# Patient Record
Sex: FEMALE | Race: WHITE | NOT HISPANIC OR LATINO | Employment: OTHER | URBAN - METROPOLITAN AREA
[De-identification: names, ages, dates, MRNs, and addresses within clinical notes are randomized per-mention and may not be internally consistent; named-entity substitution may affect disease eponyms.]

---

## 2017-11-13 ENCOUNTER — DOCUMENTATION ONLY (OUTPATIENT)
Dept: RADIATION ONCOLOGY | Facility: CLINIC | Age: 57
End: 2017-11-13

## 2017-11-13 ENCOUNTER — TREATMENT (OUTPATIENT)
Dept: RADIATION ONCOLOGY | Facility: CLINIC | Age: 57
End: 2017-11-13
Payer: MEDICAID

## 2017-11-13 PROCEDURE — 77263 THER RADIOLOGY TX PLNG CPLX: CPT | Mod: ,,, | Performed by: RADIOLOGY

## 2017-11-13 NOTE — PROGRESS NOTES
Carmen duque  [id number]  1960 11/13/2017  No referring provider defined for this encounter.    DIAGNOSIS: Bulky right-sided lung mass, with SIADH  TREATMENT SITE(S): right-sided lung mass and right supra clavicle mass    INTENT: PALLIATIVE    TREATMENT SETTING: RT ALONE     MODALITY: PHOTON    TECHNIQUE:  3D CONFORMAL RADIOTHERAPY (3DCRT)    IMRT MEDICAL NECESSITY:IMRT MEDICAL NECESSITY: N/A     HPI: 57-year-old patient presented with nausea vomiting and a sodium level of 117. Imaging reveals a large bulky right-sided mass with significant mediastinal adenopathy causing compression of SVC and pulmonary vessels. Patient does not clinically have SVC syndrome however radiation has been indicated because of the radiographic findings showing compression of the SVC vessel itself.  Patient currently in the ICU correcting her sodium level.    Patient being treated with palliative intent because of the radiographic findings.    I have personally performed treatment planning for the patient, reviewing relevant history/physical and imaging. I have defined GTV, CTV, PTV and organs at risk.     In order to accomplish this plan, I am ordering:  SIMULATION: CT SIMULATION FOR PLACEMENT OF TREATMENT FIELDS    CONTRAST: none    TO ACCOMPLISH REPRODUCIBLE POSITION: none    DEVICES FOR BEAM SHAPING: CUSTOMIZED MLC    CUSTOMIZED BOLUS: none    IMAGING: DAILY KV/KV OBI    I have ordered a weekly physics check.  SPECIAL PHYSICS CONSULT: NO  REASON: N/A    SPECIAL TREATMENT CIRCUMSTANCE: NO   Concurrent or recent administration of chemotherapeutic agents which are  known potent radiosensitizers and thus will require vigilant monitoring for  exaggerated radiation toxicities.    LABS: NONE    ANTICIPATED PRESCRIPTION: 37.5 gy in  15 fractions with repeat simulation at fraction #7 for field size reduction    TREATMENT: DAILY    PHYSICIAN: Ari Pickard MD

## 2017-11-14 ENCOUNTER — TREATMENT (OUTPATIENT)
Dept: RADIATION ONCOLOGY | Facility: CLINIC | Age: 57
End: 2017-11-14
Payer: MEDICAID

## 2017-11-14 PROCEDURE — 77295 3-D RADIOTHERAPY PLAN: CPT | Mod: 26,,, | Performed by: RADIOLOGY

## 2017-11-14 PROCEDURE — 77300 RADIATION THERAPY DOSE PLAN: CPT | Mod: 26,,, | Performed by: RADIOLOGY

## 2017-11-14 PROCEDURE — 77427 RADIATION TX MANAGEMENT X5: CPT | Mod: ,,, | Performed by: RADIOLOGY

## 2017-11-14 PROCEDURE — 77387 GUIDANCE FOR RADJ TX DLVR: CPT | Mod: 26,,, | Performed by: RADIOLOGY

## 2017-11-14 PROCEDURE — 77334 RADIATION TREATMENT AID(S): CPT | Mod: 26,,, | Performed by: RADIOLOGY

## 2017-11-17 ENCOUNTER — DOCUMENTATION ONLY (OUTPATIENT)
Dept: RADIATION ONCOLOGY | Facility: CLINIC | Age: 57
End: 2017-11-17

## 2017-11-17 ENCOUNTER — TREATMENT (OUTPATIENT)
Dept: RADIATION ONCOLOGY | Facility: CLINIC | Age: 57
End: 2017-11-17
Payer: MEDICAID

## 2017-11-17 VITALS — BODY MASS INDEX: 21.99 KG/M2 | WEIGHT: 112 LBS | HEIGHT: 60 IN

## 2017-11-17 DIAGNOSIS — G89.3 CANCER RELATED PAIN: Primary | ICD-10-CM

## 2017-11-17 DIAGNOSIS — C34.91 SMALL CELL LUNG CANCER, RIGHT: Primary | ICD-10-CM

## 2017-11-17 PROCEDURE — 77387 GUIDANCE FOR RADJ TX DLVR: CPT | Mod: ,,, | Performed by: RADIOLOGY

## 2017-11-17 PROCEDURE — 77412 RADIATION TX DELIVERY LVL 3: CPT | Mod: ,,, | Performed by: RADIOLOGY

## 2017-11-17 RX ORDER — OXYCODONE HYDROCHLORIDE 15 MG/1
15 TABLET, FILM COATED, EXTENDED RELEASE ORAL EVERY 12 HOURS
Qty: 60 TABLET | Refills: 0 | Status: SHIPPED | OUTPATIENT
Start: 2017-11-17 | End: 2017-12-26 | Stop reason: SDUPTHER

## 2017-11-17 RX ORDER — LEVOTHYROXINE SODIUM 175 UG/1
175 TABLET ORAL DAILY
Status: ON HOLD | COMMUNITY
End: 2019-08-12 | Stop reason: CLARIF

## 2017-11-17 RX ORDER — NEBIVOLOL 5 MG/1
10 TABLET ORAL DAILY
Status: ON HOLD | COMMUNITY
End: 2019-08-12 | Stop reason: CLARIF

## 2017-11-17 RX ORDER — ESOMEPRAZOLE MAGNESIUM 40 MG/1
40 CAPSULE, DELAYED RELEASE ORAL
Status: ON HOLD | COMMUNITY
End: 2019-08-12 | Stop reason: CLARIF

## 2017-11-17 RX ORDER — AMLODIPINE BESYLATE 5 MG/1
5 TABLET ORAL DAILY
Status: ON HOLD | COMMUNITY
End: 2019-09-06 | Stop reason: HOSPADM

## 2017-11-17 NOTE — PROGRESS NOTES
Patient discharged from [facility] last evening.  Will now start outpatient radiation.  Dr. Barrera saw patient and ordered MRI of the Brain.  Instructions given on chest radiation and skin care.  TA booklet given.

## 2017-11-21 ENCOUNTER — HISTORICAL (OUTPATIENT)
Dept: ADMINISTRATIVE | Facility: HOSPITAL | Age: 57
End: 2017-11-21

## 2017-11-21 ENCOUNTER — TREATMENT (OUTPATIENT)
Dept: RADIATION ONCOLOGY | Facility: CLINIC | Age: 57
End: 2017-11-21
Payer: MEDICAID

## 2017-11-21 PROCEDURE — 77387 GUIDANCE FOR RADJ TX DLVR: CPT | Mod: 26,,, | Performed by: RADIOLOGY

## 2017-11-21 PROCEDURE — 77427 RADIATION TX MANAGEMENT X5: CPT | Mod: ,,, | Performed by: RADIOLOGY

## 2017-11-27 ENCOUNTER — TREATMENT (OUTPATIENT)
Dept: RADIATION ONCOLOGY | Facility: CLINIC | Age: 57
End: 2017-11-27
Payer: MEDICAID

## 2017-11-27 DIAGNOSIS — R11.2 NAUSEA AND VOMITING IN ADULT PATIENT: Primary | ICD-10-CM

## 2017-11-27 PROCEDURE — 77295 3-D RADIOTHERAPY PLAN: CPT | Mod: ,,, | Performed by: RADIOLOGY

## 2017-11-27 PROCEDURE — 77412 RADIATION TX DELIVERY LVL 3: CPT | Mod: ,,, | Performed by: RADIOLOGY

## 2017-11-27 PROCEDURE — 77300 RADIATION THERAPY DOSE PLAN: CPT | Mod: ,,, | Performed by: RADIOLOGY

## 2017-11-27 PROCEDURE — 77387 GUIDANCE FOR RADJ TX DLVR: CPT | Mod: ,,, | Performed by: RADIOLOGY

## 2017-11-27 PROCEDURE — 77334 RADIATION TREATMENT AID(S): CPT | Mod: ,,, | Performed by: RADIOLOGY

## 2017-11-27 RX ORDER — PROMETHAZINE HYDROCHLORIDE 25 MG/1
25 TABLET ORAL EVERY 6 HOURS PRN
Qty: 120 TABLET | Refills: 3 | Status: SHIPPED | OUTPATIENT
Start: 2017-11-27 | End: 2018-11-29 | Stop reason: SDUPTHER

## 2017-12-01 PROCEDURE — 77427 RADIATION TX MANAGEMENT X5: CPT | Mod: ,,, | Performed by: RADIOLOGY

## 2017-12-04 ENCOUNTER — TREATMENT (OUTPATIENT)
Dept: RADIATION ONCOLOGY | Facility: CLINIC | Age: 57
End: 2017-12-04
Payer: MEDICAID

## 2017-12-04 DIAGNOSIS — K20.80 RADIATION ESOPHAGITIS: Primary | ICD-10-CM

## 2017-12-04 DIAGNOSIS — T66.XXXA RADIATION ESOPHAGITIS: Primary | ICD-10-CM

## 2017-12-04 PROCEDURE — 77387 GUIDANCE FOR RADJ TX DLVR: CPT | Mod: ,,, | Performed by: RADIOLOGY

## 2017-12-04 PROCEDURE — 77412 RADIATION TX DELIVERY LVL 3: CPT | Mod: ,,, | Performed by: RADIOLOGY

## 2017-12-04 RX ORDER — LIDOCAINE HYDROCHLORIDE 20 MG/ML
SOLUTION OROPHARYNGEAL EVERY 4 HOURS
Qty: 300 ML | Refills: 5 | Status: ON HOLD | OUTPATIENT
Start: 2017-12-04 | End: 2019-08-12 | Stop reason: CLARIF

## 2017-12-07 ENCOUNTER — TREATMENT (OUTPATIENT)
Dept: RADIATION ONCOLOGY | Facility: CLINIC | Age: 57
End: 2017-12-07
Payer: MEDICAID

## 2017-12-07 DIAGNOSIS — C34.11 MALIGNANT NEOPLASM OF UPPER LOBE OF RIGHT LUNG: Primary | ICD-10-CM

## 2017-12-07 PROCEDURE — 77387 GUIDANCE FOR RADJ TX DLVR: CPT | Mod: ,,, | Performed by: RADIOLOGY

## 2017-12-07 PROCEDURE — 77412 RADIATION TX DELIVERY LVL 3: CPT | Mod: ,,, | Performed by: RADIOLOGY

## 2017-12-07 PROCEDURE — 77336 RADIATION PHYSICS CONSULT: CPT | Mod: ,,, | Performed by: RADIOLOGY

## 2017-12-07 RX ORDER — OXYCODONE HYDROCHLORIDE 15 MG/1
15 TABLET ORAL EVERY 4 HOURS PRN
Qty: 40 TABLET | Refills: 0 | Status: SHIPPED | OUTPATIENT
Start: 2017-12-07 | End: 2017-12-15 | Stop reason: SDUPTHER

## 2017-12-15 DIAGNOSIS — C34.11 MALIGNANT NEOPLASM OF UPPER LOBE OF RIGHT LUNG: ICD-10-CM

## 2017-12-15 RX ORDER — OXYCODONE HYDROCHLORIDE 15 MG/1
15 TABLET ORAL EVERY 6 HOURS PRN
Qty: 40 TABLET | Refills: 0 | Status: SHIPPED | OUTPATIENT
Start: 2017-12-15 | End: 2017-12-27 | Stop reason: SDUPTHER

## 2017-12-26 DIAGNOSIS — C34.11 MALIGNANT NEOPLASM OF UPPER LOBE OF RIGHT LUNG: Primary | ICD-10-CM

## 2017-12-26 DIAGNOSIS — G89.3 CANCER RELATED PAIN: ICD-10-CM

## 2017-12-26 RX ORDER — OXYCODONE HYDROCHLORIDE 15 MG/1
15 TABLET, FILM COATED, EXTENDED RELEASE ORAL EVERY 12 HOURS
Qty: 60 TABLET | Refills: 0 | Status: SHIPPED | OUTPATIENT
Start: 2017-12-26 | End: 2018-01-16 | Stop reason: SDUPTHER

## 2017-12-27 DIAGNOSIS — C34.11 MALIGNANT NEOPLASM OF UPPER LOBE OF RIGHT LUNG: ICD-10-CM

## 2017-12-27 RX ORDER — OXYCODONE HYDROCHLORIDE 15 MG/1
15 TABLET ORAL EVERY 6 HOURS PRN
Qty: 40 TABLET | Refills: 0 | Status: SHIPPED | OUTPATIENT
Start: 2017-12-27 | End: 2018-01-08 | Stop reason: SDUPTHER

## 2018-01-02 ENCOUNTER — OFFICE VISIT (OUTPATIENT)
Dept: RADIATION ONCOLOGY | Facility: CLINIC | Age: 58
End: 2018-01-02
Payer: MEDICAID

## 2018-01-02 ENCOUNTER — DOCUMENTATION ONLY (OUTPATIENT)
Dept: RADIATION ONCOLOGY | Facility: CLINIC | Age: 58
End: 2018-01-02

## 2018-01-02 VITALS — BODY MASS INDEX: 18.36 KG/M2 | WEIGHT: 94 LBS

## 2018-01-02 DIAGNOSIS — C34.01 LUNG CANCER, MAIN BRONCHUS, RIGHT: Primary | ICD-10-CM

## 2018-01-02 PROCEDURE — 99024 POSTOP FOLLOW-UP VISIT: CPT | Mod: ,,, | Performed by: RADIOLOGY

## 2018-01-02 RX ORDER — PROMETHAZINE HYDROCHLORIDE 12.5 MG/1
12.5 TABLET ORAL EVERY 6 HOURS PRN
Refills: 5 | Status: ON HOLD | COMMUNITY
Start: 2017-11-10 | End: 2019-08-12 | Stop reason: CLARIF

## 2018-01-02 RX ORDER — DRONABINOL 5 MG/1
5 CAPSULE ORAL
Qty: 60 CAPSULE | Refills: 1 | Status: ON HOLD | OUTPATIENT
Start: 2018-01-02 | End: 2019-08-12 | Stop reason: CLARIF

## 2018-01-02 NOTE — PROGRESS NOTES
Carmen duque  [id number]  1960 1/2/2018  Yael Madrid Md  [address]  [address]  [place] [address]    DIAGNOSIS: LS-SCLC, [id number] RUL  REASON FOR VISIT: Routine scheduled follow-up.    HISTORY OF PRESENT ILLNESS:   57-year-old female with a history of COPD presented to the hospital with nausea diarrhea and weakness and found to have a sodium level of 117 with CT imaging and chest revealing a 3 x 2.2 x 2.5 cm pleural-based speaking related mass in the right upper lobe with extensive right paratracheal and right hilar adenopathy measuring 6.7 x 8.2 cm in the craniocaudal aspect. Also appreciated was right supraclavicular adenopathy there was obstruction of the right upper lobe bronchus. A second 1.7 m nodule was appreciated in the right upper lobe. CT and pelvis showed no evidence of disease and there were no bony lesions identified. She was diagnosed with SIADH, and probable diagnosis of lung cancer for which she underwent bronchoscopy that returned small cell lung cancer. Because of the impending SVC syndrome she underwent immediate palliative radiotherapy with adaptive planning to a total dose of 37.5 gray ending the treatment in December 2017. She was following with Dr. Peace but has not yet had a rosalind discussion regarding systemic therapy.    During her radiotherapy course MRI of the brain showed no evidence of metastatic disease.    INTERVAL HISTORY:   Since completion of therapy patient reports her breathing has stabilized and she is intermittently using her oxygen at home. She is not complaining of any facial swelling or any weakness in her upper extremities. She does not have cough and is not producing any mucus although she does have residual dysphagia for which she is using Anahi's Magic mouthwash and pain medication. She reports her by mouth intake is poor secondary to nausea and her family reports she intermittently uses her anti-emetics. She has lost some weight and she explains  that she is not inclined to take systemic therapy. She has an appointment with Dr. Peace to discuss this next week    Review of Systems   Constitutional: Positive for unexpected weight change. Negative for appetite change, chills and fever.   HENT:   Positive for sore throat, trouble swallowing and voice change. Negative for lump/mass and mouth sores.    Eyes: Negative for eye problems and icterus.   Respiratory: Positive for shortness of breath and wheezing. Negative for cough and hemoptysis.    Cardiovascular: Negative for chest pain and leg swelling.   Gastrointestinal: Negative for abdominal pain, constipation, diarrhea, nausea and vomiting.   Genitourinary: Negative for dysuria, frequency, hematuria, nocturia and vaginal bleeding.    Musculoskeletal: Negative for back pain, gait problem, neck pain and neck stiffness.   Neurological: Negative for extremity weakness, gait problem, headaches, numbness and seizures.   Hematological: Negative for adenopathy.   Psychiatric/Behavioral: Positive for confusion. The patient is nervous/anxious.      Past Medical History:   Diagnosis Date    Acid reflux     Anemia     Anxiety     Arthritis     Blindness - both eyes     Chronic kidney disease     COPD (chronic obstructive pulmonary disease)     GERD (gastroesophageal reflux disease)     Gout     Hypertension     Lung cancer     Lung cancer, main bronchus, right 1/2/2018    Osteopenia     Rheumatoid arteritis     SIADH (syndrome of inappropriate ADH production) 11/12/2017    Solitary kidney     Thyroid disease     Vitamin D deficiency      Past Surgical History:   Procedure Laterality Date    ABDOMINAL ADHESION SURGERY      ADENOIDECTOMY      kidney removal      right     TONSILLECTOMY       Social History     Social History    Marital status:      Spouse name: N/A    Number of children: N/A    Years of education: N/A     Occupational History    disabled      Social History Main Topics     Smoking status: Current Every Day Smoker     Packs/day: 0.50     Types: Cigarettes    Smokeless tobacco: None    Alcohol use No      Comment: seldom    Drug use: No    Sexual activity: No     Other Topics Concern    None     Social History Narrative    None     Family History   Problem Relation Age of Onset    Stroke Mother     Thyroid disease Mother     Sleep apnea Mother     Hypertension Mother     Clotting disorder Mother     COPD Father     Heart disease Father     Neuropathy Father     Neuropathy Sister     Thyroid disease Sister     Fibromyalgia Sister     Heart disease Brother     Heart disease Maternal Grandfather     Parkinsonism Maternal Grandfather     Diabetes Maternal Aunt     Hypertension Maternal Aunt     Heart disease Paternal Uncle     Diabetes Paternal Uncle      Medication List with Changes/Refills   New Medications    DRONABINOL (MARINOL) 5 MG CAPSULE    Take 1 capsule (5 mg total) by mouth 2 (two) times daily before meals.   Current Medications    ALBUTEROL (PROVENTIL HFA/VENTOLIN HFA) 200 PUFF INHALER    Inhale 2 puffs into the lungs every 6 (six) hours as needed.      ALENDRONATE (FOSAMAX) 70 MG TABLET    Take 70 mg by mouth every 7 days.      ALPRAZOLAM (XANAX) 0.5 MG TABLET    Take 0.5 mg by mouth nightly as needed.      AMLODIPINE (NORVASC) 5 MG TABLET    Take 5 mg by mouth once daily.    CALCIUM CITRATE (CALCITRATE) 200 MG (950 MG) TABLET    Take 5 tablets by mouth once daily.      ERGOCALCIFEROL, VITAMIN D2, 1,000 UNIT TAB    2 tablets daily    ESOMEPRAZOLE (NEXIUM) 40 MG CAPSULE    Take 40 mg by mouth before breakfast.    HYDROCODONE-ACETAMINOPHEN (LORTAB) 7.5-500 MG PER TABLET    Take 1 tablet by mouth 2 (two) times daily as needed for Pain.    LEVOTHYROXINE (SYNTHROID, LEVOTHROID) 175 MCG TABLET    Take 175 mcg by mouth once daily.    LIDOCAINE HCL 2% (XYLOCAINE) 2 % SOLN    by Mucous Membrane route every 4 (four) hours. 5 ml every 4 hours by mouth as needed  for swallowing pain    LISINOPRIL (PRINIVIL,ZESTRIL) 5 MG TABLET    Take 5 mg by mouth 2 (two) times daily. Says takes this about once a week now.  Goes by how her body feels she says.     NEBIVOLOL (BYSTOLIC) 5 MG TAB    Take 10 mg by mouth once daily.    OXYCODONE (OXYCONTIN) 15 MG TR12 12 HR TABLET    Take 1 tablet (15 mg total) by mouth every 12 (twelve) hours.    OXYCODONE (ROXICODONE) 15 MG TAB    Take 1 tablet (15 mg total) by mouth every 6 (six) hours as needed for Pain.    PROMETHAZINE (PHENERGAN) 12.5 MG TAB        PROMETHAZINE (PHENERGAN) 25 MG TABLET    Take 1 tablet (25 mg total) by mouth every 6 (six) hours as needed for Nausea.    SODIUM CHLORIDE 1 GRAM TABLET    Take 1 g by mouth 3 (three) times daily.     Review of patient's allergies indicates:   Allergen Reactions    Celebrex [celecoxib]      Due to kidney removal she can not take anything for RA    Ibuprofen      Due to kidney removal    Neurontin [gabapentin]     Sulfa (sulfonamide antibiotics) Hives    Topamax [topiramate] Swelling       QUALITY OF LIFE: 80%- Normal Activity with Effort: Some Symptoms of Disease    Vitals:    01/02/18 1300   Weight: 42.6 kg (94 lb)   PainSc: 0-No pain       PHYSICAL EXAM:   GENERAL: alert; in no apparent distress. Very anxious  HEAD: normocephalic, atraumatic.  EYES: pupils are equal, round, reactive to light and accommodation. Sclera anicteric. Conjunctiva not injected.   NOSE/THROAT: no nasal erythema or rhinorrhea. Oropharynx pink, without erythema, ulcerations or thrush. Poor dentition  NECK: no cervical motion rigidity; supple with no masses.  CHEST: coarse BS in upper lobes R > L; no crackels or effusions. Patient is speaking comfortably on room air with normal work of breathing without using accessory muscles of respiration. SaO2 100%  CARDIOVASCULAR: Tachycardic; no murmurs, rubs or gallops.  ABDOMEN: soft, nontender, nondistended. Bowel sounds present.   MUSCULOSKELETAL: no tenderness to palpation  along the spine or scapulae. Normal range of motion.  NEUROLOGIC: cranial nerves II-XII intact bilaterally. Strength 5/5 in bilateral upper and lower extremities. No sensory deficits appreciated.  Normal gait.  EXTREMITIES: no clubbing, cyanosis, edema.  SKIN: no erythema, rashes, ulcerations noted.     ANCILLARY DATA: MRI B 12/17: jaydon    ASSESSMENT: 57F with limited stage small cell lung carcinoma of the right upper lobe, [id number] status post radiotherapy for impending SVC syndrome to 3750 cGy ending December 2017.  PLAN:  Ms. Robertson presents today without any signs or symptoms of SVC syndrome. She tolerated her radiotherapy well but does have some residual dysphagia which I recommended she continue using Anahi's Magic mouthwash and her pain medication which has been recently refilled. Also given her prescription today for Marinol to help with appetite and our dietitian will arrange for her to have ensure delivered to her home. I again reinforced the recommendation for round-the-clock anti-emetics. I explained to her the importance of systemic therapy for this disease and she again reiterated that she was not interested in chemotherapy but directed her to have this conversation frankly with the medical oncologist Dr. Peace, with whom she has an appointment next week. If the patient does not elect to proceed with systemic therapy to achieve control below the neck I do not think she would be a good candidate for prophylactic cranial irradiation therapy. Notably she does have a clear MRI of the brain at this time. Patient can follow-up with us as needed or if there are any questions following her discussion with Dr. Peace but did explain to her that there was no further role for radiotherapy to the chest and my above recommendation against PCI in the absence of systemic therapy for this disease. She and her family expressed understanding.    All questions answered and contact information provided. Patient  understands free to call us anytime with any questions or concerns regarding radiation therapy.    TIME SPENT WITH PATIENT: I have personally seen and evaluated this patient. Approximately 30 minutes were spent with the patient discussing follow-up careplan.     PHYSICIAN: Genaro Barrera Jr, MD

## 2018-01-02 NOTE — PROGRESS NOTES
NUTRITION NOTE    Ms. Morales is a 58 year old female with small cell lung cancer.  Orly Barrera asked me to see her because she has lost 16# over the past 4-6 weeks.  Carmen attributes poor appetite to hypoguesia and some radiation induced esophagitis.  Her family states she has never been a big eater.  Weight: 94#  She likes nutrition supplements such as Ensure.    Plan: Advised Carmen to aim for 1500 calories daily.  2. Recommended she eat small meals 4-6 times a day.  Gave list of calorie dense foods, small snack and small meal ideas.  3. Advised she could drink 4 bottles of Ensure Plus daily and will meet most of her nutrition needs.  4. Dr. Barrera prescribed Marinol.  5. Faxed Medicaid Oral supplement referral form into [employer].

## 2018-01-08 DIAGNOSIS — C34.11 MALIGNANT NEOPLASM OF UPPER LOBE OF RIGHT LUNG: ICD-10-CM

## 2018-01-08 RX ORDER — OXYCODONE HYDROCHLORIDE 15 MG/1
15 TABLET ORAL EVERY 6 HOURS PRN
Qty: 40 TABLET | Refills: 0 | Status: SHIPPED | OUTPATIENT
Start: 2018-01-08 | End: 2018-01-16 | Stop reason: SDUPTHER

## 2018-01-08 NOTE — TELEPHONE ENCOUNTER
Patient calling for a refill on pain medication. I explained to patient that this will be the last time we refill her medication. She has been discharged by Dr. Barrera, but remains under the care of Dr. Peace. She has a follow up with Kobi on Jan.16th, I did make it clear to Ms. RUFUS duque that she should discuss her pain medication at that visit.

## 2018-01-15 ENCOUNTER — TELEPHONE (OUTPATIENT)
Dept: RADIATION ONCOLOGY | Facility: CLINIC | Age: 58
End: 2018-01-15

## 2018-01-15 NOTE — TELEPHONE ENCOUNTER
Ms. Leyva has been approved for Ensure Plus via [employer].  [employer] has not been able to reach Ms. Leyva.    Plan: Called Ms. Leyva at [phone] and she did answer the phone. Discussed fact that she had been approved to receive Ensure Plus.  Advised that [employer] will be calling her.  2. Called [employer] and gave them Ms. Leyva's phone number and advised that Ms. Leyva is expecting a call.

## 2018-01-16 ENCOUNTER — OFFICE VISIT (OUTPATIENT)
Dept: HEMATOLOGY/ONCOLOGY | Facility: CLINIC | Age: 58
End: 2018-01-16
Payer: MEDICAID

## 2018-01-16 VITALS
DIASTOLIC BLOOD PRESSURE: 64 MMHG | TEMPERATURE: 98 F | RESPIRATION RATE: 18 BRPM | HEART RATE: 112 BPM | SYSTOLIC BLOOD PRESSURE: 92 MMHG | BODY MASS INDEX: 18.1 KG/M2 | HEIGHT: 60 IN | WEIGHT: 92.19 LBS

## 2018-01-16 DIAGNOSIS — G89.3 CANCER RELATED PAIN: ICD-10-CM

## 2018-01-16 DIAGNOSIS — C34.91 PRIMARY CANCER OF RIGHT LUNG: Chronic | ICD-10-CM

## 2018-01-16 DIAGNOSIS — F17.200 SMOKER: ICD-10-CM

## 2018-01-16 DIAGNOSIS — G89.3 CANCER ASSOCIATED PAIN: Chronic | ICD-10-CM

## 2018-01-16 DIAGNOSIS — C34.11 MALIGNANT NEOPLASM OF UPPER LOBE OF RIGHT LUNG: ICD-10-CM

## 2018-01-16 DIAGNOSIS — C34.01 LUNG CANCER, MAIN BRONCHUS, RIGHT: Primary | ICD-10-CM

## 2018-01-16 LAB
ALBUMIN SERPL-MCNC: 4.5 G/DL (ref 3.1–4.7)
ALP SERPL-CCNC: 117 IU/L (ref 40–104)
ALT (SGPT): 21 IU/L (ref 3–33)
AST SERPL-CCNC: 24 IU/L (ref 10–40)
BASOPHILS NFR BLD: 0 K/UL (ref 0–0.2)
BASOPHILS NFR BLD: 0.4 %
BILIRUB SERPL-MCNC: 0.4 MG/DL (ref 0.3–1)
BUN SERPL-MCNC: 17 MG/DL (ref 8–20)
CALCIUM SERPL-MCNC: 9.6 MG/DL (ref 7.7–10.4)
CHLORIDE: 103 MMOL/L (ref 98–110)
CO2 SERPL-SCNC: 24 MMOL/L (ref 22.8–31.6)
CREATININE: 0.94 MG/DL (ref 0.6–1.4)
EOSINOPHIL NFR BLD: 0.1 K/UL (ref 0–0.7)
EOSINOPHIL NFR BLD: 0.7 %
ERYTHROCYTE [DISTWIDTH] IN BLOOD BY AUTOMATED COUNT: 16.1 % (ref 12.5–14.5)
GLUCOSE: 94 MG/DL (ref 70–99)
GRAN #: 6.1 K/UL (ref 1.4–6.5)
GRAN%: 74.4 %
HCT VFR BLD AUTO: 32.9 % (ref 36–48)
HGB BLD-MCNC: 10.3 G/DL (ref 12–15)
IMMATURE GRANS (ABS): 0 K/UL (ref 0–1)
IMMATURE GRANULOCYTES: 0.2 %
LYMPH #: 1 K/UL (ref 1.2–3.4)
LYMPH%: 11.7 %
MCH RBC QN AUTO: 30.9 PG (ref 25–35)
MCHC RBC AUTO-ENTMCNC: 31.3 G/DL (ref 31–36)
MCV RBC AUTO: 98.8 FL (ref 79–98)
MONO #: 1 K/UL (ref 0.1–0.6)
MONO%: 12.6 %
NUCLEATED RBCS: 0 %
NUCLEATED RED BLOOD CELLS: 0 /100 WBC
PERFORMED BY:: ABNORMAL
PLATELET # BLD AUTO: 304 K/UL (ref 140–440)
PMV BLD AUTO: 9.4 FL (ref 8.8–12.7)
POTASSIUM SERPL-SCNC: 4.5 MMOL/L (ref 3.5–5)
PROT SERPL-MCNC: 8.2 G/DL (ref 6–8.2)
RBC # BLD AUTO: 3.33 M/UL (ref 3.5–5.5)
SODIUM: 136 MMOL/L (ref 134–144)
WBC # BLD: 8.2 K/UL (ref 5–10)

## 2018-01-16 PROCEDURE — 99215 OFFICE O/P EST HI 40 MIN: CPT | Mod: ,,, | Performed by: INTERNAL MEDICINE

## 2018-01-16 RX ORDER — OXYCODONE HYDROCHLORIDE 15 MG/1
15 TABLET, FILM COATED, EXTENDED RELEASE ORAL EVERY 12 HOURS
Qty: 60 TABLET | Refills: 0 | Status: SHIPPED | OUTPATIENT
Start: 2018-01-16 | End: 2019-06-03 | Stop reason: SDUPTHER

## 2018-01-16 RX ORDER — OXYCODONE HYDROCHLORIDE 15 MG/1
15 TABLET ORAL EVERY 6 HOURS PRN
Qty: 60 TABLET | Refills: 0 | Status: SHIPPED | OUTPATIENT
Start: 2018-01-16 | End: 2019-06-03 | Stop reason: SDUPTHER

## 2018-01-16 NOTE — ASSESSMENT & PLAN NOTE
Patient is on 2 medications.      Oxycontin: 15mg bid  Oxycodone: 15mg every 4 hours prn.     This regimen is doing well currently.  Patient has good pain control

## 2018-01-16 NOTE — PROGRESS NOTES
Subjective:       Patient ID: Carmen duque is a 57 y.o. female.    Chief Complaint: Initial Visit and Results (scan and labs in epic)  follow up small cell lung cancer.     Patient is a 56yo female who presented with near SVC syndrome in November of this year.  Patient was found to have a small cell lung cancer on bronchoscopy.  She underwent 3 weeks of single agent XRT which completed in December of 2017 and is here for recommendations for chemotherapy.  She was scheduled here prior but missed the appt. Due to not having a ride.      CT scan of the chest orignially showed a 3.5cm right pleural based mass and massive mediastinal LAD >8cm and supraclav LAD.      MRI of the brain was negative and CT of the abd/pelvis was neg in November.         CT Nov: 2017  IMPRESSION: 3.0 x 2.2 x 3.5 cm pleural-based spiculated mass within the right upper lobe within the adjacent 14 x 18 mm nodule. There is extensive right paratracheal, right hilar and supraclavicular adenopathy. There is obstruction of the right upper lobe bronchus. Findings are compatible with bronchogenic malignancy. Bronchoscopy is recommended for further evaluation Diffuse emphysematous changes Prior right nephrectomy with no evidence of metastatic disease within the abdomen or pelvis       Past Medical History:   Diagnosis Date    Acid reflux     Anemia     Anxiety     Arthritis     Blindness - both eyes     Chronic kidney disease     COPD (chronic obstructive pulmonary disease)     GERD (gastroesophageal reflux disease)     Gout     Hypertension     Lung cancer     Lung cancer, main bronchus, right 1/2/2018    Osteopenia     Rheumatoid arteritis     SIADH (syndrome of inappropriate ADH production) 11/12/2017    Solitary kidney     Thyroid disease     Vitamin D deficiency        Past Surgical History:   Procedure Laterality Date    ABDOMINAL ADHESION SURGERY      ADENOIDECTOMY      kidney removal      right     TONSILLECTOMY          Social History     Social History    Marital status:      Spouse name: N/A    Number of children: N/A    Years of education: N/A     Occupational History    disabled      Social History Main Topics    Smoking status: Former Smoker     Packs/day: 0.50     Types: Cigarettes     Quit date: 4/16/2017    Smokeless tobacco: Never Used    Alcohol use No      Comment: seldom    Drug use: No    Sexual activity: No     Other Topics Concern    None     Social History Narrative    None       Family History   Problem Relation Age of Onset    Stroke Mother     Thyroid disease Mother     Sleep apnea Mother     Hypertension Mother     Clotting disorder Mother     COPD Father     Heart disease Father     Neuropathy Father     Neuropathy Sister     Thyroid disease Sister     Fibromyalgia Sister     Heart disease Brother     Heart disease Maternal Grandfather     Parkinsonism Maternal Grandfather     Diabetes Maternal Aunt     Hypertension Maternal Aunt     Heart disease Paternal Uncle     Diabetes Paternal Uncle        Review of patient's allergies indicates:   Allergen Reactions    Celebrex [celecoxib]      Due to kidney removal she can not take anything for RA    Ibuprofen      Due to kidney removal    Neurontin [gabapentin]     Sulfa (sulfonamide antibiotics) Hives    Topamax [topiramate] Swelling       Current Outpatient Prescriptions:     albuterol (PROVENTIL HFA/VENTOLIN HFA) 200 puff inhaler, Inhale 2 puffs into the lungs every 6 (six) hours as needed.  , Disp: , Rfl:     alendronate (FOSAMAX) 70 MG tablet, Take 70 mg by mouth every 7 days.  , Disp: , Rfl:     alprazolam (XANAX) 0.5 MG tablet, Take 0.5 mg by mouth nightly as needed.  , Disp: , Rfl:     amLODIPine (NORVASC) 5 MG tablet, Take 5 mg by mouth once daily., Disp: , Rfl:     calcium citrate (CALCITRATE) 200 mg (950 mg) tablet, Take 5 tablets by mouth once daily.  , Disp: , Rfl:     dronabinol (MARINOL) 5 MG  capsule, Take 1 capsule (5 mg total) by mouth 2 (two) times daily before meals., Disp: 60 capsule, Rfl: 1    ergocalciferol, vitamin D2, 1,000 unit Tab, 2 tablets daily, Disp: 60 tablet, Rfl: 5    esomeprazole (NEXIUM) 40 MG capsule, Take 40 mg by mouth before breakfast., Disp: , Rfl:     hydrocodone-acetaminophen (LORTAB) 7.5-500 mg per tablet, Take 1 tablet by mouth 2 (two) times daily as needed for Pain., Disp: 60 tablet, Rfl: 1    levothyroxine (SYNTHROID, LEVOTHROID) 175 MCG tablet, Take 175 mcg by mouth once daily., Disp: , Rfl:     lidocaine HCl 2% (XYLOCAINE) 2 % Soln, by Mucous Membrane route every 4 (four) hours. 5 ml every 4 hours by mouth as needed for swallowing pain, Disp: 300 mL, Rfl: 5    lisinopril (PRINIVIL,ZESTRIL) 5 MG tablet, Take 5 mg by mouth 2 (two) times daily. Says takes this about once a week now.  Goes by how her body feels she says. , Disp: , Rfl:     nebivolol (BYSTOLIC) 5 MG Tab, Take 10 mg by mouth once daily., Disp: , Rfl:     oxyCODONE (OXYCONTIN) 15 mg TR12 12 hr tablet, Take 1 tablet (15 mg total) by mouth every 12 (twelve) hours., Disp: 60 tablet, Rfl: 0    oxyCODONE (ROXICODONE) 15 MG Tab, Take 1 tablet (15 mg total) by mouth every 6 (six) hours as needed for Pain., Disp: 60 tablet, Rfl: 0    promethazine (PHENERGAN) 12.5 MG Tab, , Disp: , Rfl: 5    promethazine (PHENERGAN) 25 MG tablet, Take 1 tablet (25 mg total) by mouth every 6 (six) hours as needed for Nausea., Disp: 120 tablet, Rfl: 3    sodium chloride 1 gram tablet, Take 1 g by mouth 3 (three) times daily., Disp: , Rfl:     All medications and past history have been reviewed.    Review of Systems   Constitutional: Negative for fever.   Respiratory: Negative for shortness of breath.    Cardiovascular: Negative for chest pain and leg swelling.   Gastrointestinal: Negative for abdominal pain and blood in stool.   Genitourinary: Negative for hematuria.   Skin: Negative for rash.       Objective:        BP 92/64    Pulse (!) 112   Temp 97.8 °F (36.6 °C)   Resp 18   Ht 5' (1.524 m)   Wt 41.8 kg (92 lb 3.2 oz)   BMI 18.01 kg/m²     Physical Exam   Constitutional: She appears well-developed and well-nourished.   HENT:   Head: Normocephalic and atraumatic.   Right Ear: External ear normal.   Left Ear: External ear normal.   Mouth/Throat: Oropharynx is clear and moist.   Eyes: Conjunctivae are normal. Pupils are equal, round, and reactive to light.   Neck: No tracheal deviation present. No thyromegaly present.   Cardiovascular: Normal rate, regular rhythm and normal heart sounds.    Pulmonary/Chest: Effort normal and breath sounds normal.   Abdominal: Soft. Bowel sounds are normal. She exhibits no distension and no mass. There is no tenderness.   Musculoskeletal: She exhibits no edema.   Neurological:   Neuro intact througout   Skin: No rash noted.   Psychiatric: She has a normal mood and affect. Her behavior is normal. Judgment and thought content normal.         Lab  No results found for this or any previous visit (from the past 336 hour(s)).  CMP  Sodium   Date Value Ref Range Status   10/12/2012 134 (L) 136 - 145 mmol/L Final     Potassium   Date Value Ref Range Status   10/12/2012 4.9 3.5 - 5.1 mmol/L Final     Chloride   Date Value Ref Range Status   10/12/2012 100 95 - 110 mmol/L Final     CO2   Date Value Ref Range Status   10/12/2012 23 23 - 29 mmol/L Final     Glucose   Date Value Ref Range Status   10/12/2012 87 70 - 110 mg/dl Final     BUN, Bld   Date Value Ref Range Status   10/12/2012 7 6 - 20 mg/dl Final     Creatinine   Date Value Ref Range Status   10/12/2012 0.8 0.5 - 1.4 mg/dl Final     Calcium   Date Value Ref Range Status   10/12/2012 9.6 8.7 - 10.5 mg/dl Final     Total Protein   Date Value Ref Range Status   06/07/2012 7.7 6.0 - 8.4 g/dL Final   06/07/2012 7.7 6.0 - 8.4 g/dL Final     Albumin   Date Value Ref Range Status   06/07/2012 3.9 3.5 - 5.2 g/dl Final   06/07/2012 3.9 3.5 - 5.2 g/dl Final      Total Bilirubin   Date Value Ref Range Status   06/07/2012 0.2 0.1 - 1.0 mg/dl Final     Comment:     For infants and newborns, interpretation of results should be based  on gestational age, weight and in agreement with clinical  observations.  .  Premature Infant recommended reference ranges:  Up to 24 hours.............<8.0 mg/dl  Up to 48 hours............<12.0 mg/dl  3-5 days..................<15.0 mg/dl  6-29 days.................<15.0 mg/dl   06/07/2012 0.2 0.1 - 1.0 mg/dl Final     Comment:     For infants and newborns, interpretation of results should be based  on gestational age, weight and in agreement with clinical  observations.  .  Premature Infant recommended reference ranges:  Up to 24 hours.............<8.0 mg/dl  Up to 48 hours............<12.0 mg/dl  3-5 days..................<15.0 mg/dl  6-29 days.................<15.0 mg/dl     Alkaline Phosphatase   Date Value Ref Range Status   06/07/2012 135 55 - 135 U/L Final   06/07/2012 135 55 - 135 U/L Final     AST   Date Value Ref Range Status   06/07/2012 12 10 - 40 U/L Final   06/07/2012 12 10 - 40 U/L Final     ALT   Date Value Ref Range Status   06/07/2012 7 (L) 10 - 44 U/L Final   06/07/2012 7 (L) 10 - 44 U/L Final     Anion Gap   Date Value Ref Range Status   10/12/2012 11.0 8 - 16 mmol/L Final     eGFR if    Date Value Ref Range Status   10/12/2012 >60 >60 mL/min Final     Comment:     Estimated glomerular filtration rate (eGFR) is normalized to an  average body surface area of 1.73 square meters.  The calculation  used to obtain the eGFR is the adjusted MDRD equation, which factors  patient sex, age, race, and creatinine result.  Since race is unknown  in our information system, the eGFR values for -American  and Non--American patients are given for each creatinine  result.     eGFR if non    Date Value Ref Range Status   10/12/2012 >60 >60 mL/min Final         Specimen (12h ago through future)     None                All lab results and imaging results have been reviewed and discussed with the patient.     Assessment:       1. Lung cancer, main bronchus, right    2. Smoker    3. Primary cancer of right lung    4. Cancer associated pain    5. Malignant neoplasm of upper lobe of right lung    6. Cancer related pain      Problem List Items Addressed This Visit     Lung cancer, main bronchus, right-Small Cell - Primary     Had a long discussion with the patient about the life threatening terminal nature of this disease.  She has completed palliative XRT and is here for recommendations.  Will get staging CT scans to see where the cancer is now located and plan would be to begin Carbo/etop in a palliative fashion for six cycles.  Reviewed the risks and benefits in great detail with the patient.  She will need a PORT and chemo education and discussed this as well.  Will need weekly labs during treatment.      Will begin this process and hope to treat within the next 2 to 3 weeks.      Difficult diease with high mortality and low one year survival.           Relevant Orders    Ambulatory referral to Chemo School    Ambulatory referral to General Surgery    CBC auto differential    Comprehensive metabolic panel    CT Chest Without Contrast    Cancer associated pain (Chronic)     Patient is on 2 medications.      Oxycontin: 15mg bid  Oxycodone: 15mg every 4 hours prn.     This regimen is doing well currently.  Patient has good pain control           Smoker      Other Visit Diagnoses     Primary cancer of right lung  (Chronic)       Malignant neoplasm of upper lobe of right lung        Relevant Medications    oxyCODONE (ROXICODONE) 15 MG Tab    oxyCODONE (OXYCONTIN) 15 mg TR12 12 hr tablet    Cancer related pain        Relevant Medications    oxyCODONE (OXYCONTIN) 15 mg TR12 12 hr tablet        No matching staging information was found for the patient.      Plan:         Follow-up in about 3 weeks (around 2/6/2018).        The plan was discussed with the patient and all questions/concerns have been answered to the patient's satisfaction.

## 2018-01-16 NOTE — LETTER
January 16, 2018      Kem Kiser MD  [address]  [address]  [place] [address]           [facility]  [address]  [address]  [facility] [address]  Phone: [phone]  Fax: [phone]          Patient: Carmen duque   MR Number: [id number]   YOB: 1960   Date of Visit: 1/16/2018       Dear Dr. Kem Kiser:    Thank you for referring Carmen duque to me for evaluation. Attached you will find relevant portions of my assessment and plan of care.    If you have questions, please do not hesitate to call me. I look forward to following Carmen duque along with you.    Sincerely,    Winston Carr MD    Enclosure  CC:  No Recipients    If you would like to receive this communication electronically, please contact [email or web address] or [phone] to request more information on [facility] Link access.    For providers and/or their staff who would like to refer a patient to [facility], please contact us through our one-stop-shop provider referral line, [facility] [place], at [phone].    If you feel you have received this communication in error or would no longer like to receive these types of communications, please e-mail [email or web address]

## 2018-01-19 ENCOUNTER — TELEPHONE (OUTPATIENT)
Dept: HEMATOLOGY/ONCOLOGY | Facility: CLINIC | Age: 58
End: 2018-01-19

## 2018-01-19 ENCOUNTER — OFFICE VISIT (OUTPATIENT)
Dept: SURGERY | Facility: CLINIC | Age: 58
End: 2018-01-19
Payer: MEDICAID

## 2018-01-19 VITALS
WEIGHT: 92.13 LBS | SYSTOLIC BLOOD PRESSURE: 92 MMHG | DIASTOLIC BLOOD PRESSURE: 64 MMHG | BODY MASS INDEX: 18.09 KG/M2 | HEIGHT: 60 IN

## 2018-01-19 DIAGNOSIS — C34.01 LUNG CANCER, MAIN BRONCHUS, RIGHT: Primary | ICD-10-CM

## 2018-01-19 PROCEDURE — 99203 OFFICE O/P NEW LOW 30 MIN: CPT | Mod: ,,, | Performed by: SURGERY

## 2018-01-19 NOTE — TELEPHONE ENCOUNTER
Called to schedule patients chemotherapy school and she would like for us to call her back on Monday 1/22/18 because she wants to look at her schedule first.

## 2018-01-19 NOTE — PROGRESS NOTES
Subjective:       Patient ID: Carmen duque is a 57 y.o. female.    Chief Complaint: Other (Referred by Dr Carr to Evaluate Port Placement)      HPI:   Patient requiring chemotherapy presents for port placement. Patient has stage IV lung cancer has completed radiation therapy already.    Past Medical History:   Diagnosis Date    Acid reflux     Anemia     Anxiety     Arthritis     Blindness - both eyes     Chronic kidney disease     COPD (chronic obstructive pulmonary disease)     GERD (gastroesophageal reflux disease)     Gout     Hypertension     Lung cancer     Lung cancer, main bronchus, right 1/2/2018    Osteopenia     Rheumatoid arteritis     SIADH (syndrome of inappropriate ADH production) 11/12/2017    Solitary kidney     Thyroid disease     Vitamin D deficiency      Past Surgical History:   Procedure Laterality Date    ABDOMINAL ADHESION SURGERY      ADENOIDECTOMY      kidney removal      right     TONSILLECTOMY       Review of patient's allergies indicates:   Allergen Reactions    Celebrex [celecoxib]      Due to kidney removal she can not take anything for RA    Ibuprofen      Due to kidney removal    Neurontin [gabapentin]     Sulfa (sulfonamide antibiotics) Hives    Topamax [topiramate] Swelling     Medication List with Changes/Refills   Current Medications    ALBUTEROL (PROVENTIL HFA/VENTOLIN HFA) 200 PUFF INHALER    Inhale 2 puffs into the lungs every 6 (six) hours as needed.      ALENDRONATE (FOSAMAX) 70 MG TABLET    Take 70 mg by mouth every 7 days.      ALPRAZOLAM (XANAX) 0.5 MG TABLET    Take 0.5 mg by mouth nightly as needed.      AMLODIPINE (NORVASC) 5 MG TABLET    Take 5 mg by mouth once daily.    CALCIUM CITRATE (CALCITRATE) 200 MG (950 MG) TABLET    Take 5 tablets by mouth once daily.      DRONABINOL (MARINOL) 5 MG CAPSULE    Take 1 capsule (5 mg total) by mouth 2 (two) times daily before meals.    ERGOCALCIFEROL, VITAMIN D2, 1,000 UNIT TAB    2 tablets daily     ESOMEPRAZOLE (NEXIUM) 40 MG CAPSULE    Take 40 mg by mouth before breakfast.    HYDROCODONE-ACETAMINOPHEN (LORTAB) 7.5-500 MG PER TABLET    Take 1 tablet by mouth 2 (two) times daily as needed for Pain.    LEVOTHYROXINE (SYNTHROID, LEVOTHROID) 175 MCG TABLET    Take 175 mcg by mouth once daily.    LIDOCAINE HCL 2% (XYLOCAINE) 2 % SOLN    by Mucous Membrane route every 4 (four) hours. 5 ml every 4 hours by mouth as needed for swallowing pain    LISINOPRIL (PRINIVIL,ZESTRIL) 5 MG TABLET    Take 5 mg by mouth 2 (two) times daily. Says takes this about once a week now.  Goes by how her body feels she says.     NEBIVOLOL (BYSTOLIC) 5 MG TAB    Take 10 mg by mouth once daily.    OXYCODONE (OXYCONTIN) 15 MG TR12 12 HR TABLET    Take 1 tablet (15 mg total) by mouth every 12 (twelve) hours.    OXYCODONE (ROXICODONE) 15 MG TAB    Take 1 tablet (15 mg total) by mouth every 6 (six) hours as needed for Pain.    PROMETHAZINE (PHENERGAN) 12.5 MG TAB        PROMETHAZINE (PHENERGAN) 25 MG TABLET    Take 1 tablet (25 mg total) by mouth every 6 (six) hours as needed for Nausea.    SODIUM CHLORIDE 1 GRAM TABLET    Take 1 g by mouth 3 (three) times daily.     Family History   Problem Relation Age of Onset    Stroke Mother     Thyroid disease Mother     Sleep apnea Mother     Hypertension Mother     Clotting disorder Mother     COPD Father     Heart disease Father     Neuropathy Father     Neuropathy Sister     Thyroid disease Sister     Fibromyalgia Sister     Heart disease Brother     Heart disease Maternal Grandfather     Parkinsonism Maternal Grandfather     Diabetes Maternal Aunt     Hypertension Maternal Aunt     Heart disease Paternal Uncle     Diabetes Paternal Uncle      Social History     Social History    Marital status:      Spouse name: N/A    Number of children: N/A    Years of education: N/A     Occupational History    disabled      Social History Main Topics    Smoking status: Former  Smoker     Packs/day: 0.50     Types: Cigarettes     Quit date: 4/16/2017    Smokeless tobacco: Never Used    Alcohol use No      Comment: seldom    Drug use: No    Sexual activity: No     Other Topics Concern    None     Social History Narrative    None         Review of Systems   Constitutional: Negative for appetite change, chills, fever and unexpected weight change.   HENT: Negative for hearing loss, rhinorrhea, sore throat and voice change.    Eyes: Negative for photophobia and visual disturbance.   Respiratory: Negative for cough, choking and shortness of breath.    Cardiovascular: Negative for chest pain, palpitations and leg swelling.   Gastrointestinal: Negative for abdominal pain, blood in stool, constipation, diarrhea, nausea and vomiting.   Endocrine: Negative for cold intolerance, heat intolerance and polyphagia.   Genitourinary: Negative for dysuria.   Musculoskeletal: Negative for arthralgias and back pain.   Skin: Negative for color change.   Neurological: Negative for dizziness, seizures, syncope and headaches.   Hematological: Negative for adenopathy. Does not bruise/bleed easily.       Objective:      Physical Exam   Constitutional: She appears well-developed and well-nourished.  Non-toxic appearance. No distress.   HENT:   Head: Normocephalic and atraumatic. Head is without abrasion and without laceration.   Right Ear: External ear normal.   Left Ear: External ear normal.   Nose: Nose normal.   Mouth/Throat: Oropharynx is clear and moist.   Eyes: EOM are normal. Pupils are equal, round, and reactive to light.   Neck: Trachea normal. No tracheal deviation and normal range of motion present. No thyroid mass and no thyromegaly present.   Cardiovascular: Normal rate and regular rhythm.    Pulmonary/Chest: Effort normal. No accessory muscle usage. No tachypnea. No respiratory distress.   Abdominal: Soft. Normal appearance and bowel sounds are normal. She exhibits no distension and no mass. There  is no hepatosplenomegaly. There is no tenderness. There is no tenderness at McBurney's point and negative Lopez's sign. No hernia.   Lymphadenopathy:     She has no cervical adenopathy.     She has no axillary adenopathy.        Right: No inguinal adenopathy present.        Left: No inguinal adenopathy present.   Neurological: She is alert. Coordination and gait normal.   Skin: Skin is warm and intact.   Psychiatric: She has a normal mood and affect. Her speech is normal and behavior is normal.       Assessment/Plan:   Lung cancer, main bronchus, right-Small Cell  -     EKG 12-lead; Future  -     Ambulatory Referral to External Surgery        Planned procedure: Port placement on the left    Ancef 2 gm IV on call to OR    NPO past midnight    Issa cloth scrub per protocol    SCDs Bilateral Lower Extremities    I discussed the proposed procedures the the patient including risks, benefits, indications, alternatives and special concerns.  The patient appears to understand and agrees to go ahead with surgery.  I have made no promises, warranties or verbal agreements beyond what was discussed above.    No Follow-up on file.

## 2018-01-19 NOTE — LETTER
January 19, 2018      Winston Carr MD  [address]  [address]  [place] [address]           [facility]  [address]  [address]  [place] [address]  Phone: [phone]  Fax: [phone]          Patient: Carmen duque   MR Number: [id number]   YOB: 1960   Date of Visit: 1/19/2018       Dear Dr. Winston Carr:    Thank you for referring Carmen duque to me for evaluation. Attached you will find relevant portions of my assessment and plan of care.    If you have questions, please do not hesitate to call me. I look forward to following Carmen duque along with you.    Sincerely,    Ari HENRY MD    Enclosure  CC:  No Recipients    If you would like to receive this communication electronically, please contact [email or web address] or [phone] to request more information on [employer] Link access.    For providers and/or their staff who would like to refer a patient to [facility], please contact us through our one-stop-shop provider referral line, [facility], at [phone].    If you feel you have received this communication in error or would no longer like to receive these types of communications, please e-mail [email or web address]

## 2018-01-25 ENCOUNTER — CLINICAL SUPPORT (OUTPATIENT)
Dept: HEMATOLOGY/ONCOLOGY | Facility: CLINIC | Age: 58
End: 2018-01-25
Payer: MEDICAID

## 2018-01-25 NOTE — PROGRESS NOTES
Carmen duque  [id number]    [facility]    TITLE: PLAN OF CARE FOR THE CHEMOTHERAPY PATIENT / TEACHING PROTOCOL    PURPOSE: To involve the patient / significant other in the plan of care and to provide teaching to the significant other & patient receiving chemotherapy.    LEVEL: Independent.    CONTENT: The Plan of Care for the chemotherapy patient is individualized and appropriate to the patients needs, strengths, limitations, & goals.  Education includes information regarding chemotherapy side effects, the treatment itself, and self-care  Activities.    GOAL / OUTCOME STANDARDS    PHYSIOLOGIC: The client will remain free or experience minimal side effects or toxicities throughout the chemotherapy treatment period.     PSYCHOLOGIC: The client/significant others will demonstrate positive coping mechanisms in relation to chemotherapy and its side effects.      COGINITIVE: The client/significant others will verbalize understanding of self-care measure to avoid/minimize side effects of the chemotherapy regime.    EVALUATION / COMMENT KEY:    V = Audiovisual/Video  S = Successfully meets outcome  N = Needs further instruction  NA = Not applicable to the patient  P = Previous knowledge  U = Unable to comprehend  * = See progress notes          PLAN OF CARE  INFORMATION TO BE DELIVERED / NURSING INTERVENTIONS DATE EVALUATION   1. Assessment of client/caregiver,         knowledge of cancer diagnosis,         and chemotherapy as a treatment. 1a. Evaluate patient/caregiver learning ability    b. Plan teaching sessions with patient/caregiver according to needs and present anxiety level/ability to learn.    c. Provide Chemotherapy Education Packet,        Mouth Care Protocol,         Specific Patient Education Sheets. 01/25/2018 S   2. Individual chemotherapy treatment         plan. 2a. Review of Chemotherapy Education handout from [employer]            01/25/2018   S   3. Knowledge Deficit &  Self-Management of general side effects common to all chemotherapy:  a. Nausea/Vomiting  b.   Diarrhea  c. Mouth Care  d. Dental care  e. Constipation  f. Hair Loss  g. Potential for infection  h. Fatigue   3a. Reinforce that the majority of side effects from chemotherapy are reversible and are  controlled both in the hospital and at home        (blood counts recover, hair grows back).   b.  Refer to the following for reinforcement of         information post-treatment:  1. Mouth Care Protocol.  2. Bowel Protocol for constipation or diarrhea.  3.  Drug Specific Chemotherapy Information Sheets for each medication patient receiving.    01/25/2018     S     PLAN OF CARE  INFORMATION TO BE DELIVERED / NURSING INTERVENTIONS DATE EVALUATION   h. Potential for bleeding         i. Potential anemia/fatigue         j. Potential sunburn         k. Birth control measures  l. Safety measures post treatment 4.  Chemotherapy Home Care Instruction  and Safety Information Sheet.  A. patient/caregivers to thoroughly cook shellfish (shrimp, crab, etc) to decrease the chance of infection.    B.  Use sunscreen and protective clothing while in the sun.   01/25/2018      4. Knowledge deficit & Self Management of Drug Specific  Side Effects.    a. BLADDER EFFECTS        (Hemorrhagic Cystitis)                  Preventable with adequate hydration; occurs 2-3 days or more post treatment.   1.  Instruct patient to:  a.   Void at least every 2 hours; increase intake.  b.   DO NOT hold urine; go when urge is felt.  c.    Empty bladder at bedtime and on         awakening.  d.   Observe for color changes (red to tea           colored), amount and frequency changes.  e.   Notify oncologist of any abnormalities           in urine or voiding or if you cannot               drink adequate fluids.   01/25/2018   S   b.   CHANGES IN URINE        COLOR:      1.   Instruct patient:  a.   Most evident in first 2-3 voidings after            administration.  b. Lasts less than 24 hours.  c. If urine is discolored 2 or more days post- treatment, notify oncologist.      01/25/2018 S   c.    KIDNEY EFFECTS           (Nephrotoxicity)   1.  Instruct patient to:  a.   Drink 8-16 glasses of fluid/day the day   pre-treatment and 3-4 days post-treatment to maintain hydration; the best way to minimize kidney problems.  b.   Notify oncologist immediately if unable to drink fluids or if changes are noted in urinary elimination.     01/25/2018   S   a. PULMONARY TOXICITY    1. Instruct patient to report symptoms such as shortness of breath, chest pain, shallow breathing, or chest wall discomfort to physician.  2. Reinforce preventative measures used by the health care team.  a. Baseline and periodic PFT and chest x-ray.   01/25/2018   S     PLAN OF CARE INFORMATION TO BE DELIVERED / NURSING INTERVENTIONS DATE EVALUATION   b. NERVE & MUSCLE EFFECTS (neurotoxocity; neuropathy, possible visual/hearing changes)        3. Instruct patient to:    a. Report numbness or tingling of the hands/feet, loss of fine motor movement (buttoning shirt, tying shoelaces), or gait changes to your oncologist.  b. If numbness/tingling are present:  1. protect feet with shoes at all times.  2. Use gloves for washing dishes/gardening & potholders in kitchen.       01/25/2018   S   c. CARDIOTOXICITY  Decreased effectiveness of             cardiac function. Effective are                  cumulative and irreversible.                                    CARDIAC ARRYTHMIAS              4   Instruct:  a. Heart function may be tested before treatment and perdiocally during treatment.  b. Notify oncologist of irregular pulse, palpitations, shortness of breath, or swelling in lower extremities/feet.          Taxol and Taxotere can cause arrhythmias on infusion that resolve once infusion discontinued. Instruct nurse if any irregularity felt.    01/25/2018   S   d. EXTRAVASTION  Occurs when vesicants  leak outside of vein and cause damage to the skin and underlying tissues.   1. Reinforce preventive measures used to avoid complications.  a. Fresh IV site or central line monitored continuously with vesicant IVP.  b. Continuous infusion via central line site and blood return monitored periodically around the clock.  2. Instruct to:  a. Notify nurse of any discomfort, burning, stinging, etc. at IV site during chemotherapy administration.  b. Notify oncologist of any redness, pain, or swelling at IV site after discharge from hospital.   01/25/2018   S   e. HYPERSENSITIVITY can happen with any medication.   1. Instruct patient:  a. Nurse is with them during the initial part of treatment and will be close by to monitor.  b. Pre-medication ordered by the oncologist must be taken on time. If doses are missed, treatment will need to be re-scheduled.  c. Skin redness, itching, or hives appearing after discharge should be reported to oncologist. 01/25/2018   S       PLAN OF CARE INFORMATION TO BE DELIVERED / NURSING INTERVENTIONS DATE EVALUATION   f. FLU-LIKE SYNDROME      1. Instruct patient symptoms are hard to prevent and may include fever, shaking chills, muscle and body aches.  a. Taking prescribed medications from physician if needed.  b. Adequate fluids are important.    2. Reinforce the need to call if temperature is         elevated to 100.4 or more  01/25/2018   S   g. HAND-FOOT SYNDROME  causes painful, symmetric swelling and redness of palms and soles                  5. Instruct patient to report any numbness or tingling in the hands or feet.  6. Explain prevention techniques, such as     a. Use heavy moisturizers to lessen skin dryness and itching, but to avoid if skin is cracked or broken  b. Bathe in tepid water, use non-perfumed soap, and wash gently. Baths with oatmeal or diluted baking soda may be soothing.  c. Avoid tight fitting shoes and repetitive actions, such as rubbing hands or applying pressure to  hands/feet.  7. Review measures to take should syndrome occur:  a. Cold compresses and elevation for          edema  b. Pain medications and other measures as ordered by oncologist.   4.   Syndrome resolves few weeks after therapy. 01/25/2018   S   5. DISCHARGE PLANNING /        EDUCATION 1.    Explain importance of compliance with follow- up  tests (CBC, CMP).  2.    Verify patient/caregiver know:  a.    Oncologists office phone number.  b.    Dates of follow-up appointments.  c.    Prescriptions given for nausea  3.   Review side effects to monitor and notify          oncologist about.  4.   Reinforce the need for patient and caregivers to:  a.    Review information given.  b.    Call oncologists office with questions          or symptoms  5.   Provide Cancer Resource [facility] [employer] make referrals if needed for financial or .   01/25/2018   S     PROGRESS NOTES: I met with the patient, sister, and mother today for chemotherapy education. she will be starting treatment with Etoposide and Carboplatin on 2/6/18. We have to wait for a shipment of Etoposide due to it being on National backorder.  We discussed the mechanism of action, potential side effects of this treatment as well as ways she can manage them at home. Some of these side effects include but or not limited to fever, nausea, vomiting, decreased appetite, fatigue, weakness, cytopenias, myalgia/arthralgia, constipation, diarrhea, bleeding, headache, shortness of breath, nail changes, taste change, hair thinning/loss, mood disturbances, or edema. We also discussed dietary modifications she should make although this will be discussed in more detail with the dietician. she was provided with anti-emetic medication, a copy of all of the information we discussed today as well as our contact information. she will be provided a schedule on his first day of treatment. We will obtain labs on a weekly basis and the patient will follow-up with the  physician for toxicity monitoring throughout treatment. All questions were answered and an informed consent was obtained. she was reminded to certainly contact us sooner if needed.

## 2018-01-25 NOTE — PROGRESS NOTES
Met with patient, sister, and mother to complete New Patient Orientation and distress screening; patient indicated a distress rating of 2.  Patient signed consent form to receive information from the [employer].  Transportation was indicated as an issue, patient has transportation benefits provided by her Medicaid plan.  Patient did not request any other supportive services at this time.

## 2018-01-25 NOTE — PROGRESS NOTES
NUTRITION NOTE-CHEMO SCHOOL    Carmen is now getting Carbo/Etoposide and here for chemo school.  Weight:92#, down 2#.  She continues to have a poor appetite.  She was accepted to receive Ensure Plus from [facility] but delayed shipment until next week.  Plan: Advised Carmen that she has to keep her weight up in order to be strong enough for chemo.  Since she will drink the Ensure, advised she needs to get the Ensure asap.  2. I called [facility] and spoke to Elida who states she will call Ms. Morales back today.  I also gave [facility] the name of Carmen's sister and contact information to [facility] with Ms. Moraless permission, so that she could accept delivery.  3. Also discussed typical chemo school information such as food safety, avoid vitamin and herbal supplements.  Eating tips with nausea and taste changes. 4.  Will follow up when she gets chemo.

## 2018-01-29 DIAGNOSIS — T45.1X5A CHEMOTHERAPY-INDUCED NEUTROPENIA: ICD-10-CM

## 2018-01-29 DIAGNOSIS — D70.1 CHEMOTHERAPY-INDUCED NEUTROPENIA: ICD-10-CM

## 2018-01-31 ENCOUNTER — TELEPHONE (OUTPATIENT)
Dept: RADIATION ONCOLOGY | Facility: CLINIC | Age: 58
End: 2018-01-31

## 2018-01-31 ENCOUNTER — DOCUMENTATION ONLY (OUTPATIENT)
Dept: RADIATION ONCOLOGY | Facility: CLINIC | Age: 58
End: 2018-01-31

## 2018-01-31 NOTE — TELEPHONE ENCOUNTER
Ms. Leyva called asking for refill on her pain medications. I explained to her that we have DC'd her from our care and she is now actively under the care of her Med Onc who is managing her medications. Pt. Was slightly upset when I confirmed that we would not refill it for her. I instructed her to contact her Medical Oncologist. Pt stated no one answers her calls, Confirmed the number [phone], suggested she call again this afternoon and leave a message if necessary.

## 2018-02-05 ENCOUNTER — TELEPHONE (OUTPATIENT)
Dept: HEMATOLOGY/ONCOLOGY | Facility: CLINIC | Age: 58
End: 2018-02-05

## 2018-02-05 RX ORDER — SODIUM CHLORIDE 0.9 % (FLUSH) 0.9 %
10 SYRINGE (ML) INJECTION
Status: CANCELLED | OUTPATIENT
Start: 2018-02-06

## 2018-02-05 RX ORDER — SODIUM CHLORIDE 0.9 % (FLUSH) 0.9 %
10 SYRINGE (ML) INJECTION
Status: CANCELLED | OUTPATIENT
Start: 2018-02-08

## 2018-02-05 RX ORDER — HEPARIN 100 UNIT/ML
500 SYRINGE INTRAVENOUS
Status: CANCELLED | OUTPATIENT
Start: 2018-02-08

## 2018-02-05 RX ORDER — SODIUM CHLORIDE 0.9 % (FLUSH) 0.9 %
10 SYRINGE (ML) INJECTION
Status: CANCELLED | OUTPATIENT
Start: 2018-02-07

## 2018-02-05 RX ORDER — HEPARIN 100 UNIT/ML
500 SYRINGE INTRAVENOUS
Status: CANCELLED | OUTPATIENT
Start: 2018-02-06

## 2018-02-05 RX ORDER — HEPARIN 100 UNIT/ML
500 SYRINGE INTRAVENOUS
Status: CANCELLED | OUTPATIENT
Start: 2018-02-07

## 2018-02-05 NOTE — TELEPHONE ENCOUNTER
Pt labs done @ [facility] path [phone] today.      Fax [phone]

## 2018-02-06 ENCOUNTER — TELEPHONE (OUTPATIENT)
Dept: HEMATOLOGY/ONCOLOGY | Facility: CLINIC | Age: 58
End: 2018-02-06

## 2018-02-06 DIAGNOSIS — C34.01 LUNG CANCER, MAIN BRONCHUS, RIGHT: Primary | ICD-10-CM

## 2018-02-06 NOTE — TELEPHONE ENCOUNTER
Spoke to patient about her elevated potassium of 6.0. Dr. Carr wants her to go to [facility] E.R[facility] for this as he is not sure why the level is high since  she does not take any supplements. She agreed to go.

## 2018-02-07 ENCOUNTER — OFFICE VISIT (OUTPATIENT)
Dept: HEMATOLOGY/ONCOLOGY | Facility: CLINIC | Age: 58
End: 2018-02-07
Payer: MEDICAID

## 2018-02-07 VITALS
WEIGHT: 98.13 LBS | HEART RATE: 87 BPM | SYSTOLIC BLOOD PRESSURE: 122 MMHG | BODY MASS INDEX: 19.16 KG/M2 | TEMPERATURE: 97 F | DIASTOLIC BLOOD PRESSURE: 63 MMHG

## 2018-02-07 DIAGNOSIS — C34.01 LUNG CANCER, MAIN BRONCHUS, RIGHT: ICD-10-CM

## 2018-02-07 DIAGNOSIS — G89.3 CANCER ASSOCIATED PAIN: Chronic | ICD-10-CM

## 2018-02-07 PROCEDURE — 3008F BODY MASS INDEX DOCD: CPT | Mod: ,,, | Performed by: INTERNAL MEDICINE

## 2018-02-07 PROCEDURE — 99214 OFFICE O/P EST MOD 30 MIN: CPT | Mod: ,,, | Performed by: INTERNAL MEDICINE

## 2018-02-07 NOTE — PROGRESS NOTES
PROGRESS NOTE    Subjective:       Patient ID: Carmen duque is a 57 y.o. female.    Chief Complaint:  No chief complaint on file.  lung cancer follow up.     History of Present Illness:   Carmen duque is a 57 y.o. female who presents for follow up, on first cycle of carbo etopiside at this time.  She is having some nausea but ran out of zofran.         Family and Social history reviewed and is unchanged from 1/16/2018      ROS:  Review of Systems   Constitutional: Negative for fever.   Respiratory: Negative for shortness of breath.    Cardiovascular: Negative for chest pain and leg swelling.   Gastrointestinal: Negative for abdominal pain and blood in stool.   Genitourinary: Negative for hematuria.   Skin: Negative for rash.          Current Outpatient Prescriptions:     albuterol (PROVENTIL HFA/VENTOLIN HFA) 200 puff inhaler, Inhale 2 puffs into the lungs every 6 (six) hours as needed.  , Disp: , Rfl:     alendronate (FOSAMAX) 70 MG tablet, Take 70 mg by mouth every 7 days.  , Disp: , Rfl:     alprazolam (XANAX) 0.5 MG tablet, Take 0.5 mg by mouth nightly as needed.  , Disp: , Rfl:     amLODIPine (NORVASC) 5 MG tablet, Take 5 mg by mouth once daily., Disp: , Rfl:     calcium citrate (CALCITRATE) 200 mg (950 mg) tablet, Take 5 tablets by mouth once daily.  , Disp: , Rfl:     dronabinol (MARINOL) 5 MG capsule, Take 1 capsule (5 mg total) by mouth 2 (two) times daily before meals., Disp: 60 capsule, Rfl: 1    ergocalciferol, vitamin D2, 1,000 unit Tab, 2 tablets daily, Disp: 60 tablet, Rfl: 5    esomeprazole (NEXIUM) 40 MG capsule, Take 40 mg by mouth before breakfast., Disp: , Rfl:     hydrocodone-acetaminophen (LORTAB) 7.5-500 mg per tablet, Take 1 tablet by mouth 2 (two) times daily as needed for Pain., Disp: 60 tablet, Rfl: 1    levothyroxine (SYNTHROID, LEVOTHROID) 175 MCG tablet, Take 175 mcg by mouth once daily., Disp: , Rfl:     lidocaine  HCl 2% (XYLOCAINE) 2 % Soln, by Mucous Membrane route every 4 (four) hours. 5 ml every 4 hours by mouth as needed for swallowing pain, Disp: 300 mL, Rfl: 5    lisinopril (PRINIVIL,ZESTRIL) 5 MG tablet, Take 5 mg by mouth 2 (two) times daily. Says takes this about once a week now.  Goes by how her body feels she says. , Disp: , Rfl:     nebivolol (BYSTOLIC) 5 MG Tab, Take 10 mg by mouth once daily., Disp: , Rfl:     oxyCODONE (OXYCONTIN) 15 mg TR12 12 hr tablet, Take 1 tablet (15 mg total) by mouth every 12 (twelve) hours., Disp: 60 tablet, Rfl: 0    oxyCODONE (ROXICODONE) 15 MG Tab, Take 1 tablet (15 mg total) by mouth every 6 (six) hours as needed for Pain., Disp: 60 tablet, Rfl: 0    promethazine (PHENERGAN) 12.5 MG Tab, , Disp: , Rfl: 5    promethazine (PHENERGAN) 25 MG tablet, Take 1 tablet (25 mg total) by mouth every 6 (six) hours as needed for Nausea., Disp: 120 tablet, Rfl: 3    sodium chloride 1 gram tablet, Take 1 g by mouth 3 (three) times daily., Disp: , Rfl:         Objective:       Physical Examination:     /63   Pulse 87   Temp 97.4 °F (36.3 °C)   Wt 44.5 kg (98 lb 1.7 oz)   BMI 19.16 kg/m²     Physical Exam   Constitutional: She appears well-developed and well-nourished.   HENT:   Head: Normocephalic and atraumatic.   Right Ear: External ear normal.   Left Ear: External ear normal.   Mouth/Throat: Oropharynx is clear and moist.   Eyes: Conjunctivae are normal. Pupils are equal, round, and reactive to light.   Neck: No tracheal deviation present. No thyromegaly present.   Cardiovascular: Normal rate, regular rhythm and normal heart sounds.    Pulmonary/Chest: Effort normal and breath sounds normal.   Abdominal: Soft. Bowel sounds are normal. She exhibits no distension and no mass. There is no tenderness.   Musculoskeletal: She exhibits no edema.   Neurological:   Neuro intact througout   Skin: No rash noted.   Psychiatric: She has a normal mood and affect. Her behavior is normal.  Judgment and thought content normal.       Labs:   No results found for this or any previous visit (from the past 336 hour(s)).  CMP  Sodium   Date Value Ref Range Status   01/16/2018 136 134 - 144 mmol/L      Potassium   Date Value Ref Range Status   01/16/2018 4.5 3.5 - 5.0 mmol/L      Chloride   Date Value Ref Range Status   01/16/2018 103 98 - 110 mmol/L      CO2   Date Value Ref Range Status   01/16/2018 24.0 22.8 - 31.6 mmol/L      Glucose   Date Value Ref Range Status   01/16/2018 94 70 - 99 mg/dL      BUN, Bld   Date Value Ref Range Status   01/16/2018 17 8 - 20 mg/dL      Creatinine   Date Value Ref Range Status   01/16/2018 0.94 0.60 - 1.40 mg/dL      Calcium   Date Value Ref Range Status   01/16/2018 9.6 7.7 - 10.4 mg/dL      Total Protein   Date Value Ref Range Status   01/16/2018 8.2 6.0 - 8.2 g/dL      Albumin   Date Value Ref Range Status   01/16/2018 4.5 3.1 - 4.7 g/dL      Total Bilirubin   Date Value Ref Range Status   01/16/2018 0.4 0.3 - 1.0 mg/dL      Alkaline Phosphatase   Date Value Ref Range Status   01/16/2018 117 (H) 40 - 104 IU/L      AST   Date Value Ref Range Status   01/16/2018 24 10 - 40 IU/L      ALT   Date Value Ref Range Status   06/07/2012 7 (L) 10 - 44 U/L Final   06/07/2012 7 (L) 10 - 44 U/L Final     Anion Gap   Date Value Ref Range Status   10/12/2012 11.0 8 - 16 mmol/L Final     eGFR if    Date Value Ref Range Status   10/12/2012 >60 >60 mL/min Final     Comment:     Estimated glomerular filtration rate (eGFR) is normalized to an  average body surface area of 1.73 square meters.  The calculation  used to obtain the eGFR is the adjusted MDRD equation, which factors  patient sex, age, race, and creatinine result.  Since race is unknown  in our information system, the eGFR values for -American  and Non--American patients are given for each creatinine  result.     eGFR if non    Date Value Ref Range Status   10/12/2012 >60 >60 mL/min  Final     No results found for: CEA  No results found for: PSA        Assessment/Plan:     Problem List Items Addressed This Visit     Lung cancer, main bronchus, right-Small Cell     Patient is on cycle one and has expected nausea.  Will refill her Zofran and suggested she take this med.  Will continue to get weekly labs and discussed this today.  I will see her back in three weeks for follow up.  Labs look ok currently with mild anemia.          Cancer associated pain (Chronic)     Pain seems to be under reasonable control.  Will continue current meds for now and watch.                 Discussion:     Follow-up in about 3 weeks (around 2/28/2018).      Electronically signed by Winston Miller

## 2018-02-07 NOTE — ASSESSMENT & PLAN NOTE
Patient is on cycle one and has expected nausea.  Will refill her Zofran and suggested she take this med.  Will continue to get weekly labs and discussed this today.  I will see her back in three weeks for follow up.  Labs look ok currently with mild anemia.

## 2018-02-07 NOTE — TELEPHONE ENCOUNTER
Spoke to patient this morning. She went to the [facility] E.R[facility] Yesterday as we requested to have her potassium level rechecked. She says they did an EKG and labs. Her kcl came back at 4.3 which is WNL. She comes back today to infusion for the 2nd day of her chemo.

## 2018-02-09 ENCOUNTER — TELEPHONE (OUTPATIENT)
Dept: HEMATOLOGY/ONCOLOGY | Facility: CLINIC | Age: 58
End: 2018-02-09

## 2018-02-12 ENCOUNTER — HISTORICAL (OUTPATIENT)
Dept: ADMINISTRATIVE | Facility: HOSPITAL | Age: 58
End: 2018-02-12

## 2018-02-13 ENCOUNTER — TELEPHONE (OUTPATIENT)
Dept: HEMATOLOGY/ONCOLOGY | Facility: CLINIC | Age: 58
End: 2018-02-13

## 2018-02-14 ENCOUNTER — TELEPHONE (OUTPATIENT)
Dept: HEMATOLOGY/ONCOLOGY | Facility: CLINIC | Age: 58
End: 2018-02-14

## 2018-02-14 DIAGNOSIS — E87.5 HYPERKALEMIA: Primary | ICD-10-CM

## 2018-02-14 LAB
BUN SERPL-MCNC: 24 MG/DL (ref 8–20)
CALCIUM SERPL-MCNC: 9.1 MG/DL (ref 7.7–10.4)
CHLORIDE: 101 MMOL/L (ref 98–110)
CO2 SERPL-SCNC: 24.7 MMOL/L (ref 22.8–31.6)
CREATININE: 0.99 MG/DL (ref 0.6–1.4)
GLUCOSE: 95 MG/DL (ref 70–99)
POTASSIUM SERPL-SCNC: 4.4 MMOL/L (ref 3.5–5)
SODIUM: 133 MMOL/L (ref 134–144)

## 2018-02-14 NOTE — TELEPHONE ENCOUNTER
Patient rechecked her kcl after  the on call doctor received an elevated level on 2/12/18. Her level was normal at 4.4. Dr. Ashby and also the patient were notified of the normal levels.

## 2018-02-14 NOTE — TELEPHONE ENCOUNTER
Called pt instructed her that potassium level came back high on Monday and wanted to make sure that she was not on a Potassium supplement and she stated she was not. Instructed that she needed to recheck potassium level today. EDSON. Notified [facility] lab of happening twice now so she could be aware of any problems. EDSON.

## 2018-02-15 ENCOUNTER — TELEPHONE (OUTPATIENT)
Dept: HEMATOLOGY/ONCOLOGY | Facility: CLINIC | Age: 58
End: 2018-02-15

## 2018-02-15 NOTE — TELEPHONE ENCOUNTER
----- Message from Daisy Pimentel sent at 2/15/2018  8:48 AM CST -----  Pt called in would like a refill of oxycodone 15mg 60 tablets     Please call  [phone]     Pt asked that it be ready in 30 mins minutes and I explained that our policy is to call 24 to 48 hours before refill is needed. I explained that we will call her when RX is ready for  could not promise it would be today because the doctor is in clinic.

## 2018-02-15 NOTE — TELEPHONE ENCOUNTER
Patient called requesting refill on her Oxycodone. Prescription refilled patient notified it is ready for pickup.

## 2018-02-20 ENCOUNTER — TELEPHONE (OUTPATIENT)
Dept: HEMATOLOGY/ONCOLOGY | Facility: CLINIC | Age: 58
End: 2018-02-20

## 2018-02-20 NOTE — TELEPHONE ENCOUNTER
Spoke to patient's brother -in-law. Patient and her sister are broke down on side of the road in her car. She is trying to get her nephew to come get them and bring her her today for her platelets transfusion. I explained to her the importance of getting this done today and then she will need 2 units of prbc tomorrow. I told her that she will have to go through the E.R. If she gets to [facility] after 5 pm. Orders sent to [facility]. He voiced understanding

## 2018-02-21 ENCOUNTER — TELEPHONE (OUTPATIENT)
Dept: HEMATOLOGY/ONCOLOGY | Facility: CLINIC | Age: 58
End: 2018-02-21

## 2018-02-21 NOTE — TELEPHONE ENCOUNTER
Spoke to Graciela FOWLER at day surgery at [facility]. Patient came in on 2/20 for 1 platelet and is there this am for 2 units of prbc's. Kcl is normal level of 3.7

## 2018-02-23 RX ORDER — HEPARIN 100 UNIT/ML
500 SYRINGE INTRAVENOUS
Status: CANCELLED | OUTPATIENT
Start: 2018-02-27

## 2018-02-23 RX ORDER — SODIUM CHLORIDE 0.9 % (FLUSH) 0.9 %
10 SYRINGE (ML) INJECTION
Status: CANCELLED | OUTPATIENT
Start: 2018-02-28

## 2018-02-23 RX ORDER — HEPARIN 100 UNIT/ML
500 SYRINGE INTRAVENOUS
Status: CANCELLED | OUTPATIENT
Start: 2018-02-28

## 2018-02-23 RX ORDER — HEPARIN 100 UNIT/ML
500 SYRINGE INTRAVENOUS
Status: CANCELLED | OUTPATIENT
Start: 2018-03-01

## 2018-02-23 RX ORDER — SODIUM CHLORIDE 0.9 % (FLUSH) 0.9 %
10 SYRINGE (ML) INJECTION
Status: CANCELLED | OUTPATIENT
Start: 2018-02-27

## 2018-02-23 RX ORDER — SODIUM CHLORIDE 0.9 % (FLUSH) 0.9 %
10 SYRINGE (ML) INJECTION
Status: CANCELLED | OUTPATIENT
Start: 2018-03-01

## 2018-02-26 ENCOUNTER — TELEPHONE (OUTPATIENT)
Dept: HEMATOLOGY/ONCOLOGY | Facility: CLINIC | Age: 58
End: 2018-02-26

## 2018-02-26 NOTE — TELEPHONE ENCOUNTER
----- Message from Daisy Pimentel sent at 2/26/2018  9:22 AM CST -----  Pt called in said that she just picked up her oxycodone 15mg last week when she requested it on 02/15 and didn't need that one she said she need the oxycotin 15mg pt said the oxycodone is not due and the pharmacy is holding. I looked back and explained to pt when she called in she asked for oxycodone.     Pt said she needs her oxycotin 15mg 60 tablets. The pharmacy is holding her oxycodone 15mg till 02/29 and she is out of medication.  I told pt I would send an high prioity message to see what we can do about getting this done asap.     Call back [phone]

## 2018-03-07 ENCOUNTER — TELEPHONE (OUTPATIENT)
Dept: HEMATOLOGY/ONCOLOGY | Facility: CLINIC | Age: 58
End: 2018-03-07

## 2018-03-07 NOTE — TELEPHONE ENCOUNTER
----- Message from Mary Hines sent at 3/7/2018 11:21 AM CST -----  Patient called in requesting that a nurse please give her a call with her BW results that were done yesterday next door. Please advise and contact patient at [phone]. Thanks       Left message for patient. Labs from 3/6/18 done at [facility] are all good. kcl value is 4.2 and WNL.

## 2018-03-12 ENCOUNTER — TELEPHONE (OUTPATIENT)
Dept: HEMATOLOGY/ONCOLOGY | Facility: CLINIC | Age: 58
End: 2018-03-12

## 2018-03-12 NOTE — TELEPHONE ENCOUNTER
Called pt to tell her that hydrocodone scrip is ready for  and stated she would come by tomorrow to

## 2018-03-12 NOTE — TELEPHONE ENCOUNTER
----- Message from Daisy Pimentel sent at 3/12/2018 11:23 AM CDT -----  Pt called in needs rx refill oxycodone 15mg 60 tablets regular breakthrough not the time release.  Pt call back [phone]

## 2018-03-13 ENCOUNTER — TELEPHONE (OUTPATIENT)
Dept: HEMATOLOGY/ONCOLOGY | Facility: CLINIC | Age: 58
End: 2018-03-13

## 2018-03-20 RX ORDER — SODIUM CHLORIDE 0.9 % (FLUSH) 0.9 %
10 SYRINGE (ML) INJECTION
Status: CANCELLED | OUTPATIENT
Start: 2018-03-20

## 2018-03-20 RX ORDER — HEPARIN 100 UNIT/ML
500 SYRINGE INTRAVENOUS
Status: CANCELLED | OUTPATIENT
Start: 2018-03-20

## 2018-03-21 RX ORDER — SODIUM CHLORIDE 0.9 % (FLUSH) 0.9 %
10 SYRINGE (ML) INJECTION
Status: CANCELLED | OUTPATIENT
Start: 2018-03-21

## 2018-03-21 RX ORDER — HEPARIN 100 UNIT/ML
500 SYRINGE INTRAVENOUS
Status: CANCELLED | OUTPATIENT
Start: 2018-03-22

## 2018-03-21 RX ORDER — SODIUM CHLORIDE 0.9 % (FLUSH) 0.9 %
10 SYRINGE (ML) INJECTION
Status: CANCELLED | OUTPATIENT
Start: 2018-03-22

## 2018-03-21 RX ORDER — HEPARIN 100 UNIT/ML
500 SYRINGE INTRAVENOUS
Status: CANCELLED | OUTPATIENT
Start: 2018-03-21

## 2018-03-27 ENCOUNTER — TELEPHONE (OUTPATIENT)
Dept: HEMATOLOGY/ONCOLOGY | Facility: CLINIC | Age: 58
End: 2018-03-27

## 2018-03-27 ENCOUNTER — HISTORICAL (OUTPATIENT)
Dept: ADMINISTRATIVE | Facility: HOSPITAL | Age: 58
End: 2018-03-27

## 2018-03-27 LAB
BASOPHILS NFR BLD: 0.1 K/UL (ref 0–0.2)
BASOPHILS NFR BLD: 0.5 %
EOSINOPHIL NFR BLD: 0 %
EOSINOPHIL NFR BLD: 0 K/UL (ref 0–0.7)
ERYTHROCYTE [DISTWIDTH] IN BLOOD BY AUTOMATED COUNT: 15.5 % (ref 12.5–14.5)
GRAN #: 12.9 K/UL (ref 1.4–6.5)
GRAN%: 85.2 %
HCT VFR BLD AUTO: 24.2 % (ref 36–48)
HGB BLD-MCNC: 7.7 G/DL (ref 12–15)
IMMATURE GRANS (ABS): 0.2 K/UL (ref 0–1)
IMMATURE GRANULOCYTES: 1.5 %
LYMPH #: 0.5 K/UL (ref 1.2–3.4)
LYMPH%: 3.4 %
MCH RBC QN AUTO: 30.7 PG (ref 25–35)
MCHC RBC AUTO-ENTMCNC: 31.8 G/DL (ref 31–36)
MCV RBC AUTO: 96.4 FL (ref 79–98)
MONO #: 1.4 K/UL (ref 0.1–0.6)
MONO%: 9.4 %
NUCLEATED RBCS: 0 %
NUCLEATED RED BLOOD CELLS: 0 /100 WBC
PERFORMED BY:: ABNORMAL
PLATELET # BLD AUTO: 84 K/UL (ref 140–440)
PMV BLD AUTO: 11.2 FL (ref 8.8–12.7)
RBC # BLD AUTO: 2.51 M/UL (ref 3.5–5.5)
WBC # BLD: 15.1 K/UL (ref 5–10)

## 2018-03-27 NOTE — TELEPHONE ENCOUNTER
Carmen called back I told her she needed to get 2 units of blood and needed to go get type and screened today and go tomorrow for blood

## 2018-03-27 NOTE — TELEPHONE ENCOUNTER
Received labs showing cl hgb 7.7  DR Lomax said to transfuse 2 units of blood called pt no answer left message to get type and screened and will get blood tomorrow  Filled out orders and called scheduling also ASU to set up transfusion

## 2018-03-29 ENCOUNTER — TELEPHONE (OUTPATIENT)
Dept: HEMATOLOGY/ONCOLOGY | Facility: CLINIC | Age: 58
End: 2018-03-29

## 2018-04-03 LAB
BASOPHILS NFR BLD: 0.1 K/UL (ref 0–0.2)
BASOPHILS NFR BLD: 0.3 %
EOSINOPHIL NFR BLD: 0 K/UL (ref 0–0.7)
EOSINOPHIL NFR BLD: 0.1 %
ERYTHROCYTE [DISTWIDTH] IN BLOOD BY AUTOMATED COUNT: 15.2 % (ref 12.5–14.5)
GRAN #: 15.9 K/UL (ref 1.4–6.5)
GRAN%: 79.2 %
HCT VFR BLD AUTO: 38.6 % (ref 36–48)
HGB BLD-MCNC: 12.5 G/DL (ref 12–15)
IMMATURE GRANS (ABS): 0.9 K/UL (ref 0–1)
IMMATURE GRANULOCYTES: 4.7 %
LYMPH #: 1 K/UL (ref 1.2–3.4)
LYMPH%: 4.7 %
MCH RBC QN AUTO: 31.6 PG (ref 25–35)
MCHC RBC AUTO-ENTMCNC: 32.4 G/DL (ref 31–36)
MCV RBC AUTO: 97.7 FL (ref 79–98)
MONO #: 2.2 K/UL (ref 0.1–0.6)
MONO%: 11 %
NUCLEATED RBCS: 0 %
NUCLEATED RED BLOOD CELLS: 0 /100 WBC
PERFORMED BY:: ABNORMAL
PLATELET # BLD AUTO: 50 K/UL (ref 140–440)
PMV BLD AUTO: 10.2 FL (ref 8.8–12.7)
RBC # BLD AUTO: 3.95 M/UL (ref 3.5–5.5)
WBC # BLD: 20 K/UL (ref 5–10)

## 2018-04-10 LAB
BASOPHILS NFR BLD: 0 K/UL (ref 0–0.2)
BASOPHILS NFR BLD: 0.3 %
EOSINOPHIL NFR BLD: 0 K/UL (ref 0–0.7)
EOSINOPHIL NFR BLD: 0.1 %
ERYTHROCYTE [DISTWIDTH] IN BLOOD BY AUTOMATED COUNT: 15 % (ref 12.5–14.5)
GRAN #: 8.8 K/UL (ref 1.4–6.5)
GRAN%: 76.6 %
HCT VFR BLD AUTO: 33.7 % (ref 36–48)
HGB BLD-MCNC: 11.1 G/DL (ref 12–15)
IMMATURE GRANS (ABS): 0.1 K/UL (ref 0–1)
IMMATURE GRANULOCYTES: 0.4 %
LYMPH #: 0.8 K/UL (ref 1.2–3.4)
LYMPH%: 7.1 %
MCH RBC QN AUTO: 32 PG (ref 25–35)
MCHC RBC AUTO-ENTMCNC: 32.9 G/DL (ref 31–36)
MCV RBC AUTO: 97.1 FL (ref 79–98)
MONO #: 1.8 K/UL (ref 0.1–0.6)
MONO%: 15.5 %
NUCLEATED RBCS: 0 %
NUCLEATED RED BLOOD CELLS: 0 /100 WBC
PERFORMED BY:: ABNORMAL
PLATELET # BLD AUTO: 100 K/UL (ref 140–440)
PMV BLD AUTO: 9.8 FL (ref 8.8–12.7)
RBC # BLD AUTO: 3.47 M/UL (ref 3.5–5.5)
WBC # BLD: 11.5 K/UL (ref 5–10)

## 2018-04-10 RX ORDER — HEPARIN 100 UNIT/ML
500 SYRINGE INTRAVENOUS
Status: CANCELLED | OUTPATIENT
Start: 2018-04-12

## 2018-04-10 RX ORDER — SODIUM CHLORIDE 0.9 % (FLUSH) 0.9 %
10 SYRINGE (ML) INJECTION
Status: CANCELLED | OUTPATIENT
Start: 2018-04-12

## 2018-04-10 RX ORDER — HEPARIN 100 UNIT/ML
500 SYRINGE INTRAVENOUS
Status: CANCELLED | OUTPATIENT
Start: 2018-04-10

## 2018-04-10 RX ORDER — SODIUM CHLORIDE 0.9 % (FLUSH) 0.9 %
10 SYRINGE (ML) INJECTION
Status: CANCELLED | OUTPATIENT
Start: 2018-04-10

## 2018-04-10 RX ORDER — SODIUM CHLORIDE 0.9 % (FLUSH) 0.9 %
10 SYRINGE (ML) INJECTION
Status: CANCELLED | OUTPATIENT
Start: 2018-04-11

## 2018-04-10 RX ORDER — HEPARIN 100 UNIT/ML
500 SYRINGE INTRAVENOUS
Status: CANCELLED | OUTPATIENT
Start: 2018-04-11

## 2018-04-17 ENCOUNTER — TELEPHONE (OUTPATIENT)
Dept: HEMATOLOGY/ONCOLOGY | Facility: CLINIC | Age: 58
End: 2018-04-17

## 2018-04-17 NOTE — TELEPHONE ENCOUNTER
Called pt to go over labs and went over plt were 36 and not low enough to go to hospital wbc was 42 and this was due to neupogen  To make sure she gets lab work done next week

## 2018-04-17 NOTE — TELEPHONE ENCOUNTER
----- Message from Mary Hines sent at 4/17/2018  9:32 AM CDT -----  Patient called in stating that she had her BW done yesterday and would like nurse to please call her with the results at [phone]. Thanks

## 2018-04-24 ENCOUNTER — TELEPHONE (OUTPATIENT)
Dept: HEMATOLOGY/ONCOLOGY | Facility: CLINIC | Age: 58
End: 2018-04-24

## 2018-04-24 NOTE — TELEPHONE ENCOUNTER
----- Message from Daisy Pimentel sent at 4/24/2018  2:24 PM CDT -----  Contact: call back [phone]  Pt called in would like results to her labs done yesterday at [facility].   Call back # above

## 2018-04-30 ENCOUNTER — HISTORICAL (OUTPATIENT)
Dept: ADMINISTRATIVE | Facility: HOSPITAL | Age: 58
End: 2018-04-30

## 2018-04-30 LAB
BASOPHILS NFR BLD: 0 K/UL (ref 0–0.2)
BASOPHILS NFR BLD: 0.2 %
EOSINOPHIL NFR BLD: 0 K/UL (ref 0–0.7)
EOSINOPHIL NFR BLD: 0.4 %
ERYTHROCYTE [DISTWIDTH] IN BLOOD BY AUTOMATED COUNT: 17.4 % (ref 12.5–14.5)
GRAN #: 6.3 K/UL (ref 1.4–6.5)
GRAN%: 75.8 %
HCT VFR BLD AUTO: 23 % (ref 36–48)
HGB BLD-MCNC: 7.4 G/DL (ref 12–15)
IMMATURE GRANS (ABS): 0 K/UL (ref 0–1)
IMMATURE GRANULOCYTES: 0.5 %
LYMPH #: 0.7 K/UL (ref 1.2–3.4)
LYMPH%: 8.9 %
MCH RBC QN AUTO: 32.6 PG (ref 25–35)
MCHC RBC AUTO-ENTMCNC: 32.2 G/DL (ref 31–36)
MCV RBC AUTO: 101.3 FL (ref 79–98)
MONO #: 1.2 K/UL (ref 0.1–0.6)
MONO%: 14.2 %
NUCLEATED RBCS: 0 %
NUCLEATED RED BLOOD CELLS: 0 /100 WBC
PERFORMED BY:: ABNORMAL
PLATELET # BLD AUTO: 47 K/UL (ref 140–440)
PMV BLD AUTO: 9.9 FL (ref 8.8–12.7)
RBC # BLD AUTO: 2.27 M/UL (ref 3.5–5.5)
WBC # BLD: 8.2 K/UL (ref 5–10)

## 2018-05-07 LAB
BASOPHILS NFR BLD: 0 K/UL (ref 0–0.2)
BASOPHILS NFR BLD: 0.3 %
EOSINOPHIL NFR BLD: 0 K/UL (ref 0–0.7)
EOSINOPHIL NFR BLD: 0.5 %
ERYTHROCYTE [DISTWIDTH] IN BLOOD BY AUTOMATED COUNT: 18.2 % (ref 12.5–14.5)
GRAN #: 4.5 K/UL (ref 1.4–6.5)
GRAN%: 71.9 %
HCT VFR BLD AUTO: 34.9 % (ref 36–48)
HGB BLD-MCNC: 11.3 G/DL (ref 12–15)
IMMATURE GRANS (ABS): 0 K/UL (ref 0–1)
IMMATURE GRANULOCYTES: 0.2 %
LYMPH #: 0.6 K/UL (ref 1.2–3.4)
LYMPH%: 10 %
MCH RBC QN AUTO: 31.9 PG (ref 25–35)
MCHC RBC AUTO-ENTMCNC: 32.4 G/DL (ref 31–36)
MCV RBC AUTO: 98.6 FL (ref 79–98)
MONO #: 1.1 K/UL (ref 0.1–0.6)
MONO%: 17.1 %
NUCLEATED RBCS: 0 %
NUCLEATED RED BLOOD CELLS: 0 /100 WBC
PERFORMED BY:: ABNORMAL
PLATELET # BLD AUTO: 77 K/UL (ref 140–440)
PMV BLD AUTO: 10.1 FL (ref 8.8–12.7)
RBC # BLD AUTO: 3.54 M/UL (ref 3.5–5.5)
WBC # BLD: 6.3 K/UL (ref 5–10)

## 2018-05-10 ENCOUNTER — TELEPHONE (OUTPATIENT)
Dept: HEMATOLOGY/ONCOLOGY | Facility: CLINIC | Age: 58
End: 2018-05-10

## 2018-05-10 NOTE — TELEPHONE ENCOUNTER
----- Message from Tank Peace MD sent at 5/10/2018  2:54 PM CDT -----  Potassium a little elevated

## 2018-05-11 ENCOUNTER — TELEPHONE (OUTPATIENT)
Dept: HEMATOLOGY/ONCOLOGY | Facility: CLINIC | Age: 58
End: 2018-05-11

## 2018-05-11 NOTE — TELEPHONE ENCOUNTER
----- Message from Daisy Pimentel sent at 5/10/2018  2:01 PM CDT -----  Pt called in had labs done at [facility] on Monday and would like a call back at # below [phone]

## 2018-05-14 ENCOUNTER — OFFICE VISIT (OUTPATIENT)
Dept: HEMATOLOGY/ONCOLOGY | Facility: CLINIC | Age: 58
End: 2018-05-14
Payer: MEDICAID

## 2018-05-14 VITALS
RESPIRATION RATE: 18 BRPM | BODY MASS INDEX: 18.59 KG/M2 | DIASTOLIC BLOOD PRESSURE: 76 MMHG | HEART RATE: 103 BPM | SYSTOLIC BLOOD PRESSURE: 124 MMHG | WEIGHT: 95.19 LBS | TEMPERATURE: 98 F

## 2018-05-14 DIAGNOSIS — C34.01 LUNG CANCER, MAIN BRONCHUS, RIGHT: ICD-10-CM

## 2018-05-14 LAB
ALBUMIN SERPL-MCNC: 4.2 G/DL (ref 3.1–4.7)
ALP SERPL-CCNC: 142 IU/L (ref 40–104)
ALT (SGPT): 11 IU/L (ref 3–33)
AST SERPL-CCNC: 17 IU/L (ref 10–40)
BASOPHILS NFR BLD: 0 K/UL (ref 0–0.2)
BASOPHILS NFR BLD: 0.6 %
BILIRUB SERPL-MCNC: 0.3 MG/DL (ref 0.3–1)
BUN SERPL-MCNC: 12 MG/DL (ref 8–20)
CALCIUM SERPL-MCNC: 9.4 MG/DL (ref 7.7–10.4)
CHLORIDE: 100 MMOL/L (ref 98–110)
CO2 SERPL-SCNC: 25.4 MMOL/L (ref 22.8–31.6)
CREATININE: 1.04 MG/DL (ref 0.6–1.4)
EOSINOPHIL NFR BLD: 0 K/UL (ref 0–0.7)
EOSINOPHIL NFR BLD: 0.8 %
ERYTHROCYTE [DISTWIDTH] IN BLOOD BY AUTOMATED COUNT: 18.8 % (ref 12.5–14.5)
GLUCOSE: 105 MG/DL (ref 70–99)
GRAN #: 3.3 K/UL (ref 1.4–6.5)
GRAN%: 63 %
HCT VFR BLD AUTO: 35.9 % (ref 36–48)
HGB BLD-MCNC: 11.3 G/DL (ref 12–15)
IMMATURE GRANS (ABS): 0 K/UL (ref 0–1)
IMMATURE GRANULOCYTES: 0.4 %
LYMPH #: 0.8 K/UL (ref 1.2–3.4)
LYMPH%: 15.1 %
MCH RBC QN AUTO: 31.1 PG (ref 25–35)
MCHC RBC AUTO-ENTMCNC: 31.5 G/DL (ref 31–36)
MCV RBC AUTO: 98.9 FL (ref 79–98)
MONO #: 1.1 K/UL (ref 0.1–0.6)
MONO%: 20 %
NUCLEATED RBCS: 0 %
NUCLEATED RED BLOOD CELLS: 0 /100 WBC
PERFORMED BY:: ABNORMAL
PLATELET # BLD AUTO: 125 K/UL (ref 140–440)
PMV BLD AUTO: 9.6 FL (ref 8.8–12.7)
POTASSIUM SERPL-SCNC: 4.8 MMOL/L (ref 3.5–5)
PROT SERPL-MCNC: 7.9 G/DL (ref 6–8.2)
RBC # BLD AUTO: 3.63 M/UL (ref 3.5–5.5)
SODIUM: 135 MMOL/L (ref 134–144)
WBC # BLD: 5.2 K/UL (ref 5–10)

## 2018-05-14 PROCEDURE — 99214 OFFICE O/P EST MOD 30 MIN: CPT | Mod: ,,, | Performed by: INTERNAL MEDICINE

## 2018-05-14 NOTE — ASSESSMENT & PLAN NOTE
Patient returns for visit after missing many visits.  She has had four cycles of carbo/etop and states she has been tolerating these treatments very well.  I expressed strongly to her that she she needs to see me with every cycle to ensure her safety.  She understands this.  Will continue treatment tomorrow, go to six total then rescan to see her status.

## 2018-05-14 NOTE — PROGRESS NOTES
PROGRESS NOTE    Subjective:       Patient ID: Carmen udque is a 58 y.o. female.    Chief Complaint:  Follow-up and Results (labs in epic)  lung cancer follow up.     History of Present Illness:   Carmen duque is a 58 y.o. female who presents for follow up, on first cycle of carbo etopiside at this time.  She is having some nausea but ran out of zofran.         Family and Social history reviewed and is unchanged from 1/16/2018      ROS:  Review of Systems   Constitutional: Negative for fever.   Respiratory: Negative for shortness of breath.    Cardiovascular: Negative for chest pain and leg swelling.   Gastrointestinal: Negative for abdominal pain and blood in stool.   Genitourinary: Negative for hematuria.   Skin: Negative for rash.          Current Outpatient Prescriptions:     albuterol (PROVENTIL HFA/VENTOLIN HFA) 200 puff inhaler, Inhale 2 puffs into the lungs every 6 (six) hours as needed.  , Disp: , Rfl:     alendronate (FOSAMAX) 70 MG tablet, Take 70 mg by mouth every 7 days.  , Disp: , Rfl:     alprazolam (XANAX) 0.5 MG tablet, Take 0.5 mg by mouth nightly as needed.  , Disp: , Rfl:     amLODIPine (NORVASC) 5 MG tablet, Take 5 mg by mouth once daily., Disp: , Rfl:     calcium citrate (CALCITRATE) 200 mg (950 mg) tablet, Take 5 tablets by mouth once daily.  , Disp: , Rfl:     dronabinol (MARINOL) 5 MG capsule, Take 1 capsule (5 mg total) by mouth 2 (two) times daily before meals., Disp: 60 capsule, Rfl: 1    ergocalciferol, vitamin D2, 1,000 unit Tab, 2 tablets daily, Disp: 60 tablet, Rfl: 5    esomeprazole (NEXIUM) 40 MG capsule, Take 40 mg by mouth before breakfast., Disp: , Rfl:     hydrocodone-acetaminophen (LORTAB) 7.5-500 mg per tablet, Take 1 tablet by mouth 2 (two) times daily as needed for Pain., Disp: 60 tablet, Rfl: 1    levothyroxine (SYNTHROID, LEVOTHROID) 175 MCG tablet, Take 175 mcg by mouth once daily., Disp: , Rfl:      lidocaine HCl 2% (XYLOCAINE) 2 % Soln, by Mucous Membrane route every 4 (four) hours. 5 ml every 4 hours by mouth as needed for swallowing pain, Disp: 300 mL, Rfl: 5    lisinopril (PRINIVIL,ZESTRIL) 5 MG tablet, Take 5 mg by mouth 2 (two) times daily. Says takes this about once a week now.  Goes by how her body feels she says. , Disp: , Rfl:     nebivolol (BYSTOLIC) 5 MG Tab, Take 10 mg by mouth once daily., Disp: , Rfl:     oxyCODONE (OXYCONTIN) 15 mg TR12 12 hr tablet, Take 1 tablet (15 mg total) by mouth every 12 (twelve) hours., Disp: 60 tablet, Rfl: 0    oxyCODONE (ROXICODONE) 15 MG Tab, Take 1 tablet (15 mg total) by mouth every 6 (six) hours as needed for Pain., Disp: 60 tablet, Rfl: 0    promethazine (PHENERGAN) 12.5 MG Tab, , Disp: , Rfl: 5    promethazine (PHENERGAN) 25 MG tablet, Take 1 tablet (25 mg total) by mouth every 6 (six) hours as needed for Nausea., Disp: 120 tablet, Rfl: 3    sodium chloride 1 gram tablet, Take 1 g by mouth 3 (three) times daily., Disp: , Rfl:         Objective:       Physical Examination:     /76   Pulse 103   Temp 97.9 °F (36.6 °C)   Resp 18   Wt 43.2 kg (95 lb 3.2 oz)   BMI 18.59 kg/m²     Physical Exam   Constitutional: She appears well-developed and well-nourished.   HENT:   Head: Normocephalic and atraumatic.   Right Ear: External ear normal.   Left Ear: External ear normal.   Mouth/Throat: Oropharynx is clear and moist.   Eyes: Conjunctivae are normal. Pupils are equal, round, and reactive to light.   Neck: No tracheal deviation present. No thyromegaly present.   Cardiovascular: Normal rate, regular rhythm and normal heart sounds.    Pulmonary/Chest: Effort normal and breath sounds normal.   Abdominal: Soft. Bowel sounds are normal. She exhibits no distension and no mass. There is no tenderness.   Musculoskeletal: She exhibits no edema.   Neurological:   Neuro intact througout   Skin: No rash noted.   Psychiatric: She has a normal mood and affect. Her  behavior is normal. Judgment and thought content normal.       Labs:   Recent Results (from the past 336 hour(s))   CBC auto differential    Collection Time: 05/07/18  2:30 PM   Result Value Ref Range    WBC 6.3 5.0 - 10.0 K/ul    Hemoglobin 11.3 (L) 12.0 - 15.0 g/dl    Hematocrit 34.9 (L) 36.0 - 48.0 %    Platelets 77 (L) 140 - 440 K/ul     CMP  Sodium   Date Value Ref Range Status   05/07/2018 137 134 - 144 mmol/L      Potassium   Date Value Ref Range Status   05/07/2018 5.2 (H) 3.5 - 5.0 mmol/L      Chloride   Date Value Ref Range Status   05/07/2018 103 98 - 110 mmol/L      CO2   Date Value Ref Range Status   05/07/2018 25.5 22.8 - 31.6 mmol/L      Glucose   Date Value Ref Range Status   05/07/2018 105 (H) 70 - 99 mg/dL      BUN, Bld   Date Value Ref Range Status   04/30/2018 12 8 - 20 mg/dL      Creatinine   Date Value Ref Range Status   04/30/2018 0.96 0.60 - 1.40 mg/dL      Calcium   Date Value Ref Range Status   05/07/2018 9.4 7.7 - 10.4 mg/dL      Total Protein   Date Value Ref Range Status   04/30/2018 6.8 6.0 - 8.2 g/dL      Albumin   Date Value Ref Range Status   04/30/2018 3.5 3.1 - 4.7 g/dL      Total Bilirubin   Date Value Ref Range Status   04/30/2018 0.3 0.3 - 1.0 mg/dL      Alkaline Phosphatase   Date Value Ref Range Status   04/30/2018 154 (H) 40 - 104 IU/L      AST   Date Value Ref Range Status   04/30/2018 16 10 - 40 IU/L      ALT   Date Value Ref Range Status   06/07/2012 7 (L) 10 - 44 U/L Final   06/07/2012 7 (L) 10 - 44 U/L Final     Anion Gap   Date Value Ref Range Status   10/12/2012 11.0 8 - 16 mmol/L Final     eGFR if    Date Value Ref Range Status   10/12/2012 >60 >60 mL/min Final     Comment:     Estimated glomerular filtration rate (eGFR) is normalized to an  average body surface area of 1.73 square meters.  The calculation  used to obtain the eGFR is the adjusted MDRD equation, which factors  patient sex, age, race, and creatinine result.  Since race is unknown  in our  information system, the eGFR values for -American  and Non--American patients are given for each creatinine  result.     eGFR if non    Date Value Ref Range Status   10/12/2012 >60 >60 mL/min Final     No results found for: CEA  No results found for: PSA        Assessment/Plan:     Problem List Items Addressed This Visit     Lung cancer, main bronchus, right-Small Cell     Patient returns for visit after missing many visits.  She has had four cycles of carbo/etop and states she has been tolerating these treatments very well.  I expressed strongly to her that she she needs to see me with every cycle to ensure her safety.  She understands this.  Will continue treatment tomorrow, go to six total then rescan to see her status.                 Discussion:     Follow-up in about 3 weeks (around 6/4/2018).      Electronically signed by Winston Miller

## 2018-05-14 NOTE — LETTER
May 14, 2018      Kem Kiser MD  [address]  [address]  [place] [address]           [facility]ematology Oncology  [address]  [address]  [place] [address]  Phone: [phone]  Fax: [phone]          Patient: Carmen duque   MR Number: [id number]   YOB: 1960   Date of Visit: 5/14/2018       Dear Dr. Kem Kiser:    Thank you for referring Carmen duque to me for evaluation. Attached you will find relevant portions of my assessment and plan of care.    If you have questions, please do not hesitate to call me. I look forward to following Carmen duque along with you.    Sincerely,    Winston Carr MD    Enclosure  CC:  No Recipients    If you would like to receive this communication electronically, please contact [email or web address] or [phone] to request more information on [employer] Link access.    For providers and/or their staff who would like to refer a patient to [facility], please contact us through our one-stop-shop provider referral line, [facility], at [phone].    If you feel you have received this communication in error or would no longer like to receive these types of communications, please e-mail [email or web address]

## 2018-05-15 RX ORDER — HEPARIN 100 UNIT/ML
500 SYRINGE INTRAVENOUS
Status: CANCELLED | OUTPATIENT
Start: 2018-05-16

## 2018-05-15 RX ORDER — SODIUM CHLORIDE 0.9 % (FLUSH) 0.9 %
10 SYRINGE (ML) INJECTION
Status: CANCELLED | OUTPATIENT
Start: 2018-05-17

## 2018-05-15 RX ORDER — SODIUM CHLORIDE 0.9 % (FLUSH) 0.9 %
10 SYRINGE (ML) INJECTION
Status: CANCELLED | OUTPATIENT
Start: 2018-05-16

## 2018-05-15 RX ORDER — HEPARIN 100 UNIT/ML
500 SYRINGE INTRAVENOUS
Status: CANCELLED | OUTPATIENT
Start: 2018-05-17

## 2018-05-15 RX ORDER — SODIUM CHLORIDE 0.9 % (FLUSH) 0.9 %
10 SYRINGE (ML) INJECTION
Status: CANCELLED | OUTPATIENT
Start: 2018-05-15

## 2018-05-15 RX ORDER — HEPARIN 100 UNIT/ML
500 SYRINGE INTRAVENOUS
Status: CANCELLED | OUTPATIENT
Start: 2018-05-15

## 2018-05-21 LAB
BASOPHILS NFR BLD: 0.2 K/UL (ref 0–0.2)
BASOPHILS NFR BLD: 0.7 %
EOSINOPHIL NFR BLD: 0.1 K/UL (ref 0–0.7)
EOSINOPHIL NFR BLD: 0.3 %
ERYTHROCYTE [DISTWIDTH] IN BLOOD BY AUTOMATED COUNT: 19.6 % (ref 12.5–14.5)
GRAN #: 21.1 K/UL (ref 1.4–6.5)
GRAN%: 86.9 %
HCT VFR BLD AUTO: 31.5 % (ref 36–48)
HGB BLD-MCNC: 9.9 G/DL (ref 12–15)
IMMATURE GRANS (ABS): 1.7 K/UL (ref 0–1)
IMMATURE GRANULOCYTES: 7 %
LYMPH #: 0.8 K/UL (ref 1.2–3.4)
LYMPH%: 3.3 %
MCH RBC QN AUTO: 32.1 PG (ref 25–35)
MCHC RBC AUTO-ENTMCNC: 31.4 G/DL (ref 31–36)
MCV RBC AUTO: 102.3 FL (ref 79–98)
MONO #: 0.4 K/UL (ref 0.1–0.6)
MONO%: 1.8 %
NUCLEATED RBCS: 0 %
NUCLEATED RED BLOOD CELLS: 0 /100 WBC
PERFORMED BY:: ABNORMAL
PLATELET # BLD AUTO: 43 K/UL (ref 140–440)
PMV BLD AUTO: 10.8 FL (ref 8.8–12.7)
RBC # BLD AUTO: 3.08 M/UL (ref 3.5–5.5)
WBC # BLD: 24.3 K/UL (ref 5–10)

## 2018-06-04 ENCOUNTER — OFFICE VISIT (OUTPATIENT)
Dept: HEMATOLOGY/ONCOLOGY | Facility: CLINIC | Age: 58
End: 2018-06-04
Payer: MEDICAID

## 2018-06-04 VITALS
RESPIRATION RATE: 18 BRPM | BODY MASS INDEX: 19.32 KG/M2 | DIASTOLIC BLOOD PRESSURE: 68 MMHG | HEART RATE: 90 BPM | SYSTOLIC BLOOD PRESSURE: 104 MMHG | WEIGHT: 98.88 LBS | TEMPERATURE: 98 F

## 2018-06-04 DIAGNOSIS — G89.3 CANCER ASSOCIATED PAIN: Chronic | ICD-10-CM

## 2018-06-04 DIAGNOSIS — C34.01 LUNG CANCER, MAIN BRONCHUS, RIGHT: ICD-10-CM

## 2018-06-04 PROCEDURE — 99214 OFFICE O/P EST MOD 30 MIN: CPT | Mod: ,,, | Performed by: INTERNAL MEDICINE

## 2018-06-04 NOTE — ASSESSMENT & PLAN NOTE
Patient is to begin sixth cycle of carbo/etopiside.  She has tolerated this well and will arrange new scans in four weeks to see effect of treatment.  This was discussed with patient in detail today.

## 2018-06-04 NOTE — PROGRESS NOTES
PROGRESS NOTE    Subjective:       Patient ID: Carmen duque is a 58 y.o. female.    Chief Complaint:  Follow-up  lung cancer follow up.     History of Present Illness:   Carmen duque is a 58 y.o. female who presents for follow up, She is to do cycle number six of carbo/etopiside.  No new complaints.     Family and Social history reviewed and is unchanged from 1/16/2018      ROS:  Review of Systems   Constitutional: Negative for fever.   Respiratory: Negative for shortness of breath.    Cardiovascular: Negative for chest pain and leg swelling.   Gastrointestinal: Negative for abdominal pain and blood in stool.   Genitourinary: Negative for hematuria.   Skin: Negative for rash.          Current Outpatient Prescriptions:     albuterol (PROVENTIL HFA/VENTOLIN HFA) 200 puff inhaler, Inhale 2 puffs into the lungs every 6 (six) hours as needed.  , Disp: , Rfl:     alendronate (FOSAMAX) 70 MG tablet, Take 70 mg by mouth every 7 days.  , Disp: , Rfl:     alprazolam (XANAX) 0.5 MG tablet, Take 0.5 mg by mouth nightly as needed.  , Disp: , Rfl:     amLODIPine (NORVASC) 5 MG tablet, Take 5 mg by mouth once daily., Disp: , Rfl:     calcium citrate (CALCITRATE) 200 mg (950 mg) tablet, Take 5 tablets by mouth once daily.  , Disp: , Rfl:     dronabinol (MARINOL) 5 MG capsule, Take 1 capsule (5 mg total) by mouth 2 (two) times daily before meals., Disp: 60 capsule, Rfl: 1    ergocalciferol, vitamin D2, 1,000 unit Tab, 2 tablets daily, Disp: 60 tablet, Rfl: 5    esomeprazole (NEXIUM) 40 MG capsule, Take 40 mg by mouth before breakfast., Disp: , Rfl:     hydrocodone-acetaminophen (LORTAB) 7.5-500 mg per tablet, Take 1 tablet by mouth 2 (two) times daily as needed for Pain., Disp: 60 tablet, Rfl: 1    levothyroxine (SYNTHROID, LEVOTHROID) 175 MCG tablet, Take 175 mcg by mouth once daily., Disp: , Rfl:     lidocaine HCl 2% (XYLOCAINE) 2 % Soln, by Mucous Membrane  route every 4 (four) hours. 5 ml every 4 hours by mouth as needed for swallowing pain, Disp: 300 mL, Rfl: 5    lisinopril (PRINIVIL,ZESTRIL) 5 MG tablet, Take 5 mg by mouth 2 (two) times daily. Says takes this about once a week now.  Goes by how her body feels she says. , Disp: , Rfl:     nebivolol (BYSTOLIC) 5 MG Tab, Take 10 mg by mouth once daily., Disp: , Rfl:     oxyCODONE (OXYCONTIN) 15 mg TR12 12 hr tablet, Take 1 tablet (15 mg total) by mouth every 12 (twelve) hours., Disp: 60 tablet, Rfl: 0    oxyCODONE (ROXICODONE) 15 MG Tab, Take 1 tablet (15 mg total) by mouth every 6 (six) hours as needed for Pain., Disp: 60 tablet, Rfl: 0    promethazine (PHENERGAN) 12.5 MG Tab, , Disp: , Rfl: 5    promethazine (PHENERGAN) 25 MG tablet, Take 1 tablet (25 mg total) by mouth every 6 (six) hours as needed for Nausea., Disp: 120 tablet, Rfl: 3    sodium chloride 1 gram tablet, Take 1 g by mouth 3 (three) times daily., Disp: , Rfl:         Objective:       Physical Examination:     /68   Pulse 90   Temp 98 °F (36.7 °C)   Resp 18   Wt 44.9 kg (98 lb 14.4 oz)   BMI 19.32 kg/m²     Physical Exam   Constitutional: She appears well-developed and well-nourished.   HENT:   Head: Normocephalic and atraumatic.   Right Ear: External ear normal.   Left Ear: External ear normal.   Mouth/Throat: Oropharynx is clear and moist.   Eyes: Conjunctivae are normal. Pupils are equal, round, and reactive to light.   Neck: No tracheal deviation present. No thyromegaly present.   Cardiovascular: Normal rate, regular rhythm and normal heart sounds.    Pulmonary/Chest: Effort normal and breath sounds normal.   Abdominal: Soft. Bowel sounds are normal. She exhibits no distension and no mass. There is no tenderness.   Musculoskeletal: She exhibits no edema.   Neurological:   Neuro intact througout   Skin: No rash noted.   Psychiatric: She has a normal mood and affect. Her behavior is normal. Judgment and thought content normal.        Labs:   Recent Results (from the past 336 hour(s))   CBC auto differential    Collection Time: 05/21/18  2:07 PM   Result Value Ref Range    WBC 24.3 (H) 5.0 - 10.0 K/ul    Hemoglobin 9.9 (L) 12.0 - 15.0 g/dl    Hematocrit 31.5 (L) 36.0 - 48.0 %    Platelets 43 (LL) 140 - 440 K/ul     CMP  Sodium   Date Value Ref Range Status   05/30/2018 137 134 - 144 mmol/L      Potassium   Date Value Ref Range Status   05/30/2018 5.4 (H) 3.5 - 5.0 mmol/L      Chloride   Date Value Ref Range Status   05/30/2018 101 98 - 110 mmol/L      CO2   Date Value Ref Range Status   05/30/2018 28.6 22.8 - 31.6 mmol/L      Glucose   Date Value Ref Range Status   05/30/2018 101 (H) 70 - 99 mg/dL      BUN, Bld   Date Value Ref Range Status   05/30/2018 9 8 - 20 mg/dL      Creatinine   Date Value Ref Range Status   05/30/2018 1.11 0.60 - 1.40 mg/dL      Calcium   Date Value Ref Range Status   05/30/2018 9.2 7.7 - 10.4 mg/dL      Total Protein   Date Value Ref Range Status   05/30/2018 7.6 6.0 - 8.2 g/dL      Albumin   Date Value Ref Range Status   05/30/2018 4.3 3.1 - 4.7 g/dL      Total Bilirubin   Date Value Ref Range Status   05/30/2018 0.2 (L) 0.3 - 1.0 mg/dL      Alkaline Phosphatase   Date Value Ref Range Status   05/30/2018 204 (H) 40 - 104 IU/L      AST   Date Value Ref Range Status   05/30/2018 19 10 - 40 IU/L      ALT   Date Value Ref Range Status   06/07/2012 7 (L) 10 - 44 U/L Final   06/07/2012 7 (L) 10 - 44 U/L Final     Anion Gap   Date Value Ref Range Status   10/12/2012 11.0 8 - 16 mmol/L Final     eGFR if    Date Value Ref Range Status   10/12/2012 >60 >60 mL/min Final     Comment:     Estimated glomerular filtration rate (eGFR) is normalized to an  average body surface area of 1.73 square meters.  The calculation  used to obtain the eGFR is the adjusted MDRD equation, which factors  patient sex, age, race, and creatinine result.  Since race is unknown  in our information system, the eGFR values for  -American  and Non--American patients are given for each creatinine  result.     eGFR if non    Date Value Ref Range Status   10/12/2012 >60 >60 mL/min Final     No results found for: CEA  No results found for: PSA        Assessment/Plan:     Problem List Items Addressed This Visit     Lung cancer, main bronchus, right-Small Cell     Patient is to begin sixth cycle of carbo/etopiside.  She has tolerated this well and will arrange new scans in four weeks to see effect of treatment.  This was discussed with patient in detail today.           Relevant Orders    CT Abdomen Pelvis With Contrast    CT Chest With Contrast    Cancer associated pain (Chronic)     Pain is currently under control at this time.  Will continue current medication.                Discussion:     Follow-up in about 4 weeks (around 7/2/2018).      Electronically signed by Winston Miller

## 2018-06-04 NOTE — LETTER
June 4, 2018      Kem Kiser MD  [address]  [address]  [place] [address]           [facility] Hematology Oncology  [address]  [address]  [place] [address]  Phone: [phone]  Fax: [phone]          Patient: Carmen duque   MR Number: [id number]   YOB: 1960   Date of Visit: 6/4/2018       Dear Dr. Kem Kiser:    Thank you for referring Carmen duque to me for evaluation. Attached you will find relevant portions of my assessment and plan of care.    If you have questions, please do not hesitate to call me. I look forward to following Carmen duque along with you.    Sincerely,    Winston Carr MD    Enclosure  CC:  No Recipients    If you would like to receive this communication electronically, please contact [email or web address] or [phone] to request more information on [employer] Link access.    For providers and/or their staff who would like to refer a patient to [facility], please contact us through our one-stop-shop provider referral line, [facility], at [phone].    If you feel you have received this communication in error or would no longer like to receive these types of communications, please e-mail [email or web address]

## 2018-06-05 ENCOUNTER — TELEPHONE (OUTPATIENT)
Dept: ADMINISTRATIVE | Facility: HOSPITAL | Age: 58
End: 2018-06-05

## 2018-06-12 RX ORDER — SODIUM CHLORIDE 0.9 % (FLUSH) 0.9 %
10 SYRINGE (ML) INJECTION
Status: CANCELLED | OUTPATIENT
Start: 2018-06-12

## 2018-06-12 RX ORDER — SODIUM CHLORIDE 0.9 % (FLUSH) 0.9 %
10 SYRINGE (ML) INJECTION
Status: CANCELLED | OUTPATIENT
Start: 2018-06-14

## 2018-06-12 RX ORDER — HEPARIN 100 UNIT/ML
500 SYRINGE INTRAVENOUS
Status: CANCELLED | OUTPATIENT
Start: 2018-06-13

## 2018-06-12 RX ORDER — HEPARIN 100 UNIT/ML
500 SYRINGE INTRAVENOUS
Status: CANCELLED | OUTPATIENT
Start: 2018-06-14

## 2018-06-12 RX ORDER — SODIUM CHLORIDE 0.9 % (FLUSH) 0.9 %
10 SYRINGE (ML) INJECTION
Status: CANCELLED | OUTPATIENT
Start: 2018-06-13

## 2018-06-12 RX ORDER — HEPARIN 100 UNIT/ML
500 SYRINGE INTRAVENOUS
Status: CANCELLED | OUTPATIENT
Start: 2018-06-12

## 2018-07-02 ENCOUNTER — OFFICE VISIT (OUTPATIENT)
Dept: HEMATOLOGY/ONCOLOGY | Facility: CLINIC | Age: 58
End: 2018-07-02
Payer: MEDICAID

## 2018-07-02 VITALS
HEIGHT: 60 IN | WEIGHT: 96.38 LBS | HEART RATE: 125 BPM | SYSTOLIC BLOOD PRESSURE: 154 MMHG | DIASTOLIC BLOOD PRESSURE: 85 MMHG | TEMPERATURE: 98 F | BODY MASS INDEX: 18.92 KG/M2

## 2018-07-02 DIAGNOSIS — C34.01 LUNG CANCER, MAIN BRONCHUS, RIGHT: ICD-10-CM

## 2018-07-02 PROCEDURE — 99213 OFFICE O/P EST LOW 20 MIN: CPT | Mod: ,,, | Performed by: INTERNAL MEDICINE

## 2018-07-02 NOTE — LETTER
July 2, 2018      Kem Kiser MD  [address]  [address]  [place] [address]           [facility] - Hematology Oncology  [address]  [address]  [place] [address]  Phone: [phone]  Fax: [phone]          Patient: Carmen Leyva   MR Number: [id number]   YOB: 1960   Date of Visit: 7/2/2018       Dear Dr. Kem Kiser:    Thank you for referring Carmen Leyva to me for evaluation. Attached you will find relevant portions of my assessment and plan of care.    If you have questions, please do not hesitate to call me. I look forward to following Carmen Leyva along with you.    Sincerely,    Winston Carr MD    Enclosure  CC:  No Recipients    If you would like to receive this communication electronically, please contact [email or web address] or [phone] to request more information on [employer] Link access.    For providers and/or their staff who would like to refer a patient to [facility], please contact us through our one-stop-shop provider referral line, [facility], at [phone].    If you feel you have received this communication in error or would no longer like to receive these types of communications, please e-mail [email or web address]

## 2018-07-02 NOTE — ASSESSMENT & PLAN NOTE
Patient returns for CT scans which show a marked response to treatment.  Patient has completed six cycles of carbo/etop and I will place this on hold and rescan her in 2 months to see how she is doing.  I discussed this in detail with her and she understands this.  Will see her again in 2 months with CT of the chest.

## 2018-07-02 NOTE — PROGRESS NOTES
PROGRESS NOTE    Subjective:       Patient ID: Carmen Leyva is a 58 y.o. female.    Chief Complaint:  Follow-up and Results  lung cancer follow up.     History of Present Illness:   Carmen Leyva is a 58 y.o. female who presents for follow up, She has completed six cycles of carbo/etopiside.      Family and Social history reviewed and is unchanged from 1/16/2018      ROS:  Review of Systems   Constitutional: Negative for fever.   Respiratory: Negative for shortness of breath.    Cardiovascular: Negative for chest pain and leg swelling.   Gastrointestinal: Negative for abdominal pain and blood in stool.   Genitourinary: Negative for hematuria.   Skin: Negative for rash.          Current Outpatient Prescriptions:     albuterol (PROVENTIL HFA/VENTOLIN HFA) 200 puff inhaler, Inhale 2 puffs into the lungs every 6 (six) hours as needed.  , Disp: , Rfl:     alendronate (FOSAMAX) 70 MG tablet, Take 70 mg by mouth every 7 days.  , Disp: , Rfl:     alprazolam (XANAX) 0.5 MG tablet, Take 0.5 mg by mouth nightly as needed.  , Disp: , Rfl:     amLODIPine (NORVASC) 5 MG tablet, Take 5 mg by mouth once daily., Disp: , Rfl:     calcium citrate (CALCITRATE) 200 mg (950 mg) tablet, Take 5 tablets by mouth once daily.  , Disp: , Rfl:     dronabinol (MARINOL) 5 MG capsule, Take 1 capsule (5 mg total) by mouth 2 (two) times daily before meals., Disp: 60 capsule, Rfl: 1    ergocalciferol, vitamin D2, 1,000 unit Tab, 2 tablets daily, Disp: 60 tablet, Rfl: 5    esomeprazole (NEXIUM) 40 MG capsule, Take 40 mg by mouth before breakfast., Disp: , Rfl:     hydrocodone-acetaminophen (LORTAB) 7.5-500 mg per tablet, Take 1 tablet by mouth 2 (two) times daily as needed for Pain., Disp: 60 tablet, Rfl: 1    levothyroxine (SYNTHROID, LEVOTHROID) 175 MCG tablet, Take 175 mcg by mouth once daily., Disp: , Rfl:     lidocaine HCl 2% (XYLOCAINE) 2 % Soln, by Mucous Membrane route  every 4 (four) hours. 5 ml every 4 hours by mouth as needed for swallowing pain, Disp: 300 mL, Rfl: 5    lisinopril (PRINIVIL,ZESTRIL) 5 MG tablet, Take 5 mg by mouth 2 (two) times daily. Says takes this about once a week now.  Goes by how her body feels she says. , Disp: , Rfl:     nebivolol (BYSTOLIC) 5 MG Tab, Take 10 mg by mouth once daily., Disp: , Rfl:     oxyCODONE (OXYCONTIN) 15 mg TR12 12 hr tablet, Take 1 tablet (15 mg total) by mouth every 12 (twelve) hours., Disp: 60 tablet, Rfl: 0    oxyCODONE (ROXICODONE) 15 MG Tab, Take 1 tablet (15 mg total) by mouth every 6 (six) hours as needed for Pain., Disp: 60 tablet, Rfl: 0    promethazine (PHENERGAN) 12.5 MG Tab, , Disp: , Rfl: 5    promethazine (PHENERGAN) 25 MG tablet, Take 1 tablet (25 mg total) by mouth every 6 (six) hours as needed for Nausea., Disp: 120 tablet, Rfl: 3    sodium chloride 1 gram tablet, Take 1 g by mouth 3 (three) times daily., Disp: , Rfl:         Objective:       Physical Examination:     BP (!) 154/85   Pulse (!) 125   Temp 97.7 °F (36.5 °C)   Ht 5' (1.524 m)   Wt 43.7 kg (96 lb 6.4 oz)   BMI 18.83 kg/m²     Physical Exam   Constitutional: She appears well-developed and well-nourished.   HENT:   Head: Normocephalic and atraumatic.   Right Ear: External ear normal.   Left Ear: External ear normal.   Mouth/Throat: Oropharynx is clear and moist.   Eyes: Conjunctivae are normal. Pupils are equal, round, and reactive to light.   Neck: No tracheal deviation present. No thyromegaly present.   Cardiovascular: Normal rate, regular rhythm and normal heart sounds.    Pulmonary/Chest: Effort normal and breath sounds normal.   Abdominal: Soft. Bowel sounds are normal. She exhibits no distension and no mass. There is no tenderness.   Musculoskeletal: She exhibits no edema.   Neurological:   Neuro intact througout   Skin: No rash noted.   Psychiatric: She has a normal mood and affect. Her behavior is normal. Judgment and thought content  normal.       Labs:   Recent Results (from the past 336 hour(s))   CBC auto differential    Collection Time: 06/18/18  2:17 PM   Result Value Ref Range    WBC 31.5 (H) 5.0 - 10.0 K/ul    Hemoglobin 11.7 (L) 12.0 - 15.0 g/dl    Hematocrit 35.9 (L) 36.0 - 48.0 %    Platelets 56 (L) 140 - 440 K/ul     CMP  Sodium   Date Value Ref Range Status   06/25/2018 136 134 - 144 mmol/L      Potassium   Date Value Ref Range Status   06/25/2018 4.4 3.5 - 5.0 mmol/L      Chloride   Date Value Ref Range Status   06/25/2018 101 98 - 110 mmol/L      CO2   Date Value Ref Range Status   06/25/2018 26.7 22.8 - 31.6 mmol/L      Glucose   Date Value Ref Range Status   06/25/2018 96 70 - 99 mg/dL      BUN, Bld   Date Value Ref Range Status   06/25/2018 14 8 - 20 mg/dL      Creatinine   Date Value Ref Range Status   06/25/2018 0.89 0.60 - 1.40 mg/dL      Calcium   Date Value Ref Range Status   06/25/2018 9.4 7.7 - 10.4 mg/dL      Total Protein   Date Value Ref Range Status   06/25/2018 7.7 6.0 - 8.2 g/dL      Albumin   Date Value Ref Range Status   06/25/2018 4.4 3.1 - 4.7 g/dL      Total Bilirubin   Date Value Ref Range Status   06/25/2018 0.4 0.3 - 1.0 mg/dL      Alkaline Phosphatase   Date Value Ref Range Status   06/25/2018 232 (H) 40 - 104 IU/L      AST   Date Value Ref Range Status   06/25/2018 21 10 - 40 IU/L      ALT   Date Value Ref Range Status   06/07/2012 7 (L) 10 - 44 U/L Final   06/07/2012 7 (L) 10 - 44 U/L Final     Anion Gap   Date Value Ref Range Status   10/12/2012 11.0 8 - 16 mmol/L Final     eGFR if    Date Value Ref Range Status   10/12/2012 >60 >60 mL/min Final     Comment:     Estimated glomerular filtration rate (eGFR) is normalized to an  average body surface area of 1.73 square meters.  The calculation  used to obtain the eGFR is the adjusted MDRD equation, which factors  patient sex, age, race, and creatinine result.  Since race is unknown  in our information system, the eGFR values for  -American  and Non--American patients are given for each creatinine  result.     eGFR if non    Date Value Ref Range Status   10/12/2012 >60 >60 mL/min Final     No results found for: CEA  No results found for: PSA        Assessment/Plan:     Problem List Items Addressed This Visit     Lung cancer, main bronchus, right-Small Cell     Patient returns for CT scans which show a marked response to treatment.  Patient has completed six cycles of carbo/etop and I will place this on hold and rescan her in 2 months to see how she is doing.  I discussed this in detail with her and she understands this.  Will see her again in 2 months with CT of the chest.           Relevant Orders    CT Chest Without Contrast          Discussion:     Follow-up in about 2 months (around 9/2/2018).      Electronically signed by Winston Miller

## 2018-07-05 ENCOUNTER — TELEPHONE (OUTPATIENT)
Dept: HEMATOLOGY/ONCOLOGY | Facility: CLINIC | Age: 58
End: 2018-07-05

## 2018-07-05 NOTE — TELEPHONE ENCOUNTER
----- Message from Nicolette Carlin sent at 7/5/2018  2:29 PM CDT -----  Pt called and said that Dr. Miller is giving her 2 months off and she needs to know if she still has to do her labs or not.    CB# [phone]    Thanks,  Nicolette    Patient told she can get labs done monthly until she resumes chemo. She rtc in sept. 2018

## 2018-09-04 LAB
ALBUMIN SERPL-MCNC: 4.5 G/DL (ref 3.1–4.7)
ALP SERPL-CCNC: 174 IU/L (ref 40–104)
ALT (SGPT): 15 IU/L (ref 3–33)
AST SERPL-CCNC: 21 IU/L (ref 10–40)
BASOPHILS NFR BLD: 0 K/UL (ref 0–0.2)
BASOPHILS NFR BLD: 0.4 %
BILIRUB SERPL-MCNC: 0.5 MG/DL (ref 0.3–1)
BUN SERPL-MCNC: 13 MG/DL (ref 8–20)
CALCIUM SERPL-MCNC: 9.4 MG/DL (ref 7.7–10.4)
CHLORIDE: 99 MMOL/L (ref 98–110)
CO2 SERPL-SCNC: 28 MMOL/L (ref 22.8–31.6)
CREATININE: 1.01 MG/DL (ref 0.6–1.4)
EOSINOPHIL NFR BLD: 0 K/UL (ref 0–0.7)
EOSINOPHIL NFR BLD: 0.8 %
ERYTHROCYTE [DISTWIDTH] IN BLOOD BY AUTOMATED COUNT: 13.2 % (ref 12.5–14.5)
GLUCOSE: 97 MG/DL (ref 70–99)
GRAN #: 3.4 K/UL (ref 1.4–6.5)
GRAN%: 68.8 %
HCT VFR BLD AUTO: 30 % (ref 36–48)
HGB BLD-MCNC: 9.5 G/DL (ref 12–15)
IMMATURE GRANS (ABS): 0 K/UL (ref 0–1)
IMMATURE GRANULOCYTES: 0.2 %
LYMPH #: 0.8 K/UL (ref 1.2–3.4)
LYMPH%: 16.2 %
MCH RBC QN AUTO: 35.1 PG (ref 25–35)
MCHC RBC AUTO-ENTMCNC: 31.7 G/DL (ref 31–36)
MCV RBC AUTO: 110.7 FL (ref 79–98)
MONO #: 0.7 K/UL (ref 0.1–0.6)
MONO%: 13.6 %
NUCLEATED RBCS: 0 %
NUCLEATED RED BLOOD CELLS: 0 /100 WBC
PERFORMED BY:: ABNORMAL
PLATELET # BLD AUTO: 82 K/UL (ref 140–440)
PMV BLD AUTO: 9.1 FL (ref 8.8–12.7)
POTASSIUM SERPL-SCNC: 4.7 MMOL/L (ref 3.5–5)
PROT SERPL-MCNC: 8.3 G/DL (ref 6–8.2)
RBC # BLD AUTO: 2.71 M/UL (ref 3.5–5.5)
SODIUM: 133 MMOL/L (ref 134–144)
WBC # BLD: 4.9 K/UL (ref 5–10)

## 2018-09-06 ENCOUNTER — OFFICE VISIT (OUTPATIENT)
Dept: HEMATOLOGY/ONCOLOGY | Facility: CLINIC | Age: 58
End: 2018-09-06
Payer: MEDICAID

## 2018-09-06 VITALS
RESPIRATION RATE: 18 BRPM | DIASTOLIC BLOOD PRESSURE: 78 MMHG | BODY MASS INDEX: 19.53 KG/M2 | TEMPERATURE: 98 F | SYSTOLIC BLOOD PRESSURE: 131 MMHG | WEIGHT: 100 LBS | HEART RATE: 86 BPM

## 2018-09-06 DIAGNOSIS — C34.01 LUNG CANCER, MAIN BRONCHUS, RIGHT: ICD-10-CM

## 2018-09-06 PROCEDURE — 99213 OFFICE O/P EST LOW 20 MIN: CPT | Mod: ,,, | Performed by: INTERNAL MEDICINE

## 2018-09-06 NOTE — ASSESSMENT & PLAN NOTE
CT scan looks ok however there is suggestion of mediastinal LN recurrence.  I will need to repeat this with contrast and discussed this with her today.  No other areas or recurrence are seen.  Will have her back after this.

## 2018-09-06 NOTE — LETTER
September 6, 2018      Kem Kiser MD  [address]  [address]  [place] [address]           [facility] - Hematology Oncology  [address]  [address]  [facility] [address]  Phone: [phone]  Fax: [phone]          Patient: Carmen Leyva   MR Number: [id number]   YOB: 1960   Date of Visit: 9/6/2018       Dear Dr. Kem Kiser:    Thank you for referring Carmen Leyva to me for evaluation. Attached you will find relevant portions of my assessment and plan of care.    If you have questions, please do not hesitate to call me. I look forward to following Carmen Leyva along with you.    Sincerely,    Winston Carr MD    Enclosure  CC:  No Recipients    If you would like to receive this communication electronically, please contact [email or web address] or [phone] to request more information on [employer] Link access.    For providers and/or their staff who would like to refer a patient to [facility], please contact us through our one-stop-shop provider referral line, [facility], at [phone].    If you feel you have received this communication in error or would no longer like to receive these types of communications, please e-mail [email or web address]

## 2018-09-06 NOTE — PROGRESS NOTES
PROGRESS NOTE    Subjective:       Patient ID: Carmen Leyva is a 58 y.o. female.    Chief Complaint:  Follow-up and Results  lung cancer follow up.     History of Present Illness:   Carmen Leyva is a 58 y.o. female who presents for follow up, She has completed six cycles of carbo/etopiside.      Family and Social history reviewed and is unchanged from 1/16/2018      ROS:  Review of Systems   Constitutional: Negative for fever.   Respiratory: Negative for shortness of breath.    Cardiovascular: Negative for chest pain and leg swelling.   Gastrointestinal: Negative for abdominal pain and blood in stool.   Genitourinary: Negative for hematuria.   Skin: Negative for rash.          Current Outpatient Medications:     albuterol (PROVENTIL HFA/VENTOLIN HFA) 200 puff inhaler, Inhale 2 puffs into the lungs every 6 (six) hours as needed.  , Disp: , Rfl:     alendronate (FOSAMAX) 70 MG tablet, Take 70 mg by mouth every 7 days.  , Disp: , Rfl:     alprazolam (XANAX) 0.5 MG tablet, Take 0.5 mg by mouth nightly as needed.  , Disp: , Rfl:     amLODIPine (NORVASC) 5 MG tablet, Take 5 mg by mouth once daily., Disp: , Rfl:     calcium citrate (CALCITRATE) 200 mg (950 mg) tablet, Take 5 tablets by mouth once daily.  , Disp: , Rfl:     dronabinol (MARINOL) 5 MG capsule, Take 1 capsule (5 mg total) by mouth 2 (two) times daily before meals., Disp: 60 capsule, Rfl: 1    ergocalciferol, vitamin D2, 1,000 unit Tab, 2 tablets daily, Disp: 60 tablet, Rfl: 5    esomeprazole (NEXIUM) 40 MG capsule, Take 40 mg by mouth before breakfast., Disp: , Rfl:     hydrocodone-acetaminophen (LORTAB) 7.5-500 mg per tablet, Take 1 tablet by mouth 2 (two) times daily as needed for Pain., Disp: 60 tablet, Rfl: 1    levothyroxine (SYNTHROID, LEVOTHROID) 175 MCG tablet, Take 175 mcg by mouth once daily., Disp: , Rfl:     lidocaine HCl 2% (XYLOCAINE) 2 % Soln, by Mucous Membrane route every  4 (four) hours. 5 ml every 4 hours by mouth as needed for swallowing pain, Disp: 300 mL, Rfl: 5    lisinopril (PRINIVIL,ZESTRIL) 5 MG tablet, Take 5 mg by mouth 2 (two) times daily. Says takes this about once a week now.  Goes by how her body feels she says. , Disp: , Rfl:     nebivolol (BYSTOLIC) 5 MG Tab, Take 10 mg by mouth once daily., Disp: , Rfl:     oxyCODONE (OXYCONTIN) 15 mg TR12 12 hr tablet, Take 1 tablet (15 mg total) by mouth every 12 (twelve) hours., Disp: 60 tablet, Rfl: 0    oxyCODONE (ROXICODONE) 15 MG Tab, Take 1 tablet (15 mg total) by mouth every 6 (six) hours as needed for Pain., Disp: 60 tablet, Rfl: 0    promethazine (PHENERGAN) 12.5 MG Tab, , Disp: , Rfl: 5    promethazine (PHENERGAN) 25 MG tablet, Take 1 tablet (25 mg total) by mouth every 6 (six) hours as needed for Nausea., Disp: 120 tablet, Rfl: 3    sodium chloride 1 gram tablet, Take 1 g by mouth 3 (three) times daily., Disp: , Rfl:         Objective:       Physical Examination:     /78   Pulse 86   Temp 97.8 °F (36.6 °C)   Resp 18   Wt 45.4 kg (100 lb)   BMI 19.53 kg/m²     Physical Exam   Constitutional: She appears well-developed and well-nourished.   HENT:   Head: Normocephalic and atraumatic.   Right Ear: External ear normal.   Left Ear: External ear normal.   Mouth/Throat: Oropharynx is clear and moist.   Eyes: Conjunctivae are normal. Pupils are equal, round, and reactive to light.   Neck: No tracheal deviation present. No thyromegaly present.   Cardiovascular: Normal rate, regular rhythm and normal heart sounds.   Pulmonary/Chest: Effort normal and breath sounds normal.   Abdominal: Soft. Bowel sounds are normal. She exhibits no distension and no mass. There is no tenderness.   Musculoskeletal: She exhibits no edema.   Neurological:   Neuro intact througout   Skin: No rash noted.   Psychiatric: She has a normal mood and affect. Her behavior is normal. Judgment and thought content normal.       Labs:   Recent  Results (from the past 336 hour(s))   CBC auto differential    Collection Time: 09/04/18  1:02 PM   Result Value Ref Range    WBC 4.9 (L) 5.0 - 10.0 K/ul    Hemoglobin 9.5 (L) 12.0 - 15.0 g/dl    Hematocrit 30.0 (L) 36.0 - 48.0 %    Platelets 82 (L) 140 - 440 K/ul     CMP  Sodium   Date Value Ref Range Status   09/04/2018 133 (L) 134 - 144 mmol/L      Potassium   Date Value Ref Range Status   09/04/2018 4.7 3.5 - 5.0 mmol/L      Chloride   Date Value Ref Range Status   09/04/2018 99 98 - 110 mmol/L      CO2   Date Value Ref Range Status   09/04/2018 28.0 22.8 - 31.6 mmol/L      Glucose   Date Value Ref Range Status   09/04/2018 97 70 - 99 mg/dL      BUN, Bld   Date Value Ref Range Status   09/04/2018 13 8 - 20 mg/dL      Creatinine   Date Value Ref Range Status   09/04/2018 1.01 0.60 - 1.40 mg/dL      Calcium   Date Value Ref Range Status   09/04/2018 9.4 7.7 - 10.4 mg/dL      Total Protein   Date Value Ref Range Status   09/04/2018 8.3 (H) 6.0 - 8.2 g/dL      Albumin   Date Value Ref Range Status   09/04/2018 4.5 3.1 - 4.7 g/dL      Total Bilirubin   Date Value Ref Range Status   09/04/2018 0.5 0.3 - 1.0 mg/dL      Alkaline Phosphatase   Date Value Ref Range Status   09/04/2018 174 (H) 40 - 104 IU/L      AST   Date Value Ref Range Status   09/04/2018 21 10 - 40 IU/L      ALT   Date Value Ref Range Status   06/07/2012 7 (L) 10 - 44 U/L Final   06/07/2012 7 (L) 10 - 44 U/L Final     Anion Gap   Date Value Ref Range Status   10/12/2012 11.0 8 - 16 mmol/L Final     eGFR if    Date Value Ref Range Status   10/12/2012 >60 >60 mL/min Final     Comment:     Estimated glomerular filtration rate (eGFR) is normalized to an  average body surface area of 1.73 square meters.  The calculation  used to obtain the eGFR is the adjusted MDRD equation, which factors  patient sex, age, race, and creatinine result.  Since race is unknown  in our information system, the eGFR values for -American  and  Non--American patients are given for each creatinine  result.     eGFR if non    Date Value Ref Range Status   10/12/2012 >60 >60 mL/min Final     No results found for: CEA  No results found for: PSA        Assessment/Plan:     Problem List Items Addressed This Visit     Lung cancer, main bronchus, right-Small Cell     CT scan looks ok however there is suggestion of mediastinal LN recurrence.  I will need to repeat this with contrast and discussed this with her today.  No other areas or recurrence are seen.  Will have her back after this.           Relevant Orders    CT Chest With Contrast          Discussion:     Follow-up in about 2 weeks (around 9/20/2018).      Electronically signed by Winston Miller

## 2018-09-10 ENCOUNTER — TELEPHONE (OUTPATIENT)
Dept: HEMATOLOGY/ONCOLOGY | Facility: CLINIC | Age: 58
End: 2018-09-10

## 2018-09-10 NOTE — TELEPHONE ENCOUNTER
----- Message from Mary Hines sent at 9/7/2018  3:15 PM CDT -----  Patient called in stating that she has scans set up for 9/17. She would like to know if Dr. Carr would like her to have any type of treatments or anything before these ? Please advise and contact patient at [phone]. Thanks       Left message for her to call me back. She is finished with her initial chemo cycles. She will have a scan on 9/17/18 and then return here on 9/19 to discuss results and if there is a need for additional treatments.     Carmen returned my call. She was instructed to redo her labs next Monday and get the scan done as ordered then see Dr. Carr on 9/19/18

## 2018-09-17 LAB
ALBUMIN SERPL-MCNC: 4.1 G/DL (ref 3.1–4.7)
ALP SERPL-CCNC: 193 IU/L (ref 40–104)
ALT (SGPT): 14 IU/L (ref 3–33)
AST SERPL-CCNC: 22 IU/L (ref 10–40)
BILIRUB SERPL-MCNC: 0.3 MG/DL (ref 0.3–1)
BUN SERPL-MCNC: 19 MG/DL (ref 8–20)
CALCIUM SERPL-MCNC: 9.4 MG/DL (ref 7.7–10.4)
CHLORIDE: 97 MMOL/L (ref 98–110)
CO2 SERPL-SCNC: 23.8 MMOL/L (ref 22.8–31.6)
CREATININE: 1.1 MG/DL (ref 0.6–1.4)
GLUCOSE: 139 MG/DL (ref 70–99)
POTASSIUM SERPL-SCNC: 4.2 MMOL/L (ref 3.5–5)
PROT SERPL-MCNC: 7.8 G/DL (ref 6–8.2)
SODIUM: 133 MMOL/L (ref 134–144)

## 2018-09-19 ENCOUNTER — OFFICE VISIT (OUTPATIENT)
Dept: HEMATOLOGY/ONCOLOGY | Facility: CLINIC | Age: 58
End: 2018-09-19
Payer: MEDICAID

## 2018-09-19 VITALS
TEMPERATURE: 98 F | WEIGHT: 97 LBS | HEART RATE: 118 BPM | RESPIRATION RATE: 18 BRPM | BODY MASS INDEX: 18.94 KG/M2 | SYSTOLIC BLOOD PRESSURE: 129 MMHG | DIASTOLIC BLOOD PRESSURE: 79 MMHG

## 2018-09-19 DIAGNOSIS — C34.01 LUNG CANCER, MAIN BRONCHUS, RIGHT: ICD-10-CM

## 2018-09-19 PROCEDURE — 99213 OFFICE O/P EST LOW 20 MIN: CPT | Mod: ,,, | Performed by: INTERNAL MEDICINE

## 2018-09-19 NOTE — PROGRESS NOTES
PROGRESS NOTE    Subjective:       Patient ID: Carmen Leyva is a 58 y.o. female.    Chief Complaint:  Follow-up and Results  lung cancer follow up.     History of Present Illness:   Carmen Leyva is a 58 y.o. female who presents for follow up, She has completed six cycles of carbo/etopiside.  She returns today with a contrasted CT scan after a non-contrast scan was abnormal.  No new complaints.      Family and Social history reviewed and is unchanged from 1/16/2018      ROS:  Review of Systems   Constitutional: Negative for fever.   Respiratory: Negative for shortness of breath.    Cardiovascular: Negative for chest pain and leg swelling.   Gastrointestinal: Negative for abdominal pain and blood in stool.   Genitourinary: Negative for hematuria.   Skin: Negative for rash.          Current Outpatient Medications:     albuterol (PROVENTIL HFA/VENTOLIN HFA) 200 puff inhaler, Inhale 2 puffs into the lungs every 6 (six) hours as needed.  , Disp: , Rfl:     alendronate (FOSAMAX) 70 MG tablet, Take 70 mg by mouth every 7 days.  , Disp: , Rfl:     alprazolam (XANAX) 0.5 MG tablet, Take 0.5 mg by mouth nightly as needed.  , Disp: , Rfl:     amLODIPine (NORVASC) 5 MG tablet, Take 5 mg by mouth once daily., Disp: , Rfl:     calcium citrate (CALCITRATE) 200 mg (950 mg) tablet, Take 5 tablets by mouth once daily.  , Disp: , Rfl:     dronabinol (MARINOL) 5 MG capsule, Take 1 capsule (5 mg total) by mouth 2 (two) times daily before meals., Disp: 60 capsule, Rfl: 1    ergocalciferol, vitamin D2, 1,000 unit Tab, 2 tablets daily, Disp: 60 tablet, Rfl: 5    esomeprazole (NEXIUM) 40 MG capsule, Take 40 mg by mouth before breakfast., Disp: , Rfl:     hydrocodone-acetaminophen (LORTAB) 7.5-500 mg per tablet, Take 1 tablet by mouth 2 (two) times daily as needed for Pain., Disp: 60 tablet, Rfl: 1    levothyroxine (SYNTHROID, LEVOTHROID) 175 MCG tablet, Take 175 mcg by  mouth once daily., Disp: , Rfl:     lidocaine HCl 2% (XYLOCAINE) 2 % Soln, by Mucous Membrane route every 4 (four) hours. 5 ml every 4 hours by mouth as needed for swallowing pain, Disp: 300 mL, Rfl: 5    lisinopril (PRINIVIL,ZESTRIL) 5 MG tablet, Take 5 mg by mouth 2 (two) times daily. Says takes this about once a week now.  Goes by how her body feels she says. , Disp: , Rfl:     nebivolol (BYSTOLIC) 5 MG Tab, Take 10 mg by mouth once daily., Disp: , Rfl:     oxyCODONE (OXYCONTIN) 15 mg TR12 12 hr tablet, Take 1 tablet (15 mg total) by mouth every 12 (twelve) hours., Disp: 60 tablet, Rfl: 0    oxyCODONE (ROXICODONE) 15 MG Tab, Take 1 tablet (15 mg total) by mouth every 6 (six) hours as needed for Pain., Disp: 60 tablet, Rfl: 0    promethazine (PHENERGAN) 12.5 MG Tab, , Disp: , Rfl: 5    promethazine (PHENERGAN) 25 MG tablet, Take 1 tablet (25 mg total) by mouth every 6 (six) hours as needed for Nausea., Disp: 120 tablet, Rfl: 3    sodium chloride 1 gram tablet, Take 1 g by mouth 3 (three) times daily., Disp: , Rfl:         Objective:       Physical Examination:     /79   Pulse (!) 118   Temp 98.1 °F (36.7 °C)   Resp 18   Wt 44 kg (97 lb)   BMI 18.94 kg/m²     Physical Exam   Constitutional: She appears well-developed and well-nourished.   HENT:   Head: Normocephalic and atraumatic.   Right Ear: External ear normal.   Left Ear: External ear normal.   Mouth/Throat: Oropharynx is clear and moist.   Eyes: Conjunctivae are normal. Pupils are equal, round, and reactive to light.   Neck: No tracheal deviation present. No thyromegaly present.   Cardiovascular: Normal rate, regular rhythm and normal heart sounds.   Pulmonary/Chest: Effort normal and breath sounds normal.   Abdominal: Soft. Bowel sounds are normal. She exhibits no distension and no mass. There is no tenderness.   Musculoskeletal: She exhibits no edema.   Neurological:   Neuro intact througout   Skin: No rash noted.   Psychiatric: She has a  normal mood and affect. Her behavior is normal. Judgment and thought content normal.       Labs:   No results found for this or any previous visit (from the past 336 hour(s)).  CMP  Sodium   Date Value Ref Range Status   09/17/2018 133 (L) 134 - 144 mmol/L      Potassium   Date Value Ref Range Status   09/17/2018 4.2 3.5 - 5.0 mmol/L      Chloride   Date Value Ref Range Status   09/17/2018 97 (L) 98 - 110 mmol/L      CO2   Date Value Ref Range Status   09/17/2018 23.8 22.8 - 31.6 mmol/L      Glucose   Date Value Ref Range Status   09/17/2018 139 (H) 70 - 99 mg/dL      BUN, Bld   Date Value Ref Range Status   09/17/2018 19 8 - 20 mg/dL      Creatinine   Date Value Ref Range Status   09/17/2018 1.10 0.60 - 1.40 mg/dL      Calcium   Date Value Ref Range Status   09/17/2018 9.4 7.7 - 10.4 mg/dL      Total Protein   Date Value Ref Range Status   09/17/2018 7.8 6.0 - 8.2 g/dL      Albumin   Date Value Ref Range Status   09/17/2018 4.1 3.1 - 4.7 g/dL      Total Bilirubin   Date Value Ref Range Status   09/17/2018 0.3 0.3 - 1.0 mg/dL      Alkaline Phosphatase   Date Value Ref Range Status   09/17/2018 193 (H) 40 - 104 IU/L      AST   Date Value Ref Range Status   09/17/2018 22 10 - 40 IU/L      ALT   Date Value Ref Range Status   06/07/2012 7 (L) 10 - 44 U/L Final   06/07/2012 7 (L) 10 - 44 U/L Final     Anion Gap   Date Value Ref Range Status   10/12/2012 11.0 8 - 16 mmol/L Final     eGFR if    Date Value Ref Range Status   10/12/2012 >60 >60 mL/min Final     Comment:     Estimated glomerular filtration rate (eGFR) is normalized to an  average body surface area of 1.73 square meters.  The calculation  used to obtain the eGFR is the adjusted MDRD equation, which factors  patient sex, age, race, and creatinine result.  Since race is unknown  in our information system, the eGFR values for -American  and Non--American patients are given for each creatinine  result.     eGFR if non     Date Value Ref Range Status   10/12/2012 >60 >60 mL/min Final     No results found for: CEA  No results found for: PSA        Assessment/Plan:     Problem List Items Addressed This Visit     Lung cancer, main bronchus, right-Small Cell     Patient's Ct scan shows her to be in resmission.  She is doing well and has no new complaints or issues.  I discussed with her that we will scan her every three months for this year and every six thereafter.  Will have her back in three months with scan/labs.           Relevant Orders    CBC auto differential    Comprehensive metabolic panel    CT Chest With Contrast          Discussion:     Follow-up in about 3 months (around 12/19/2018).      Electronically signed by Winston Miller

## 2018-09-19 NOTE — LETTER
September 19, 2018      Kem Kiser MD  [address]  [address]  [place] [address]           [facility] - Hematology Oncology  [address]  [address]  [place] [address]  Phone: [phone]  Fax: [phone]          Patient: Carmen Leyva   MR Number: [id number]   YOB: 1960   Date of Visit: 9/19/2018       Dear Dr. Kem Kiser:    Thank you for referring Carmen Leyva to me for evaluation. Attached you will find relevant portions of my assessment and plan of care.    If you have questions, please do not hesitate to call me. I look forward to following Carmen Leyva along with you.    Sincerely,    Winston Carr MD    Enclosure  CC:  No Recipients    If you would like to receive this communication electronically, please contact [email or web address] or [phone] to request more information on [employer] Link access.    For providers and/or their staff who would like to refer a patient to [facility], please contact us through our one-stop-shop provider referral line, [facility], at [phone].    If you feel you have received this communication in error or would no longer like to receive these types of communications, please e-mail [email or web address]

## 2018-09-19 NOTE — ASSESSMENT & PLAN NOTE
Patient's Ct scan shows her to be in resmission.  She is doing well and has no new complaints or issues.  I discussed with her that we will scan her every three months for this year and every six thereafter.  Will have her back in three months with scan/labs.

## 2018-10-04 ENCOUNTER — DOCUMENTATION ONLY (OUTPATIENT)
Dept: RADIATION ONCOLOGY | Facility: CLINIC | Age: 58
End: 2018-10-04

## 2018-10-04 NOTE — PROGRESS NOTES
Sent referral to [facility] for nutrition supplements. Ensure Plus 4 cartons daily.  Weight: 97#  BMI: 18.9

## 2018-11-26 ENCOUNTER — TELEPHONE (OUTPATIENT)
Dept: HEMATOLOGY/ONCOLOGY | Facility: CLINIC | Age: 58
End: 2018-11-26

## 2018-11-26 NOTE — TELEPHONE ENCOUNTER
----- Message from Zeenat Mixon sent at 11/26/2018  9:32 AM CST -----  Regarding: Oxycontin Refill   Contact: [phone]  Requested refill OxyContin 15 #60 due for refill 11/27/18. Please call when ready.      oxyCODONE (OXYCONTIN) 15 mg TR12 12 hr tablet   Medication   Date: 1/16/2018 Department: [facility] - Hematology Oncology Ordering/Authorizing: Winston Carr MD  Order Providers     Prescribing Provider Encounter Provider  MD Winston Robb MD  Medication Detail      Disp Refills Start End   oxyCODONE (OXYCONTIN) 15 mg TR12 12 hr tablet 60 tablet 0 1/16/2018    Sig - Route: Take 1 tablet (15 mg total) by mouth every 12 (twelve) hours. - Oral   Class: Print   Earliest Fill Date: 1/16/2018   Associated Diagnoses     Cancer related pain     Malignant neoplasm of upper lobe of right lung     Pharmacy     [facility] - [place], [place] - [id number]  Additional Information     Associated Reports  View Encounter  Priority and Order Details

## 2018-11-29 DIAGNOSIS — R11.2 NAUSEA AND VOMITING IN ADULT PATIENT: ICD-10-CM

## 2018-11-29 RX ORDER — PROMETHAZINE HYDROCHLORIDE 25 MG/1
TABLET ORAL
Qty: 120 TABLET | Refills: 3 | Status: SHIPPED | OUTPATIENT
Start: 2018-11-29

## 2018-12-14 LAB
ALBUMIN SERPL-MCNC: 3.8 G/DL (ref 3.1–4.7)
ALP SERPL-CCNC: 191 IU/L (ref 40–104)
ALT (SGPT): 10 IU/L (ref 3–33)
AST SERPL-CCNC: 19 IU/L (ref 10–40)
BASOPHILS NFR BLD: 0 K/UL (ref 0–0.2)
BASOPHILS NFR BLD: 0.5 %
BILIRUB SERPL-MCNC: 0.3 MG/DL (ref 0.3–1)
BUN SERPL-MCNC: 15 MG/DL (ref 8–20)
CALCIUM SERPL-MCNC: 9.3 MG/DL (ref 7.7–10.4)
CHLORIDE: 98 MMOL/L (ref 98–110)
CO2 SERPL-SCNC: 26.1 MMOL/L (ref 22.8–31.6)
CREATININE: 0.95 MG/DL (ref 0.6–1.4)
EOSINOPHIL NFR BLD: 0 K/UL (ref 0–0.7)
EOSINOPHIL NFR BLD: 0.6 %
ERYTHROCYTE [DISTWIDTH] IN BLOOD BY AUTOMATED COUNT: 12.6 % (ref 12.5–14.5)
GLUCOSE: 99 MG/DL (ref 70–99)
GRAN #: 5 K/UL (ref 1.4–6.5)
GRAN%: 75.1 %
HCT VFR BLD AUTO: 33.9 % (ref 36–48)
HGB BLD-MCNC: 10.4 G/DL (ref 12–15)
IMMATURE GRANS (ABS): 0 K/UL (ref 0–1)
IMMATURE GRANULOCYTES: 0.3 %
ISTAT CREATININE: 1.1 MG/DL (ref 0.6–1.4)
LYMPH #: 0.7 K/UL (ref 1.2–3.4)
LYMPH%: 11.1 %
MCH RBC QN AUTO: 32.8 PG (ref 25–35)
MCHC RBC AUTO-ENTMCNC: 30.7 G/DL (ref 31–36)
MCV RBC AUTO: 106.9 FL (ref 79–98)
MONO #: 0.8 K/UL (ref 0.1–0.6)
MONO%: 12.4 %
NUCLEATED RBCS: 0 %
NUCLEATED RED BLOOD CELLS: 0 /100 WBC
PERFORMED BY:: ABNORMAL
PLATELET # BLD AUTO: 138 K/UL (ref 140–440)
PMV BLD AUTO: 9.1 FL (ref 8.8–12.7)
POTASSIUM SERPL-SCNC: 4.1 MMOL/L (ref 3.5–5)
PROT SERPL-MCNC: 7.7 G/DL (ref 6–8.2)
RBC # BLD AUTO: 3.17 M/UL (ref 3.5–5.5)
SODIUM: 134 MMOL/L (ref 134–144)
WBC # BLD: 6.6 K/UL (ref 5–10)

## 2018-12-17 ENCOUNTER — OFFICE VISIT (OUTPATIENT)
Dept: HEMATOLOGY/ONCOLOGY | Facility: CLINIC | Age: 58
End: 2018-12-17
Payer: MEDICAID

## 2018-12-17 VITALS
SYSTOLIC BLOOD PRESSURE: 120 MMHG | WEIGHT: 92.81 LBS | RESPIRATION RATE: 20 BRPM | HEART RATE: 90 BPM | BODY MASS INDEX: 18.12 KG/M2 | DIASTOLIC BLOOD PRESSURE: 72 MMHG | TEMPERATURE: 98 F

## 2018-12-17 DIAGNOSIS — D69.6 THROMBOCYTOPENIA: ICD-10-CM

## 2018-12-17 DIAGNOSIS — C34.01 LUNG CANCER, MAIN BRONCHUS, RIGHT: ICD-10-CM

## 2018-12-17 DIAGNOSIS — D64.9 ANEMIA, UNSPECIFIED TYPE: ICD-10-CM

## 2018-12-17 PROCEDURE — 99214 OFFICE O/P EST MOD 30 MIN: CPT | Mod: ,,, | Performed by: INTERNAL MEDICINE

## 2018-12-17 NOTE — PROGRESS NOTES
PROGRESS NOTE    Subjective:       Patient ID: Carmen Leyva is a 58 y.o. female.    Chief Complaint:  Follow-up  lung cancer follow up.     History of Present Illness:   Carmen Leyva is a 58 y.o. female who presents for follow up, She has completed six cycles of carbo/etopiside.     Patient has no new complaints at this time and is here for CT scan results.      Family and Social history reviewed and is unchanged from 1/16/2018      ROS:  Review of Systems   Constitutional: Negative for fever.   Respiratory: Negative for shortness of breath.    Cardiovascular: Negative for chest pain and leg swelling.   Gastrointestinal: Negative for abdominal pain and blood in stool.   Genitourinary: Negative for hematuria.   Skin: Negative for rash.          Current Outpatient Medications:     albuterol (PROVENTIL HFA/VENTOLIN HFA) 200 puff inhaler, Inhale 2 puffs into the lungs every 6 (six) hours as needed.  , Disp: , Rfl:     alendronate (FOSAMAX) 70 MG tablet, Take 70 mg by mouth every 7 days.  , Disp: , Rfl:     alprazolam (XANAX) 0.5 MG tablet, Take 0.5 mg by mouth nightly as needed.  , Disp: , Rfl:     amLODIPine (NORVASC) 5 MG tablet, Take 5 mg by mouth once daily., Disp: , Rfl:     calcium citrate (CALCITRATE) 200 mg (950 mg) tablet, Take 5 tablets by mouth once daily.  , Disp: , Rfl:     dronabinol (MARINOL) 5 MG capsule, Take 1 capsule (5 mg total) by mouth 2 (two) times daily before meals., Disp: 60 capsule, Rfl: 1    ergocalciferol, vitamin D2, 1,000 unit Tab, 2 tablets daily, Disp: 60 tablet, Rfl: 5    esomeprazole (NEXIUM) 40 MG capsule, Take 40 mg by mouth before breakfast., Disp: , Rfl:     hydrocodone-acetaminophen (LORTAB) 7.5-500 mg per tablet, Take 1 tablet by mouth 2 (two) times daily as needed for Pain., Disp: 60 tablet, Rfl: 1    levothyroxine (SYNTHROID, LEVOTHROID) 175 MCG tablet, Take 175 mcg by mouth once daily., Disp: , Rfl:      lidocaine HCl 2% (XYLOCAINE) 2 % Soln, by Mucous Membrane route every 4 (four) hours. 5 ml every 4 hours by mouth as needed for swallowing pain, Disp: 300 mL, Rfl: 5    lisinopril (PRINIVIL,ZESTRIL) 5 MG tablet, Take 5 mg by mouth 2 (two) times daily. Says takes this about once a week now.  Goes by how her body feels she says. , Disp: , Rfl:     nebivolol (BYSTOLIC) 5 MG Tab, Take 10 mg by mouth once daily., Disp: , Rfl:     oxyCODONE (OXYCONTIN) 15 mg TR12 12 hr tablet, Take 1 tablet (15 mg total) by mouth every 12 (twelve) hours., Disp: 60 tablet, Rfl: 0    oxyCODONE (ROXICODONE) 15 MG Tab, Take 1 tablet (15 mg total) by mouth every 6 (six) hours as needed for Pain., Disp: 60 tablet, Rfl: 0    promethazine (PHENERGAN) 12.5 MG Tab, , Disp: , Rfl: 5    promethazine (PHENERGAN) 25 MG tablet, TAKE ONE TABLET BY MOUTH EVERY 6 HOURS AS NEEDED FOR NAUSEA, Disp: 120 tablet, Rfl: 3    sodium chloride 1 gram tablet, Take 1 g by mouth 3 (three) times daily., Disp: , Rfl:         Objective:       Physical Examination:     /72   Pulse 90   Temp 97.9 °F (36.6 °C)   Resp 20   Wt 42.1 kg (92 lb 12.8 oz)   BMI 18.12 kg/m²     Physical Exam   Constitutional: She appears well-developed and well-nourished.   HENT:   Head: Normocephalic and atraumatic.   Right Ear: External ear normal.   Left Ear: External ear normal.   Mouth/Throat: Oropharynx is clear and moist.   Eyes: Conjunctivae are normal. Pupils are equal, round, and reactive to light.   Neck: No tracheal deviation present. No thyromegaly present.   Cardiovascular: Normal rate, regular rhythm and normal heart sounds.   Pulmonary/Chest: Effort normal and breath sounds normal.   Abdominal: Soft. Bowel sounds are normal. She exhibits no distension and no mass. There is no tenderness.   Musculoskeletal: She exhibits no edema.   Neurological:   Neuro intact througout   Skin: No rash noted.   Psychiatric: She has a normal mood and affect. Her behavior is normal.  Judgment and thought content normal.       Labs:   Recent Results (from the past 336 hour(s))   CBC auto differential    Collection Time: 12/14/18 11:37 AM   Result Value Ref Range    WBC 6.6 5.0 - 10.0 K/ul    Hemoglobin 10.4 (L) 12.0 - 15.0 g/dl    Hematocrit 33.9 (L) 36.0 - 48.0 %    Platelets 138 (L) 140 - 440 K/ul     CMP  Sodium   Date Value Ref Range Status   12/14/2018 134 134 - 144 mmol/L      Potassium   Date Value Ref Range Status   12/14/2018 4.1 3.5 - 5.0 mmol/L      Chloride   Date Value Ref Range Status   12/14/2018 98 98 - 110 mmol/L      CO2   Date Value Ref Range Status   12/14/2018 26.1 22.8 - 31.6 mmol/L      Glucose   Date Value Ref Range Status   12/14/2018 99 70 - 99 mg/dL      BUN, Bld   Date Value Ref Range Status   12/14/2018 15 8 - 20 mg/dL      Creatinine   Date Value Ref Range Status   12/14/2018 0.95 0.60 - 1.40 mg/dL      Calcium   Date Value Ref Range Status   12/14/2018 9.3 7.7 - 10.4 mg/dL      Total Protein   Date Value Ref Range Status   12/14/2018 7.7 6.0 - 8.2 g/dL      Albumin   Date Value Ref Range Status   12/14/2018 3.8 3.1 - 4.7 g/dL      Total Bilirubin   Date Value Ref Range Status   12/14/2018 0.3 0.3 - 1.0 mg/dL      Alkaline Phosphatase   Date Value Ref Range Status   12/14/2018 191 (H) 40 - 104 IU/L      AST   Date Value Ref Range Status   12/14/2018 19 10 - 40 IU/L      ALT   Date Value Ref Range Status   06/07/2012 7 (L) 10 - 44 U/L Final   06/07/2012 7 (L) 10 - 44 U/L Final     Anion Gap   Date Value Ref Range Status   10/12/2012 11.0 8 - 16 mmol/L Final     eGFR if    Date Value Ref Range Status   10/12/2012 >60 >60 mL/min Final     Comment:     Estimated glomerular filtration rate (eGFR) is normalized to an  average body surface area of 1.73 square meters.  The calculation  used to obtain the eGFR is the adjusted MDRD equation, which factors  patient sex, age, race, and creatinine result.  Since race is unknown  in our information system, the eGFR  values for -American  and Non--American patients are given for each creatinine  result.     eGFR if non    Date Value Ref Range Status   10/12/2012 >60 >60 mL/min Final     No results found for: CEA  No results found for: PSA        Assessment/Plan:     Problem List Items Addressed This Visit     Lung cancer, main bronchus, right-Small Cell     CT scan of the chest is unremarkable at this time and patient appears BRYAN.  Will continue to monitor with ct again in six months but will have her back with me in three months with a CXR.  Patient looks good today.           Relevant Orders    X-Ray Chest PA And Lateral    Anemia     This anemia is likely due to chronic disease but could be still in a recovery phase from chemotherapy.  Will check again on follow in three months and work up further if no recovery.           Relevant Orders    CBC auto differential    Comprehensive metabolic panel    Thrombocytopenia     Platelet count only mildly suppressed at 138 at this time and for now I will observe this as with the Hb.  No bleeding and patient overall looks ok.                 Discussion:     Follow-up in about 3 months (around 3/17/2019).      Electronically signed by Winston Miller

## 2018-12-17 NOTE — ASSESSMENT & PLAN NOTE
This anemia is likely due to chronic disease but could be still in a recovery phase from chemotherapy.  Will check again on follow in three months and work up further if no recovery.

## 2018-12-17 NOTE — ASSESSMENT & PLAN NOTE
CT scan of the chest is unremarkable at this time and patient appears BRYAN.  Will continue to monitor with ct again in six months but will have her back with me in three months with a CXR.  Patient looks good today.

## 2018-12-17 NOTE — LETTER
December 17, 2018      Elias Stanford MD  [address]  [address]  [place] [address]           [facility] - Hematology Oncology  [address]  [address]  [place] [address]  Phone: [phone]  Fax: [phone]          Patient: Carmen Leyva   MR Number: [id number]   YOB: 1960   Date of Visit: 12/17/2018       Dear Dr. Elias Stanford:    Thank you for referring Carmen Leyva to me for evaluation. Attached you will find relevant portions of my assessment and plan of care.    If you have questions, please do not hesitate to call me. I look forward to following Carmen Leyva along with you.    Sincerely,    Winston Carr MD    Enclosure  CC:  No Recipients    If you would like to receive this communication electronically, please contact [email or web address] or [phone] to request more information on [employer] Link access.    For providers and/or their staff who would like to refer a patient to [facility], please contact us through our one-stop-shop provider referral line, [facility], at [phone].    If you feel you have received this communication in error or would no longer like to receive these types of communications, please e-mail [email or web address]

## 2018-12-17 NOTE — ASSESSMENT & PLAN NOTE
Platelet count only mildly suppressed at 138 at this time and for now I will observe this as with the Hb.  No bleeding and patient overall looks ok.

## 2019-03-13 LAB
ALBUMIN SERPL-MCNC: 3.7 G/DL (ref 3.1–4.7)
ALP SERPL-CCNC: 187 IU/L (ref 40–104)
ALT (SGPT): 12 IU/L (ref 3–33)
AST SERPL-CCNC: 19 IU/L (ref 10–40)
BASOPHILS NFR BLD: 0 K/UL (ref 0–0.2)
BASOPHILS NFR BLD: 0.4 %
BILIRUB SERPL-MCNC: 0.5 MG/DL (ref 0.3–1)
BUN SERPL-MCNC: 14 MG/DL (ref 8–20)
CALCIUM SERPL-MCNC: 9.4 MG/DL (ref 7.7–10.4)
CHLORIDE: 99 MMOL/L (ref 98–110)
CO2 SERPL-SCNC: 27.6 MMOL/L (ref 22.8–31.6)
CREATININE: 0.86 MG/DL (ref 0.6–1.4)
EOSINOPHIL NFR BLD: 0.1 K/UL (ref 0–0.7)
EOSINOPHIL NFR BLD: 0.9 %
ERYTHROCYTE [DISTWIDTH] IN BLOOD BY AUTOMATED COUNT: 13.2 % (ref 12.5–14.5)
GLUCOSE: 119 MG/DL (ref 70–99)
GRAN #: 5.8 K/UL (ref 1.4–6.5)
GRAN%: 75.3 %
HCT VFR BLD AUTO: 31.9 % (ref 36–48)
HGB BLD-MCNC: 9.8 G/DL (ref 12–15)
IMMATURE GRANS (ABS): 0 K/UL (ref 0–1)
IMMATURE GRANULOCYTES: 0.4 %
LYMPH #: 0.9 K/UL (ref 1.2–3.4)
LYMPH%: 11 %
MCH RBC QN AUTO: 32.9 PG (ref 25–35)
MCHC RBC AUTO-ENTMCNC: 30.7 G/DL (ref 31–36)
MCV RBC AUTO: 107 FL (ref 79–98)
MONO #: 0.9 K/UL (ref 0.1–0.6)
MONO%: 12 %
NUCLEATED RBCS: 0 %
NUCLEATED RED BLOOD CELLS: 0 /100 WBC
PERFORMED BY:: ABNORMAL
PLATELET # BLD AUTO: 146 K/UL (ref 140–440)
PMV BLD AUTO: 10.1 FL (ref 8.8–12.7)
POTASSIUM SERPL-SCNC: 4.5 MMOL/L (ref 3.5–5)
PROT SERPL-MCNC: 7.2 G/DL (ref 6–8.2)
RBC # BLD AUTO: 2.98 M/UL (ref 3.5–5.5)
SODIUM: 135 MMOL/L (ref 134–144)
WBC # BLD: 7.8 K/UL (ref 5–10)

## 2019-03-18 ENCOUNTER — OFFICE VISIT (OUTPATIENT)
Dept: HEMATOLOGY/ONCOLOGY | Facility: CLINIC | Age: 59
End: 2019-03-18
Payer: MEDICAID

## 2019-03-18 VITALS
HEART RATE: 93 BPM | WEIGHT: 88.88 LBS | RESPIRATION RATE: 20 BRPM | TEMPERATURE: 98 F | DIASTOLIC BLOOD PRESSURE: 68 MMHG | SYSTOLIC BLOOD PRESSURE: 101 MMHG | BODY MASS INDEX: 17.36 KG/M2

## 2019-03-18 DIAGNOSIS — D64.9 ANEMIA, UNSPECIFIED TYPE: ICD-10-CM

## 2019-03-18 DIAGNOSIS — C34.01 LUNG CANCER, MAIN BRONCHUS, RIGHT: ICD-10-CM

## 2019-03-18 DIAGNOSIS — G89.3 CANCER ASSOCIATED PAIN: Chronic | ICD-10-CM

## 2019-03-18 PROCEDURE — 99214 PR OFFICE/OUTPT VISIT, EST, LEVL IV, 30-39 MIN: ICD-10-PCS | Mod: ,,, | Performed by: INTERNAL MEDICINE

## 2019-03-18 PROCEDURE — 99214 OFFICE O/P EST MOD 30 MIN: CPT | Mod: ,,, | Performed by: INTERNAL MEDICINE

## 2019-03-18 NOTE — ASSESSMENT & PLAN NOTE
Patient is doing well from this standpoint and appears BRYAN.  Will have a CT scan done on next visit in three months and discussed this today.

## 2019-03-18 NOTE — PROGRESS NOTES
PROGRESS NOTE    Subjective:       Patient ID: Carmen Leyva is a 59 y.o. female.    Chief Complaint:  No chief complaint on file.  lung cancer follow up.     History of Present Illness:   Carmen Leyva is a 59 y.o. female who presents for follow up, She has completed six cycles of carbo/etopiside.     Patient has no new complaints at this time and is here for CT scan results.      Family and Social history reviewed and is unchanged from 1/16/2018      ROS:  Review of Systems   Constitutional: Negative for fever.   Respiratory: Negative for shortness of breath.    Cardiovascular: Negative for chest pain and leg swelling.   Gastrointestinal: Negative for abdominal pain and blood in stool.   Genitourinary: Negative for hematuria.   Skin: Negative for rash.          Current Outpatient Medications:     albuterol (PROVENTIL HFA/VENTOLIN HFA) 200 puff inhaler, Inhale 2 puffs into the lungs every 6 (six) hours as needed.  , Disp: , Rfl:     alendronate (FOSAMAX) 70 MG tablet, Take 70 mg by mouth every 7 days.  , Disp: , Rfl:     alprazolam (XANAX) 0.5 MG tablet, Take 0.5 mg by mouth nightly as needed.  , Disp: , Rfl:     amLODIPine (NORVASC) 5 MG tablet, Take 5 mg by mouth once daily., Disp: , Rfl:     calcium citrate (CALCITRATE) 200 mg (950 mg) tablet, Take 5 tablets by mouth once daily.  , Disp: , Rfl:     dronabinol (MARINOL) 5 MG capsule, Take 1 capsule (5 mg total) by mouth 2 (two) times daily before meals., Disp: 60 capsule, Rfl: 1    ergocalciferol, vitamin D2, 1,000 unit Tab, 2 tablets daily, Disp: 60 tablet, Rfl: 5    esomeprazole (NEXIUM) 40 MG capsule, Take 40 mg by mouth before breakfast., Disp: , Rfl:     hydrocodone-acetaminophen (LORTAB) 7.5-500 mg per tablet, Take 1 tablet by mouth 2 (two) times daily as needed for Pain., Disp: 60 tablet, Rfl: 1    levothyroxine (SYNTHROID, LEVOTHROID) 175 MCG tablet, Take 175 mcg by mouth once daily.,  Disp: , Rfl:     lidocaine HCl 2% (XYLOCAINE) 2 % Soln, by Mucous Membrane route every 4 (four) hours. 5 ml every 4 hours by mouth as needed for swallowing pain, Disp: 300 mL, Rfl: 5    lisinopril (PRINIVIL,ZESTRIL) 5 MG tablet, Take 5 mg by mouth 2 (two) times daily. Says takes this about once a week now.  Goes by how her body feels she says. , Disp: , Rfl:     nebivolol (BYSTOLIC) 5 MG Tab, Take 10 mg by mouth once daily., Disp: , Rfl:     oxyCODONE (OXYCONTIN) 15 mg TR12 12 hr tablet, Take 1 tablet (15 mg total) by mouth every 12 (twelve) hours., Disp: 60 tablet, Rfl: 0    oxyCODONE (ROXICODONE) 15 MG Tab, Take 1 tablet (15 mg total) by mouth every 6 (six) hours as needed for Pain., Disp: 60 tablet, Rfl: 0    promethazine (PHENERGAN) 12.5 MG Tab, , Disp: , Rfl: 5    promethazine (PHENERGAN) 25 MG tablet, TAKE ONE TABLET BY MOUTH EVERY 6 HOURS AS NEEDED FOR NAUSEA, Disp: 120 tablet, Rfl: 3    sodium chloride 1 gram tablet, Take 1 g by mouth 3 (three) times daily., Disp: , Rfl:         Objective:       Physical Examination:     /68   Pulse 93   Temp 98.1 °F (36.7 °C)   Resp 20   Wt 40.3 kg (88 lb 14.4 oz)   BMI 17.36 kg/m²     Physical Exam   Constitutional: She appears well-developed and well-nourished.   HENT:   Head: Normocephalic and atraumatic.   Right Ear: External ear normal.   Left Ear: External ear normal.   Mouth/Throat: Oropharynx is clear and moist.   Eyes: Conjunctivae are normal. Pupils are equal, round, and reactive to light.   Neck: No tracheal deviation present. No thyromegaly present.   Cardiovascular: Normal rate, regular rhythm and normal heart sounds.   Pulmonary/Chest: Effort normal and breath sounds normal.   Abdominal: Soft. Bowel sounds are normal. She exhibits no distension and no mass. There is no tenderness.   Musculoskeletal: She exhibits no edema.   Neurological:   Neuro intact througout   Skin: No rash noted.   Psychiatric: She has a normal mood and affect. Her  behavior is normal. Judgment and thought content normal.       Labs:   Recent Results (from the past 336 hour(s))   CBC auto differential    Collection Time: 03/13/19  1:51 PM   Result Value Ref Range    WBC 7.8 5.0 - 10.0 K/ul    Hemoglobin 9.8 (L) 12.0 - 15.0 g/dl    Hematocrit 31.9 (L) 36.0 - 48.0 %    Platelets 146 140 - 440 K/ul     CMP  Sodium   Date Value Ref Range Status   03/13/2019 135 134 - 144 mmol/L      Potassium   Date Value Ref Range Status   03/13/2019 4.5 3.5 - 5.0 mmol/L      Chloride   Date Value Ref Range Status   03/13/2019 99 98 - 110 mmol/L      CO2   Date Value Ref Range Status   03/13/2019 27.6 22.8 - 31.6 mmol/L      Glucose   Date Value Ref Range Status   03/13/2019 119 (H) 70 - 99 mg/dL      BUN, Bld   Date Value Ref Range Status   03/13/2019 14 8 - 20 mg/dL      Creatinine   Date Value Ref Range Status   03/13/2019 0.86 0.60 - 1.40 mg/dL      Calcium   Date Value Ref Range Status   03/13/2019 9.4 7.7 - 10.4 mg/dL      Total Protein   Date Value Ref Range Status   03/13/2019 7.2 6.0 - 8.2 g/dL      Albumin   Date Value Ref Range Status   03/13/2019 3.7 3.1 - 4.7 g/dL      Total Bilirubin   Date Value Ref Range Status   03/13/2019 0.5 0.3 - 1.0 mg/dL      Alkaline Phosphatase   Date Value Ref Range Status   03/13/2019 187 (H) 40 - 104 IU/L      AST   Date Value Ref Range Status   03/13/2019 19 10 - 40 IU/L      ALT   Date Value Ref Range Status   06/07/2012 7 (L) 10 - 44 U/L Final   06/07/2012 7 (L) 10 - 44 U/L Final     Anion Gap   Date Value Ref Range Status   10/12/2012 11.0 8 - 16 mmol/L Final     eGFR if    Date Value Ref Range Status   10/12/2012 >60 >60 mL/min Final     Comment:     Estimated glomerular filtration rate (eGFR) is normalized to an  average body surface area of 1.73 square meters.  The calculation  used to obtain the eGFR is the adjusted MDRD equation, which factors  patient sex, age, race, and creatinine result.  Since race is unknown  in our  information system, the eGFR values for -American  and Non--American patients are given for each creatinine  result.     eGFR if non    Date Value Ref Range Status   10/12/2012 >60 >60 mL/min Final     No results found for: CEA  No results found for: PSA        Assessment/Plan:     Problem List Items Addressed This Visit     Lung cancer, main bronchus, right-Small Cell     Patient is doing well from this standpoint and appears BRYAN.  Will have a CT scan done on next visit in three months and discussed this today.           Relevant Orders    CT Chest Without Contrast    CBC auto differential    Comprehensive metabolic panel    Cancer associated pain (Chronic)     Her pain is doing well at this time.  Will continue current care and discussed this today.           Anemia     Her Hb has worsened from 10.4 to 9.9gdl.  She has no bleeding and no symptoms.  At this point I will continue to monitor conservatively and discussed this today.                 Discussion:     Follow-up in about 3 months (around 6/18/2019).      Electronically signed by Winston Miller

## 2019-03-18 NOTE — LETTER
March 18, 2019      Kem Kiser MD  [address]  [address]  [place] [address]           [facility] - Hematology Oncology  [address]  [address]  [place] [address]  Phone: [phone]  Fax: [phone]          Patient: Carmen Leyva   MR Number: [id number]   YOB: 1960   Date of Visit: 3/18/2019       Dear Dr. Kem Kiser:    Thank you for referring Carmen Leyva to me for evaluation. Attached you will find relevant portions of my assessment and plan of care.    If you have questions, please do not hesitate to call me. I look forward to following Carmen Leyva along with you.    Sincerely,    Winston Carr MD    Enclosure  CC:  No Recipients    If you would like to receive this communication electronically, please contact [email or web address] or [phone] to request more information on [email or web address] Link access.    For providers and/or their staff who would like to refer a patient to [facility], please contact us through our one-stop-shop provider referral line, [facility], at [phone].    If you feel you have received this communication in error or would no longer like to receive these types of communications, please e-mail [email or web address]

## 2019-03-18 NOTE — ASSESSMENT & PLAN NOTE
Her Hb has worsened from 10.4 to 9.9gdl.  She has no bleeding and no symptoms.  At this point I will continue to monitor conservatively and discussed this today.

## 2019-03-21 ENCOUNTER — TELEPHONE (OUTPATIENT)
Dept: HEMATOLOGY/ONCOLOGY | Facility: CLINIC | Age: 59
End: 2019-03-21

## 2019-06-03 DIAGNOSIS — G89.3 CANCER RELATED PAIN: ICD-10-CM

## 2019-06-03 DIAGNOSIS — C34.11 MALIGNANT NEOPLASM OF UPPER LOBE OF RIGHT LUNG: ICD-10-CM

## 2019-06-03 RX ORDER — OXYCODONE HYDROCHLORIDE 15 MG/1
15 TABLET, FILM COATED, EXTENDED RELEASE ORAL EVERY 12 HOURS
Qty: 60 TABLET | Refills: 0 | Status: SHIPPED | OUTPATIENT
Start: 2019-06-03 | End: 2019-06-27 | Stop reason: SDUPTHER

## 2019-06-03 RX ORDER — OXYCODONE HYDROCHLORIDE 15 MG/1
15 TABLET ORAL EVERY 6 HOURS PRN
Qty: 60 TABLET | Refills: 0 | Status: SHIPPED | OUTPATIENT
Start: 2019-06-03 | End: 2019-06-17 | Stop reason: SDUPTHER

## 2019-06-17 DIAGNOSIS — C34.11 MALIGNANT NEOPLASM OF UPPER LOBE OF RIGHT LUNG: ICD-10-CM

## 2019-06-17 RX ORDER — OXYCODONE HYDROCHLORIDE 15 MG/1
15 TABLET ORAL EVERY 6 HOURS PRN
Qty: 60 TABLET | Refills: 0 | Status: SHIPPED | OUTPATIENT
Start: 2019-06-17 | End: 2019-06-27 | Stop reason: SDUPTHER

## 2019-06-17 NOTE — TELEPHONE ENCOUNTER
----- Message from Carmelita Jack sent at 6/17/2019  9:53 AM CDT -----  Patient needs a refill of her regular oxycodone 15mg #60. Please call when ready at [phone].

## 2019-06-24 LAB
ALBUMIN SERPL-MCNC: 3.9 G/DL (ref 3.1–4.7)
ALP SERPL-CCNC: 179 IU/L (ref 40–104)
ALT (SGPT): 10 IU/L (ref 3–33)
AST SERPL-CCNC: 18 IU/L (ref 10–40)
BASOPHILS NFR BLD: 0 K/UL (ref 0–0.2)
BASOPHILS NFR BLD: 0.4 %
BILIRUB SERPL-MCNC: 0.6 MG/DL (ref 0.3–1)
BUN SERPL-MCNC: 12 MG/DL (ref 8–20)
CALCIUM SERPL-MCNC: 9.3 MG/DL (ref 7.7–10.4)
CHLORIDE: 97 MMOL/L (ref 98–110)
CO2 SERPL-SCNC: 26.9 MMOL/L (ref 22.8–31.6)
CREATININE: 0.96 MG/DL (ref 0.6–1.4)
EOSINOPHIL NFR BLD: 0 K/UL (ref 0–0.7)
EOSINOPHIL NFR BLD: 0.4 %
ERYTHROCYTE [DISTWIDTH] IN BLOOD BY AUTOMATED COUNT: 12.9 % (ref 11.7–14.9)
GLUCOSE: 104 MG/DL (ref 70–99)
GRAN #: 6.5 K/UL (ref 1.4–6.5)
GRAN%: 77.4 %
HCT VFR BLD AUTO: 31.4 % (ref 36–48)
HGB BLD-MCNC: 9.9 G/DL (ref 12–15)
IMMATURE GRANS (ABS): 0 K/UL (ref 0–1)
IMMATURE GRANULOCYTES: 0.4 %
LYMPH #: 0.9 K/UL (ref 1.2–3.4)
LYMPH%: 10.3 %
MCH RBC QN AUTO: 32.1 PG (ref 25–35)
MCHC RBC AUTO-ENTMCNC: 31.5 G/DL (ref 31–36)
MCV RBC AUTO: 101.9 FL (ref 79–98)
MONO #: 0.9 K/UL (ref 0.1–0.6)
MONO%: 11.1 %
NUCLEATED RBCS: 0 %
PLATELET # BLD AUTO: 171 K/UL (ref 140–440)
PMV BLD AUTO: 9.6 FL (ref 8.8–12.7)
POTASSIUM SERPL-SCNC: 5 MMOL/L (ref 3.5–5)
PROT SERPL-MCNC: 7.5 G/DL (ref 6–8.2)
RBC # BLD AUTO: 3.08 M/UL (ref 3.5–5.5)
SODIUM: 133 MMOL/L (ref 134–144)
WBC # BLD AUTO: 8.4 K/UL (ref 5–10)

## 2019-06-26 ENCOUNTER — OFFICE VISIT (OUTPATIENT)
Dept: HEMATOLOGY/ONCOLOGY | Facility: CLINIC | Age: 59
End: 2019-06-26
Payer: MEDICAID

## 2019-06-26 VITALS
HEART RATE: 91 BPM | TEMPERATURE: 98 F | DIASTOLIC BLOOD PRESSURE: 66 MMHG | SYSTOLIC BLOOD PRESSURE: 102 MMHG | RESPIRATION RATE: 20 BRPM | WEIGHT: 83.81 LBS | BODY MASS INDEX: 16.37 KG/M2

## 2019-06-26 DIAGNOSIS — G89.3 CANCER ASSOCIATED PAIN: Chronic | ICD-10-CM

## 2019-06-26 DIAGNOSIS — C34.01 LUNG CANCER, MAIN BRONCHUS, RIGHT: ICD-10-CM

## 2019-06-26 DIAGNOSIS — D64.9 ANEMIA, UNSPECIFIED TYPE: ICD-10-CM

## 2019-06-26 PROCEDURE — 99214 PR OFFICE/OUTPT VISIT, EST, LEVL IV, 30-39 MIN: ICD-10-PCS | Mod: ,,, | Performed by: INTERNAL MEDICINE

## 2019-06-26 PROCEDURE — 99214 OFFICE O/P EST MOD 30 MIN: CPT | Mod: ,,, | Performed by: INTERNAL MEDICINE

## 2019-06-26 RX ORDER — ATORVASTATIN CALCIUM 10 MG/1
TABLET, FILM COATED ORAL
Refills: 3 | Status: ON HOLD | COMMUNITY
Start: 2019-06-13 | End: 2019-09-06 | Stop reason: HOSPADM

## 2019-06-26 RX ORDER — UMECLIDINIUM BROMIDE AND VILANTEROL TRIFENATATE 62.5; 25 UG/1; UG/1
POWDER RESPIRATORY (INHALATION)
Refills: 5 | Status: ON HOLD | COMMUNITY
Start: 2019-04-12 | End: 2019-08-12 | Stop reason: CLARIF

## 2019-06-26 RX ORDER — PANTOPRAZOLE SODIUM 40 MG/1
40 TABLET, DELAYED RELEASE ORAL DAILY
Refills: 5 | COMMUNITY
Start: 2019-04-12

## 2019-06-26 RX ORDER — ALLOPURINOL 300 MG/1
300 TABLET ORAL 2 TIMES DAILY
Refills: 5 | Status: ON HOLD | COMMUNITY
Start: 2019-06-13 | End: 2019-09-06 | Stop reason: HOSPADM

## 2019-06-26 RX ORDER — LORAZEPAM 1 MG/1
1 TABLET ORAL DAILY PRN
Refills: 2 | Status: ON HOLD | COMMUNITY
Start: 2019-06-13 | End: 2019-09-06 | Stop reason: HOSPADM

## 2019-06-26 RX ORDER — DOXYLAMINE SUCCINATE 25 MG
TABLET ORAL
Refills: 2 | Status: ON HOLD | COMMUNITY
Start: 2019-04-18 | End: 2019-08-12 | Stop reason: CLARIF

## 2019-06-26 NOTE — PROGRESS NOTES
PROGRESS NOTE    Subjective:       Patient ID: Carmen Leyva is a 59 y.o. female.    Chief Complaint:  Follow-up and Results  lung cancer follow up.     History of Present Illness:   Carmen Leyva is a 59 y.o. female who presents for follow up, She has completed six cycles of carbo/etopiside.     Patient has no new complaints at this time and is here for CT scan results.      Family and Social history reviewed and is unchanged from 1/16/2018      ROS:  Review of Systems   Constitutional: Negative for fever.   Respiratory: Negative for shortness of breath.    Cardiovascular: Negative for chest pain and leg swelling.   Gastrointestinal: Negative for abdominal pain and blood in stool.   Genitourinary: Negative for hematuria.   Skin: Negative for rash.          Current Outpatient Medications:     albuterol (PROVENTIL HFA/VENTOLIN HFA) 200 puff inhaler, Inhale 2 puffs into the lungs every 6 (six) hours as needed.  , Disp: , Rfl:     alendronate (FOSAMAX) 70 MG tablet, Take 70 mg by mouth every 7 days.  , Disp: , Rfl:     allopurinol (ZYLOPRIM) 300 MG tablet, , Disp: , Rfl: 5    alprazolam (XANAX) 0.5 MG tablet, Take 0.5 mg by mouth nightly as needed.  , Disp: , Rfl:     amLODIPine (NORVASC) 5 MG tablet, Take 5 mg by mouth once daily., Disp: , Rfl:     ANORO ELLIPTA 62.5-25 mcg/actuation DsDv, , Disp: , Rfl: 5    atorvastatin (LIPITOR) 10 MG tablet, , Disp: , Rfl: 3    calcium citrate (CALCITRATE) 200 mg (950 mg) tablet, Take 5 tablets by mouth once daily.  , Disp: , Rfl:     dronabinol (MARINOL) 5 MG capsule, Take 1 capsule (5 mg total) by mouth 2 (two) times daily before meals., Disp: 60 capsule, Rfl: 1    ergocalciferol, vitamin D2, 1,000 unit Tab, 2 tablets daily, Disp: 60 tablet, Rfl: 5    esomeprazole (NEXIUM) 40 MG capsule, Take 40 mg by mouth before breakfast., Disp: , Rfl:     hydrocodone-acetaminophen (LORTAB) 7.5-500 mg per tablet, Take 1  tablet by mouth 2 (two) times daily as needed for Pain., Disp: 60 tablet, Rfl: 1    levothyroxine (SYNTHROID, LEVOTHROID) 175 MCG tablet, Take 175 mcg by mouth once daily., Disp: , Rfl:     lidocaine HCl 2% (XYLOCAINE) 2 % Soln, by Mucous Membrane route every 4 (four) hours. 5 ml every 4 hours by mouth as needed for swallowing pain, Disp: 300 mL, Rfl: 5    lisinopril (PRINIVIL,ZESTRIL) 5 MG tablet, Take 5 mg by mouth 2 (two) times daily. Says takes this about once a week now.  Goes by how her body feels she says. , Disp: , Rfl:     LORazepam (ATIVAN) 1 MG tablet, , Disp: , Rfl: 2    nebivolol (BYSTOLIC) 5 MG Tab, Take 10 mg by mouth once daily., Disp: , Rfl:     oxyCODONE (OXYCONTIN) 15 mg TR12 12 hr tablet, Take 1 tablet (15 mg total) by mouth every 12 (twelve) hours., Disp: 60 tablet, Rfl: 0    oxyCODONE (ROXICODONE) 15 MG Tab, Take 1 tablet (15 mg total) by mouth every 6 (six) hours as needed for Pain., Disp: 60 tablet, Rfl: 0    pantoprazole (PROTONIX) 40 MG tablet, , Disp: , Rfl: 5    promethazine (PHENERGAN) 12.5 MG Tab, , Disp: , Rfl: 5    promethazine (PHENERGAN) 25 MG tablet, TAKE ONE TABLET BY MOUTH EVERY 6 HOURS AS NEEDED FOR NAUSEA, Disp: 120 tablet, Rfl: 3    SLEEP AID, DOXYLAMINE, 25 mg tablet, , Disp: , Rfl: 2    sodium chloride 1 gram tablet, Take 1 g by mouth 3 (three) times daily., Disp: , Rfl:         Objective:       Physical Examination:     /66   Pulse 91   Temp 98.4 °F (36.9 °C)   Resp 20   Wt 38 kg (83 lb 12.8 oz)   BMI 16.37 kg/m²     Physical Exam   Constitutional: She appears well-developed and well-nourished.   HENT:   Head: Normocephalic and atraumatic.   Right Ear: External ear normal.   Left Ear: External ear normal.   Mouth/Throat: Oropharynx is clear and moist.   Eyes: Pupils are equal, round, and reactive to light. Conjunctivae are normal.   Neck: No tracheal deviation present. No thyromegaly present.   Cardiovascular: Normal rate, regular rhythm and normal  heart sounds.   Pulmonary/Chest: Effort normal and breath sounds normal.   Abdominal: Soft. Bowel sounds are normal. She exhibits no distension and no mass. There is no tenderness.   Musculoskeletal: She exhibits no edema.   Neurological:   Neuro intact througout   Skin: No rash noted.   Psychiatric: She has a normal mood and affect. Her behavior is normal. Judgment and thought content normal.       Labs:   Recent Results (from the past 336 hour(s))   CBC auto differential    Collection Time: 06/24/19  9:46 AM   Result Value Ref Range    WBC 8.4 5.0 - 10.0 K/uL    Hemoglobin 9.9 (L) 12.0 - 15.0 g/dL    Hematocrit 31.4 (L) 36.0 - 48.0 %    Platelets 171 140 - 440 K/uL     CMP  Sodium   Date Value Ref Range Status   06/24/2019 133 (L) 134 - 144 mmol/L      Potassium   Date Value Ref Range Status   06/24/2019 5.0 3.5 - 5.0 mmol/L      Chloride   Date Value Ref Range Status   06/24/2019 97 (L) 98 - 110 mmol/L      CO2   Date Value Ref Range Status   06/24/2019 26.9 22.8 - 31.6 mmol/L      Glucose   Date Value Ref Range Status   06/24/2019 104 (H) 70 - 99 mg/dL      BUN, Bld   Date Value Ref Range Status   06/24/2019 12 8 - 20 mg/dL      Creatinine   Date Value Ref Range Status   06/24/2019 0.96 0.60 - 1.40 mg/dL      Calcium   Date Value Ref Range Status   06/24/2019 9.3 7.7 - 10.4 mg/dL      Total Protein   Date Value Ref Range Status   06/24/2019 7.5 6.0 - 8.2 g/dL      Albumin   Date Value Ref Range Status   06/24/2019 3.9 3.1 - 4.7 g/dL      Total Bilirubin   Date Value Ref Range Status   06/24/2019 0.6 0.3 - 1.0 mg/dL      Alkaline Phosphatase   Date Value Ref Range Status   06/24/2019 179 (H) 40 - 104 IU/L      AST   Date Value Ref Range Status   06/24/2019 18 10 - 40 IU/L      ALT   Date Value Ref Range Status   06/07/2012 7 (L) 10 - 44 U/L Final   06/07/2012 7 (L) 10 - 44 U/L Final     Anion Gap   Date Value Ref Range Status   10/12/2012 11.0 8 - 16 mmol/L Final     eGFR if    Date Value Ref  Range Status   10/12/2012 >60 >60 mL/min Final     Comment:     Estimated glomerular filtration rate (eGFR) is normalized to an  average body surface area of 1.73 square meters.  The calculation  used to obtain the eGFR is the adjusted MDRD equation, which factors  patient sex, age, race, and creatinine result.  Since race is unknown  in our information system, the eGFR values for -American  and Non--American patients are given for each creatinine  result.     eGFR if non    Date Value Ref Range Status   10/12/2012 >60 >60 mL/min Final     No results found for: CEA  No results found for: PSA        Assessment/Plan:     Problem List Items Addressed This Visit     Lung cancer, main bronchus, right-Small Cell     CT scan is negative at this time and patient appears BRYAN.  Will see her again in three months with labs and cxr and will plan on CT scan in six months.  Discussed in detail today.           Relevant Orders    CBC auto differential    Comprehensive metabolic panel    X-Ray Chest PA And Lateral    Cancer associated pain (Chronic)     Patient still has intermittent rib pain which is helped by narcotic pain meds.  She is doing ok with this and will continue treatment.           Anemia     This is likely chronic disease anemia.  Hb is stable at this time at 9.9g/dl.  She has no new symptoms so will continue to watch this.                 Discussion:     Follow up in about 3 months (around 9/26/2019).      Electronically signed by Winston Miller

## 2019-06-26 NOTE — ASSESSMENT & PLAN NOTE
Patient still has intermittent rib pain which is helped by narcotic pain meds.  She is doing ok with this and will continue treatment.

## 2019-06-26 NOTE — ASSESSMENT & PLAN NOTE
CT scan is negative at this time and patient appears BRYAN.  Will see her again in three months with labs and cxr and will plan on CT scan in six months.  Discussed in detail today.

## 2019-06-26 NOTE — ASSESSMENT & PLAN NOTE
This is likely chronic disease anemia.  Hb is stable at this time at 9.9g/dl.  She has no new symptoms so will continue to watch this.

## 2019-06-26 NOTE — LETTER
June 26, 2019      Kem iKser MD  [address]  [address]  [facility] [address]           [facility]  [address]  [address]  [place] [address]  Phone: [phone]  Fax: [phone]          Patient: Carmen Leyva   MR Number: [id number]   YOB: 1960   Date of Visit: 6/26/2019       Dear Dr. Kem Kiser:    Thank you for referring Carmen Leyva to me for evaluation. Attached you will find relevant portions of my assessment and plan of care.    If you have questions, please do not hesitate to call me. I look forward to following Carmen Leyva along with you.    Sincerely,    Winston Carr MD    Enclosure  CC:  No Recipients    If you would like to receive this communication electronically, please contact [email or web address] or [phone] to request more information on [facility] [employer] access.    For providers and/or their staff who would like to refer a patient to [facility], please contact us through our one-stop-shop provider referral line, [facility], at [phone].    If you feel you have received this communication in error or would no longer like to receive these types of communications, please e-mail [email or web address]

## 2019-06-27 DIAGNOSIS — C34.11 MALIGNANT NEOPLASM OF UPPER LOBE OF RIGHT LUNG: ICD-10-CM

## 2019-06-27 DIAGNOSIS — G89.3 CANCER RELATED PAIN: ICD-10-CM

## 2019-06-27 RX ORDER — OXYCODONE HYDROCHLORIDE 15 MG/1
15 TABLET, FILM COATED, EXTENDED RELEASE ORAL EVERY 12 HOURS
Qty: 60 TABLET | Refills: 0 | Status: ON HOLD | OUTPATIENT
Start: 2019-06-27 | End: 2019-09-06 | Stop reason: HOSPADM

## 2019-06-27 RX ORDER — OXYCODONE HYDROCHLORIDE 15 MG/1
15 TABLET ORAL EVERY 6 HOURS PRN
Qty: 60 TABLET | Refills: 0 | Status: SHIPPED | OUTPATIENT
Start: 2019-06-27 | End: 2019-07-26 | Stop reason: SDUPTHER

## 2019-07-23 DIAGNOSIS — R92.8 ABNORMAL MAMMOGRAM: Primary | ICD-10-CM

## 2019-07-26 DIAGNOSIS — C34.11 MALIGNANT NEOPLASM OF UPPER LOBE OF RIGHT LUNG: ICD-10-CM

## 2019-07-29 RX ORDER — OXYCODONE HYDROCHLORIDE 15 MG/1
15 TABLET ORAL EVERY 6 HOURS PRN
Qty: 60 TABLET | Refills: 0 | Status: ON HOLD | OUTPATIENT
Start: 2019-07-29 | End: 2019-09-06 | Stop reason: HOSPADM

## 2019-08-11 ENCOUNTER — HOSPITAL ENCOUNTER (EMERGENCY)
Facility: HOSPITAL | Age: 59
Discharge: OTHER INSTITUTION | End: 2019-08-11
Attending: EMERGENCY MEDICINE | Admitting: INTERNAL MEDICINE
Payer: MEDICAID

## 2019-08-11 ENCOUNTER — HOSPITAL ENCOUNTER (INPATIENT)
Facility: HOSPITAL | Age: 59
LOS: 29 days | Discharge: HOSPICE | DRG: 025 | End: 2019-09-09
Attending: EMERGENCY MEDICINE | Admitting: PSYCHIATRY & NEUROLOGY
Payer: MEDICAID

## 2019-08-11 VITALS
OXYGEN SATURATION: 96 % | DIASTOLIC BLOOD PRESSURE: 60 MMHG | BODY MASS INDEX: 17.67 KG/M2 | HEIGHT: 60 IN | RESPIRATION RATE: 15 BRPM | SYSTOLIC BLOOD PRESSURE: 101 MMHG | WEIGHT: 90 LBS | HEART RATE: 104 BPM | TEMPERATURE: 98 F

## 2019-08-11 DIAGNOSIS — I10 HYPERTENSION, UNSPECIFIED TYPE: ICD-10-CM

## 2019-08-11 DIAGNOSIS — Z29.89 SEIZURE PROPHYLAXIS: ICD-10-CM

## 2019-08-11 DIAGNOSIS — Z71.89 ADVANCED CARE PLANNING/COUNSELING DISCUSSION: ICD-10-CM

## 2019-08-11 DIAGNOSIS — Z71.89 GOALS OF CARE, COUNSELING/DISCUSSION: ICD-10-CM

## 2019-08-11 DIAGNOSIS — E03.9 HYPOTHYROIDISM, UNSPECIFIED TYPE: ICD-10-CM

## 2019-08-11 DIAGNOSIS — G93.6 VASOGENIC BRAIN EDEMA: ICD-10-CM

## 2019-08-11 DIAGNOSIS — I63.9 STROKE: ICD-10-CM

## 2019-08-11 DIAGNOSIS — Z51.5 PALLIATIVE CARE ENCOUNTER: ICD-10-CM

## 2019-08-11 DIAGNOSIS — E44.0 MODERATE MALNUTRITION: ICD-10-CM

## 2019-08-11 DIAGNOSIS — R94.31 QT PROLONGATION: ICD-10-CM

## 2019-08-11 DIAGNOSIS — C79.31 BRAIN METASTASIS: Primary | ICD-10-CM

## 2019-08-11 DIAGNOSIS — I10 HTN (HYPERTENSION): ICD-10-CM

## 2019-08-11 DIAGNOSIS — D49.6 BRAIN TUMOR: Primary | ICD-10-CM

## 2019-08-11 LAB
ALBUMIN SERPL BCP-MCNC: 4.6 G/DL (ref 3.5–5.2)
ALP SERPL-CCNC: 148 U/L (ref 55–135)
ALT SERPL W/O P-5'-P-CCNC: 9 U/L (ref 10–44)
AMMONIA PLAS-SCNC: 12 UMOL/L (ref 10–50)
ANION GAP SERPL CALC-SCNC: 10 MMOL/L (ref 8–16)
AST SERPL-CCNC: 16 U/L (ref 10–40)
BASOPHILS # BLD AUTO: 0.03 K/UL (ref 0–0.2)
BASOPHILS NFR BLD: 0.4 % (ref 0–1.9)
BILIRUB SERPL-MCNC: 0.5 MG/DL (ref 0.1–1)
BILIRUB UR QL STRIP: NEGATIVE
BILIRUB UR QL STRIP: NEGATIVE
BNP SERPL-MCNC: 189 PG/ML (ref 0–99)
BUN SERPL-MCNC: 10 MG/DL (ref 6–20)
CALCIUM SERPL-MCNC: 9.3 MG/DL (ref 8.7–10.5)
CHLORIDE SERPL-SCNC: 92 MMOL/L (ref 95–110)
CHOLEST SERPL-MCNC: 172 MG/DL (ref 120–199)
CHOLEST SERPL-MCNC: 183 MG/DL (ref 120–199)
CHOLEST/HDLC SERPL: 2.7 {RATIO} (ref 2–5)
CHOLEST/HDLC SERPL: 3 {RATIO} (ref 2–5)
CLARITY UR REFRACT.AUTO: ABNORMAL
CLARITY UR: CLEAR
CO2 SERPL-SCNC: 28 MMOL/L (ref 23–29)
COLOR UR AUTO: YELLOW
COLOR UR: YELLOW
CREAT SERPL-MCNC: 1 MG/DL (ref 0.5–1.4)
DIFFERENTIAL METHOD: ABNORMAL
EOSINOPHIL # BLD AUTO: 0 K/UL (ref 0–0.5)
EOSINOPHIL NFR BLD: 0.2 % (ref 0–8)
ERYTHROCYTE [DISTWIDTH] IN BLOOD BY AUTOMATED COUNT: 13.9 % (ref 11.5–14.5)
EST. GFR  (AFRICAN AMERICAN): >60 ML/MIN/1.73 M^2
EST. GFR  (NON AFRICAN AMERICAN): >60 ML/MIN/1.73 M^2
ESTIMATED AVG GLUCOSE: 97 MG/DL (ref 68–131)
GLUCOSE SERPL-MCNC: 108 MG/DL (ref 70–110)
GLUCOSE SERPL-MCNC: 117 MG/DL (ref 70–110)
GLUCOSE UR QL STRIP: NEGATIVE
GLUCOSE UR QL STRIP: NEGATIVE
HBA1C MFR BLD HPLC: 5 % (ref 4–5.6)
HCT VFR BLD AUTO: 31.5 % (ref 37–48.5)
HDLC SERPL-MCNC: 58 MG/DL (ref 40–75)
HDLC SERPL-MCNC: 67 MG/DL (ref 40–75)
HDLC SERPL: 33.7 % (ref 20–50)
HDLC SERPL: 36.6 % (ref 20–50)
HGB BLD-MCNC: 9.9 G/DL (ref 12–16)
HGB UR QL STRIP: ABNORMAL
HGB UR QL STRIP: NEGATIVE
IMM GRANULOCYTES # BLD AUTO: 0.02 K/UL (ref 0–0.04)
IMM GRANULOCYTES NFR BLD AUTO: 0.2 % (ref 0–0.5)
INR PPP: 1
KETONES UR QL STRIP: NEGATIVE
KETONES UR QL STRIP: NEGATIVE
LDH SERPL L TO P-CCNC: 1.17 MMOL/L (ref 0.5–2.2)
LDLC SERPL CALC-MCNC: 96.4 MG/DL (ref 63–159)
LDLC SERPL CALC-MCNC: 98.6 MG/DL (ref 63–159)
LEUKOCYTE ESTERASE UR QL STRIP: NEGATIVE
LEUKOCYTE ESTERASE UR QL STRIP: NEGATIVE
LYMPHOCYTES # BLD AUTO: 0.8 K/UL (ref 1–4.8)
LYMPHOCYTES NFR BLD: 9.8 % (ref 18–48)
MCH RBC QN AUTO: 31.5 PG (ref 27–31)
MCHC RBC AUTO-ENTMCNC: 31.4 G/DL (ref 32–36)
MCV RBC AUTO: 100 FL (ref 82–98)
MICROSCOPIC COMMENT: NORMAL
MONOCYTES # BLD AUTO: 0.8 K/UL (ref 0.3–1)
MONOCYTES NFR BLD: 9.6 % (ref 4–15)
NEUTROPHILS # BLD AUTO: 6.4 K/UL (ref 1.8–7.7)
NEUTROPHILS NFR BLD: 79.8 % (ref 38–73)
NITRITE UR QL STRIP: NEGATIVE
NITRITE UR QL STRIP: NEGATIVE
NONHDLC SERPL-MCNC: 114 MG/DL
NONHDLC SERPL-MCNC: 116 MG/DL
NRBC BLD-RTO: 0 /100 WBC
PH UR STRIP: 6 [PH] (ref 5–8)
PH UR STRIP: 6 [PH] (ref 5–8)
PLATELET # BLD AUTO: 153 K/UL (ref 150–350)
PMV BLD AUTO: 9.9 FL (ref 9.2–12.9)
POTASSIUM SERPL-SCNC: 3.9 MMOL/L (ref 3.5–5.1)
PROT SERPL-MCNC: 7.7 G/DL (ref 6–8.4)
PROT UR QL STRIP: ABNORMAL
PROT UR QL STRIP: NEGATIVE
PROTHROMBIN TIME: 13 SEC (ref 11.7–14)
RBC # BLD AUTO: 3.14 M/UL (ref 4–5.4)
RBC #/AREA URNS AUTO: 1 /HPF (ref 0–4)
SAMPLE: NORMAL
SODIUM SERPL-SCNC: 130 MMOL/L (ref 136–145)
SP GR UR STRIP: 1 (ref 1–1.03)
SP GR UR STRIP: 1.01 (ref 1–1.03)
SQUAMOUS #/AREA URNS AUTO: 10 /HPF
T4 FREE SERPL-MCNC: 1.65 NG/DL (ref 0.71–1.51)
T4 FREE SERPL-MCNC: 2.09 NG/DL (ref 0.71–1.51)
TRIGL SERPL-MCNC: 77 MG/DL (ref 30–150)
TRIGL SERPL-MCNC: 98 MG/DL (ref 30–150)
TROPONIN I SERPL DL<=0.01 NG/ML-MCNC: <0.03 NG/ML (ref 0.02–0.04)
TSH SERPL DL<=0.005 MIU/L-ACNC: 0.04 UIU/ML (ref 0.4–4)
TSH SERPL DL<=0.005 MIU/L-ACNC: 0.06 UIU/ML (ref 0.34–5.6)
URN SPEC COLLECT METH UR: ABNORMAL
URN SPEC COLLECT METH UR: ABNORMAL
UROBILINOGEN UR STRIP-ACNC: NEGATIVE EU/DL
WBC # BLD AUTO: 8.06 K/UL (ref 3.9–12.7)
WBC #/AREA URNS AUTO: 3 /HPF (ref 0–5)

## 2019-08-11 PROCEDURE — 99223 1ST HOSP IP/OBS HIGH 75: CPT | Mod: ,,, | Performed by: NURSE PRACTITIONER

## 2019-08-11 PROCEDURE — 93010 ELECTROCARDIOGRAM REPORT: CPT | Mod: ,,, | Performed by: INTERNAL MEDICINE

## 2019-08-11 PROCEDURE — 83605 ASSAY OF LACTIC ACID: CPT

## 2019-08-11 PROCEDURE — 85610 PROTHROMBIN TIME: CPT

## 2019-08-11 PROCEDURE — 25500020 PHARM REV CODE 255: Performed by: EMERGENCY MEDICINE

## 2019-08-11 PROCEDURE — 85025 COMPLETE CBC W/AUTO DIFF WBC: CPT

## 2019-08-11 PROCEDURE — 81003 URINALYSIS AUTO W/O SCOPE: CPT

## 2019-08-11 PROCEDURE — 83880 ASSAY OF NATRIURETIC PEPTIDE: CPT

## 2019-08-11 PROCEDURE — A9585 GADOBUTROL INJECTION: HCPCS | Performed by: EMERGENCY MEDICINE

## 2019-08-11 PROCEDURE — 93010 EKG 12-LEAD: ICD-10-PCS | Mod: ,,, | Performed by: INTERNAL MEDICINE

## 2019-08-11 PROCEDURE — 87040 BLOOD CULTURE FOR BACTERIA: CPT

## 2019-08-11 PROCEDURE — 20000000 HC ICU ROOM

## 2019-08-11 PROCEDURE — 84436 ASSAY OF TOTAL THYROXINE: CPT

## 2019-08-11 PROCEDURE — 93005 ELECTROCARDIOGRAM TRACING: CPT

## 2019-08-11 PROCEDURE — 84443 ASSAY THYROID STIM HORMONE: CPT

## 2019-08-11 PROCEDURE — 99223 PR INITIAL HOSPITAL CARE,LEVL III: ICD-10-PCS | Mod: ,,, | Performed by: NURSE PRACTITIONER

## 2019-08-11 PROCEDURE — 83036 HEMOGLOBIN GLYCOSYLATED A1C: CPT

## 2019-08-11 PROCEDURE — 84481 FREE ASSAY (FT-3): CPT

## 2019-08-11 PROCEDURE — 99285 EMERGENCY DEPT VISIT HI MDM: CPT

## 2019-08-11 PROCEDURE — 80061 LIPID PANEL: CPT | Mod: 91

## 2019-08-11 PROCEDURE — 84443 ASSAY THYROID STIM HORMONE: CPT | Mod: 91

## 2019-08-11 PROCEDURE — 82962 GLUCOSE BLOOD TEST: CPT

## 2019-08-11 PROCEDURE — 63600175 PHARM REV CODE 636 W HCPCS: Performed by: NURSE PRACTITIONER

## 2019-08-11 PROCEDURE — 12000002 HC ACUTE/MED SURGE SEMI-PRIVATE ROOM

## 2019-08-11 PROCEDURE — 63600175 PHARM REV CODE 636 W HCPCS: Performed by: EMERGENCY MEDICINE

## 2019-08-11 PROCEDURE — 99284 EMERGENCY DEPT VISIT MOD MDM: CPT | Mod: ,,, | Performed by: EMERGENCY MEDICINE

## 2019-08-11 PROCEDURE — 84439 ASSAY OF FREE THYROXINE: CPT

## 2019-08-11 PROCEDURE — 99284 PR EMERGENCY DEPT VISIT,LEVEL IV: ICD-10-PCS | Mod: ,,, | Performed by: EMERGENCY MEDICINE

## 2019-08-11 PROCEDURE — 80061 LIPID PANEL: CPT

## 2019-08-11 PROCEDURE — 81001 URINALYSIS AUTO W/SCOPE: CPT

## 2019-08-11 PROCEDURE — 96365 THER/PROPH/DIAG IV INF INIT: CPT

## 2019-08-11 PROCEDURE — 80053 COMPREHEN METABOLIC PANEL: CPT

## 2019-08-11 PROCEDURE — 82140 ASSAY OF AMMONIA: CPT

## 2019-08-11 PROCEDURE — 51701 INSERT BLADDER CATHETER: CPT

## 2019-08-11 PROCEDURE — 84439 ASSAY OF FREE THYROXINE: CPT | Mod: 91

## 2019-08-11 PROCEDURE — 84484 ASSAY OF TROPONIN QUANT: CPT

## 2019-08-11 RX ORDER — ONDANSETRON 2 MG/ML
4 INJECTION INTRAMUSCULAR; INTRAVENOUS EVERY 8 HOURS PRN
Status: DISCONTINUED | OUTPATIENT
Start: 2019-08-11 | End: 2019-09-10 | Stop reason: HOSPADM

## 2019-08-11 RX ORDER — AMOXICILLIN 250 MG
1 CAPSULE ORAL 2 TIMES DAILY
Status: DISCONTINUED | OUTPATIENT
Start: 2019-08-11 | End: 2019-09-06

## 2019-08-11 RX ORDER — PANTOPRAZOLE SODIUM 40 MG/1
40 TABLET, DELAYED RELEASE ORAL DAILY
Status: DISCONTINUED | OUTPATIENT
Start: 2019-08-12 | End: 2019-09-10 | Stop reason: HOSPADM

## 2019-08-11 RX ORDER — OXYCODONE HYDROCHLORIDE 5 MG/1
5 TABLET ORAL EVERY 6 HOURS PRN
Status: DISCONTINUED | OUTPATIENT
Start: 2019-08-11 | End: 2019-09-04

## 2019-08-11 RX ORDER — LEVOTHYROXINE SODIUM 137 UG/1
TABLET ORAL
COMMUNITY

## 2019-08-11 RX ORDER — LEVETIRACETAM 5 MG/ML
500 INJECTION INTRAVASCULAR EVERY 12 HOURS
Status: DISCONTINUED | OUTPATIENT
Start: 2019-08-11 | End: 2019-08-14

## 2019-08-11 RX ORDER — GADOBUTROL 604.72 MG/ML
4 INJECTION INTRAVENOUS
Status: COMPLETED | OUTPATIENT
Start: 2019-08-11 | End: 2019-08-11

## 2019-08-11 RX ORDER — ACETAMINOPHEN 325 MG/1
650 TABLET ORAL EVERY 8 HOURS PRN
Status: DISCONTINUED | OUTPATIENT
Start: 2019-08-11 | End: 2019-08-13

## 2019-08-11 RX ORDER — DEXAMETHASONE SODIUM PHOSPHATE 4 MG/ML
4 INJECTION, SOLUTION INTRA-ARTICULAR; INTRALESIONAL; INTRAMUSCULAR; INTRAVENOUS; SOFT TISSUE EVERY 6 HOURS
Status: DISCONTINUED | OUTPATIENT
Start: 2019-08-12 | End: 2019-08-19

## 2019-08-11 RX ORDER — ACETAMINOPHEN 10 MG/ML
15 INJECTION, SOLUTION INTRAVENOUS
Status: COMPLETED | OUTPATIENT
Start: 2019-08-11 | End: 2019-08-11

## 2019-08-11 RX ORDER — POLYETHYLENE GLYCOL 3350 17 G/17G
17 POWDER, FOR SOLUTION ORAL DAILY
Status: DISCONTINUED | OUTPATIENT
Start: 2019-08-12 | End: 2019-09-06

## 2019-08-11 RX ORDER — SODIUM CHLORIDE 0.9 % (FLUSH) 0.9 %
10 SYRINGE (ML) INJECTION
Status: DISCONTINUED | OUTPATIENT
Start: 2019-08-11 | End: 2019-09-10 | Stop reason: HOSPADM

## 2019-08-11 RX ORDER — DEXAMETHASONE SODIUM PHOSPHATE 4 MG/ML
10 INJECTION, SOLUTION INTRA-ARTICULAR; INTRALESIONAL; INTRAMUSCULAR; INTRAVENOUS; SOFT TISSUE ONCE
Status: COMPLETED | OUTPATIENT
Start: 2019-08-11 | End: 2019-08-11

## 2019-08-11 RX ORDER — IPRATROPIUM BROMIDE AND ALBUTEROL SULFATE 2.5; .5 MG/3ML; MG/3ML
3 SOLUTION RESPIRATORY (INHALATION) EVERY 4 HOURS PRN
Status: DISCONTINUED | OUTPATIENT
Start: 2019-08-11 | End: 2019-09-10 | Stop reason: HOSPADM

## 2019-08-11 RX ORDER — SODIUM CHLORIDE 9 MG/ML
INJECTION, SOLUTION INTRAVENOUS CONTINUOUS
Status: DISCONTINUED | OUTPATIENT
Start: 2019-08-11 | End: 2019-08-13

## 2019-08-11 RX ADMIN — ACETAMINOPHEN 610 MG: 10 INJECTION, SOLUTION INTRAVENOUS at 04:08

## 2019-08-11 RX ADMIN — SODIUM CHLORIDE: 0.9 INJECTION, SOLUTION INTRAVENOUS at 11:08

## 2019-08-11 RX ADMIN — LEVETIRACETAM 500 MG: 5 INJECTION INTRAVENOUS at 09:08

## 2019-08-11 RX ADMIN — DEXAMETHASONE SODIUM PHOSPHATE 10 MG: 4 INJECTION, SOLUTION INTRAMUSCULAR; INTRAVENOUS at 09:08

## 2019-08-11 RX ADMIN — GADOBUTROL 4 ML: 604.72 INJECTION INTRAVENOUS at 11:08

## 2019-08-11 NOTE — ED PROVIDER NOTES
Encounter Date: 8/11/2019       History     Chief Complaint   Patient presents with    Dizziness     FEELING OFF BALANCE PER HER MOTHER X 2-3 DAYS    Headache     59-year-old female presents with altered mental status, patient complains of headache and dizziness.  Patient has a history of stage IV lung cancer in remission currently patient also has a history of COPD, SIADH, hypertension, hyperlipidemia, chronic kidney disease.  Patient is mildly confused she is oriented to person and place only, patient follows commands but does require redirection.  Patient complains of headache but otherwise has no acute complaints.        Review of patient's allergies indicates:   Allergen Reactions    Celebrex [celecoxib]      Due to kidney removal she can not take anything for RA    Ibuprofen      Due to kidney removal    Neurontin [gabapentin]     Sulfa (sulfonamide antibiotics) Hives    Topamax [topiramate] Swelling     Past Medical History:   Diagnosis Date    Acid reflux     Anemia     Anxiety     Arthritis     Blindness - both eyes     Chronic kidney disease     COPD (chronic obstructive pulmonary disease)     GERD (gastroesophageal reflux disease)     Gout     Hypertension     Lung cancer     Lung cancer, main bronchus, right 1/2/2018    Osteopenia     Rheumatoid arteritis     SIADH (syndrome of inappropriate ADH production) 11/12/2017    Solitary kidney     Thyroid disease     Vitamin D deficiency      Past Surgical History:   Procedure Laterality Date    ABDOMINAL ADHESION SURGERY      ADENOIDECTOMY      kidney removal      right     TONSILLECTOMY       Family History   Problem Relation Age of Onset    Stroke Mother     Thyroid disease Mother     Sleep apnea Mother     Hypertension Mother     Clotting disorder Mother     COPD Father     Heart disease Father     Neuropathy Father     Neuropathy Sister     Thyroid disease Sister     Fibromyalgia Sister     Heart disease Brother      Heart disease Maternal Grandfather     Parkinsonism Maternal Grandfather     Diabetes Maternal Aunt     Hypertension Maternal Aunt     Heart disease Paternal Uncle     Diabetes Paternal Uncle      Social History     Tobacco Use    Smoking status: Former Smoker     Packs/day: 0.50     Types: Cigarettes     Last attempt to quit: 2017     Years since quittin.3    Smokeless tobacco: Never Used   Substance Use Topics    Alcohol use: No     Alcohol/week: 0.0 oz     Comment: seldom    Drug use: No     Review of Systems   Unable to perform ROS: Mental status change   Neurological: Positive for headaches.       Physical Exam     Initial Vitals [19 1420]   BP Pulse Resp Temp SpO2   (!) 125/58 105 16 97.9 °F (36.6 °C) 98 %      MAP       --         Physical Exam    Nursing note and vitals reviewed.  Constitutional: She appears well-developed and well-nourished. She is not diaphoretic. No distress.   HENT:   Head: Normocephalic and atraumatic.   Right Ear: External ear normal.   Left Ear: External ear normal.   Nose: Nose normal.   Mouth/Throat: Oropharynx is clear and moist. No oropharyngeal exudate.   Eyes: Conjunctivae and EOM are normal. Pupils are equal, round, and reactive to light. Right eye exhibits no discharge. Left eye exhibits no discharge. No scleral icterus.   Neck: Normal range of motion. Neck supple. No thyromegaly present. No tracheal deviation present. No JVD present.   Cardiovascular: Normal rate, regular rhythm, normal heart sounds and intact distal pulses. Exam reveals no gallop and no friction rub.    No murmur heard.  Pulmonary/Chest: Breath sounds normal. No stridor. No respiratory distress. She has no wheezes. She has no rhonchi. She has no rales. She exhibits no tenderness.   Abdominal: Soft. Bowel sounds are normal. She exhibits no distension and no mass. There is no tenderness. There is no rebound and no guarding.   Musculoskeletal: Normal range of motion. She exhibits no  edema or tenderness.   Lymphadenopathy:     She has no cervical adenopathy.   Neurological: She is alert. She has normal strength. She displays normal reflexes. No cranial nerve deficit or sensory deficit.   Patient is oriented to person and place otherwise she believes it is 1920 and she does not know what month it is.   Skin: Skin is warm and dry. No rash and no abscess noted. No erythema. No pallor.         ED Course   Procedures  Labs Reviewed   CBC W/ AUTO DIFFERENTIAL - Abnormal; Notable for the following components:       Result Value    RBC 3.14 (*)     Hemoglobin 9.9 (*)     Hematocrit 31.5 (*)     Mean Corpuscular Volume 100 (*)     Mean Corpuscular Hemoglobin 31.5 (*)     Mean Corpuscular Hemoglobin Conc 31.4 (*)     Lymph # 0.8 (*)     Gran% 79.8 (*)     Lymph% 9.8 (*)     All other components within normal limits   COMPREHENSIVE METABOLIC PANEL - Abnormal; Notable for the following components:    Sodium 130 (*)     Chloride 92 (*)     Glucose 117 (*)     Alkaline Phosphatase 148 (*)     ALT 9 (*)     All other components within normal limits   TSH - Abnormal; Notable for the following components:    TSH 0.060 (*)     All other components within normal limits   B-TYPE NATRIURETIC PEPTIDE - Abnormal; Notable for the following components:     (*)     All other components within normal limits   URINALYSIS, REFLEX TO URINE CULTURE - Abnormal; Notable for the following components:    Protein, UA Trace (*)     All other components within normal limits    Narrative:     Preferred Collection Type->Urine, Clean Catch  Specimen Source->Urine   T4, FREE - Abnormal; Notable for the following components:    Free T4 2.09 (*)     All other components within normal limits   CULTURE, BLOOD   CULTURE, BLOOD   PROTIME-INR   LIPID PANEL   TROPONIN I   AMMONIA   AMMONIA   AMMONIA   POCT GLUCOSE   ISTAT LACTATE   POCT GLUCOSE MONITORING CONTINUOUS   POCT LACTATE        ECG Results          ECG 12 lead (In process)   Result time 08/11/19 15:24:16    In process by Interface, Lab In [facility] (08/11/19 15:24:16)                 Narrative:    Test Reason : I63.9,    Vent. Rate : 100 BPM     Atrial Rate : 100 BPM     P-R Int : 138 ms          QRS Dur : 078 ms      QT Int : 346 ms       P-R-T Axes : 081 079 078 degrees     QTc Int : 446 ms    Normal sinus rhythm  Right atrial enlargement  Borderline Abnormal ECG  No previous ECGs available    Referred By: [email or web address]   SELF           Confirmed By:                             Imaging Results          CT Head Without Contrast (Final result)  Result time 08/11/19 15:29:58    Final result by Td Nugent MD (08/11/19 15:29:58)                 Impression:      1. Large intra-axial solid mass in the left parietotemporal region, presumably reflecting metastatic disease given history of lung carcinoma, or less likely primary brain neoplasm.  Neurosurgical consultation and tissue diagnosis are recommended.  2. Moderate chronic small vessel ischemic changes throughout the white matter, with generalized cerebral atrophy.      Electronically signed by: Td Nugent MD  Date:    08/11/2019  Time:    15:29             Narrative:    EXAMINATION:  CT HEAD WITHOUT CONTRAST    CLINICAL HISTORY:  Confusion/delirium, altered LOC, unexplained; imbalance, lung cancer.    TECHNIQUE:  CMS MANDATED QUALITY DATA-CT RADIATION DOSE-436    All CT scans at this facility dose modulation, iterative reconstruction, and or weight-based dosing when appropriate to reduce radiation dose to as low as reasonably achievable.    FINDINGS:  Comparison to multiple prior exams including MRI of 11/21/2017.  there is a heterogeneously hyperdense solid intra-axial mass in the left parietotemporal region, having internal coarse calcifications, measuring 61 mm AP x 33 mm transverse dimension, with surrounding hypoattenuating vasogenic edema.  There is no additional intra-axial mass or extra-axial mass evident, given limitations from  lack of IV contrast.    There is no acute intracranial hemorrhage, with scattered areas of nonspecific white matter hypoattenuation suggesting gliosis from chronic small vessel ischemic disease.  Gray-white differentiation elsewhere is maintained, with generalized prominence of the cortical sulci and ventricles.  The unenhanced cerebellum and brainstem are unremarkable.    There are carotid siphon vascular calcifications.  The paranasal sinuses are clear, with scattered left mastoid air cell fluid or debris.  The calvarium is intact, with no acute fractures.                               X-Ray Chest AP Portable (Final result)  Result time 08/11/19 15:23:40    Final result by Td Nugent MD (08/11/19 15:23:40)                 Impression:      No evidence of acute cardiopulmonary disease.      Electronically signed by: Td Nugent MD  Date:    08/11/2019  Time:    15:23             Narrative:    EXAMINATION:  XR CHEST AP PORTABLE    CLINICAL HISTORY:  Chest pain, acute CVA.    FINDINGS:  Portable chest radiograph at 15:01 hours compared to multiple prior exams shows left subclavian injection port type central venous catheter, unchanged in position, with the cardiomediastinal silhouette and pulmonary vasculature stable and within normal limits.  There are aortic arch vascular calcifications.    There are right apical and upper lobe pleuroparenchymal opacities with apical pleural thickening, all unchanged.  There is no new consolidation, large pleural effusion, evidence of pulmonary edema, or pneumothorax.  No acute osseous abnormality.                                 Medical Decision Making:   History:   Old Medical Records: I decided to obtain old medical records.  Initial Assessment:   Emergent evaluation of a 59-year-old female presenting with altered mental status differential diagnosis includes metastatic progression, intracranial accident, infection, electrolyte abnormality, endocrine dysfunction               Attending Attestation:             Attending ED Notes:   Patient noted to have acute onset brain tumor this is likely the cause of her headache and confusion, patient will be consulted for transfer to facility with neurosurgery.  Patient repeat neurologic exam is within normal limits no other acute abnormalities noted.    Patient accepted for transfer by doctor Monet at [facility]             Clinical Impression:       ICD-10-CM ICD-9-CM   1. Brain tumor D49.6 239.6   2. Stroke I63.9 434.91                                David Dominguez MD  08/11/19 1703

## 2019-08-11 NOTE — LETTER
August 28, 2019         James Alberts  [facility] [address]  Phone: [phone]  Fax: [phone]       Patient: Carmen Leyva   YOB: 1960  Date of Visit: 08/28/2019    To Whom It May Concern:    Patient's son was at [facility] today visiting his mother who is in patient. If you have any questions or concerns, or if I can be of further assistance, please do not hesitate to contact me.    Sincerely,    Jessica Lees MD

## 2019-08-12 ENCOUNTER — ANESTHESIA EVENT (OUTPATIENT)
Dept: SURGERY | Facility: HOSPITAL | Age: 59
DRG: 025 | End: 2019-08-12
Payer: MEDICAID

## 2019-08-12 ENCOUNTER — ANESTHESIA (OUTPATIENT)
Dept: SURGERY | Facility: HOSPITAL | Age: 59
DRG: 025 | End: 2019-08-12
Payer: MEDICAID

## 2019-08-12 LAB
ABO + RH BLD: NORMAL
ALBUMIN SERPL BCP-MCNC: 3.8 G/DL (ref 3.5–5.2)
ALP SERPL-CCNC: 164 U/L (ref 55–135)
ALT SERPL W/O P-5'-P-CCNC: 5 U/L (ref 10–44)
ANION GAP SERPL CALC-SCNC: 11 MMOL/L (ref 8–16)
ANION GAP SERPL CALC-SCNC: 7 MMOL/L (ref 8–16)
APTT BLDCRRT: 25.2 SEC (ref 21–32)
ASCENDING AORTA: 2.49 CM
AST SERPL-CCNC: 13 U/L (ref 10–40)
AV INDEX (PROSTH): 0.97
AV MEAN GRADIENT: 2 MMHG
AV PEAK GRADIENT: 3 MMHG
AV VALVE AREA: 3.15 CM2
AV VELOCITY RATIO: 0.97
BASOPHILS # BLD AUTO: 0.01 K/UL (ref 0–0.2)
BASOPHILS # BLD AUTO: 0.01 K/UL (ref 0–0.2)
BASOPHILS NFR BLD: 0.1 % (ref 0–1.9)
BASOPHILS NFR BLD: 0.2 % (ref 0–1.9)
BILIRUB SERPL-MCNC: 0.3 MG/DL (ref 0.1–1)
BLD GP AB SCN CELLS X3 SERPL QL: NORMAL
BLOOD GROUP ANTIBODIES SERPL: NORMAL
BSA FOR ECHO PROCEDURE: 1.23 M2
BUN SERPL-MCNC: 10 MG/DL (ref 6–20)
BUN SERPL-MCNC: 11 MG/DL (ref 6–20)
CALCIUM SERPL-MCNC: 8.5 MG/DL (ref 8.7–10.5)
CALCIUM SERPL-MCNC: 9.3 MG/DL (ref 8.7–10.5)
CHLORIDE SERPL-SCNC: 97 MMOL/L (ref 95–110)
CHLORIDE SERPL-SCNC: 98 MMOL/L (ref 95–110)
CO2 SERPL-SCNC: 24 MMOL/L (ref 23–29)
CO2 SERPL-SCNC: 24 MMOL/L (ref 23–29)
CREAT SERPL-MCNC: 0.7 MG/DL (ref 0.5–1.4)
CREAT SERPL-MCNC: 0.9 MG/DL (ref 0.5–1.4)
CV ECHO LV RWT: 0.57 CM
DIFFERENTIAL METHOD: ABNORMAL
DIFFERENTIAL METHOD: ABNORMAL
DOP CALC AO PEAK VEL: 0.93 M/S
DOP CALC AO VTI: 19 CM
DOP CALC LVOT AREA: 3.3 CM2
DOP CALC LVOT DIAMETER: 2.04 CM
DOP CALC LVOT PEAK VEL: 0.9 M/S
DOP CALC LVOT STROKE VOLUME: 59.91 CM3
DOP CALCLVOT PEAK VEL VTI: 18.34 CM
ECHO LV POSTERIOR WALL: 0.76 CM (ref 0.6–1.1)
EOSINOPHIL # BLD AUTO: 0 K/UL (ref 0–0.5)
EOSINOPHIL # BLD AUTO: 0 K/UL (ref 0–0.5)
EOSINOPHIL NFR BLD: 0 % (ref 0–8)
EOSINOPHIL NFR BLD: 0 % (ref 0–8)
ERYTHROCYTE [DISTWIDTH] IN BLOOD BY AUTOMATED COUNT: 13.7 % (ref 11.5–14.5)
ERYTHROCYTE [DISTWIDTH] IN BLOOD BY AUTOMATED COUNT: 13.8 % (ref 11.5–14.5)
EST. GFR  (AFRICAN AMERICAN): >60 ML/MIN/1.73 M^2
EST. GFR  (AFRICAN AMERICAN): >60 ML/MIN/1.73 M^2
EST. GFR  (NON AFRICAN AMERICAN): >60 ML/MIN/1.73 M^2
EST. GFR  (NON AFRICAN AMERICAN): >60 ML/MIN/1.73 M^2
FRACTIONAL SHORTENING: 25 % (ref 28–44)
GLUCOSE SERPL-MCNC: 121 MG/DL (ref 70–110)
GLUCOSE SERPL-MCNC: 145 MG/DL (ref 70–110)
GLUCOSE SERPL-MCNC: 177 MG/DL (ref 70–110)
HCO3 UR-SCNC: 24 MMOL/L (ref 24–28)
HCT VFR BLD AUTO: 27.2 % (ref 37–48.5)
HCT VFR BLD AUTO: 32 % (ref 37–48.5)
HCT VFR BLD CALC: 32 %PCV (ref 36–54)
HGB BLD-MCNC: 9 G/DL (ref 12–16)
HGB BLD-MCNC: 9.9 G/DL (ref 12–16)
IMM GRANULOCYTES # BLD AUTO: 0.02 K/UL (ref 0–0.04)
IMM GRANULOCYTES # BLD AUTO: 0.06 K/UL (ref 0–0.04)
IMM GRANULOCYTES NFR BLD AUTO: 0.4 % (ref 0–0.5)
IMM GRANULOCYTES NFR BLD AUTO: 0.7 % (ref 0–0.5)
INTERVENTRICULAR SEPTUM: 0.79 CM (ref 0.6–1.1)
LA MAJOR: 4.71 CM
LA MINOR: 4.85 CM
LA WIDTH: 2.89 CM
LEFT ATRIUM SIZE: 2.82 CM
LEFT ATRIUM VOLUME INDEX: 26.1 ML/M2
LEFT ATRIUM VOLUME: 33.11 CM3
LEFT INTERNAL DIMENSION IN SYSTOLE: 2 CM (ref 2.1–4)
LEFT VENTRICLE DIASTOLIC VOLUME INDEX: 20.89 ML/M2
LEFT VENTRICLE DIASTOLIC VOLUME: 26.45 ML
LEFT VENTRICLE MASS INDEX: 38 G/M2
LEFT VENTRICLE SYSTOLIC VOLUME INDEX: 10 ML/M2
LEFT VENTRICLE SYSTOLIC VOLUME: 12.7 ML
LEFT VENTRICULAR INTERNAL DIMENSION IN DIASTOLE: 2.68 CM (ref 3.5–6)
LEFT VENTRICULAR MASS: 47.56 G
LYMPHOCYTES # BLD AUTO: 0.3 K/UL (ref 1–4.8)
LYMPHOCYTES # BLD AUTO: 0.3 K/UL (ref 1–4.8)
LYMPHOCYTES NFR BLD: 3.3 % (ref 18–48)
LYMPHOCYTES NFR BLD: 5.4 % (ref 18–48)
MAGNESIUM SERPL-MCNC: 1.7 MG/DL (ref 1.6–2.6)
MCH RBC QN AUTO: 31.9 PG (ref 27–31)
MCH RBC QN AUTO: 32.8 PG (ref 27–31)
MCHC RBC AUTO-ENTMCNC: 30.9 G/DL (ref 32–36)
MCHC RBC AUTO-ENTMCNC: 33.1 G/DL (ref 32–36)
MCV RBC AUTO: 103 FL (ref 82–98)
MCV RBC AUTO: 99 FL (ref 82–98)
MONOCYTES # BLD AUTO: 0.1 K/UL (ref 0.3–1)
MONOCYTES # BLD AUTO: 0.6 K/UL (ref 0.3–1)
MONOCYTES NFR BLD: 1.8 % (ref 4–15)
MONOCYTES NFR BLD: 7 % (ref 4–15)
NEUTROPHILS # BLD AUTO: 5.3 K/UL (ref 1.8–7.7)
NEUTROPHILS # BLD AUTO: 7.8 K/UL (ref 1.8–7.7)
NEUTROPHILS NFR BLD: 88.9 % (ref 38–73)
NEUTROPHILS NFR BLD: 92.2 % (ref 38–73)
NRBC BLD-RTO: 0 /100 WBC
NRBC BLD-RTO: 0 /100 WBC
PCO2 BLDA: 32 MMHG (ref 35–45)
PH SMN: 7.48 [PH] (ref 7.35–7.45)
PHOSPHATE SERPL-MCNC: 3.6 MG/DL (ref 2.7–4.5)
PISA TR MAX VEL: 2.43 M/S
PLATELET # BLD AUTO: 142 K/UL (ref 150–350)
PLATELET # BLD AUTO: 155 K/UL (ref 150–350)
PMV BLD AUTO: 9.7 FL (ref 9.2–12.9)
PMV BLD AUTO: 9.9 FL (ref 9.2–12.9)
PO2 BLDA: 206 MMHG (ref 80–100)
POC BE: 1 MMOL/L
POC IONIZED CALCIUM: 1.06 MMOL/L (ref 1.06–1.42)
POC SATURATED O2: 100 % (ref 95–100)
POC TCO2: 25 MMOL/L (ref 23–27)
POTASSIUM BLD-SCNC: 3.7 MMOL/L (ref 3.5–5.1)
POTASSIUM SERPL-SCNC: 4.3 MMOL/L (ref 3.5–5.1)
POTASSIUM SERPL-SCNC: 4.5 MMOL/L (ref 3.5–5.1)
PROT SERPL-MCNC: 7.3 G/DL (ref 6–8.4)
RA MAJOR: 3.34 CM
RA PRESSURE: 3 MMHG
RA WIDTH: 2.28 CM
RBC # BLD AUTO: 2.74 M/UL (ref 4–5.4)
RBC # BLD AUTO: 3.1 M/UL (ref 4–5.4)
RIGHT VENTRICULAR END-DIASTOLIC DIMENSION: 2.58 CM
SAMPLE: ABNORMAL
SINUS: 2.08 CM
SODIUM BLD-SCNC: 131 MMOL/L (ref 136–145)
SODIUM SERPL-SCNC: 128 MMOL/L (ref 136–145)
SODIUM SERPL-SCNC: 133 MMOL/L (ref 136–145)
SODIUM UR-SCNC: 120 MMOL/L (ref 20–250)
STJ: 2.15 CM
T3FREE SERPL-MCNC: 2.7 PG/ML (ref 2.3–4.2)
T4 SERPL-MCNC: 12.6 UG/DL (ref 4.5–11.5)
TDI LATERAL: 0.09 M/S
TDI SEPTAL: 0.07 M/S
TDI: 0.08 M/S
TR MAX PG: 24 MMHG
TRICUSPID ANNULAR PLANE SYSTOLIC EXCURSION: 1.01 CM
TV REST PULMONARY ARTERY PRESSURE: 27 MMHG
WBC # BLD AUTO: 5.69 K/UL (ref 3.9–12.7)
WBC # BLD AUTO: 8.77 K/UL (ref 3.9–12.7)

## 2019-08-12 PROCEDURE — 85730 THROMBOPLASTIN TIME PARTIAL: CPT

## 2019-08-12 PROCEDURE — 20000000 HC ICU ROOM

## 2019-08-12 PROCEDURE — 86850 RBC ANTIBODY SCREEN: CPT

## 2019-08-12 PROCEDURE — D9220A PRA ANESTHESIA: ICD-10-PCS | Mod: CRNA,,, | Performed by: NURSE ANESTHETIST, CERTIFIED REGISTERED

## 2019-08-12 PROCEDURE — 37000009 HC ANESTHESIA EA ADD 15 MINS: Performed by: NEUROLOGICAL SURGERY

## 2019-08-12 PROCEDURE — 63600175 PHARM REV CODE 636 W HCPCS: Performed by: NURSE ANESTHETIST, CERTIFIED REGISTERED

## 2019-08-12 PROCEDURE — 86902 BLOOD TYPE ANTIGEN DONOR EA: CPT

## 2019-08-12 PROCEDURE — 88307 TISSUE EXAM BY PATHOLOGIST: CPT | Mod: 26,,, | Performed by: PATHOLOGY

## 2019-08-12 PROCEDURE — 88342 TISSUE SPECIMEN TO PATHOLOGY - SURGERY: ICD-10-PCS | Mod: 26,,, | Performed by: PATHOLOGY

## 2019-08-12 PROCEDURE — 63600175 PHARM REV CODE 636 W HCPCS: Performed by: NURSE PRACTITIONER

## 2019-08-12 PROCEDURE — C1762 CONN TISS, HUMAN(INC FASCIA): HCPCS | Performed by: NEUROLOGICAL SURGERY

## 2019-08-12 PROCEDURE — 85025 COMPLETE CBC W/AUTO DIFF WBC: CPT | Mod: 91

## 2019-08-12 PROCEDURE — 36000711: Performed by: NEUROLOGICAL SURGERY

## 2019-08-12 PROCEDURE — 88307 TISSUE SPECIMEN TO PATHOLOGY - SURGERY: ICD-10-PCS | Mod: 26,,, | Performed by: PATHOLOGY

## 2019-08-12 PROCEDURE — 99233 SBSQ HOSP IP/OBS HIGH 50: CPT | Mod: ,,, | Performed by: NEUROLOGICAL SURGERY

## 2019-08-12 PROCEDURE — 27201423 OPTIME MED/SURG SUP & DEVICES STERILE SUPPLY: Performed by: NEUROLOGICAL SURGERY

## 2019-08-12 PROCEDURE — 94761 N-INVAS EAR/PLS OXIMETRY MLT: CPT

## 2019-08-12 PROCEDURE — D9220A PRA ANESTHESIA: ICD-10-PCS | Mod: ANES,,, | Performed by: ANESTHESIOLOGY

## 2019-08-12 PROCEDURE — 69990 PR MICROSURG TECHNIQUES,REQ OPER MICROSCOPE: ICD-10-PCS | Mod: 59,,, | Performed by: NEUROLOGICAL SURGERY

## 2019-08-12 PROCEDURE — S0020 INJECTION, BUPIVICAINE HYDRO: HCPCS | Performed by: NEUROLOGICAL SURGERY

## 2019-08-12 PROCEDURE — 88341 PR IHC OR ICC EACH ADD'L SINGLE ANTIBODY  STAINPR: ICD-10-PCS | Mod: 26,,, | Performed by: PATHOLOGY

## 2019-08-12 PROCEDURE — 25000003 PHARM REV CODE 250: Performed by: NURSE PRACTITIONER

## 2019-08-12 PROCEDURE — 25000003 PHARM REV CODE 250: Performed by: STUDENT IN AN ORGANIZED HEALTH CARE EDUCATION/TRAINING PROGRAM

## 2019-08-12 PROCEDURE — 88307 TISSUE EXAM BY PATHOLOGIST: CPT | Performed by: PATHOLOGY

## 2019-08-12 PROCEDURE — 86922 COMPATIBILITY TEST ANTIGLOB: CPT

## 2019-08-12 PROCEDURE — D9220A PRA ANESTHESIA: Mod: CRNA,,, | Performed by: NURSE ANESTHETIST, CERTIFIED REGISTERED

## 2019-08-12 PROCEDURE — 37000008 HC ANESTHESIA 1ST 15 MINUTES: Performed by: NEUROLOGICAL SURGERY

## 2019-08-12 PROCEDURE — D9220A PRA ANESTHESIA: Mod: ANES,,, | Performed by: ANESTHESIOLOGY

## 2019-08-12 PROCEDURE — 88342 IMHCHEM/IMCYTCHM 1ST ANTB: CPT | Performed by: PATHOLOGY

## 2019-08-12 PROCEDURE — 61510 PR EXCIS SUPRATENT BRAIN TUMOR: ICD-10-PCS | Mod: ,,, | Performed by: NEUROLOGICAL SURGERY

## 2019-08-12 PROCEDURE — C1713 ANCHOR/SCREW BN/BN,TIS/BN: HCPCS | Performed by: NEUROLOGICAL SURGERY

## 2019-08-12 PROCEDURE — 86905 BLOOD TYPING RBC ANTIGENS: CPT

## 2019-08-12 PROCEDURE — 84100 ASSAY OF PHOSPHORUS: CPT

## 2019-08-12 PROCEDURE — 84295 ASSAY OF SERUM SODIUM: CPT

## 2019-08-12 PROCEDURE — 83735 ASSAY OF MAGNESIUM: CPT

## 2019-08-12 PROCEDURE — 99233 PR SUBSEQUENT HOSPITAL CARE,LEVL III: ICD-10-PCS | Mod: ,,, | Performed by: NEUROLOGICAL SURGERY

## 2019-08-12 PROCEDURE — 63600175 PHARM REV CODE 636 W HCPCS: Performed by: NEUROLOGICAL SURGERY

## 2019-08-12 PROCEDURE — 36000710: Performed by: NEUROLOGICAL SURGERY

## 2019-08-12 PROCEDURE — 80048 BASIC METABOLIC PNL TOTAL CA: CPT

## 2019-08-12 PROCEDURE — 25000003 PHARM REV CODE 250: Performed by: NURSE ANESTHETIST, CERTIFIED REGISTERED

## 2019-08-12 PROCEDURE — 61781 SCAN PROC CRANIAL INTRA: CPT | Mod: ,,, | Performed by: NEUROLOGICAL SURGERY

## 2019-08-12 PROCEDURE — 86870 RBC ANTIBODY IDENTIFICATION: CPT

## 2019-08-12 PROCEDURE — 84300 ASSAY OF URINE SODIUM: CPT

## 2019-08-12 PROCEDURE — 61781 PR STEREOTACTIC COMP ASSIST PROC,CRANIAL,INTRADURAL: ICD-10-PCS | Mod: ,,, | Performed by: NEUROLOGICAL SURGERY

## 2019-08-12 PROCEDURE — 88342 IMHCHEM/IMCYTCHM 1ST ANTB: CPT | Mod: 26,,, | Performed by: PATHOLOGY

## 2019-08-12 PROCEDURE — 99233 PR SUBSEQUENT HOSPITAL CARE,LEVL III: ICD-10-PCS | Mod: ,,, | Performed by: NURSE PRACTITIONER

## 2019-08-12 PROCEDURE — 88341 IMHCHEM/IMCYTCHM EA ADD ANTB: CPT | Mod: 26,,, | Performed by: PATHOLOGY

## 2019-08-12 PROCEDURE — 69990 MICROSURGERY ADD-ON: CPT | Mod: 59,,, | Performed by: NEUROLOGICAL SURGERY

## 2019-08-12 PROCEDURE — 80053 COMPREHEN METABOLIC PANEL: CPT

## 2019-08-12 PROCEDURE — 99233 SBSQ HOSP IP/OBS HIGH 50: CPT | Mod: ,,, | Performed by: NURSE PRACTITIONER

## 2019-08-12 PROCEDURE — 61510 CRNEC TREPH EXC BRN TUM STTL: CPT | Mod: ,,, | Performed by: NEUROLOGICAL SURGERY

## 2019-08-12 PROCEDURE — 25000003 PHARM REV CODE 250: Performed by: NEUROLOGICAL SURGERY

## 2019-08-12 DEVICE — SCREW UN3 AXS SD 1.5X4MM: Type: IMPLANTABLE DEVICE | Site: CRANIAL | Status: FUNCTIONAL

## 2019-08-12 DEVICE — DURA MATRIX ONLAY PLUS 2X2: Type: IMPLANTABLE DEVICE | Site: CRANIAL | Status: FUNCTIONAL

## 2019-08-12 DEVICE — PLATE BONE 2X2 HOLE SM BOX: Type: IMPLANTABLE DEVICE | Site: CRANIAL | Status: FUNCTIONAL

## 2019-08-12 DEVICE — PLATE BONE BUR HOLE COVER 10MM: Type: IMPLANTABLE DEVICE | Site: CRANIAL | Status: FUNCTIONAL

## 2019-08-12 RX ORDER — ACETAMINOPHEN 325 MG/1
650 TABLET ORAL EVERY 6 HOURS PRN
Status: DISCONTINUED | OUTPATIENT
Start: 2019-08-12 | End: 2019-08-13

## 2019-08-12 RX ORDER — FENTANYL CITRATE 50 UG/ML
25 INJECTION, SOLUTION INTRAMUSCULAR; INTRAVENOUS ONCE
Status: COMPLETED | OUTPATIENT
Start: 2019-08-12 | End: 2019-08-12

## 2019-08-12 RX ORDER — BACITRACIN ZINC 500 UNIT/G
OINTMENT (GRAM) TOPICAL
Status: DISCONTINUED | OUTPATIENT
Start: 2019-08-12 | End: 2019-08-12 | Stop reason: HOSPADM

## 2019-08-12 RX ORDER — VASOPRESSIN 20 [USP'U]/ML
INJECTION, SOLUTION INTRAMUSCULAR; SUBCUTANEOUS
Status: DISCONTINUED | OUTPATIENT
Start: 2019-08-12 | End: 2019-08-12

## 2019-08-12 RX ORDER — CEFAZOLIN SODIUM 1 G/3ML
2 INJECTION, POWDER, FOR SOLUTION INTRAMUSCULAR; INTRAVENOUS
Status: CANCELLED | OUTPATIENT
Start: 2019-08-12

## 2019-08-12 RX ORDER — LEVOTHYROXINE SODIUM ANHYDROUS 100 UG/5ML
50 INJECTION, POWDER, LYOPHILIZED, FOR SOLUTION INTRAVENOUS DAILY
Status: DISCONTINUED | OUTPATIENT
Start: 2019-08-13 | End: 2019-08-13

## 2019-08-12 RX ORDER — MUPIROCIN 20 MG/G
1 OINTMENT TOPICAL 2 TIMES DAILY
Status: DISPENSED | OUTPATIENT
Start: 2019-08-12 | End: 2019-08-17

## 2019-08-12 RX ORDER — LEVETIRACETAM 100 MG/ML
SOLUTION ORAL
Status: DISCONTINUED | OUTPATIENT
Start: 2019-08-12 | End: 2019-08-12

## 2019-08-12 RX ORDER — ESMOLOL HYDROCHLORIDE 10 MG/ML
INJECTION INTRAVENOUS
Status: DISCONTINUED | OUTPATIENT
Start: 2019-08-12 | End: 2019-08-12

## 2019-08-12 RX ORDER — PANTOPRAZOLE SODIUM 40 MG/10ML
40 INJECTION, POWDER, LYOPHILIZED, FOR SOLUTION INTRAVENOUS DAILY
Status: DISCONTINUED | OUTPATIENT
Start: 2019-08-13 | End: 2019-08-13

## 2019-08-12 RX ORDER — MUPIROCIN 20 MG/G
1 OINTMENT TOPICAL
Status: CANCELLED | OUTPATIENT
Start: 2019-08-12

## 2019-08-12 RX ORDER — MUPIROCIN 20 MG/G
OINTMENT TOPICAL
Status: CANCELLED | OUTPATIENT
Start: 2019-08-12

## 2019-08-12 RX ORDER — SODIUM CHLORIDE 9 MG/ML
INJECTION, SOLUTION INTRAVENOUS CONTINUOUS
Status: CANCELLED | OUTPATIENT
Start: 2019-08-12

## 2019-08-12 RX ORDER — LIDOCAINE HYDROCHLORIDE AND EPINEPHRINE 10; 10 MG/ML; UG/ML
INJECTION, SOLUTION INFILTRATION; PERINEURAL
Status: DISCONTINUED | OUTPATIENT
Start: 2019-08-12 | End: 2019-08-12 | Stop reason: HOSPADM

## 2019-08-12 RX ORDER — BUPIVACAINE HYDROCHLORIDE 5 MG/ML
INJECTION, SOLUTION EPIDURAL; INTRACAUDAL
Status: DISCONTINUED | OUTPATIENT
Start: 2019-08-12 | End: 2019-08-12 | Stop reason: HOSPADM

## 2019-08-12 RX ORDER — DEXAMETHASONE SODIUM PHOSPHATE 4 MG/ML
INJECTION, SOLUTION INTRA-ARTICULAR; INTRALESIONAL; INTRAMUSCULAR; INTRAVENOUS; SOFT TISSUE
Status: DISCONTINUED | OUTPATIENT
Start: 2019-08-12 | End: 2019-08-12

## 2019-08-12 RX ORDER — HYDROCODONE BITARTRATE AND ACETAMINOPHEN 5; 325 MG/1; MG/1
1 TABLET ORAL EVERY 4 HOURS PRN
Status: DISCONTINUED | OUTPATIENT
Start: 2019-08-12 | End: 2019-08-13

## 2019-08-12 RX ORDER — PROPOFOL 10 MG/ML
VIAL (ML) INTRAVENOUS
Status: DISCONTINUED | OUTPATIENT
Start: 2019-08-12 | End: 2019-08-12

## 2019-08-12 RX ORDER — SODIUM CHLORIDE 9 MG/ML
INJECTION, SOLUTION INTRAVENOUS CONTINUOUS PRN
Status: DISCONTINUED | OUTPATIENT
Start: 2019-08-12 | End: 2019-08-12

## 2019-08-12 RX ORDER — FENTANYL CITRATE 50 UG/ML
INJECTION, SOLUTION INTRAMUSCULAR; INTRAVENOUS
Status: DISCONTINUED | OUTPATIENT
Start: 2019-08-12 | End: 2019-08-12

## 2019-08-12 RX ORDER — ONDANSETRON 2 MG/ML
INJECTION INTRAMUSCULAR; INTRAVENOUS
Status: DISCONTINUED | OUTPATIENT
Start: 2019-08-12 | End: 2019-08-12

## 2019-08-12 RX ORDER — LIDOCAINE HCL/PF 100 MG/5ML
SYRINGE (ML) INTRAVENOUS
Status: DISCONTINUED | OUTPATIENT
Start: 2019-08-12 | End: 2019-08-12

## 2019-08-12 RX ORDER — ROCURONIUM BROMIDE 10 MG/ML
INJECTION, SOLUTION INTRAVENOUS
Status: DISCONTINUED | OUTPATIENT
Start: 2019-08-12 | End: 2019-08-12

## 2019-08-12 RX ORDER — PHENYLEPHRINE HYDROCHLORIDE 10 MG/ML
INJECTION INTRAVENOUS
Status: DISCONTINUED | OUTPATIENT
Start: 2019-08-12 | End: 2019-08-12

## 2019-08-12 RX ORDER — BACITRACIN 50000 [IU]/1
INJECTION, POWDER, FOR SOLUTION INTRAMUSCULAR
Status: DISCONTINUED | OUTPATIENT
Start: 2019-08-12 | End: 2019-08-12 | Stop reason: HOSPADM

## 2019-08-12 RX ORDER — MIDAZOLAM HYDROCHLORIDE 1 MG/ML
INJECTION, SOLUTION INTRAMUSCULAR; INTRAVENOUS
Status: DISCONTINUED | OUTPATIENT
Start: 2019-08-12 | End: 2019-08-12

## 2019-08-12 RX ADMIN — PHENYLEPHRINE HYDROCHLORIDE 150 MCG: 10 INJECTION INTRAVENOUS at 04:08

## 2019-08-12 RX ADMIN — PROPOFOL 50 MG: 10 INJECTION, EMULSION INTRAVENOUS at 05:08

## 2019-08-12 RX ADMIN — MIDAZOLAM HYDROCHLORIDE 2 MG: 1 INJECTION, SOLUTION INTRAMUSCULAR; INTRAVENOUS at 05:08

## 2019-08-12 RX ADMIN — FENTANYL CITRATE 25 MCG: 50 INJECTION, SOLUTION INTRAMUSCULAR; INTRAVENOUS at 08:08

## 2019-08-12 RX ADMIN — FENTANYL CITRATE 50 MCG: 50 INJECTION, SOLUTION INTRAMUSCULAR; INTRAVENOUS at 05:08

## 2019-08-12 RX ADMIN — LEVETIRACETAM 500 MG: 5 INJECTION INTRAVENOUS at 10:08

## 2019-08-12 RX ADMIN — DEXAMETHASONE SODIUM PHOSPHATE 4 MG: 4 INJECTION, SOLUTION INTRAMUSCULAR; INTRAVENOUS at 05:08

## 2019-08-12 RX ADMIN — LEVOTHYROXINE SODIUM 175 MCG: 100 TABLET ORAL at 06:08

## 2019-08-12 RX ADMIN — SODIUM CHLORIDE, SODIUM GLUCONATE, SODIUM ACETATE, POTASSIUM CHLORIDE, MAGNESIUM CHLORIDE, SODIUM PHOSPHATE, DIBASIC, AND POTASSIUM PHOSPHATE: .53; .5; .37; .037; .03; .012; .00082 INJECTION, SOLUTION INTRAVENOUS at 08:08

## 2019-08-12 RX ADMIN — SODIUM CHLORIDE: 0.9 INJECTION, SOLUTION INTRAVENOUS at 07:08

## 2019-08-12 RX ADMIN — ONDANSETRON 4 MG: 2 INJECTION INTRAMUSCULAR; INTRAVENOUS at 07:08

## 2019-08-12 RX ADMIN — DEXAMETHASONE SODIUM PHOSPHATE 4 MG: 4 INJECTION, SOLUTION INTRAMUSCULAR; INTRAVENOUS at 06:08

## 2019-08-12 RX ADMIN — ROCURONIUM BROMIDE 20 MG: 10 INJECTION, SOLUTION INTRAVENOUS at 07:08

## 2019-08-12 RX ADMIN — MUPIROCIN 1 G: 20 OINTMENT TOPICAL at 09:08

## 2019-08-12 RX ADMIN — ESMOLOL HYDROCHLORIDE 10 MG: 10 INJECTION INTRAVENOUS at 08:08

## 2019-08-12 RX ADMIN — ROCURONIUM BROMIDE 40 MG: 10 INJECTION, SOLUTION INTRAVENOUS at 05:08

## 2019-08-12 RX ADMIN — LEVETIRACETAM 1000 MG: 100 SOLUTION ORAL at 06:08

## 2019-08-12 RX ADMIN — PHENYLEPHRINE HYDROCHLORIDE 100 MCG: 10 INJECTION INTRAVENOUS at 06:08

## 2019-08-12 RX ADMIN — FENTANYL CITRATE 25 MCG: 50 INJECTION INTRAMUSCULAR; INTRAVENOUS at 10:08

## 2019-08-12 RX ADMIN — SODIUM CHLORIDE 0.25 MCG/KG/MIN: 9 INJECTION, SOLUTION INTRAVENOUS at 07:08

## 2019-08-12 RX ADMIN — SODIUM CHLORIDE, SODIUM GLUCONATE, SODIUM ACETATE, POTASSIUM CHLORIDE, MAGNESIUM CHLORIDE, SODIUM PHOSPHATE, DIBASIC, AND POTASSIUM PHOSPHATE: .53; .5; .37; .037; .03; .012; .00082 INJECTION, SOLUTION INTRAVENOUS at 06:08

## 2019-08-12 RX ADMIN — SODIUM CHLORIDE, SODIUM GLUCONATE, SODIUM ACETATE, POTASSIUM CHLORIDE, MAGNESIUM CHLORIDE, SODIUM PHOSPHATE, DIBASIC, AND POTASSIUM PHOSPHATE: .53; .5; .37; .037; .03; .012; .00082 INJECTION, SOLUTION INTRAVENOUS at 05:08

## 2019-08-12 RX ADMIN — CEFTRIAXONE 2 G: 1 INJECTION, SOLUTION INTRAVENOUS at 06:08

## 2019-08-12 RX ADMIN — VASOPRESSIN 0.5 UNITS: 20 INJECTION INTRAVENOUS at 06:08

## 2019-08-12 RX ADMIN — ROCURONIUM BROMIDE 10 MG: 10 INJECTION, SOLUTION INTRAVENOUS at 06:08

## 2019-08-12 RX ADMIN — VASOPRESSIN 1 UNITS: 20 INJECTION INTRAVENOUS at 06:08

## 2019-08-12 RX ADMIN — DEXAMETHASONE SODIUM PHOSPHATE 4 MG: 4 INJECTION, SOLUTION INTRAMUSCULAR; INTRAVENOUS at 12:08

## 2019-08-12 RX ADMIN — PHENYLEPHRINE HYDROCHLORIDE 100 MCG: 10 INJECTION INTRAVENOUS at 05:08

## 2019-08-12 RX ADMIN — LIDOCAINE HYDROCHLORIDE 60 MG: 20 INJECTION, SOLUTION INTRAVENOUS at 05:08

## 2019-08-12 RX ADMIN — ESMOLOL HYDROCHLORIDE 10 MG: 10 INJECTION INTRAVENOUS at 06:08

## 2019-08-12 RX ADMIN — DEXAMETHASONE SODIUM PHOSPHATE 4 MG: 4 INJECTION, SOLUTION INTRAMUSCULAR; INTRAVENOUS at 11:08

## 2019-08-12 RX ADMIN — SUGAMMADEX 142 MG: 100 INJECTION, SOLUTION INTRAVENOUS at 07:08

## 2019-08-12 RX ADMIN — OXYCODONE HYDROCHLORIDE 5 MG: 5 TABLET ORAL at 10:08

## 2019-08-12 RX ADMIN — DEXAMETHASONE SODIUM PHOSPHATE 4 MG: 4 INJECTION, SOLUTION INTRAMUSCULAR; INTRAVENOUS at 03:08

## 2019-08-12 RX ADMIN — PHENYLEPHRINE HYDROCHLORIDE 150 MCG: 10 INJECTION INTRAVENOUS at 06:08

## 2019-08-12 RX ADMIN — PROPOFOL 100 MG: 10 INJECTION, EMULSION INTRAVENOUS at 05:08

## 2019-08-12 RX ADMIN — PROPOFOL 20 MG: 10 INJECTION, EMULSION INTRAVENOUS at 05:08

## 2019-08-12 RX ADMIN — LEVETIRACETAM 500 MG: 5 INJECTION INTRAVENOUS at 09:08

## 2019-08-12 RX ADMIN — FENTANYL CITRATE 100 MCG: 50 INJECTION, SOLUTION INTRAMUSCULAR; INTRAVENOUS at 05:08

## 2019-08-12 NOTE — PROGRESS NOTES
[facility]  Neurocritical Care  Progress Note    Admit Date: 8/11/2019  Service Date: 08/12/2019  Length of Stay: 1    Subjective:     Chief Complaint: Brain metastasis    History of Present Illness: is a 59 year old F with PMH of COPD, HTN, hypothyroidism,  stage 4 R small cell lung cancer s/p 6 cycles of carbo/etoposide in remission since 06/2018 who presents to [facility] for newly diagnosed L temporoparietal brain mass with vasogenic edema. She came as a transfer from [facility] ED for altered mental status, and abnormal gait. CT Head at [facility] revealed a L parietotemporal mass, likely metastatic 2/2 lung cancer. Per EMS, the family notes that when they saw her today she was confused and having difficulty walking and was last seen at baseline by family last night. She is being admitted to [facility] for a higher level of care.       Hospital Course: 8/11: Admit NCC: MRI w/wo, Dex, NSGY following, SBP<160, keppra  8/12: add synthroid, send urine sodium add PPI, follow na q 8 hr, plan for crani and tumor resection today-NPO        Review of Systems  Constitutional: Denies fevers, weight loss, chills, or weakness.  Eyes: Denies changes in vision.  ENT: Denies dysphagia, nasal discharge, ear pain or discharge.  Cardiovascular: Denies chest pain, palpitations, orthopnea, or claudication.  Respiratory: Denies shortness of breath, cough, hemoptysis, or wheezing.  GI: Denies nausea/vomitting, hematochezia, melena, abd pain, or changes in appetite.  : Denies dysuria, incontinence, or hematuria.  Musculoskeletal: Denies joint pain or myalgias.  Skin/breast: Denies rashes, lumps, lesions, or discharge.  Neurologic: Denies headache, dizziness, vertigo, or paresthesias.  Psychiatric: Denies changes in mood or hallucinations.  Endocrine: Denies polyuria, polydipsia, heat/cold intolerance.  Hematologic/Lymph: Denies lymphadenopathy, easy bruising or easy bleeding.  Allergic/Immunologic: Denies rash,  rhinitis.   Objective:     Vitals:  Temp: 97.8 °F (36.6 °C)  Pulse: 89  Rhythm: normal sinus rhythm  BP: 117/76  MAP (mmHg): 90  Resp: 17  SpO2: 100 %  O2 Device (Oxygen Therapy): room air    Temp  Min: 97.8 °F (36.6 °C)  Max: 98.6 °F (37 °C)  Pulse  Min: 70  Max: 108  BP  Min: 93/64  Max: 145/77  MAP (mmHg)  Min: 67  Max: 104  Resp  Min: 11  Max: 40  SpO2  Min: 94 %  Max: 100 %    08/11 0701 - 08/12 0700  In: 450 [I.V.:350]  Out: 500 [Urine:500]   Unmeasured Output  Urine Occurrence: 1  Stool Occurrence: 0  Pad Count: 1       Physical Exam  GA: Alert, comfortable, no acute distress.   HEENT: No scleral icterus or JVD.   Pulmonary: Clear to auscultation A/L.   Cardiac: RRR S1 & S2 w/o rubs/murmurs/gallops.   Abdominal: Bowel sounds present x 4. No appreciable hepatosplenomegaly.  Skin: No jaundice, rashes, or visible lesions.  Neuro:  --GCS: E4 V4M6  --Mental Status:  Awake, alert, delayed responses, oriented to self and situation, follows simple commands, disconjugate gaze  --Pupils 3->2mm, PERRL.   --Corneal reflex, gag, cough intact.  --BEAN spont    Medications:  Continuous  sodium chloride 0.9% Last Rate: 50 mL/hr at 08/12/19 1302   Scheduled  dexamethasone 4 mg Q6H   levetiracetam IVPB 500 mg Q12H   [START ON 8/13/2019] levothyroxine 50 mcg Daily   pantoprazole 40 mg Daily   polyethylene glycol 17 g Daily   senna-docusate 8.6-50 mg 1 tablet BID   PRN  acetaminophen 650 mg Q8H PRN   albuterol-ipratropium 3 mL Q4H PRN   ondansetron 4 mg Q8H PRN   oxyCODONE 5 mg Q6H PRN   sodium chloride 0.9% 10 mL PRN     Today I personally reviewed pertinent medications, lines/drains/airways, imaging, laboratory results,     Diet  Diet NPO        Assessment/Plan:     Neuro  Vasogenic brain edema  See brain mass  Continue dex  -follow neuro exam  Follow brain imaging    Seizure prophylaxis  - keppra 500 q12      Pulmonary  COPD (chronic obstructive pulmonary disease)  - duo nebs prn     Cardiac/Vascular  HTN (hypertension)  - SBP  <160   - Echo and EKG     Oncology  * Brain metastasis  - MRI w/wo  - Dex 10 then 4 q 6  - keppra 500 q 12  - Consult hem/onc  - NSGY following   -SBP <160  - Q 1 vitals   - Q 1 neuro checks  - will need CT chest/abd/pelvis with contrast   - 8/12: NPO will plan for crani for tumor resection today w/NSGY      Endocrine  Hypothyroid  - TSH, T3, T4   - start IV synthroid          The patient is being Prophylaxed for:  Venous Thromboembolism with: Mechanical  Stress Ulcer with: PPI  Ventilator Pneumonia with: not applicable    Activity Orders          Diet NPO: NPO starting at 08/11 2128        Full Code    Nevaeh Best NP  Neurocritical Care  [facility]

## 2019-08-12 NOTE — SUBJECTIVE & OBJECTIVE
Medications Prior to Admission   Medication Sig Dispense Refill Last Dose    albuterol (PROVENTIL HFA/VENTOLIN HFA) 200 puff inhaler Inhale 2 puffs into the lungs every 6 (six) hours as needed.     Taking    alendronate (FOSAMAX) 70 MG tablet Take 70 mg by mouth every 7 days.     Taking    allopurinol (ZYLOPRIM) 300 MG tablet 2 (two) times daily.   5 8/8/2019    alprazolam (XANAX) 0.5 MG tablet Take 0.5 mg by mouth nightly as needed.     Taking    amLODIPine (NORVASC) 5 MG tablet Take 5 mg by mouth once daily.   8/11/2019 at 09:00    ANORO ELLIPTA 62.5-25 mcg/actuation DsDv   5 Unknown at Unknown time    atorvastatin (LIPITOR) 10 MG tablet   3 Taking    calcium citrate (CALCITRATE) 200 mg (950 mg) tablet Take 5 tablets by mouth once daily.     Unknown at Unknown time    dronabinol (MARINOL) 5 MG capsule Take 1 capsule (5 mg total) by mouth 2 (two) times daily before meals. 60 capsule 1 Unknown at Unknown time    ergocalciferol, vitamin D2, 1,000 unit Tab 2 tablets daily 60 tablet 5 Taking    esomeprazole (NEXIUM) 40 MG capsule Take 40 mg by mouth before breakfast.   Unknown at Unknown time    hydrocodone-acetaminophen (LORTAB) 7.5-500 mg per tablet Take 1 tablet by mouth 2 (two) times daily as needed for Pain. 60 tablet 1 Unknown at Unknown time    levothyroxine (SYNTHROID) 137 MCG Tab tablet Take by mouth before breakfast.   8/11/2019 at 09:00    levothyroxine (SYNTHROID, LEVOTHROID) 175 MCG tablet Take 175 mcg by mouth once daily.   Taking    lidocaine HCl 2% (XYLOCAINE) 2 % Soln by Mucous Membrane route every 4 (four) hours. 5 ml every 4 hours by mouth as needed for swallowing pain 300 mL 5 Unknown at Unknown time    lisinopril (PRINIVIL,ZESTRIL) 5 MG tablet Take 5 mg by mouth 2 (two) times daily. Says takes this about once a week now.  Goes by how her body feels she says.    Taking    LORazepam (ATIVAN) 1 MG tablet 1 mg daily as needed.   2 8/10/2019 at 21:00    nebivolol (BYSTOLIC) 5 MG Tab  Take 10 mg by mouth once daily.   Taking    oxyCODONE (OXYCONTIN) 15 mg TR12 12 hr tablet Take 1 tablet (15 mg total) by mouth every 12 (twelve) hours. 60 tablet 0     oxyCODONE (ROXICODONE) 15 MG Tab Take 1 tablet (15 mg total) by mouth every 6 (six) hours as needed for Pain. 60 tablet 0     pantoprazole (PROTONIX) 40 MG tablet   5 Taking    promethazine (PHENERGAN) 12.5 MG Tab   5 Taking    promethazine (PHENERGAN) 25 MG tablet TAKE ONE TABLET BY MOUTH EVERY 6 HOURS AS NEEDED FOR NAUSEA 120 tablet 3 Taking    SLEEP AID, DOXYLAMINE, 25 mg tablet   2 Taking    sodium chloride 1 gram tablet Take 1 g by mouth 3 (three) times daily.   Taking       Review of patient's allergies indicates:   Allergen Reactions    Celebrex [celecoxib]      Due to kidney removal she can not take anything for RA    Ibuprofen      Due to kidney removal    Neurontin [gabapentin]     Sulfa (sulfonamide antibiotics) Hives    Topamax [topiramate] Swelling       Past Medical History:   Diagnosis Date    Acid reflux     Anemia     Anxiety     Arthritis     Blindness - both eyes     Chronic kidney disease     COPD (chronic obstructive pulmonary disease)     GERD (gastroesophageal reflux disease)     Gout     Hypertension     Lung cancer     Lung cancer, main bronchus, right 1/2/2018    Osteopenia     Rheumatoid arteritis     SIADH (syndrome of inappropriate ADH production) 11/12/2017    Solitary kidney     Thyroid disease     Vitamin D deficiency      Past Surgical History:   Procedure Laterality Date    ABDOMINAL ADHESION SURGERY      ADENOIDECTOMY      kidney removal      right     TONSILLECTOMY       Family History     Problem Relation (Age of Onset)    COPD Father    Clotting disorder Mother    Diabetes Maternal Aunt, Paternal Uncle    Fibromyalgia Sister    Heart disease Father, Brother, Maternal Grandfather, Paternal Uncle    Hypertension Mother, Maternal Aunt    Neuropathy Father, Sister    Parkinsonism  Maternal Grandfather    Sleep apnea Mother    Stroke Mother    Thyroid disease Mother, Sister        Tobacco Use    Smoking status: Former Smoker     Packs/day: 0.50     Types: Cigarettes     Last attempt to quit: 2017     Years since quittin.3    Smokeless tobacco: Never Used   Substance and Sexual Activity    Alcohol use: No     Alcohol/week: 0.0 oz     Comment: seldom    Drug use: No    Sexual activity: Never     Partners: Male     Review of Systems   Unable to perform ROS: Mental status change     Objective:     Weight: 35.5 kg (78 lb 4.2 oz)  Body mass index is 15.28 kg/m².  Vital Signs (Most Recent):  Temp: 98.2 °F (36.8 °C) (19 2349)  Pulse: 85 (19 010)  Resp: 16 (19)  BP: 97/60 (19)  SpO2: 95 % (19) Vital Signs (24h Range):  Temp:  [97.9 °F (36.6 °C)-98.6 °F (37 °C)] 98.2 °F (36.8 °C)  Pulse:  [] 85  Resp:  [12-40] 16  SpO2:  [95 %-100 %] 95 %  BP: ()/(50-76) 97/60                 Neurosurgery Physical Exam  General: AOx1, GCS E4V3M6, confused  CNII-XII: Intact on fine exam, PERRL, visual fields grossly intact, EOMi, facial sensation preserved, no facial assymetry, tongue/uvula/palate midline, shoulder shrug equal, no pronator drift  Extremities: FC x4 (R sided neglect), SILT, coordination intact throughout, DTRs 2+, no pathological reflexes    Significant Labs:  Recent Labs   Lab 19  1435   *   *   K 3.9   CL 92*   CO2 28   BUN 10   CREATININE 1.0   CALCIUM 9.3     Recent Labs   Lab 19  1435   WBC 8.06   HGB 9.9*   HCT 31.5*        Recent Labs   Lab 19  1435   LABPT 13.0   INR 1.0     Microbiology Results (last 7 days)     ** No results found for the last 168 hours. **        ABGs: No results for input(s): PH, PCO2, PO2, HCO3, POCSATURATED, BE in the last 48 hours.  Cardiac markers:   Recent Labs   Lab 19  1435   TROPONINI <0.030     CMP:   Recent Labs   Lab 19  1435   *   CALCIUM  9.3   ALBUMIN 4.6   PROT 7.7   *   K 3.9   CO2 28   CL 92*   BUN 10   CREATININE 1.0   ALKPHOS 148*   ALT 9*   AST 16   BILITOT 0.5     CRP: No results for input(s): CRP in the last 48 hours.  ESR: No results for input(s): POCESR, ERYTHROCYTES in the last 48 hours.  LFTs:   Recent Labs   Lab 08/11/19  1435   ALT 9*   AST 16   ALKPHOS 148*   BILITOT 0.5   PROT 7.7   ALBUMIN 4.6     Procalcitonin: No results for input(s): PROCAL in the last 48 hours.    Significant Diagnostics:  I have reviewed all pertinent imaging results/findings within the past 24 hours.

## 2019-08-12 NOTE — ED NOTES
Patient resting quietly in bed.  Lying on side.  Equal rise and fall of chest noted.  Remote monitoring in place.  Bed rails up x2 with bed locked in lowest position.  Call light in reach.  All necessary monitoring equipment in place.  No new patient requests at this time.  Will continue to monitor.  Awaiting KUB clearance to go to MRI and then to assigned room on neuro ICU

## 2019-08-12 NOTE — HOSPITAL COURSE
8/11: Admit NCC: MRI w/wo, Dex, NSGY following, SBP<160, keppra  8/12: add synthroid, send urine sodium add PPI, follow na q 8 hr. OR for Left temporal craniotomy for tumor. No intra op complications. Patient tolerated procedure well. Recovered in neuro ICU.   8/13: Post op MRI without detrimental findings. Add nicotine patch, incentive spirometry, d/c IVF, switch synthroid to PO, stepdown to NGSY team   8/14: Intermittent agitation overnight. Started on seroquel.  8/15: Remains in ICU, pending floor bed availability. Pain controlled. PT/OT recommending Rehab placement.   8/16: Patient agitated overnight, given Seroquel and placed in wrist restraints. Patient calm this morning. Started on iron for replacement. Decreased lab draws. Pending Rehab.   8/17: remains in hospital with measures in place to prevent agitation. Medically stable pending placement to rehab.   8/18: Had a code gray called over night for agitation, however was calm this morning after receiving one time haldol. Added as PRN in addition to seroquel at night for active delerium. Otherwise neuro stable, not in restraints and preparing for hopeful rehab placement early next week.   8/19: NAEON. Stable neuro exam. No issues with agitation last night or this AM. No use of restraints.   8/20: NAEON, AFVSS, Exam stable, minimal agitation overnight, not requiring restraints. Now marks 72h without restraints. Medically stable for rehab.  8/21: NAEON. Neurologically stable. No agitation overnight. No restraints. Sitter still at bedside. Medically stable for discharge.  8/22: NAEON. Neurologically stable. Plts 55 today. 2 units of plts given. Sitter at bedside. Dispo pending SNF placement.   8/23: NAEON. Exam stable. No restraints, sitter at bedside. Pending SNF placement.  8/24: NAEON. Exam stable. Now with tele-sitter. SNF placement pending, multiple denials.  8/25: NAEON, patient stable on exam. Tele-sitter at bedside. Medically stable. PLT count improved  to 115.  8/26 - 8/27: NAEON. Neuro exam stable, patient remains disoriented but calm. Working on placement.  8/28: Hypoglycemia due to poor PO intake. Improved with food and glucagon injection. Plts 89, continuing to monitor. Staples removed without complication. Patient tolerated removal well. Pending facility acceptance. Patient has been denied by [facility], [facility], [facility], [facility], [facility], and [facility]. Medically stable for discharge.   8/29: Neuro exam stable. Hypoglycemia this AM. Received glucose tablet. Pending rehab facility acceptance.   8/30: NAEON. Increased tangential speech today. Last MRI brain 8/13/19. MRI brain ordered for today.  9/2: head CT from yesterday reviewed and showed no acute changes to explain increased confusion. Awaiting placement. Non-participatory on exam today, but nurse states this is normal for her to wax and wane. Nurse reports she was walking and talking prior to PA visit. H/H 8/27. Will repeat CBC tomorrow.   9/3:  No acute events overnight.  Tangential speech continues.  No changes in mental status in comparison to weekend.  Imaging obtained over the weekend reviewed.  No detrimental change in comparison to prior studies.  Platelets remained stable at 69k, will hold off on transfusion.  Patient unable to complete ROS.  Lying in bed with head of bed elevated in no acute distress.  Patient is seen in the wheelchair on the unit earlier in the day.  Intermittently talkative with staff.  Heme-Onc and RadOnc do not recommend inpatient treatment, no systemic treatment per oncology. RadOnc stating WBR once more stable, however do not know how much this will improve her function. Patient accepted by hospice facility.  Patient's family deciding on post acute care needs, declining hospice at this time.  Goals of care discussion to be done this week with patient's  "family.  9/4: Continue hypoglycemia due to poor PO intake. Spoke with son, David, who agreed to PEG placement. IR consulted. CT abdomen ordered. Palliative care consulted to discuss goals of care with son and sister. Pending placement.   9/5: Palliative medicine consulted yesterday, family wishes to pursue inpatient hospice, PEG cancelled.   9/6: Hypoglycemia stable with continuous IVF's. Daily labs and SQH stopped. Pending acceptance to inpatient hospice facility.   9/7: NAEON. Plan for discharge to inpatient hospice Monday. Exam stable from yesterday, patient remains somnolent however arousable to voice. Does not follow commands however moves all extremities spontaneously. PERRL.   9/8: Tachycardic. Pending discharge to inpatient hospice.   9/9: Patient remains tachycardic. Somnolent. Patient will not open eyes to tactile stimuli however does say "ouch" when painful stimuli performed to BLE to assess movement. Dudley however will not follow commands. PERRL. Unable to assess remainder of neuro exam. Fluid boluses given to tx tachycardia and maintain BP. Long discussion between myself and patient's son and then palliative care and patient's son resulted and patient changing code status to DNR. See care update note 9/9 from myself and progress note from palliative care 9/9 for further detail. Plan to dc to inpatient hospice today.   "

## 2019-08-12 NOTE — ED TRIAGE NOTES
Carmen Leyva, an 59 y.o. female presents to the ED as a transfer from [facility] for neurosurgery consult.  Patient's family states that she was normal yesterday and that today she complained of a headache, had some confusion and an unsteady gait.  CT scan at [facility] revealed a brain mass.  Patient is oriented to person and place but gets confused on time and situation on arrival.  She states that she completed chemo and radiation for her lung cancer and that she is in remission.        Review of patient's allergies indicates:   Allergen Reactions    Celebrex [celecoxib]      Due to kidney removal she can not take anything for RA    Ibuprofen      Due to kidney removal    Neurontin [gabapentin]     Sulfa (sulfonamide antibiotics) Hives    Topamax [topiramate] Swelling     Chief Complaint   Patient presents with    Transfer from [facility] for Neuro with Dx of brain tumor. Went to [facility] ED for confusion, HA, and unsteady gait. Hx of Lung CA     Past Medical History:   Diagnosis Date    Acid reflux     Anemia     Anxiety     Arthritis     Blindness - both eyes     Chronic kidney disease     COPD (chronic obstructive pulmonary disease)     GERD (gastroesophageal reflux disease)     Gout     Hypertension     Lung cancer     Lung cancer, main bronchus, right 1/2/2018    Osteopenia     Rheumatoid arteritis     SIADH (syndrome of inappropriate ADH production) 11/12/2017    Solitary kidney     Thyroid disease     Vitamin D deficiency

## 2019-08-12 NOTE — H&P
[facility]  Neurocritical Care  History & Physical    Admit Date: 8/11/2019  Service Date: 08/11/2019  Length of Stay: 0    Subjective:     Chief Complaint: Brain metastasis    History of Present Illness: is a 59 year old F with PMH of COPD, HTN, hypothyroidism,  stage 4 R small cell lung cancer s/p 6 cycles of carbo/etoposide in remission since 06/2018 who presents to [facility] for newly diagnosed L temporoparietal brain mass with vasogenic edema. She came as a transfer from [facility] ED for altered mental status, and abnormal gait. CT Head at [facility] revealed a L parietotemporal mass, likely metastatic 2/2 lung cancer. Per EMS, the family notes that when they saw her today she was confused and having difficulty walking and was last seen at baseline by family last night. She is being admitted to [facility] for a higher level of care.       Past Medical History:   Diagnosis Date    Acid reflux     Anemia     Anxiety     Arthritis     Blindness - both eyes     Chronic kidney disease     COPD (chronic obstructive pulmonary disease)     GERD (gastroesophageal reflux disease)     Gout     Hypertension     Lung cancer     Lung cancer, main bronchus, right 1/2/2018    Osteopenia     Rheumatoid arteritis     SIADH (syndrome of inappropriate ADH production) 11/12/2017    Solitary kidney     Thyroid disease     Vitamin D deficiency      Past Surgical History:   Procedure Laterality Date    ABDOMINAL ADHESION SURGERY      ADENOIDECTOMY      kidney removal      right     TONSILLECTOMY        Current Facility-Administered Medications on File Prior to Encounter   Medication Dose Route Frequency Provider Last Rate Last Dose    [COMPLETED] acetaminophen (10 mg/mL) injection 610 mg  15 mg/kg Intravenous ED 1 Time David Dominguez MD   Stopped at 08/11/19 1620     Current Outpatient Medications on File Prior to Encounter   Medication Sig Dispense Refill    albuterol (PROVENTIL  HFA/VENTOLIN HFA) 200 puff inhaler Inhale 2 puffs into the lungs every 6 (six) hours as needed.        alendronate (FOSAMAX) 70 MG tablet Take 70 mg by mouth every 7 days.        allopurinol (ZYLOPRIM) 300 MG tablet 2 (two) times daily.   5    alprazolam (XANAX) 0.5 MG tablet Take 0.5 mg by mouth nightly as needed.        amLODIPine (NORVASC) 5 MG tablet Take 5 mg by mouth once daily.      ANORO ELLIPTA 62.5-25 mcg/actuation DsDv   5    atorvastatin (LIPITOR) 10 MG tablet   3    calcium citrate (CALCITRATE) 200 mg (950 mg) tablet Take 5 tablets by mouth once daily.        dronabinol (MARINOL) 5 MG capsule Take 1 capsule (5 mg total) by mouth 2 (two) times daily before meals. 60 capsule 1    ergocalciferol, vitamin D2, 1,000 unit Tab 2 tablets daily 60 tablet 5    esomeprazole (NEXIUM) 40 MG capsule Take 40 mg by mouth before breakfast.      hydrocodone-acetaminophen (LORTAB) 7.5-500 mg per tablet Take 1 tablet by mouth 2 (two) times daily as needed for Pain. 60 tablet 1    levothyroxine (SYNTHROID) 137 MCG Tab tablet Take by mouth before breakfast.      levothyroxine (SYNTHROID, LEVOTHROID) 175 MCG tablet Take 175 mcg by mouth once daily.      lidocaine HCl 2% (XYLOCAINE) 2 % Soln by Mucous Membrane route every 4 (four) hours. 5 ml every 4 hours by mouth as needed for swallowing pain 300 mL 5    lisinopril (PRINIVIL,ZESTRIL) 5 MG tablet Take 5 mg by mouth 2 (two) times daily. Says takes this about once a week now.  Goes by how her body feels she says.       LORazepam (ATIVAN) 1 MG tablet 1 mg daily as needed.   2    nebivolol (BYSTOLIC) 5 MG Tab Take 10 mg by mouth once daily.      oxyCODONE (OXYCONTIN) 15 mg TR12 12 hr tablet Take 1 tablet (15 mg total) by mouth every 12 (twelve) hours. 60 tablet 0    oxyCODONE (ROXICODONE) 15 MG Tab Take 1 tablet (15 mg total) by mouth every 6 (six) hours as needed for Pain. 60 tablet 0    pantoprazole (PROTONIX) 40 MG tablet   5    promethazine (PHENERGAN)  12.5 MG Tab   5    promethazine (PHENERGAN) 25 MG tablet TAKE ONE TABLET BY MOUTH EVERY 6 HOURS AS NEEDED FOR NAUSEA 120 tablet 3    SLEEP AID, DOXYLAMINE, 25 mg tablet   2    sodium chloride 1 gram tablet Take 1 g by mouth 3 (three) times daily.        Allergies: Celebrex [celecoxib]; Ibuprofen; Neurontin [gabapentin]; Sulfa (sulfonamide antibiotics); and Topamax [topiramate]    Family History   Problem Relation Age of Onset    Stroke Mother     Thyroid disease Mother     Sleep apnea Mother     Hypertension Mother     Clotting disorder Mother     COPD Father     Heart disease Father     Neuropathy Father     Neuropathy Sister     Thyroid disease Sister     Fibromyalgia Sister     Heart disease Brother     Heart disease Maternal Grandfather     Parkinsonism Maternal Grandfather     Diabetes Maternal Aunt     Hypertension Maternal Aunt     Heart disease Paternal Uncle     Diabetes Paternal Uncle      Social History     Tobacco Use    Smoking status: Former Smoker     Packs/day: 0.50     Types: Cigarettes     Last attempt to quit: 2017     Years since quittin.3    Smokeless tobacco: Never Used   Substance Use Topics    Alcohol use: No     Alcohol/week: 0.0 oz     Comment: seldom    Drug use: No     Review of Systems  Objective:     Review of Symptoms:  Constitutional: Denies fevers, weight loss, chills, or weakness.  Eyes: Denies changes in vision.  ENT: Denies dysphagia, nasal discharge, ear pain or discharge.  Cardiovascular: Denies chest pain, palpitations, orthopnea, or claudication.  Respiratory: Denies shortness of breath, cough, hemoptysis, or wheezing.  GI: Denies nausea/vomitting, hematochezia, melena, abd pain, or changes in appetite.  : Denies dysuria, incontinence, or hematuria.  Musculoskeletal: Denies joint pain or myalgias.  Skin/breast: Denies rashes, lumps, lesions, or discharge.  Neurologic: Denies  vertigo, or paresthesias. + headache, dizziness,  Psychiatric:  Denies changes in mood or hallucinations.  Endocrine: Denies polyuria, polydipsia, heat/cold intolerance.  Hematologic/Lymph: Denies lymphadenopathy, easy bruising or easy bleeding.  Allergic/Immunologic: Denies rash, rhinitis.     Vitals:    Temp: 98.6 °F (37 °C)  Pulse: 99  BP: 99/63  MAP (mmHg): 77  Resp: 16  SpO2: 97 %  O2 Device (Oxygen Therapy): room air    Temp  Min: 97.9 °F (36.6 °C)  Max: 98.6 °F (37 °C)  Pulse  Min: 92  Max: 108  BP  Min: 94/50  Max: 126/59  MAP (mmHg)  Min: 67  Max: 93  Resp  Min: 12  Max: 40  SpO2  Min: 96 %  Max: 100 %    No intake/output data recorded.           Physical Exam      Physical Exam:  GA: Alert, comfortable, no acute distress.   HEENT: No scleral icterus or JVD.   Pulmonary: Clear to auscultation A/L.   Cardiac: RRR S1 & S2 w/o rubs/murmurs/gallops.   Abdominal: Bowel sounds present x 4.   Skin: No jaundice, rashes, or visible lesions.  Neuro:  --GCS: E4 V5 M6  --Mental Status:  Alert oriented follows   --Pupils 3mm, PERRL.   --Corneal reflex, gag, cough intact.  --LUE strength: 5/5  --RUE strength: 5/5  --LLE strength: 5/5  --RLE strength: 5/5    Unable to test gait due to level of consciousness.    Today I personally reviewed pertinent medications, lines/drains/airways, imaging, laboratory results,       Assessment/Plan:     Neuro  Vasogenic brain edema  See brain mass    Seizure prophylaxis  - keppra 500 q12      Pulmonary  COPD (chronic obstructive pulmonary disease)  - duo nebs prn     Cardiac/Vascular  HTN (hypertension)  - SBP <160   - Echo and EKG     Oncology  * Brain metastasis  - MRI w/wo  - Dex 10 then 4 q 6  - keppra 500 q 12  - Consult hem/onc  - NSGY following   -SBP <160  - Q 1 vitals   - Q 1 neuro checks  - will need CT chest/abd/pelvis with contrast       Endocrine  Hypothyroid  - TSH, T3, T4   - continue home synthroid           The patient is being Prophylaxed for:  Venous Thromboembolism with: Mechanical  Stress Ulcer with: PPI  Ventilator Pneumonia  with: not applicable    Activity Orders          Diet NPO: NPO starting at 08/11 2128        Full Code    Benson Camargo, NIKKI  Neurocritical Care  [facility]

## 2019-08-12 NOTE — CONSULTS
Patient seen for wound care consult. Patient with small approx 2 x 2 cm pink area that appears to be scarring or new epithelial tissue. Patient is very mobile and do not think this is from pressure. Would however recommend interventions to prevent as she is going for surgery.     Sacral foam dressing is still in place. Recommend to continue with sacral foam dressing and change weekly or as needed.     Wound care will sign off. Please reconsult if needed.   Nursing to continue care.   Minnie MALDONADO, BSN, RN, COCN, [facility]  [phone]

## 2019-08-12 NOTE — ED PROVIDER NOTES
Encounter Date: 8/11/2019       History     Chief Complaint   Patient presents with    Transfer from [facility] for Neuro with Dx of brain tumor. Went to [place] ED for confusion, HA, and unsteady gait. Hx of Lung CA     Patient is a 59 year old F with PMH of stage 4 R small cell lung cancer s/p 6 cycles of carbo/etoposide in remission since 06/2018 who presents as a transfer from [facility] ED for altered mental status, and abnormal gait. CT Head at [facility] revealed a L parietotemporal mass, likely metastatic 2/2 lung cancer, so she was transferred here being accepted by Neurosurgery and Neuro ICU. Per EMS, the family notes that when they saw her today she was confused and having difficulty walking. She was also complaining of a headache. Patient is a poor historian and requires to be redirected multiple times over the course of the interview. It appears that this headache, confusion, and abnormal gait was sudden onset, starting today. Per EMS, family last saw the patient last night and she was at her baseline.         Review of patient's allergies indicates:   Allergen Reactions    Celebrex [celecoxib]      Due to kidney removal she can not take anything for RA    Ibuprofen      Due to kidney removal    Neurontin [gabapentin]     Sulfa (sulfonamide antibiotics) Hives    Topamax [topiramate] Swelling     Past Medical History:   Diagnosis Date    Acid reflux     Anemia     Anxiety     Arthritis     Blindness - both eyes     Chronic kidney disease     COPD (chronic obstructive pulmonary disease)     GERD (gastroesophageal reflux disease)     Gout     Hypertension     Lung cancer     Lung cancer, main bronchus, right 1/2/2018    Osteopenia     Rheumatoid arteritis     SIADH (syndrome of inappropriate ADH production) 11/12/2017    Solitary kidney     Thyroid disease     Vitamin D deficiency      Past Surgical History:   Procedure Laterality Date    ABDOMINAL ADHESION  SURGERY      ADENOIDECTOMY      kidney removal      right     TONSILLECTOMY       Family History   Problem Relation Age of Onset    Stroke Mother     Thyroid disease Mother     Sleep apnea Mother     Hypertension Mother     Clotting disorder Mother     COPD Father     Heart disease Father     Neuropathy Father     Neuropathy Sister     Thyroid disease Sister     Fibromyalgia Sister     Heart disease Brother     Heart disease Maternal Grandfather     Parkinsonism Maternal Grandfather     Diabetes Maternal Aunt     Hypertension Maternal Aunt     Heart disease Paternal Uncle     Diabetes Paternal Uncle      Social History     Tobacco Use    Smoking status: Former Smoker     Packs/day: 0.50     Types: Cigarettes     Last attempt to quit: 2017     Years since quittin.3    Smokeless tobacco: Never Used   Substance Use Topics    Alcohol use: No     Alcohol/week: 0.0 oz     Comment: seldom    Drug use: No     Review of Systems   Constitutional: Negative for chills and fever.   HENT: Negative for trouble swallowing.    Eyes: Negative for visual disturbance.   Respiratory: Negative for shortness of breath.    Cardiovascular: Negative for chest pain and leg swelling.   Gastrointestinal: Negative for nausea and vomiting.   Genitourinary: Negative for difficulty urinating.        Denies urinary incontience   Musculoskeletal: Negative for back pain and neck pain.   Skin: Negative for wound.   Neurological: Positive for headaches. Negative for dizziness, seizures, speech difficulty, weakness, light-headedness and numbness.   Psychiatric/Behavioral: Positive for confusion.       Physical Exam     Initial Vitals [19]   BP Pulse Resp Temp SpO2   (!) 112/55 97 16 98.6 °F (37 °C) 98 %      MAP       --         Physical Exam    Constitutional: She appears well-developed and well-nourished. No distress.   HENT:   Head: Normocephalic and atraumatic.   Eyes: EOM are normal. Pupils are equal,  round, and reactive to light.   Neck: Normal range of motion. Neck supple.   Cardiovascular: Normal rate, regular rhythm and normal heart sounds.   Pulmonary/Chest: Breath sounds normal. No respiratory distress.   Abdominal: Soft. Bowel sounds are normal. She exhibits no distension. There is no tenderness.   Neurological: She is alert. She has normal strength. No cranial nerve deficit or sensory deficit.   Knows her name, date of birth, where she is, but is not oriented to year. She answers 1919 when asked which year.   Psychiatric: She has a normal mood and affect. Thought content normal.         ED Course   Procedures  Labs Reviewed   URINALYSIS - Abnormal; Notable for the following components:       Result Value    Appearance, UA Hazy (*)     Occult Blood UA 1+ (*)     All other components within normal limits    Narrative:     yellow and grey   TSH - Abnormal; Notable for the following components:    TSH 0.036 (*)     All other components within normal limits   T4, FREE - Abnormal; Notable for the following components:    Free T4 1.65 (*)     All other components within normal limits   HEMOGLOBIN A1C   LIPID PANEL   URINALYSIS MICROSCOPIC    Narrative:     yellow and grey   T3, FREE   T4          Imaging Results          MRI BRAIN W WO CONTRAST (Final result)  Result time 08/11/19 23:22:15    Final result by Faustino Ochoa MD (08/11/19 23:22:15)                 Impression:      Moderately enhancing heterogeneous intra-axial mass of left parietal temporal lobe consistent with dominant metastasis.  Primary brain tumor could have similar appearance.  No other enhancing lesion.    No acute ischemia.    old infarcts of the right parietal lobe and right frontal lobe white matter.      Electronically signed by: Faustino Ochoa  Date:    08/11/2019  Time:    23:22             Narrative:    EXAMINATION:  MRI BRAIN W WO CONTRAST    CLINICAL HISTORY:  Neoplasm: head, metastatic, suspected; apparently patient has history  of right lung cancer .  Emphysema.  Rheumatoid arthritis.  Single kidney.  Small cell lung cancer status post 6 cycles chemotherapy in remission since June 2018.  Quit smoking 2.3 years ago.    TECHNIQUE:  Multiplanar multisequence MR imaging of the brain was performed before and after the administration of 4 mL Gadavist intravenous contrast.    COMPARISON:  Head CT report from 08/11/2019 at 15:29, those images are unavailable.    FINDINGS:  There is a 6 x 4.3 x 4.7 cm mass of the left temporoparietal region which is predominately T1 low signal and T2 mildly high signal and shows mild fairly diffuse enhancement.  There is T1 mildly high signal with associated T2 low signal centrally.  This could represent hemorrhage products or calcification.  There is mass effect on the left lateral ventricle.    The gray-white interface is otherwise preserved.  The dural venous sinuses and [id number] show normal enhancement.  The orbits and globes and lenses appear normal.  No hemosiderin deposition pattern found.  Diffusion imaging otherwise shows no restriction or acute ischemia.  Corpus callosum shows normal morphology.  The pituitary sella turcica appears normal.  Visualized cervical cord appears normal.  No scalp or skull lesion seen.  The pituitary stalk is midline.  No MR evidence for acute hemorrhage.  There is moderate periventricular deep white matter FLAIR hyperintensity bilaterally and especially in the right parietal lobe.  This could be related previous gliosis and old infarct.  Ventricles are somewhat diffusely enlarged perhaps related to central involutional change.  There is a small area of old encephalomalacia of the right frontal lobe white matter adjacent to the anterior horn.  The internal auditory canals appear normal.                               X-Ray Abdomen AP 1 View (Final result)  Result time 08/11/19 22:21:04    Final result by Kody Stevenson MD (08/11/19 22:21:04)                  Impression:      Nonobstructive bowel gas pattern.      Electronically signed by: Kody Stevenson MD  Date:    08/11/2019  Time:    22:21             Narrative:    EXAMINATION:  XR ABDOMEN AP 1 VIEW    CLINICAL HISTORY:  MRI clearance;    TECHNIQUE:  Single AP View of the abdomen was performed.    COMPARISON:  None.    FINDINGS:  Several cardiac wires overlie the abdomen and pelvis.  There are surgical clips overlying the upper abdomen.  Monitoring electrodes also overlie the abdomen.  No additional unexpected radiopaque foreign bodies in the field of view.  Bowel gas pattern is unremarkable.  No acute bony abnormality.                                 Medical Decision Making:   History:   Old Medical Records: I decided to obtain old medical records.  Initial Assessment:   Patient is a 59 year old F with PMH of stage 4 R small cell lung cancer s/p 6 cycles of carbo/etoposide in remission since 06/2018 who presents as a transfer from [facility] ED for altered mental status, and abnormal gait, with a new L-sided parietotemporal mass on CT Head  Differential Diagnosis:   Metastatic Lung Cancer versus abscess versus intracranial hemorraghe  -likely metastatic lung cancer given history of stage 4 lung cancer,   -less likely abscess given no fevers, chills, VSS, afebrile in ED  -less likely hemorrhage, no evidence of fall, CT Head showing L-sided mass   Clinical Tests:   Lab Tests: Ordered and Reviewed  Radiological Study: Ordered and Reviewed  Medical Tests: Ordered and Reviewed  ED Management:  -CT Head at [facility] ED showing L-sided parietotemporal mass  -transferred to [facility], accepted by Neuro ICU and Neurosurgery via telephone  -Neuro ICU consulted and will see the patient  -Neuro ICU spoke with Neurosurgery who will evaluate the patient  -Neuro ICU to admit patient  Other:   I discussed test(s) with the performing physician.              Attending Attestation:   Physician Attestation Statement  for Resident:  As the supervising MD  I agree with the above history. -: 59-year-old female transferred from [facility] for concern of metastatic brain cancer.   As the supervising MD I agree with the above PE.    As the supervising MD I agree with the above treatment, course, plan, and disposition.   -: Neurosurgery and Neuro Critical Care consult.  They will admit the patient.  I have reviewed the following: records from a referring facility.                       Clinical Impression:       ICD-10-CM ICD-9-CM   1. Brain metastasis C79.31 198.3                                Andrea Edwards MD  Resident  08/11/19 2120       Francois Cope,   08/12/19 0030

## 2019-08-12 NOTE — PROGRESS NOTES
[facility]  Neurosurgery  Progress Note    Subjective:     History of Present Illness: 59F w/ PMH COPD, HTN, hypothyroidism, stage 4 R small cell lung cancer s/p 6 cycles of carbo/etoposide in remission since 06/2018 who [place] [facility] [place] w/ L parietal brain mass. Per EMS records patient had a 1d history of AMS and gait difficulty and was brought into the hospital by her family for evaluation. CTH @ OSH showed large L parietal brain mass with possible hemorrhagic component and she was [place] [facility] [place]. MRI @ [facility] redemonstrated L nonenhancing parietal mass with minimal blood. Patient is confused and so ROS is tenuous.    Post-Op Info:  * No surgery found *         Medications Prior to Admission   Medication Sig Dispense Refill Last Dose    albuterol (PROVENTIL HFA/VENTOLIN HFA) 200 puff inhaler Inhale 2 puffs into the lungs every 6 (six) hours as needed.     Taking    alendronate (FOSAMAX) 70 MG tablet Take 70 mg by mouth every 7 days.     Taking    allopurinol (ZYLOPRIM) 300 MG tablet 2 (two) times daily.   5 8/8/2019    alprazolam (XANAX) 0.5 MG tablet Take 0.5 mg by mouth nightly as needed.     Taking    amLODIPine (NORVASC) 5 MG tablet Take 5 mg by mouth once daily.   8/11/2019 at 09:00    ANORO ELLIPTA 62.5-25 mcg/actuation DsDv   5 Unknown at Unknown time    atorvastatin (LIPITOR) 10 MG tablet   3 Taking    calcium citrate (CALCITRATE) 200 mg (950 mg) tablet Take 5 tablets by mouth once daily.     Unknown at Unknown time    dronabinol (MARINOL) 5 MG capsule Take 1 capsule (5 mg total) by mouth 2 (two) times daily before meals. 60 capsule 1 Unknown at Unknown time    ergocalciferol, vitamin D2, 1,000 unit Tab 2 tablets daily 60 tablet 5 Taking    esomeprazole (NEXIUM) 40 MG capsule Take 40 mg by mouth before breakfast.   Unknown at Unknown time    hydrocodone-acetaminophen (LORTAB) 7.5-500 mg per tablet Take 1 tablet by mouth 2 (two) times daily as  needed for Pain. 60 tablet 1 Unknown at Unknown time    levothyroxine (SYNTHROID) 137 MCG Tab tablet Take by mouth before breakfast.   8/11/2019 at 09:00    levothyroxine (SYNTHROID, LEVOTHROID) 175 MCG tablet Take 175 mcg by mouth once daily.   Taking    lidocaine HCl 2% (XYLOCAINE) 2 % Soln by Mucous Membrane route every 4 (four) hours. 5 ml every 4 hours by mouth as needed for swallowing pain 300 mL 5 Unknown at Unknown time    lisinopril (PRINIVIL,ZESTRIL) 5 MG tablet Take 5 mg by mouth 2 (two) times daily. Says takes this about once a week now.  Goes by how her body feels she says.    Taking    LORazepam (ATIVAN) 1 MG tablet 1 mg daily as needed.   2 8/10/2019 at 21:00    nebivolol (BYSTOLIC) 5 MG Tab Take 10 mg by mouth once daily.   Taking    oxyCODONE (OXYCONTIN) 15 mg TR12 12 hr tablet Take 1 tablet (15 mg total) by mouth every 12 (twelve) hours. 60 tablet 0     oxyCODONE (ROXICODONE) 15 MG Tab Take 1 tablet (15 mg total) by mouth every 6 (six) hours as needed for Pain. 60 tablet 0     pantoprazole (PROTONIX) 40 MG tablet   5 Taking    promethazine (PHENERGAN) 12.5 MG Tab   5 Taking    promethazine (PHENERGAN) 25 MG tablet TAKE ONE TABLET BY MOUTH EVERY 6 HOURS AS NEEDED FOR NAUSEA 120 tablet 3 Taking    SLEEP AID, DOXYLAMINE, 25 mg tablet   2 Taking    sodium chloride 1 gram tablet Take 1 g by mouth 3 (three) times daily.   Taking       Review of patient's allergies indicates:   Allergen Reactions    Celebrex [celecoxib]      Due to kidney removal she can not take anything for RA    Ibuprofen      Due to kidney removal    Neurontin [gabapentin]     Sulfa (sulfonamide antibiotics) Hives    Topamax [topiramate] Swelling       Past Medical History:   Diagnosis Date    Acid reflux     Anemia     Anxiety     Arthritis     Blindness - both eyes     Chronic kidney disease     COPD (chronic obstructive pulmonary disease)     GERD (gastroesophageal reflux disease)     Gout      Hypertension     Lung cancer     Lung cancer, main bronchus, right 2018    Osteopenia     Rheumatoid arteritis     SIADH (syndrome of inappropriate ADH production) 2017    Solitary kidney     Thyroid disease     Vitamin D deficiency      Past Surgical History:   Procedure Laterality Date    ABDOMINAL ADHESION SURGERY      ADENOIDECTOMY      kidney removal      right     TONSILLECTOMY       Family History     Problem Relation (Age of Onset)    COPD Father    Clotting disorder Mother    Diabetes Maternal Aunt, Paternal Uncle    Fibromyalgia Sister    Heart disease Father, Brother, Maternal Grandfather, Paternal Uncle    Hypertension Mother, Maternal Aunt    Neuropathy Father, Sister    Parkinsonism Maternal Grandfather    Sleep apnea Mother    Stroke Mother    Thyroid disease Mother, Sister        Tobacco Use    Smoking status: Former Smoker     Packs/day: 0.50     Types: Cigarettes     Last attempt to quit: 2017     Years since quittin.3    Smokeless tobacco: Never Used   Substance and Sexual Activity    Alcohol use: No     Alcohol/week: 0.0 oz     Comment: seldom    Drug use: No    Sexual activity: Never     Partners: Male     Review of Systems   Unable to perform ROS: Mental status change     Objective:     Weight: 35.5 kg (78 lb 4.2 oz)  Body mass index is 15.28 kg/m².  Vital Signs (Most Recent):  Temp: 98.2 °F (36.8 °C) (19 2349)  Pulse: 85 (19 010)  Resp: 16 (19 010)  BP: 97/60 (19)  SpO2: 95 % (19) Vital Signs (24h Range):  Temp:  [97.9 °F (36.6 °C)-98.6 °F (37 °C)] 98.2 °F (36.8 °C)  Pulse:  [] 85  Resp:  [12-40] 16  SpO2:  [95 %-100 %] 95 %  BP: ()/(50-76) 97/60                 Neurosurgery Physical Exam  General: AOx1, GCS E4V3M6, confused  CNII-XII: Intact on fine exam, PERRL, visual fields grossly intact, EOMi, facial sensation preserved, no facial assymetry, tongue/uvula/palate midline, shoulder shrug equal, no  pronator drift  Extremities: FC x4 (R sided neglect), SILT, coordination intact throughout, DTRs 2+, no pathological reflexes    Significant Labs:  Recent Labs   Lab 08/11/19  1435   *   *   K 3.9   CL 92*   CO2 28   BUN 10   CREATININE 1.0   CALCIUM 9.3     Recent Labs   Lab 08/11/19  1435   WBC 8.06   HGB 9.9*   HCT 31.5*        Recent Labs   Lab 08/11/19  1435   LABPT 13.0   INR 1.0     Microbiology Results (last 7 days)     ** No results found for the last 168 hours. **        ABGs: No results for input(s): PH, PCO2, PO2, HCO3, POCSATURATED, BE in the last 48 hours.  Cardiac markers:   Recent Labs   Lab 08/11/19  1435   TROPONINI <0.030     CMP:   Recent Labs   Lab 08/11/19  1435   *   CALCIUM 9.3   ALBUMIN 4.6   PROT 7.7   *   K 3.9   CO2 28   CL 92*   BUN 10   CREATININE 1.0   ALKPHOS 148*   ALT 9*   AST 16   BILITOT 0.5     CRP: No results for input(s): CRP in the last 48 hours.  ESR: No results for input(s): POCESR, ERYTHROCYTES in the last 48 hours.  LFTs:   Recent Labs   Lab 08/11/19  1435   ALT 9*   AST 16   ALKPHOS 148*   BILITOT 0.5   PROT 7.7   ALBUMIN 4.6     Procalcitonin: No results for input(s): PROCAL in the last 48 hours.    Significant Diagnostics:  I have reviewed all pertinent imaging results/findings within the past 24 hours.    Assessment/Plan:     * Brain metastasis  59F w/ PMH COPD, HTN, hypothyroidism, stage 4 R small cell lung cancer s/p 6 cycles of carbo/etoposide in remission since 06/2018 who [place] [facility] [place] w/ L parietal brain mass:    --Patient admitted to Neuro-ICU on telemetry; NCC primary      -q1h neurochecks in ICU       -Stable for TTF to medicine team in AM  --All labs and diagnostics reviewed  --Follow-up MRI w/wo contrast w/ [facility] reviewed      -Please repeat CT chest/abdo/pelvis   --SBP <160 (cardene ggt; hydralazine & labetalol PRN; transition to home meds when appropriate)  --Na >135  --Keppra 500 BID  --Dex 4q6 x7d  --HOB  >30  --Reccomend Heme/onc work-up for restaging and prognostication  --Follow-up full pre-op labs (CBC/CMP/PT-INR/PTT/T&S)  --NPO at this time for possible operative intervention  --PPI  --Continue to monitor clinically, notify NSGY immediately with any changes in neuro status        Yehuda Bradley MD  Neurosurgery  [facility]

## 2019-08-12 NOTE — HPI
59F w/ PMH COPD, HTN, hypothyroidism, stage 4 R small cell lung cancer s/p 6 cycles of carbo/etoposide in remission since 06/2018 who [place] [facility] [place]SH w/ L parietal brain mass. Per EMS records patient had a 1d history of AMS and gait difficulty and was brought into the hospital by her family for evaluation. CTH @ OSH showed large L parietal brain mass with possible hemorrhagic component and she was transferred to C for evaluation. MRI @ [facility] redemonstrated L nonenhancing parietal mass with minimal blood. Patient is confused and so ROS is tenuous.

## 2019-08-12 NOTE — ASSESSMENT & PLAN NOTE
- MRI w/wo  - Dex 10 then 4 q 6  - keppra 500 q 12  - Consult hem/onc  - NSGY following   -SBP <160  - Q 1 vitals   - Q 1 neuro checks  - will need CT chest/abd/pelvis with contrast   - 8/12: NPO will plan for crani for tumor resection today w/NSGY

## 2019-08-12 NOTE — ASSESSMENT & PLAN NOTE
- MRI w/wo  - Dex 10 then 4 q 6  - keppra 500 q 12  - Consult hem/onc  - NSGY following   -SBP <160  - Q 1 vitals   - Q 1 neuro checks  - will need CT chest/abd/pelvis with contrast

## 2019-08-12 NOTE — SUBJECTIVE & OBJECTIVE
Past Medical History:   Diagnosis Date    Acid reflux     Anemia     Anxiety     Arthritis     Blindness - both eyes     Chronic kidney disease     COPD (chronic obstructive pulmonary disease)     GERD (gastroesophageal reflux disease)     Gout     Hypertension     Lung cancer     Lung cancer, main bronchus, right 1/2/2018    Osteopenia     Rheumatoid arteritis     SIADH (syndrome of inappropriate ADH production) 11/12/2017    Solitary kidney     Thyroid disease     Vitamin D deficiency      Past Surgical History:   Procedure Laterality Date    ABDOMINAL ADHESION SURGERY      ADENOIDECTOMY      kidney removal      right     TONSILLECTOMY        Current Facility-Administered Medications on File Prior to Encounter   Medication Dose Route Frequency Provider Last Rate Last Dose    [COMPLETED] acetaminophen (10 mg/mL) injection 610 mg  15 mg/kg Intravenous ED 1 Time David Dominguez MD   Stopped at 08/11/19 1620     Current Outpatient Medications on File Prior to Encounter   Medication Sig Dispense Refill    albuterol (PROVENTIL HFA/VENTOLIN HFA) 200 puff inhaler Inhale 2 puffs into the lungs every 6 (six) hours as needed.        alendronate (FOSAMAX) 70 MG tablet Take 70 mg by mouth every 7 days.        allopurinol (ZYLOPRIM) 300 MG tablet 2 (two) times daily.   5    alprazolam (XANAX) 0.5 MG tablet Take 0.5 mg by mouth nightly as needed.        amLODIPine (NORVASC) 5 MG tablet Take 5 mg by mouth once daily.      ANORO ELLIPTA 62.5-25 mcg/actuation DsDv   5    atorvastatin (LIPITOR) 10 MG tablet   3    calcium citrate (CALCITRATE) 200 mg (950 mg) tablet Take 5 tablets by mouth once daily.        dronabinol (MARINOL) 5 MG capsule Take 1 capsule (5 mg total) by mouth 2 (two) times daily before meals. 60 capsule 1    ergocalciferol, vitamin D2, 1,000 unit Tab 2 tablets daily 60 tablet 5    esomeprazole (NEXIUM) 40 MG capsule Take 40 mg by mouth before breakfast.       hydrocodone-acetaminophen (LORTAB) 7.5-500 mg per tablet Take 1 tablet by mouth 2 (two) times daily as needed for Pain. 60 tablet 1    levothyroxine (SYNTHROID) 137 MCG Tab tablet Take by mouth before breakfast.      levothyroxine (SYNTHROID, LEVOTHROID) 175 MCG tablet Take 175 mcg by mouth once daily.      lidocaine HCl 2% (XYLOCAINE) 2 % Soln by Mucous Membrane route every 4 (four) hours. 5 ml every 4 hours by mouth as needed for swallowing pain 300 mL 5    lisinopril (PRINIVIL,ZESTRIL) 5 MG tablet Take 5 mg by mouth 2 (two) times daily. Says takes this about once a week now.  Goes by how her body feels she says.       LORazepam (ATIVAN) 1 MG tablet 1 mg daily as needed.   2    nebivolol (BYSTOLIC) 5 MG Tab Take 10 mg by mouth once daily.      oxyCODONE (OXYCONTIN) 15 mg TR12 12 hr tablet Take 1 tablet (15 mg total) by mouth every 12 (twelve) hours. 60 tablet 0    oxyCODONE (ROXICODONE) 15 MG Tab Take 1 tablet (15 mg total) by mouth every 6 (six) hours as needed for Pain. 60 tablet 0    pantoprazole (PROTONIX) 40 MG tablet   5    promethazine (PHENERGAN) 12.5 MG Tab   5    promethazine (PHENERGAN) 25 MG tablet TAKE ONE TABLET BY MOUTH EVERY 6 HOURS AS NEEDED FOR NAUSEA 120 tablet 3    SLEEP AID, DOXYLAMINE, 25 mg tablet   2    sodium chloride 1 gram tablet Take 1 g by mouth 3 (three) times daily.        Allergies: Celebrex [celecoxib]; Ibuprofen; Neurontin [gabapentin]; Sulfa (sulfonamide antibiotics); and Topamax [topiramate]    Family History   Problem Relation Age of Onset    Stroke Mother     Thyroid disease Mother     Sleep apnea Mother     Hypertension Mother     Clotting disorder Mother     COPD Father     Heart disease Father     Neuropathy Father     Neuropathy Sister     Thyroid disease Sister     Fibromyalgia Sister     Heart disease Brother     Heart disease Maternal Grandfather     Parkinsonism Maternal Grandfather     Diabetes Maternal Aunt     Hypertension Maternal Aunt      Heart disease Paternal Uncle     Diabetes Paternal Uncle      Social History     Tobacco Use    Smoking status: Former Smoker     Packs/day: 0.50     Types: Cigarettes     Last attempt to quit: 2017     Years since quittin.3    Smokeless tobacco: Never Used   Substance Use Topics    Alcohol use: No     Alcohol/week: 0.0 oz     Comment: seldom    Drug use: No     Review of Systems  Objective:     Review of Symptoms:  Constitutional: Denies fevers, weight loss, chills, or weakness.  Eyes: Denies changes in vision.  ENT: Denies dysphagia, nasal discharge, ear pain or discharge.  Cardiovascular: Denies chest pain, palpitations, orthopnea, or claudication.  Respiratory: Denies shortness of breath, cough, hemoptysis, or wheezing.  GI: Denies nausea/vomitting, hematochezia, melena, abd pain, or changes in appetite.  : Denies dysuria, incontinence, or hematuria.  Musculoskeletal: Denies joint pain or myalgias.  Skin/breast: Denies rashes, lumps, lesions, or discharge.  Neurologic: Denies  vertigo, or paresthesias. + headache, dizziness,  Psychiatric: Denies changes in mood or hallucinations.  Endocrine: Denies polyuria, polydipsia, heat/cold intolerance.  Hematologic/Lymph: Denies lymphadenopathy, easy bruising or easy bleeding.  Allergic/Immunologic: Denies rash, rhinitis.     Vitals:    Temp: 98.6 °F (37 °C)  Pulse: 99  BP: 99/63  MAP (mmHg): 77  Resp: 16  SpO2: 97 %  O2 Device (Oxygen Therapy): room air    Temp  Min: 97.9 °F (36.6 °C)  Max: 98.6 °F (37 °C)  Pulse  Min: 92  Max: 108  BP  Min: 94/50  Max: 126/59  MAP (mmHg)  Min: 67  Max: 93  Resp  Min: 12  Max: 40  SpO2  Min: 96 %  Max: 100 %    No intake/output data recorded.           Physical Exam      Physical Exam:  GA: Alert, comfortable, no acute distress.   HEENT: No scleral icterus or JVD.   Pulmonary: Clear to auscultation A/L.   Cardiac: RRR S1 & S2 w/o rubs/murmurs/gallops.   Abdominal: Bowel sounds present x 4.   Skin: No jaundice, rashes,  or visible lesions.  Neuro:  --GCS: E4 V5 M6  --Mental Status:  Alert oriented follows   --Pupils 3mm, PERRL.   --Corneal reflex, gag, cough intact.  --LUE strength: 5/5  --RUE strength: 5/5  --LLE strength: 5/5  --RLE strength: 5/5    Unable to test gait due to level of consciousness.    Today I personally reviewed pertinent medications, lines/drains/airways, imaging, laboratory results,

## 2019-08-12 NOTE — HPI
is a 59 year old F with PMH of COPD, HTN, hypothyroidism,  stage 4 R small cell lung cancer s/p 6 cycles of carbo/etoposide in remission since 06/2018 who presents to [facility] for newly diagnosed L temporoparietal brain mass with vasogenic edema. She came as a transfer from [facility] ED for altered mental status, and abnormal gait. CT Head at [facility] revealed a L parietotemporal mass, likely metastatic 2/2 lung cancer. Per EMS, the family notes that when they saw her today she was confused and having difficulty walking and was last seen at baseline by family last night. She is being admitted to [facility] for a higher level of care.

## 2019-08-12 NOTE — PROGRESS NOTES
Patient arrived to [facility] from [facility] by [facility]    Type of stroke/diagnosis: Brain Mass    TPA start and end time: N/A    Thrombectomy start and end time: N/A    Current symptoms: confusion, following commands, aphasic    Skin assessment done: Yes  Wounds noted: Small scab with blanchable redness to coccyx    NCC notified: Gilberto CROSS MD      Wound Care Documentation:    Wound care consulted:  Yes    LDA added: Yes    Type of Wound: scab with blanchable redness    Location of Wound: coccyx

## 2019-08-12 NOTE — NURSING
Pt taken to 2nd floor OR with portable tele monitor, ambu bag, and O2 tank with consents for a crani resection. Care handed off to OR RN and anesthesia.

## 2019-08-12 NOTE — HOSPITAL COURSE
8/11: Admit NCC: MRI w/wo, Dex, NSGY following, SBP<160, keppra  8/12: add synthroid, send urine sodium add PPI, follow na q 8 hr, plan for crani and tumor resection today-NPO  8/13: add nicotine patch, incentive spirometry, d/c IVF, switch synthroid to PO, stepdown to NGSY team   8/14: Intermittent agitation overnight. Started on seroquel

## 2019-08-12 NOTE — ED NOTES
Patient monitored by remote monitoring system, as she was found attempting to exit her bed with all leads on.  Patient does not have a steady gait and is confused

## 2019-08-12 NOTE — PLAN OF CARE
Problem: Adult Inpatient Plan of Care  Goal: Plan of Care Review  Outcome: Ongoing (interventions implemented as appropriate)  POC reviewed with pt at 0530. Pt verbalized understanding. Questions and concerns addressed. No acute events today. Port a cath accessed. NCC aware of current electrolytes (Na- 128), no new orders at this time. NS @ 50. Pt passed CHUN. Pt progressing toward goals. Will continue to monitor. See flowsheets for full assessment and VS info.

## 2019-08-12 NOTE — ASSESSMENT & PLAN NOTE
59F w/ PMH COPD, HTN, hypothyroidism, stage 4 R small cell lung cancer s/p 6 cycles of carbo/etoposide in remission since 06/2018 who presents to [facility] ED from OSH w/ L parietal brain mass:    --Patient admitted to Neuro-ICU on telemetry; NCC primary      -q1h neurochecks in ICU       -Stable for TTF to medicine team in AM  --All labs and diagnostics reviewed  --Follow-up MRI w/wo contrast w/ [facility] reviewed      -Please repeat CT chest/abdo/pelvis   --SBP <160 (cardene ggt; hydralazine & labetalol PRN; transition to home meds when appropriate)  --Na >135  --Keppra 500 BID  --Dex 4q6 x7d  --HOB >30  --Reccomend Heme/onc work-up for restaging and prognostication  --Follow-up full pre-op labs (CBC/CMP/PT-INR/PTT/T&S)  --NPO at this time for possible operative intervention  --PPI  --Continue to monitor clinically, notify NSGY immediately with any changes in neuro status

## 2019-08-12 NOTE — SUBJECTIVE & OBJECTIVE
Review of Systems  Constitutional: Denies fevers, weight loss, chills, or weakness.  Eyes: Denies changes in vision.  ENT: Denies dysphagia, nasal discharge, ear pain or discharge.  Cardiovascular: Denies chest pain, palpitations, orthopnea, or claudication.  Respiratory: Denies shortness of breath, cough, hemoptysis, or wheezing.  GI: Denies nausea/vomitting, hematochezia, melena, abd pain, or changes in appetite.  : Denies dysuria, incontinence, or hematuria.  Musculoskeletal: Denies joint pain or myalgias.  Skin/breast: Denies rashes, lumps, lesions, or discharge.  Neurologic: Denies headache, dizziness, vertigo, or paresthesias.  Psychiatric: Denies changes in mood or hallucinations.  Endocrine: Denies polyuria, polydipsia, heat/cold intolerance.  Hematologic/Lymph: Denies lymphadenopathy, easy bruising or easy bleeding.  Allergic/Immunologic: Denies rash, rhinitis.   Objective:     Vitals:  Temp: 97.8 °F (36.6 °C)  Pulse: 89  Rhythm: normal sinus rhythm  BP: 117/76  MAP (mmHg): 90  Resp: 17  SpO2: 100 %  O2 Device (Oxygen Therapy): room air    Temp  Min: 97.8 °F (36.6 °C)  Max: 98.6 °F (37 °C)  Pulse  Min: 70  Max: 108  BP  Min: 93/64  Max: 145/77  MAP (mmHg)  Min: 67  Max: 104  Resp  Min: 11  Max: 40  SpO2  Min: 94 %  Max: 100 %    08/11 0701 - 08/12 0700  In: 450 [I.V.:350]  Out: 500 [Urine:500]   Unmeasured Output  Urine Occurrence: 1  Stool Occurrence: 0  Pad Count: 1       Physical Exam  GA: Alert, comfortable, no acute distress.   HEENT: No scleral icterus or JVD.   Pulmonary: Clear to auscultation A/L.   Cardiac: RRR S1 & S2 w/o rubs/murmurs/gallops.   Abdominal: Bowel sounds present x 4. No appreciable hepatosplenomegaly.  Skin: No jaundice, rashes, or visible lesions.  Neuro:  --GCS: E4 V4M6  --Mental Status:  Awake, alert, delayed responses, oriented to self and situation, follows simple commands, disconjugate gaze  --Pupils 3->2mm, PERRL.   --Corneal reflex, gag, cough intact.  --BEAN  spont    Medications:  Continuous  sodium chloride 0.9% Last Rate: 50 mL/hr at 08/12/19 1302   Scheduled  dexamethasone 4 mg Q6H   levetiracetam IVPB 500 mg Q12H   [START ON 8/13/2019] levothyroxine 50 mcg Daily   pantoprazole 40 mg Daily   polyethylene glycol 17 g Daily   senna-docusate 8.6-50 mg 1 tablet BID   PRN  acetaminophen 650 mg Q8H PRN   albuterol-ipratropium 3 mL Q4H PRN   ondansetron 4 mg Q8H PRN   oxyCODONE 5 mg Q6H PRN   sodium chloride 0.9% 10 mL PRN     Today I personally reviewed pertinent medications, lines/drains/airways, imaging, laboratory results,     Diet  Diet NPO

## 2019-08-12 NOTE — PLAN OF CARE
PCP- DR. HERVE ANDRADE     PT HAS A RIDE HOME AND FAMILY SUPPORT WITH MOTHER AND SISTER.     PHARMACY- [facility] [address], [place] [address]    Coverage Name [employer] [place] Auth Phone [phone]   Employer Group   Group Number [id number]   Subscriber Name GUILLE HORTON Subscriber Number [id number]   Subscriber Date of Birth 1960 08/12/19 1811   Discharge Assessment   Assessment Type Discharge Planning Assessment   Confirmed/corrected address and phone number on facesheet? Yes   Assessment information obtained from? Patient   Expected Length of Stay (days) 7   Communicated expected length of stay with patient/caregiver yes   Prior to hospitilization cognitive status: Alert/Oriented   Prior to hospitalization functional status: Independent   Current cognitive status: Unable to Assess   Current Functional Status: Assistive Equipment   Facility Arrived From: [facility]    Lives With grandparent(s)   Able to Return to Prior Arrangements yes   Is patient able to care for self after discharge? Unable to determine at this time (comments)   Patient's perception of discharge disposition home or selfcare   Readmission Within the Last 30 Days no previous admission in last 30 days   Patient currently being followed by outpatient case management? No   Patient currently receives any other outside agency services? No   Equipment Currently Used at Home none   Do you have any problems affording any of your prescribed medications? No   Is the patient taking medications as prescribed? yes   Does the patient have transportation home? Yes   Transportation Anticipated family or friend will provide;car, drives self   Does the patient receive services at the [facility]? No   Discharge Plan A Rehab   Discharge Plan B Home Health;Home with family   DME Needed Upon Discharge  none   Patient/Family in Agreement with Plan yes

## 2019-08-12 NOTE — PROGRESS NOTES
Patient transferred to the MRI bed,, tele, O2 sensor, and BP cuff applied,, placed in MRI, tolerating well, WCTM

## 2019-08-12 NOTE — ANESTHESIA PREPROCEDURE EVALUATION
08/12/2019  [facility]  Anesthesia Pre-Operative Evaluation         Patient Name: Carmen Leyva  YOB: 1960  MRN: [id number]    SUBJECTIVE:     Pre-operative evaluation for Procedure(s) (LRB):  L craniotomy for tumor (Left)     08/12/2019    Carmen Leyva is a 59 y.o. female w/ a significant PMHx of anemia, HTN, COPD, rheumatoid arthritis, hypothyroidism, and small cell lung cancer with metastases to the brain who presents for the above procedure.    NPO from yesterday evening.    LDA:   Right forearm 20g PIV  Left sided port (accessed)    Prev airway: None documented.    Drips: None documented.      Patient Active Problem List   Diagnosis    Hypothyroid    HTN (hypertension)    Vision, low both eyes    Renal artery stenosis    COPD (chronic obstructive pulmonary disease)    Psoriasis    Asymptomatic hyperuricemia    Positive [place]  and RNP    Smoker    Chronic narcotic use    S/p nephrectomy for DAPHNIE    Lung cancer, main bronchus, right-Small Cell    Cancer associated pain    Chemotherapy-induced neutropenia    Anemia    Thrombocytopenia    Brain metastasis    Seizure prophylaxis    Vasogenic brain edema     Review of patient's allergies indicates:   Allergen Reactions    Celebrex [celecoxib]      Due to kidney removal she can not take anything for RA    Ibuprofen      Due to kidney removal    Neurontin [gabapentin]     Sulfa (sulfonamide antibiotics) Hives    Topamax [topiramate] Swelling       Current Inpatient Medications:   dexamethasone  4 mg Intravenous Q6H    levetiracetam IVPB  500 mg Intravenous Q12H    [START ON 8/13/2019] levothyroxine  50 mcg Intravenous Daily    pantoprazole  40 mg Oral Daily    polyethylene glycol  17 g Oral Daily    senna-docusate 8.6-50 mg  1 tablet Oral BID       No current facility-administered medications on file  prior to encounter.      Current Outpatient Medications on File Prior to Encounter   Medication Sig Dispense Refill    alendronate (FOSAMAX) 70 MG tablet Take 70 mg by mouth every 7 days.        allopurinol (ZYLOPRIM) 300 MG tablet Take 300 mg by mouth 2 (two) times daily.   5    amLODIPine (NORVASC) 5 MG tablet Take 5 mg by mouth once daily.      atorvastatin (LIPITOR) 10 MG tablet   3    levothyroxine (SYNTHROID) 137 MCG Tab tablet Take by mouth before breakfast.      LORazepam (ATIVAN) 1 MG tablet 1 mg daily as needed.   2    oxyCODONE (OXYCONTIN) 15 mg TR12 12 hr tablet Take 1 tablet (15 mg total) by mouth every 12 (twelve) hours. 60 tablet 0    oxyCODONE (ROXICODONE) 15 MG Tab Take 1 tablet (15 mg total) by mouth every 6 (six) hours as needed for Pain. 60 tablet 0    pantoprazole (PROTONIX) 40 MG tablet Take 40 mg by mouth once daily.   5    promethazine (PHENERGAN) 25 MG tablet TAKE ONE TABLET BY MOUTH EVERY 6 HOURS AS NEEDED FOR NAUSEA 120 tablet 3    albuterol (PROVENTIL HFA/VENTOLIN HFA) 200 puff inhaler Inhale 2 puffs into the lungs every 6 (six) hours as needed.        calcium citrate (CALCITRATE) 200 mg (950 mg) tablet Take 5 tablets by mouth once daily.           Past Surgical History:   Procedure Laterality Date    ABDOMINAL ADHESION SURGERY      ADENOIDECTOMY      kidney removal      right     TONSILLECTOMY         Social History     Socioeconomic History    Marital status:      Spouse name: Not on file    Number of children: Not on file    Years of education: Not on file    Highest education level: Not on file   Occupational History    Occupation: disabled   Social Needs    Financial resource strain: Not on file    Food insecurity:     Worry: Not on file     Inability: Not on file    Transportation needs:     Medical: Not on file     Non-medical: Not on file   Tobacco Use    Smoking status: Former Smoker     Packs/day: 0.50     Types: Cigarettes     Last attempt to  quit: 2017     Years since quittin.3    Smokeless tobacco: Never Used   Substance and Sexual Activity    Alcohol use: No     Alcohol/week: 0.0 oz     Comment: seldom    Drug use: No    Sexual activity: Never     Partners: Male   Lifestyle    Physical activity:     Days per week: Not on file     Minutes per session: Not on file    Stress: Not on file   Relationships    Social connections:     Talks on phone: Not on file     Gets together: Not on file     Attends L[employer]-day service: Not on file     Active member of club or organization: Not on file     Attends meetings of clubs or organizations: Not on file     Relationship status: Not on file   Other Topics Concern    Not on file   Social History Narrative    Not on file       OBJECTIVE:     Vital Signs Range (Last 24H):  Temp:  [36.6 °C (97.8 °F)-37 °C (98.6 °F)]   Pulse:  []   Resp:  [11-40]   BP: ()/(50-91)   SpO2:  [94 %-100 %]       CBC:   Recent Labs     19  1435 19  034   WBC 8.06 5.69   RBC 3.14* 3.10*   HGB 9.9* 9.9*   HCT 31.5* 32.0*    142*   * 103*   MCH 31.5* 31.9*   MCHC 31.4* 30.9*       CMP:   Recent Labs     19  14319  0346   * 128*   K 3.9 4.5   CL 92* 97   CO2 28 24   BUN 10 10   CREATININE 1.0 0.9   * 121*   MG  --  1.7   PHOS  --  3.6   CALCIUM 9.3 9.3   ALBUMIN 4.6 3.8   PROT 7.7 7.3   ALKPHOS 148* 164*   ALT 9* 5*   AST 16 13   BILITOT 0.5 0.3       INR:  Recent Labs     19  1435 19  034   INR 1.0  --    APTT  --  25.2       Diagnostic Studies: No relevant studies.    EKG:   Normal sinus rhythm  Right atrial enlargement  Abnormal ECG  When compared with ECG of 11-AUG-2019 14:40,  No significant change was found  Confirmed by Dolores Verma MD ([id number]) on 2019 1:29:35 PM    2D ECHO:  · Concentric left ventricular remodeling. Normal left ventricular systolic function. The estimated ejection fraction is 65%  · Mild mitral sclerosis. Mild-to-moderate  mitral regurgitation.  · Normal LV diastolic function.  · Normal right ventricular systolic function.  · Mild tricuspid regurgitation.  · Normal central venous pressure (3 mm Hg).  · The estimated PA systolic pressure is 27 mm Hg        ASSESSMENT/PLAN:         Anesthesia Evaluation    I have reviewed the Patient Summary Reports.    I have reviewed the Nursing Notes.   I have reviewed the Medications.     Review of Systems  Anesthesia Hx:  Denies Hx of Anesthetic complications  History of prior surgery of interest to airway management or planning: Previous anesthesia: General Denies Family Hx of Anesthesia complications.   Denies Personal Hx of Anesthesia complications.   Social:  Former Smoker    Hematology/Oncology:         -- Anemia: --  Cancer in past history:  surgery    Cardiovascular:   Hypertension    Pulmonary:   COPD, moderate    Renal/:   Chronic Renal Disease, CRI    Hepatic/GI:   GERD    Neurological:   Lung mets to brain   Endocrine:   Hypothyroidism        Physical Exam  General:  Cachexia    Airway/Jaw/Neck:  Airway Findings: Mouth Opening: Normal Tongue: Normal  General Airway Assessment: Adult  Mallampati: II  TM Distance: Normal, at least 6 cm      Dental:  Dental Findings: Periodontal disease, Severe   Chest/Lungs:  Chest/Lungs Findings: Expiratory Wheezes, Mild     Heart/Vascular:  Heart Findings: Normal       Mental Status:  Mental Status Findings:  Cooperative, Confusion         Anesthesia Plan  Type of Anesthesia, risks & benefits discussed:  Anesthesia Type:  general  Patient's Preference:   Intra-op Monitoring Plan: standard ASA monitors, arterial line and central line  Intra-op Monitoring Plan Comments:   Post Op Pain Control Plan: multimodal analgesia  Post Op Pain Control Plan Comments:   Induction:   IV  Beta Blocker:  Patient is not currently on a Beta-Blocker (No further documentation required).       Informed Consent: Patient representative understands risks and agrees with  Anesthesia plan.  Questions answered. Anesthesia consent signed with patient representative.  ASA Score: 4  emergent   Day of Surgery Review of History & Physical:    H&P update referred to the surgeon.         Ready For Surgery From Anesthesia Perspective.

## 2019-08-13 PROBLEM — F17.210: Status: ACTIVE | Noted: 2019-08-13

## 2019-08-13 LAB
ALBUMIN SERPL BCP-MCNC: 3.7 G/DL (ref 3.5–5.2)
ALLENS TEST: ABNORMAL
ALP SERPL-CCNC: 154 U/L (ref 55–135)
ALT SERPL W/O P-5'-P-CCNC: 7 U/L (ref 10–44)
ANION GAP SERPL CALC-SCNC: 11 MMOL/L (ref 8–16)
ANION GAP SERPL CALC-SCNC: 11 MMOL/L (ref 8–16)
AST SERPL-CCNC: 17 U/L (ref 10–40)
BASOPHILS # BLD AUTO: 0.01 K/UL (ref 0–0.2)
BASOPHILS # BLD AUTO: 0.01 K/UL (ref 0–0.2)
BASOPHILS NFR BLD: 0.1 % (ref 0–1.9)
BASOPHILS NFR BLD: 0.1 % (ref 0–1.9)
BILIRUB SERPL-MCNC: 0.2 MG/DL (ref 0.1–1)
BUN SERPL-MCNC: 11 MG/DL (ref 6–20)
BUN SERPL-MCNC: 11 MG/DL (ref 6–20)
CALCIUM SERPL-MCNC: 9.3 MG/DL (ref 8.7–10.5)
CALCIUM SERPL-MCNC: 9.3 MG/DL (ref 8.7–10.5)
CHLORIDE SERPL-SCNC: 100 MMOL/L (ref 95–110)
CHLORIDE SERPL-SCNC: 100 MMOL/L (ref 95–110)
CO2 SERPL-SCNC: 25 MMOL/L (ref 23–29)
CO2 SERPL-SCNC: 25 MMOL/L (ref 23–29)
CREAT SERPL-MCNC: 0.8 MG/DL (ref 0.5–1.4)
CREAT SERPL-MCNC: 0.8 MG/DL (ref 0.5–1.4)
DELSYS: ABNORMAL
DIFFERENTIAL METHOD: ABNORMAL
DIFFERENTIAL METHOD: ABNORMAL
EOSINOPHIL # BLD AUTO: 0 K/UL (ref 0–0.5)
EOSINOPHIL # BLD AUTO: 0 K/UL (ref 0–0.5)
EOSINOPHIL NFR BLD: 0 % (ref 0–8)
EOSINOPHIL NFR BLD: 0 % (ref 0–8)
ERYTHROCYTE [DISTWIDTH] IN BLOOD BY AUTOMATED COUNT: 14 % (ref 11.5–14.5)
ERYTHROCYTE [DISTWIDTH] IN BLOOD BY AUTOMATED COUNT: 14 % (ref 11.5–14.5)
EST. GFR  (AFRICAN AMERICAN): >60 ML/MIN/1.73 M^2
EST. GFR  (AFRICAN AMERICAN): >60 ML/MIN/1.73 M^2
EST. GFR  (NON AFRICAN AMERICAN): >60 ML/MIN/1.73 M^2
EST. GFR  (NON AFRICAN AMERICAN): >60 ML/MIN/1.73 M^2
GLUCOSE SERPL-MCNC: 115 MG/DL (ref 70–110)
GLUCOSE SERPL-MCNC: 115 MG/DL (ref 70–110)
HCO3 UR-SCNC: 29.9 MMOL/L (ref 24–28)
HCT VFR BLD AUTO: 28.7 % (ref 37–48.5)
HCT VFR BLD AUTO: 28.7 % (ref 37–48.5)
HGB BLD-MCNC: 9.2 G/DL (ref 12–16)
HGB BLD-MCNC: 9.2 G/DL (ref 12–16)
IMM GRANULOCYTES # BLD AUTO: 0.09 K/UL (ref 0–0.04)
IMM GRANULOCYTES # BLD AUTO: 0.09 K/UL (ref 0–0.04)
IMM GRANULOCYTES NFR BLD AUTO: 0.7 % (ref 0–0.5)
IMM GRANULOCYTES NFR BLD AUTO: 0.7 % (ref 0–0.5)
LYMPHOCYTES # BLD AUTO: 0.3 K/UL (ref 1–4.8)
LYMPHOCYTES # BLD AUTO: 0.3 K/UL (ref 1–4.8)
LYMPHOCYTES NFR BLD: 2.1 % (ref 18–48)
LYMPHOCYTES NFR BLD: 2.1 % (ref 18–48)
MAGNESIUM SERPL-MCNC: 2 MG/DL (ref 1.6–2.6)
MCH RBC QN AUTO: 31.9 PG (ref 27–31)
MCH RBC QN AUTO: 31.9 PG (ref 27–31)
MCHC RBC AUTO-ENTMCNC: 32.1 G/DL (ref 32–36)
MCHC RBC AUTO-ENTMCNC: 32.1 G/DL (ref 32–36)
MCV RBC AUTO: 100 FL (ref 82–98)
MCV RBC AUTO: 100 FL (ref 82–98)
MONOCYTES # BLD AUTO: 0.5 K/UL (ref 0.3–1)
MONOCYTES # BLD AUTO: 0.5 K/UL (ref 0.3–1)
MONOCYTES NFR BLD: 4.1 % (ref 4–15)
MONOCYTES NFR BLD: 4.1 % (ref 4–15)
NEUTROPHILS # BLD AUTO: 12.2 K/UL (ref 1.8–7.7)
NEUTROPHILS # BLD AUTO: 12.2 K/UL (ref 1.8–7.7)
NEUTROPHILS NFR BLD: 93 % (ref 38–73)
NEUTROPHILS NFR BLD: 93 % (ref 38–73)
NRBC BLD-RTO: 0 /100 WBC
NRBC BLD-RTO: 0 /100 WBC
PCO2 BLDA: 46.9 MMHG (ref 35–45)
PH SMN: 7.41 [PH] (ref 7.35–7.45)
PHOSPHATE SERPL-MCNC: 4.2 MG/DL (ref 2.7–4.5)
PLATELET # BLD AUTO: 152 K/UL (ref 150–350)
PLATELET # BLD AUTO: 152 K/UL (ref 150–350)
PMV BLD AUTO: 9.7 FL (ref 9.2–12.9)
PMV BLD AUTO: 9.7 FL (ref 9.2–12.9)
PO2 BLDA: 144 MMHG (ref 80–100)
POC BE: 5 MMOL/L
POC SATURATED O2: 99 % (ref 95–100)
POC TCO2: 31 MMOL/L (ref 23–27)
POTASSIUM SERPL-SCNC: 4.7 MMOL/L (ref 3.5–5.1)
POTASSIUM SERPL-SCNC: 4.7 MMOL/L (ref 3.5–5.1)
PROT SERPL-MCNC: 6.9 G/DL (ref 6–8.4)
RBC # BLD AUTO: 2.88 M/UL (ref 4–5.4)
RBC # BLD AUTO: 2.88 M/UL (ref 4–5.4)
SAMPLE: ABNORMAL
SITE: ABNORMAL
SODIUM SERPL-SCNC: 136 MMOL/L (ref 136–145)
WBC # BLD AUTO: 13.09 K/UL (ref 3.9–12.7)
WBC # BLD AUTO: 13.09 K/UL (ref 3.9–12.7)

## 2019-08-13 PROCEDURE — 63600175 PHARM REV CODE 636 W HCPCS: Performed by: NURSE PRACTITIONER

## 2019-08-13 PROCEDURE — 99233 PR SUBSEQUENT HOSPITAL CARE,LEVL III: ICD-10-PCS | Mod: ,,, | Performed by: NURSE PRACTITIONER

## 2019-08-13 PROCEDURE — 83735 ASSAY OF MAGNESIUM: CPT

## 2019-08-13 PROCEDURE — 25500020 PHARM REV CODE 255: Performed by: PSYCHIATRY & NEUROLOGY

## 2019-08-13 PROCEDURE — 99233 SBSQ HOSP IP/OBS HIGH 50: CPT | Mod: ,,, | Performed by: NURSE PRACTITIONER

## 2019-08-13 PROCEDURE — 84100 ASSAY OF PHOSPHORUS: CPT

## 2019-08-13 PROCEDURE — A9585 GADOBUTROL INJECTION: HCPCS | Performed by: PSYCHIATRY & NEUROLOGY

## 2019-08-13 PROCEDURE — 80053 COMPREHEN METABOLIC PANEL: CPT

## 2019-08-13 PROCEDURE — 94761 N-INVAS EAR/PLS OXIMETRY MLT: CPT

## 2019-08-13 PROCEDURE — 25000003 PHARM REV CODE 250: Performed by: NURSE PRACTITIONER

## 2019-08-13 PROCEDURE — 36600 WITHDRAWAL OF ARTERIAL BLOOD: CPT

## 2019-08-13 PROCEDURE — S4991 NICOTINE PATCH NONLEGEND: HCPCS | Performed by: NURSE PRACTITIONER

## 2019-08-13 PROCEDURE — 99900035 HC TECH TIME PER 15 MIN (STAT)

## 2019-08-13 PROCEDURE — 82803 BLOOD GASES ANY COMBINATION: CPT

## 2019-08-13 PROCEDURE — 27000221 HC OXYGEN, UP TO 24 HOURS

## 2019-08-13 PROCEDURE — 85025 COMPLETE CBC W/AUTO DIFF WBC: CPT

## 2019-08-13 PROCEDURE — 94799 UNLISTED PULMONARY SVC/PX: CPT

## 2019-08-13 PROCEDURE — 25000003 PHARM REV CODE 250: Performed by: STUDENT IN AN ORGANIZED HEALTH CARE EDUCATION/TRAINING PROGRAM

## 2019-08-13 PROCEDURE — 63600175 PHARM REV CODE 636 W HCPCS: Performed by: PHYSICIAN ASSISTANT

## 2019-08-13 PROCEDURE — 92610 EVALUATE SWALLOWING FUNCTION: CPT

## 2019-08-13 PROCEDURE — 20000000 HC ICU ROOM

## 2019-08-13 RX ORDER — MIDAZOLAM HYDROCHLORIDE 1 MG/ML
INJECTION INTRAMUSCULAR; INTRAVENOUS
Status: DISPENSED
Start: 2019-08-13 | End: 2019-08-13

## 2019-08-13 RX ORDER — NICOTINE 7MG/24HR
1 PATCH, TRANSDERMAL 24 HOURS TRANSDERMAL DAILY
Status: DISCONTINUED | OUTPATIENT
Start: 2019-08-13 | End: 2019-09-10 | Stop reason: HOSPADM

## 2019-08-13 RX ORDER — FENTANYL CITRATE 50 UG/ML
25 INJECTION, SOLUTION INTRAMUSCULAR; INTRAVENOUS ONCE
Status: COMPLETED | OUTPATIENT
Start: 2019-08-13 | End: 2019-08-13

## 2019-08-13 RX ORDER — ACETAMINOPHEN 325 MG/1
650 TABLET ORAL EVERY 6 HOURS PRN
Status: DISCONTINUED | OUTPATIENT
Start: 2019-08-13 | End: 2019-09-10 | Stop reason: HOSPADM

## 2019-08-13 RX ORDER — GADOBUTROL 604.72 MG/ML
4 INJECTION INTRAVENOUS
Status: COMPLETED | OUTPATIENT
Start: 2019-08-13 | End: 2019-08-13

## 2019-08-13 RX ORDER — LEVOTHYROXINE SODIUM 75 UG/1
75 TABLET ORAL
Status: DISCONTINUED | OUTPATIENT
Start: 2019-08-14 | End: 2019-09-10 | Stop reason: HOSPADM

## 2019-08-13 RX ORDER — HEPARIN SODIUM 5000 [USP'U]/ML
5000 INJECTION, SOLUTION INTRAVENOUS; SUBCUTANEOUS EVERY 8 HOURS
Status: DISCONTINUED | OUTPATIENT
Start: 2019-08-13 | End: 2019-08-14

## 2019-08-13 RX ORDER — MIDAZOLAM HYDROCHLORIDE 1 MG/ML
0.5 INJECTION INTRAMUSCULAR; INTRAVENOUS ONCE
Status: COMPLETED | OUTPATIENT
Start: 2019-08-13 | End: 2019-08-13

## 2019-08-13 RX ADMIN — LEVOTHYROXINE SODIUM ANHYDROUS 50 MCG: 100 INJECTION, POWDER, LYOPHILIZED, FOR SOLUTION INTRAVENOUS at 05:08

## 2019-08-13 RX ADMIN — GADOBUTROL 4 ML: 604.72 INJECTION INTRAVENOUS at 02:08

## 2019-08-13 RX ADMIN — FENTANYL CITRATE 25 MCG: 50 INJECTION INTRAMUSCULAR; INTRAVENOUS at 02:08

## 2019-08-13 RX ADMIN — SENNOSIDES,DOCUSATE SODIUM 1 TABLET: 8.6; 5 TABLET, FILM COATED ORAL at 08:08

## 2019-08-13 RX ADMIN — LEVETIRACETAM 500 MG: 5 INJECTION INTRAVENOUS at 09:08

## 2019-08-13 RX ADMIN — HYDROCODONE BITARTRATE AND ACETAMINOPHEN 1 TABLET: 5; 325 TABLET ORAL at 03:08

## 2019-08-13 RX ADMIN — QUETIAPINE FUMARATE 12.5 MG: 25 TABLET, FILM COATED ORAL at 02:08

## 2019-08-13 RX ADMIN — HEPARIN SODIUM 5000 UNITS: 5000 INJECTION, SOLUTION INTRAVENOUS; SUBCUTANEOUS at 09:08

## 2019-08-13 RX ADMIN — POLYETHYLENE GLYCOL 3350 17 G: 17 POWDER, FOR SOLUTION ORAL at 08:08

## 2019-08-13 RX ADMIN — HYDROCODONE BITARTRATE AND ACETAMINOPHEN 1 TABLET: 5; 325 TABLET ORAL at 07:08

## 2019-08-13 RX ADMIN — MIDAZOLAM HYDROCHLORIDE 0.5 MG: 1 INJECTION, SOLUTION INTRAMUSCULAR; INTRAVENOUS at 04:08

## 2019-08-13 RX ADMIN — PANTOPRAZOLE SODIUM 40 MG: 40 TABLET, DELAYED RELEASE ORAL at 08:08

## 2019-08-13 RX ADMIN — DEXAMETHASONE SODIUM PHOSPHATE 4 MG: 4 INJECTION, SOLUTION INTRAMUSCULAR; INTRAVENOUS at 05:08

## 2019-08-13 RX ADMIN — LEVETIRACETAM 500 MG: 5 INJECTION INTRAVENOUS at 08:08

## 2019-08-13 RX ADMIN — NICOTINE 1 PATCH: 7 PATCH, EXTENDED RELEASE TRANSDERMAL at 10:08

## 2019-08-13 RX ADMIN — MUPIROCIN 1 G: 20 OINTMENT TOPICAL at 08:08

## 2019-08-13 RX ADMIN — MUPIROCIN 1 G: 20 OINTMENT TOPICAL at 09:08

## 2019-08-13 RX ADMIN — DEXAMETHASONE SODIUM PHOSPHATE 4 MG: 4 INJECTION, SOLUTION INTRAMUSCULAR; INTRAVENOUS at 11:08

## 2019-08-13 RX ADMIN — SENNOSIDES,DOCUSATE SODIUM 1 TABLET: 8.6; 5 TABLET, FILM COATED ORAL at 09:08

## 2019-08-13 NOTE — NURSING
Pt arrived back from OR s/p craniotomy on cardiac monitoring and venti mask in place. VSS stable. Bedside RN to continue to monitor.

## 2019-08-13 NOTE — PLAN OF CARE
Problem: Adult Inpatient Plan of Care  Goal: Plan of Care Review  Outcome: Ongoing (interventions implemented as appropriate)  POC reviewed with pt at 0530. Pt verbalized understanding, needs reinforcement. Questions and concerns addressed. Post-op MRI complete. No acute events today. Pt progressing toward goals. Will continue to monitor. See flowsheets for full assessment and VS info.

## 2019-08-13 NOTE — TRANSFER OF CARE
"Anesthesia Transfer of Care Note    Patient: Carmen Leyva    Procedure(s) Performed: Procedure(s) (LRB):  L craniotomy for tumor (Left)    Patient location: ICU    Anesthesia Type: general    Transport from OR: Transported from OR on 6-10 L/min O2 by face mask with adequate spontaneous ventilation. Continuous ECG monitoring in transport. Continuous SpO2 monitoring in transport. Continuos invasive BP monitoring in transport    Post pain: adequate analgesia    Post assessment: no apparent anesthetic complications and tolerated procedure well    Post vital signs: stable    Level of consciousness: sedated    Complications: none    Transfer of care protocol was followed      Last vitals:   Visit Vitals  /72 (BP Location: Right arm, Patient Position: Lying)   Pulse 98   Temp 36.7 °C (98 °F) (Oral)   Resp 18   Ht 5' 1" (1.549 m)   Wt 35.4 kg (78 lb)   SpO2 100%   Breastfeeding? No   BMI 14.74 kg/m²     "

## 2019-08-13 NOTE — ASSESSMENT & PLAN NOTE
-pt  Reported she still smokes 1 cigarette per day  -smoking cessation education  -Nicotine patch initiated

## 2019-08-13 NOTE — BRIEF OP NOTE
[facility]  Brief Operative Note    SUMMARY     Surgery Date: 8/12/2019     Surgeon(s) and Role:     * Gabo Erickson MD - Primary    Assisting Surgeon: Yehuda Bradley MD    Pre-op Diagnosis:  Brain metastasis [C79.31]    Post-op Diagnosis:  Post-Op Diagnosis Codes:     * Brain metastasis [C79.31]    Procedure(s) (LRB):  L craniotomy for tumor (Left)    Anesthesia: General    Description of Procedure: L temporal craniotomy for tumor resection    Description of the findings of the procedure: See above    Estimated Blood Loss: * No values recorded between 8/12/2019  6:25 PM and 8/12/2019  8:11 PM *         Specimens:   Specimen (12h ago, onward)    Start     Ordered    08/12/19 1955  Specimen to Pathology - Surgery  Once     Comments:  1. Brain Tumor - Permanent     Start Status     08/12/19 1955 Collected (08/12/19 1944) Order ID: [id number]       08/12/19 1954

## 2019-08-13 NOTE — PROGRESS NOTES
[facility]  Neurosurgery  Progress Note    Subjective:     History of Present Illness: 59F w/ PMH COPD, HTN, hypothyroidism, stage 4 R small cell lung cancer s/p 6 cycles of carbo/etoposide in remission since 06/2018 who presents to [facility] ED from OSH w/ L parietal brain mass. Per EMS records patient had a 1d history of AMS and gait difficulty and was brought into the hospital by her family for evaluation. CTH @ OSH showed large L parietal brain mass with possible hemorrhagic component and she was transferred to [facility] for evaluation. MRI @ C redemonstrated L nonenhancing parietal mass with minimal blood. Patient is confused and so ROS is tenuous.    Post-Op Info:  Procedure(s) (LRB):  L craniotomy for tumor (Left)   1 Day Post-Op     To OR yesterday otherwise No AE. AFVSS.     Past Medical History:   Diagnosis Date    Acid reflux     Anemia     Anxiety     Arthritis     Blindness - both eyes     Chronic kidney disease     COPD (chronic obstructive pulmonary disease)     GERD (gastroesophageal reflux disease)     Gout     Hypertension     Lung cancer     Lung cancer, main bronchus, right 1/2/2018    Osteopenia     Rheumatoid arteritis     SIADH (syndrome of inappropriate ADH production) 11/12/2017    Solitary kidney     Thyroid disease     Vitamin D deficiency      Past Surgical History:   Procedure Laterality Date    ABDOMINAL ADHESION SURGERY      ADENOIDECTOMY      kidney removal      right     L craniotomy for tumor Left 8/12/2019    Performed by [facility]o [facility]kson MD at [facility] OR [address] [place] [address]    TONSILLECTOMY       Family History     Problem Relation (Age of Onset)    COPD Father    Clotting disorder Mother    Diabetes Maternal Aunt, Paternal Uncle    Fibromyalgia Sister    Heart disease Father, Brother, Maternal Grandfather, Paternal Uncle    Hypertension Mother, Maternal Aunt    Neuropathy Father, Sister    Parkinsonism Maternal Grandfather    Sleep apnea Mother    Stroke Mother     Thyroid disease Mother, Sister        Tobacco Use    Smoking status: Former Smoker     Packs/day: 0.50     Types: Cigarettes     Last attempt to quit: 2017     Years since quittin.3    Smokeless tobacco: Never Used   Substance and Sexual Activity    Alcohol use: No     Alcohol/week: 0.0 oz     Comment: seldom    Drug use: No    Sexual activity: Never     Partners: Male       Objective:     Weight: 35.4 kg (78 lb)  Body mass index is 14.74 kg/m².  Vital Signs (Most Recent):  Temp: 97 °F (36.1 °C) (19 07)  Pulse: 95 (19 0810)  Resp: 16 (19 0810)  BP: 112/72 (19 08)  SpO2: 100 % (19 0810) Vital Signs (24h Range):  Temp:  [94.7 °F (34.8 °C)-98 °F (36.7 °C)] 97 °F (36.1 °C)  Pulse:  [] 95  Resp:  [10-36] 16  SpO2:  [96 %-100 %] 100 %  BP: ()/(59-91) 112/72  Arterial Line BP: ()/(55-98) 98/81     Date 19 0700 - 19 0659   Shift 7725-1151 2218-3668 1868-7165 24 Hour Total   INTAKE   P.O. 150   150   I.V.(mL/kg) 100.8(2.8)   100.8(2.8)   Shift Total(mL/kg) 250.8(7.1)   250.8(7.1)   OUTPUT   Shift Total(mL/kg)       Weight (kg) 35.4 35.4 35.4 35.4               Physical Exam:  Nursing note and vitals reviewed.    Constitutional: She appears well-developed.     General: AOx1, GCS E4V3M6, confused  CNII-XII: Intact on fine exam, PERRL, visual fields grossly intact, EOMi, facial sensation preserved, no facial assymetry, tongue/uvula/palate midline, shoulder shrug equal, no pronator drift  Extremities: FC x4 (R sided neglect), SILT, coordination intact throughout, DTRs 2+, no pathological reflexes    Significant Labs:  Recent Labs   Lab 19  0346 19  2337 19  0228   * 145*  --  115*  115*   * 133* 136 136  136   K 4.5 4.3  --  4.7  4.7   CL 97 98  --  100  100   CO2 24 24  --  25  25   BUN 10 11  --  11  11   CREATININE 0.9 0.7  --  0.8  0.8   CALCIUM 9.3 8.5*  --  9.3  9.3   MG 1.7  --   --  2.0      Recent Labs   Lab 08/12/19 0346 08/12/19 1907 08/12/19 2112 08/13/19 0228   WBC 5.69  --  8.77 13.09*  13.09*   HGB 9.9*  --  9.0* 9.2*  9.2*   HCT 32.0* 32* 27.2* 28.7*  28.7*   *  --  155 152  152     Recent Labs   Lab 08/11/19 1435 08/12/19 0346   LABPT 13.0  --    INR 1.0  --    APTT  --  25.2     Microbiology Results (last 7 days)     ** No results found for the last 168 hours. **        ABGs:   Recent Labs   Lab 08/12/19 1907 08/13/19 0448   PH 7.482* 7.413   PCO2 32.0* 46.9*   PO2 206* 144*   HCO3 24.0 29.9*   POCSATURATED 100 99   BE 1 5     Cardiac markers:   Recent Labs   Lab 08/11/19 1435   TROPONINI <0.030     CMP:   Recent Labs   Lab 08/11/19 1435 08/12/19 0346 08/12/19 2112 08/12/19 2337 08/13/19 0228   * 121* 145*  --  115*  115*   CALCIUM 9.3 9.3 8.5*  --  9.3  9.3   ALBUMIN 4.6 3.8  --   --  3.7   PROT 7.7 7.3  --   --  6.9   * 128* 133* 136 136  136   K 3.9 4.5 4.3  --  4.7  4.7   CO2 28 24 24  --  25  25   CL 92* 97 98  --  100  100   BUN 10 10 11  --  11  11   CREATININE 1.0 0.9 0.7  --  0.8  0.8   ALKPHOS 148* 164*  --   --  154*   ALT 9* 5*  --   --  7*   AST 16 13  --   --  17   BILITOT 0.5 0.3  --   --  0.2     CRP: No results for input(s): CRP in the last 48 hours.  ESR: No results for input(s): POCESR, ERYTHROCYTES in the last 48 hours.  LFTs:   Recent Labs   Lab 08/11/19 1435 08/12/19 0346 08/13/19  0228   ALT 9* 5* 7*   AST 16 13 17   ALKPHOS 148* 164* 154*   BILITOT 0.5 0.3 0.2   PROT 7.7 7.3 6.9   ALBUMIN 4.6 3.8 3.7     Procalcitonin: No results for input(s): PROCAL in the last 48 hours.    Significant Diagnostics:  I have reviewed all pertinent imaging results/findings within the past 24 hours.    Assessment/Plan:     * Brain metastasis  59F w/ PMH COPD, HTN, hypothyroidism, stage 4 R small cell lung cancer s/p 6 cycles of carbo/etoposide in remission since 06/2018 who [facility] [place] w/ L parietal brain mass:    Neuro  stable.   --SBP <160   --Na >135  --Keppra 500 BID  --Dex 4q6  --HOB >30  --Reccomend Heme/onc work-up for restaging and prognostication  --PPI  PT, OT, ST.   Likely TTF today.         Joseph Zuñiga MD  Neurosurgery  [facility]

## 2019-08-13 NOTE — PLAN OF CARE
Problem: Adult Inpatient Plan of Care  Goal: Plan of Care Review  Outcome: Ongoing (interventions implemented as appropriate)  POC reviewed with pt at 1300. Pt is still confuse and only oriented to self. Questions and concerns addressed. Pt. Has been trying to get out from the bed all day. Diet was advance to Regular diet. Normal Saline was discontinued.  Pt progressing toward goals. Will continue to monitor. See flowsheets for full assessment and VS info.

## 2019-08-13 NOTE — ASSESSMENT & PLAN NOTE
- MRI w/wo  - Dex 10 then 4 q 6  - keppra 500 q 12  - Consult hem/onc  - NSGY following   -SBP <160  - Q 1 vitals   - Q 1 neuro checks  - will need CT chest/abd/pelvis with contrast   - 8/12: NPO will plan for crani for tumor resection today w/NSGY  -8/13: POD#1 Left crani for tumor resection, plan to stepdown to NSGY team , continue dex

## 2019-08-13 NOTE — ASSESSMENT & PLAN NOTE
59F w/ PMH COPD, HTN, hypothyroidism, stage 4 R small cell lung cancer s/p 6 cycles of carbo/etoposide in remission since 06/2018 who [place] [facility] [place] w/ L parietal brain mass:    Neuro stable.   --SBP <160   --Na >135  --Keppra 500 BID  --Dex 4q6  --HOB >30  --Reccomend Heme/onc work-up for restaging and prognostication  --PPI  PT, OT, ST.   Likely TTF today.

## 2019-08-13 NOTE — PT/OT/SLP EVAL
Speech Language Pathology Evaluation  Bedside Swallow    Patient Name:  Carmen Leyva   MRN:  [id number]  Admitting Diagnosis: Brain metastasis    Recommendations:                 General Recommendations:  Cognitive-linguistic evaluation  Diet recommendations:  Regular, Thin   Aspiration Precautions: Assistance/supervision with meals and Standard aspiration precautions   General Precautions: Standard, other (see comments)  Communication strategies:  none    History:     Past Medical History:   Diagnosis Date    Acid reflux     Anemia     Anxiety     Arthritis     Blindness - both eyes     Chronic kidney disease     COPD (chronic obstructive pulmonary disease)     GERD (gastroesophageal reflux disease)     Gout     Hypertension     Lung cancer     Lung cancer, main bronchus, right 1/2/2018    Osteopenia     Rheumatoid arteritis     SIADH (syndrome of inappropriate ADH production) 11/12/2017    Solitary kidney     Thyroid disease     Vitamin D deficiency        Past Surgical History:   Procedure Laterality Date    ABDOMINAL ADHESION SURGERY      ADENOIDECTOMY      kidney removal      right     L craniotomy for tumor Left 8/12/2019    Performed by [facility]o [facility]no [facility] at [facility] OR [address] [place] [address]    TONSILLECTOMY         Social History: Uanble to determine.     Prior diet: Unable to determine.       Subjective     Patient walking in the halls with RN. Confused. Non purposeful Logorrhea noted.     Pain/Comfort:  · Pain Rating 1: 0/10    Objective:     Oral Musculature Evaluation  · Oral Musculature: unable to assess due to poor participation/comprehension    Bedside Swallow Eval:   Consistencies Assessed:  · Thin liquids via straw sips x5  · Solids via 1/2 cracker     Oral Phase:   · WFL    Pharyngeal Phase:   · no overt clinical signs/symptoms of aspiration  · no overt clinical signs/symptoms of pharyngeal dysphagia    Compensatory Strategies  · None    Treatment: Patient with confusion noted, when given solid  food trials patient attempting to place under bed sheets despite cues. Patient requires assistance with meals though is appropriate for regular solid consistencies and thin liquids when appropriate.    Assessment:     Carmen Leyva is a 59 y.o. female with an SLP diagnosis of Cognitive-Linguistic Impairment.  She presents with no oral or pharyngeal swallow impairment at this time. ST to complete cognitive evaluation at next session pending ability to participate in assessment.     Goals:   Multidisciplinary Problems     SLP Goals        Problem: SLP Goal    Goal Priority Disciplines Outcome   SLP Goal     SLP    Description:  Speech Language Pathology Goals  Goals expected to be met by 8/20:  1. Patient will tolerate a regular diet and thin liquids with no overt signs of airway compromise.   2. Patient will participate in a full speech, language, and cognitive assessment when appropriate.                         Plan:     · Patient to be seen:  4 x/week   · Plan of Care expires:     · Plan of Care reviewed with:  patient   · SLP Follow-Up:  Yes       Discharge recommendations:  other (see comments)   Barriers to Discharge:  None    Time Tracking:     SLP Treatment Date:   08/13/19  Speech Start Time:  1150  Speech Stop Time:  1158     Speech Total Time (min):  8 min    Billable Minutes: Eval Swallow and Oral Function 8    Emily Abadie, CCC-SLP  08/13/2019

## 2019-08-13 NOTE — SUBJECTIVE & OBJECTIVE
Review of Systems  Constitutional: Denies fevers, weight loss, chills, or weakness.  Eyes: Denies changes in vision.  ENT: Denies dysphagia, nasal discharge, ear pain or discharge.  Cardiovascular: Denies chest pain, palpitations, orthopnea, or claudication.  Respiratory: Denies shortness of breath, cough, hemoptysis, or wheezing.  GI: Denies nausea/vomitting, hematochezia, melena, abd pain, or changes in appetite.  : Denies dysuria, incontinence, or hematuria.  Musculoskeletal: Denies joint pain or myalgias.  Skin/breast: Denies rashes, lumps, lesions, or discharge.  Neurologic: Denies headache, dizziness, vertigo, or paresthesias.  Psychiatric: Denies changes in mood or hallucinations.  Endocrine: Denies polyuria, polydipsia, heat/cold intolerance.  Hematologic/Lymph: Denies lymphadenopathy, easy bruising or easy bleeding.  Allergic/Immunologic: Denies rash, rhinitis.   Objective:     Vitals:  Temp: 97 °F (36.1 °C)  Pulse: 98  Rhythm: normal sinus rhythm  BP: (!) 108/58  MAP (mmHg): 77  Resp: (!) 22  SpO2: 99 %  O2 Device (Oxygen Therapy): room air    Temp  Min: 94.7 °F (34.8 °C)  Max: 97.5 °F (36.4 °C)  Pulse  Min: 71  Max: 101  BP  Min: 93/59  Max: 126/66  MAP (mmHg)  Min: 70  Max: 92  Resp  Min: 10  Max: 36  SpO2  Min: 98 %  Max: 100 %    08/12 0701 - 08/13 0700  In: 4001.7 [I.V.:3801.7]  Out: 2365 [Urine:2365]   Unmeasured Output  Urine Occurrence: 1  Stool Occurrence: 0  Pad Count: 2       Physical Exam  GA:  comfortable, no acute distress.   HEENT: No scleral icterus or JVD.   Pulmonary: Clear to auscultation A/L.   Cardiac: RRR S1 & S2 w/o rubs/murmurs/gallops.   Abdominal: Bowel sounds present x 4. No appreciable hepatosplenomegaly.  Skin: No jaundice, rashes, or visible lesions.  Neuro:  --GCS: E4 V4 M6  --Mental Status:  Awake, alert, oriented to self only, follows simple commands  --CN II-XII grossly intact.   --Pupils 3->2mm, PERRL.   --Corneal reflex, gag, cough intact.  --VIKAS  spont    Medications:  Continuous Scheduled  dexamethasone 4 mg Q6H   heparin (porcine) 5,000 Units Q8H   levetiracetam IVPB 500 mg Q12H   [START ON 8/14/2019] levothyroxine 75 mcg Before breakfast   midazolam     mupirocin 1 g BID   nicotine 1 patch Daily   pantoprazole 40 mg Daily   polyethylene glycol 17 g Daily   senna-docusate 8.6-50 mg 1 tablet BID   PRN  acetaminophen 650 mg Q6H PRN   albuterol-ipratropium 3 mL Q4H PRN   ondansetron 4 mg Q8H PRN   oxyCODONE 5 mg Q6H PRN   QUEtiapine 12.5 mg Q12H PRN   sodium chloride 0.9% 10 mL PRN     Today I personally reviewed pertinent medications, lines/drains/airways, imaging, laboratory results,    Diet  Diet Adult Regular (IDDSI Level 7)

## 2019-08-13 NOTE — PLAN OF CARE
Problem: SLP Goal  Goal: SLP Goal  Speech Language Pathology Goals  Goals expected to be met by 8/20:  1. Patient will tolerate a regular diet and thin liquids with no overt signs of airway compromise.   2. Patient will participate in a full speech, language, and cognitive assessment when appropriate.       Bedside swallow eval completed with implemented plan of care. ST recommending continued regular consistencies and thin liquids.   Emily P. Abadie M.S., CCC-SLP  Speech Language Pathologist  [phone]  08/13/2019

## 2019-08-13 NOTE — PROGRESS NOTES
[facility]  Neurocritical Care  Progress Note    Admit Date: 8/11/2019  Service Date: 08/13/2019  Length of Stay: 2    Subjective:     Chief Complaint: Brain metastasis    History of Present Illness: is a 59 year old F with PMH of COPD, HTN, hypothyroidism,  stage 4 R small cell lung cancer s/p 6 cycles of carbo/etoposide in remission since 06/2018 who presents to [facility] for newly diagnosed L temporoparietal brain mass with vasogenic edema. She came as a transfer from [facility] ED for altered mental status, and abnormal gait. CT Head at [facility] revealed a L parietotemporal mass, likely metastatic 2/2 lung cancer. Per EMS, the family notes that when they saw her today she was confused and having difficulty walking and was last seen at baseline by family last night. She is being admitted to [facility] for a higher level of care.       Hospital Course: 8/11: Admit NCC: MRI w/wo, Dex, NSGY following, SBP<160, keppra  8/12: add synthroid, send urine sodium add PPI, follow na q 8 hr, plan for crani and tumor resection today-NPO  8/13: add nicotine patch, incentive spirometry, d/c IVF, switch synthroid to PO, stepdown to NGSY team         Review of Systems  Constitutional: Denies fevers, weight loss, chills, or weakness.  Eyes: Denies changes in vision.  ENT: Denies dysphagia, nasal discharge, ear pain or discharge.  Cardiovascular: Denies chest pain, palpitations, orthopnea, or claudication.  Respiratory: Denies shortness of breath, cough, hemoptysis, or wheezing.  GI: Denies nausea/vomitting, hematochezia, melena, abd pain, or changes in appetite.  : Denies dysuria, incontinence, or hematuria.  Musculoskeletal: Denies joint pain or myalgias.  Skin/breast: Denies rashes, lumps, lesions, or discharge.  Neurologic: Denies headache, dizziness, vertigo, or paresthesias.  Psychiatric: Denies changes in mood or hallucinations.  Endocrine: Denies polyuria, polydipsia, heat/cold  intolerance.  Hematologic/Lymph: Denies lymphadenopathy, easy bruising or easy bleeding.  Allergic/Immunologic: Denies rash, rhinitis.   Objective:     Vitals:  Temp: 97 °F (36.1 °C)  Pulse: 98  Rhythm: normal sinus rhythm  BP: (!) 108/58  MAP (mmHg): 77  Resp: (!) 22  SpO2: 99 %  O2 Device (Oxygen Therapy): room air    Temp  Min: 94.7 °F (34.8 °C)  Max: 97.5 °F (36.4 °C)  Pulse  Min: 71  Max: 101  BP  Min: 93/59  Max: 126/66  MAP (mmHg)  Min: 70  Max: 92  Resp  Min: 10  Max: 36  SpO2  Min: 98 %  Max: 100 %    08/12 0701 - 08/13 0700  In: 4001.7 [I.V.:3801.7]  Out: 2365 [Urine:2365]   Unmeasured Output  Urine Occurrence: 1  Stool Occurrence: 0  Pad Count: 2       Physical Exam  GA:  comfortable, no acute distress.   HEENT: No scleral icterus or JVD.   Pulmonary: Clear to auscultation A/L.   Cardiac: RRR S1 & S2 w/o rubs/murmurs/gallops.   Abdominal: Bowel sounds present x 4. No appreciable hepatosplenomegaly.  Skin: No jaundice, rashes, or visible lesions.  Neuro:  --GCS: E4 V4 M6  --Mental Status:  Awake, alert, oriented to self only, follows simple commands  --CN II-XII grossly intact.   --Pupils 3->2mm, PERRL.   --Corneal reflex, gag, cough intact.  --BEAN spont    Medications:  Continuous Scheduled  dexamethasone 4 mg Q6H   heparin (porcine) 5,000 Units Q8H   levetiracetam IVPB 500 mg Q12H   [START ON 8/14/2019] levothyroxine 75 mcg Before breakfast   midazolam     mupirocin 1 g BID   nicotine 1 patch Daily   pantoprazole 40 mg Daily   polyethylene glycol 17 g Daily   senna-docusate 8.6-50 mg 1 tablet BID   PRN  acetaminophen 650 mg Q6H PRN   albuterol-ipratropium 3 mL Q4H PRN   ondansetron 4 mg Q8H PRN   oxyCODONE 5 mg Q6H PRN   QUEtiapine 12.5 mg Q12H PRN   sodium chloride 0.9% 10 mL PRN     Today I personally reviewed pertinent medications, lines/drains/airways, imaging, laboratory results,    Diet  Diet Adult Regular (IDDSI Level 7)      Assessment/Plan:     Neuro  Vasogenic brain edema  See brain  mass  Continue dex  -follow neuro exam  Follow brain imaging    Seizure prophylaxis  - keppra 500 q12      Pulmonary  COPD (chronic obstructive pulmonary disease)  - duo nebs prn     Cardiac/Vascular  HTN (hypertension)  - SBP <160   - Echo and EKG     Oncology  * Brain metastasis  - MRI w/wo  - Dex 10 then 4 q 6  - keppra 500 q 12  - Consult hem/onc  - NSGY following   -SBP <160  - Q 1 vitals   - Q 1 neuro checks  - will need CT chest/abd/pelvis with contrast   - 8/12: NPO will plan for crani for tumor resection today w/NSGY  -8/13: POD#1 Left crani for tumor resection, plan to stepdown to NSGY team , continue dex    Endocrine  Hypothyroid  - TSH, T3, T4   - start IV synthroid  -8/13: switched to PO synthroid    Other  Episodic cigarette smoking dependence  -pt  Reported she still smokes 1 cigarette per day  -smoking cessation education  -Nicotine patch initiated          The patient is being Prophylaxed for:  Venous Thromboembolism with: Chemical  Stress Ulcer with: PPI  Ventilator Pneumonia with: not applicable    Activity Orders          Diet Adult Regular (IDDSI Level 7): Regular starting at 08/13 0902        Full Code    Nevaeh Best NP  Neurocritical Care  [facility]

## 2019-08-13 NOTE — SUBJECTIVE & OBJECTIVE
To OR yesterday otherwise No AE. AFVSS.     Past Medical History:   Diagnosis Date    Acid reflux     Anemia     Anxiety     Arthritis     Blindness - both eyes     Chronic kidney disease     COPD (chronic obstructive pulmonary disease)     GERD (gastroesophageal reflux disease)     Gout     Hypertension     Lung cancer     Lung cancer, main bronchus, right 2018    Osteopenia     Rheumatoid arteritis     SIADH (syndrome of inappropriate ADH production) 2017    Solitary kidney     Thyroid disease     Vitamin D deficiency      Past Surgical History:   Procedure Laterality Date    ABDOMINAL ADHESION SURGERY      ADENOIDECTOMY      kidney removal      right     L craniotomy for tumor Left 2019    Performed by Gabo Erickson MD at [facility] OR [address] [place] [address]    TONSILLECTOMY       Family History     Problem Relation (Age of Onset)    COPD Father    Clotting disorder Mother    Diabetes Maternal Aunt, Paternal Uncle    Fibromyalgia Sister    Heart disease Father, Brother, Maternal Grandfather, Paternal Uncle    Hypertension Mother, Maternal Aunt    Neuropathy Father, Sister    Parkinsonism Maternal Grandfather    Sleep apnea Mother    Stroke Mother    Thyroid disease Mother, Sister        Tobacco Use    Smoking status: Former Smoker     Packs/day: 0.50     Types: Cigarettes     Last attempt to quit: 2017     Years since quittin.3    Smokeless tobacco: Never Used   Substance and Sexual Activity    Alcohol use: No     Alcohol/week: 0.0 oz     Comment: seldom    Drug use: No    Sexual activity: Never     Partners: Male       Objective:     Weight: 35.4 kg (78 lb)  Body mass index is 14.74 kg/m².  Vital Signs (Most Recent):  Temp: 97 °F (36.1 °C) (19 0701)  Pulse: 95 (19 0810)  Resp: 16 (19 0810)  BP: 112/72 (19 0801)  SpO2: 100 % (19 0810) Vital Signs (24h Range):  Temp:  [94.7 °F (34.8 °C)-98 °F (36.7 °C)] 97 °F (36.1 °C)  Pulse:  [] 95  Resp:   [10-36] 16  SpO2:  [96 %-100 %] 100 %  BP: ()/(59-91) 112/72  Arterial Line BP: ()/(55-98) 98/81     Date 08/13/19 0700 - 08/14/19 0659   Shift 8048-8094 0343-0457 1815-4948 24 Hour Total   INTAKE   P.O. 150   150   I.V.(mL/kg) 100.8(2.8)   100.8(2.8)   Shift Total(mL/kg) 250.8(7.1)   250.8(7.1)   OUTPUT   Shift Total(mL/kg)       Weight (kg) 35.4 35.4 35.4 35.4               Physical Exam:  Nursing note and vitals reviewed.    Constitutional: She appears well-developed.     General: AOx1, GCS E4V3M6, confused  CNII-XII: Intact on fine exam, PERRL, visual fields grossly intact, EOMi, facial sensation preserved, no facial assymetry, tongue/uvula/palate midline, shoulder shrug equal, no pronator drift  Extremities: FC x4 (R sided neglect), SILT, coordination intact throughout, DTRs 2+, no pathological reflexes    Significant Labs:  Recent Labs   Lab 08/12/19  0346 08/12/19 2112 08/12/19 2337 08/13/19 0228   * 145*  --  115*  115*   * 133* 136 136  136   K 4.5 4.3  --  4.7  4.7   CL 97 98  --  100  100   CO2 24 24  --  25  25   BUN 10 11  --  11  11   CREATININE 0.9 0.7  --  0.8  0.8   CALCIUM 9.3 8.5*  --  9.3  9.3   MG 1.7  --   --  2.0     Recent Labs   Lab 08/12/19  0346 08/12/19  1907 08/12/19 2112 08/13/19 0228   WBC 5.69  --  8.77 13.09*  13.09*   HGB 9.9*  --  9.0* 9.2*  9.2*   HCT 32.0* 32* 27.2* 28.7*  28.7*   *  --  155 152  152     Recent Labs   Lab 08/11/19  1435 08/12/19  0346   LABPT 13.0  --    INR 1.0  --    APTT  --  25.2     Microbiology Results (last 7 days)     ** No results found for the last 168 hours. **        ABGs:   Recent Labs   Lab 08/12/19  1907 08/13/19  0448   PH 7.482* 7.413   PCO2 32.0* 46.9*   PO2 206* 144*   HCO3 24.0 29.9*   POCSATURATED 100 99   BE 1 5     Cardiac markers:   Recent Labs   Lab 08/11/19  1435   TROPONINI <0.030     CMP:   Recent Labs   Lab 08/11/19  1435 08/12/19  0346 08/12/19  2112 08/12/19  2337 08/13/19  0228   GLU  117* 121* 145*  --  115*  115*   CALCIUM 9.3 9.3 8.5*  --  9.3  9.3   ALBUMIN 4.6 3.8  --   --  3.7   PROT 7.7 7.3  --   --  6.9   * 128* 133* 136 136  136   K 3.9 4.5 4.3  --  4.7  4.7   CO2 28 24 24  --  25  25   CL 92* 97 98  --  100  100   BUN 10 10 11  --  11  11   CREATININE 1.0 0.9 0.7  --  0.8  0.8   ALKPHOS 148* 164*  --   --  154*   ALT 9* 5*  --   --  7*   AST 16 13  --   --  17   BILITOT 0.5 0.3  --   --  0.2     CRP: No results for input(s): CRP in the last 48 hours.  ESR: No results for input(s): POCESR, ERYTHROCYTES in the last 48 hours.  LFTs:   Recent Labs   Lab 08/11/19  1435 08/12/19  0346 08/13/19  0228   ALT 9* 5* 7*   AST 16 13 17   ALKPHOS 148* 164* 154*   BILITOT 0.5 0.3 0.2   PROT 7.7 7.3 6.9   ALBUMIN 4.6 3.8 3.7     Procalcitonin: No results for input(s): PROCAL in the last 48 hours.    Significant Diagnostics:  I have reviewed all pertinent imaging results/findings within the past 24 hours.

## 2019-08-14 PROBLEM — G93.40 ENCEPHALOPATHIES: Status: ACTIVE | Noted: 2019-08-14

## 2019-08-14 LAB
ALBUMIN SERPL BCP-MCNC: 3.8 G/DL (ref 3.5–5.2)
ALP SERPL-CCNC: 137 U/L (ref 55–135)
ALT SERPL W/O P-5'-P-CCNC: 10 U/L (ref 10–44)
AMMONIA PLAS-SCNC: 44 UMOL/L (ref 10–50)
ANION GAP SERPL CALC-SCNC: 11 MMOL/L (ref 8–16)
AST SERPL-CCNC: 19 U/L (ref 10–40)
BASOPHILS # BLD AUTO: 0 K/UL (ref 0–0.2)
BASOPHILS NFR BLD: 0 % (ref 0–1.9)
BILIRUB SERPL-MCNC: 0.3 MG/DL (ref 0.1–1)
BUN SERPL-MCNC: 17 MG/DL (ref 6–20)
CALCIUM SERPL-MCNC: 9.9 MG/DL (ref 8.7–10.5)
CHLORIDE SERPL-SCNC: 102 MMOL/L (ref 95–110)
CO2 SERPL-SCNC: 29 MMOL/L (ref 23–29)
CREAT SERPL-MCNC: 0.9 MG/DL (ref 0.5–1.4)
DIFFERENTIAL METHOD: ABNORMAL
EOSINOPHIL # BLD AUTO: 0 K/UL (ref 0–0.5)
EOSINOPHIL NFR BLD: 0 % (ref 0–8)
ERYTHROCYTE [DISTWIDTH] IN BLOOD BY AUTOMATED COUNT: 14.3 % (ref 11.5–14.5)
EST. GFR  (AFRICAN AMERICAN): >60 ML/MIN/1.73 M^2
EST. GFR  (NON AFRICAN AMERICAN): >60 ML/MIN/1.73 M^2
GLUCOSE SERPL-MCNC: 125 MG/DL (ref 70–110)
HCT VFR BLD AUTO: 27.3 % (ref 37–48.5)
HGB BLD-MCNC: 8.6 G/DL (ref 12–16)
IMM GRANULOCYTES # BLD AUTO: 0.08 K/UL (ref 0–0.04)
IMM GRANULOCYTES NFR BLD AUTO: 0.6 % (ref 0–0.5)
LYMPHOCYTES # BLD AUTO: 0.2 K/UL (ref 1–4.8)
LYMPHOCYTES NFR BLD: 1.7 % (ref 18–48)
MAGNESIUM SERPL-MCNC: 2.2 MG/DL (ref 1.6–2.6)
MCH RBC QN AUTO: 32.3 PG (ref 27–31)
MCHC RBC AUTO-ENTMCNC: 31.5 G/DL (ref 32–36)
MCV RBC AUTO: 103 FL (ref 82–98)
MONOCYTES # BLD AUTO: 0.7 K/UL (ref 0.3–1)
MONOCYTES NFR BLD: 5.5 % (ref 4–15)
NEUTROPHILS # BLD AUTO: 12.4 K/UL (ref 1.8–7.7)
NEUTROPHILS NFR BLD: 92.2 % (ref 38–73)
NRBC BLD-RTO: 0 /100 WBC
PHOSPHATE SERPL-MCNC: 3.1 MG/DL (ref 2.7–4.5)
PLATELET # BLD AUTO: 161 K/UL (ref 150–350)
PMV BLD AUTO: 10 FL (ref 9.2–12.9)
POCT GLUCOSE: 118 MG/DL (ref 70–110)
POCT GLUCOSE: 127 MG/DL (ref 70–110)
POCT GLUCOSE: 136 MG/DL (ref 70–110)
POTASSIUM SERPL-SCNC: 4 MMOL/L (ref 3.5–5.1)
PROT SERPL-MCNC: 7 G/DL (ref 6–8.4)
RBC # BLD AUTO: 2.66 M/UL (ref 4–5.4)
SODIUM SERPL-SCNC: 142 MMOL/L (ref 136–145)
WBC # BLD AUTO: 13.45 K/UL (ref 3.9–12.7)

## 2019-08-14 PROCEDURE — 97161 PT EVAL LOW COMPLEX 20 MIN: CPT

## 2019-08-14 PROCEDURE — 85025 COMPLETE CBC W/AUTO DIFF WBC: CPT

## 2019-08-14 PROCEDURE — 82140 ASSAY OF AMMONIA: CPT

## 2019-08-14 PROCEDURE — 25000003 PHARM REV CODE 250: Performed by: NURSE PRACTITIONER

## 2019-08-14 PROCEDURE — 80053 COMPREHEN METABOLIC PANEL: CPT

## 2019-08-14 PROCEDURE — 93010 EKG 12-LEAD: ICD-10-PCS | Mod: ,,, | Performed by: INTERNAL MEDICINE

## 2019-08-14 PROCEDURE — 84100 ASSAY OF PHOSPHORUS: CPT

## 2019-08-14 PROCEDURE — 93010 ELECTROCARDIOGRAM REPORT: CPT | Mod: ,,, | Performed by: INTERNAL MEDICINE

## 2019-08-14 PROCEDURE — 99233 PR SUBSEQUENT HOSPITAL CARE,LEVL III: ICD-10-PCS | Mod: ,,, | Performed by: PSYCHIATRY & NEUROLOGY

## 2019-08-14 PROCEDURE — 93005 ELECTROCARDIOGRAM TRACING: CPT

## 2019-08-14 PROCEDURE — 63600175 PHARM REV CODE 636 W HCPCS: Performed by: STUDENT IN AN ORGANIZED HEALTH CARE EDUCATION/TRAINING PROGRAM

## 2019-08-14 PROCEDURE — 20000000 HC ICU ROOM

## 2019-08-14 PROCEDURE — 25000003 PHARM REV CODE 250: Performed by: STUDENT IN AN ORGANIZED HEALTH CARE EDUCATION/TRAINING PROGRAM

## 2019-08-14 PROCEDURE — 92523 SPEECH SOUND LANG COMPREHEN: CPT

## 2019-08-14 PROCEDURE — 94761 N-INVAS EAR/PLS OXIMETRY MLT: CPT

## 2019-08-14 PROCEDURE — S4991 NICOTINE PATCH NONLEGEND: HCPCS | Performed by: NURSE PRACTITIONER

## 2019-08-14 PROCEDURE — 97166 OT EVAL MOD COMPLEX 45 MIN: CPT

## 2019-08-14 PROCEDURE — 99233 SBSQ HOSP IP/OBS HIGH 50: CPT | Mod: ,,, | Performed by: PSYCHIATRY & NEUROLOGY

## 2019-08-14 PROCEDURE — 63600175 PHARM REV CODE 636 W HCPCS: Performed by: NURSE PRACTITIONER

## 2019-08-14 PROCEDURE — 63600175 PHARM REV CODE 636 W HCPCS: Performed by: PHYSICIAN ASSISTANT

## 2019-08-14 PROCEDURE — 83735 ASSAY OF MAGNESIUM: CPT

## 2019-08-14 RX ORDER — DIVALPROEX SODIUM 250 MG/1
500 TABLET, DELAYED RELEASE ORAL EVERY 12 HOURS
Status: DISCONTINUED | OUTPATIENT
Start: 2019-08-14 | End: 2019-09-10 | Stop reason: HOSPADM

## 2019-08-14 RX ORDER — QUETIAPINE FUMARATE 25 MG/1
25 TABLET, FILM COATED ORAL 3 TIMES DAILY PRN
Status: DISCONTINUED | OUTPATIENT
Start: 2019-08-14 | End: 2019-09-10 | Stop reason: HOSPADM

## 2019-08-14 RX ORDER — ENOXAPARIN SODIUM 100 MG/ML
30 INJECTION SUBCUTANEOUS EVERY 24 HOURS
Status: DISCONTINUED | OUTPATIENT
Start: 2019-08-14 | End: 2019-08-19

## 2019-08-14 RX ORDER — QUETIAPINE FUMARATE 25 MG/1
25 TABLET, FILM COATED ORAL EVERY 12 HOURS PRN
Status: DISCONTINUED | OUTPATIENT
Start: 2019-08-14 | End: 2019-08-14

## 2019-08-14 RX ADMIN — MUPIROCIN 1 G: 20 OINTMENT TOPICAL at 09:08

## 2019-08-14 RX ADMIN — DEXAMETHASONE SODIUM PHOSPHATE 4 MG: 4 INJECTION, SOLUTION INTRAMUSCULAR; INTRAVENOUS at 11:08

## 2019-08-14 RX ADMIN — QUETIAPINE FUMARATE 25 MG: 25 TABLET ORAL at 01:08

## 2019-08-14 RX ADMIN — NICOTINE 1 PATCH: 7 PATCH, EXTENDED RELEASE TRANSDERMAL at 08:08

## 2019-08-14 RX ADMIN — QUETIAPINE FUMARATE 25 MG: 25 TABLET ORAL at 11:08

## 2019-08-14 RX ADMIN — HEPARIN SODIUM 5000 UNITS: 5000 INJECTION, SOLUTION INTRAVENOUS; SUBCUTANEOUS at 06:08

## 2019-08-14 RX ADMIN — LEVOTHYROXINE SODIUM 75 MCG: 75 TABLET ORAL at 06:08

## 2019-08-14 RX ADMIN — DIVALPROEX SODIUM 500 MG: 250 TABLET, DELAYED RELEASE ORAL at 09:08

## 2019-08-14 RX ADMIN — SENNOSIDES,DOCUSATE SODIUM 1 TABLET: 8.6; 5 TABLET, FILM COATED ORAL at 09:08

## 2019-08-14 RX ADMIN — ENOXAPARIN SODIUM 30 MG: 100 INJECTION SUBCUTANEOUS at 05:08

## 2019-08-14 RX ADMIN — DEXAMETHASONE SODIUM PHOSPHATE 4 MG: 4 INJECTION, SOLUTION INTRAMUSCULAR; INTRAVENOUS at 05:08

## 2019-08-14 RX ADMIN — MUPIROCIN 1 G: 20 OINTMENT TOPICAL at 08:08

## 2019-08-14 RX ADMIN — DEXAMETHASONE SODIUM PHOSPHATE 4 MG: 4 INJECTION, SOLUTION INTRAMUSCULAR; INTRAVENOUS at 06:08

## 2019-08-14 RX ADMIN — LEVETIRACETAM 500 MG: 5 INJECTION INTRAVENOUS at 08:08

## 2019-08-14 RX ADMIN — QUETIAPINE FUMARATE 25 MG: 25 TABLET ORAL at 04:08

## 2019-08-14 RX ADMIN — POLYETHYLENE GLYCOL 3350 17 G: 17 POWDER, FOR SOLUTION ORAL at 08:08

## 2019-08-14 RX ADMIN — SENNOSIDES,DOCUSATE SODIUM 1 TABLET: 8.6; 5 TABLET, FILM COATED ORAL at 08:08

## 2019-08-14 RX ADMIN — DEXAMETHASONE SODIUM PHOSPHATE 4 MG: 4 INJECTION, SOLUTION INTRAMUSCULAR; INTRAVENOUS at 12:08

## 2019-08-14 RX ADMIN — PANTOPRAZOLE SODIUM 40 MG: 40 TABLET, DELAYED RELEASE ORAL at 08:08

## 2019-08-14 RX ADMIN — QUETIAPINE FUMARATE 25 MG: 25 TABLET ORAL at 10:08

## 2019-08-14 NOTE — PT/OT/SLP EVAL
Physical Therapy Evaluation    Patient Name:  Carmen Leyva  MRN: [id number]    Recommendations:     Discharge Recommendations: Inpatient Rehabilitation Facility   Equipment recommendations: TBD  Barriers to discharge: Decreased caregiver support available  and Fall risk     Assessment:     Carmen Leyva is a 59 y.o. female [facility] with medical diagnosis of brain metastasis. Pt is s/p L craniotomy for tumor resection. Pt presents with weakness, impaired balance, decreased endurance, impaired cardiopulmonary response to activity, decreased safety awareness, impaired cognition, altered gait mechanics and impaired functional mobility . Pt impulsive and hyperverbal, but cooperative during session and participatory in therapeutic activities.  Carmen Leyva would benefit from acute PT intervention to address listed functional deficits, provide patient/caregiver education, reduce fall risk, and maximize (I) and safety with functional mobility. Once medically stable, recommending pt discharge to inpatient rehabilitation facility for continued therapy.      Rehab Prognosis: Good; based on positive indicators including potential for functional gains within an intensive rehab program , pt motivation to participate    Plan:     Patient to be seen 4 x/week to address the above listed problems via therapeutic exercises, therapeutic activity, neuromuscular re-education and gait training     Plan of Care Expires: 9/12/19  Plan of Care reviewed with: patient, son (discussed with son on telephone)     This plan of care has been discussed with the patient/caregiver, who was included in its development and is in agreement with the identified goals and treatment plan.     Subjective     Communicated with RN prior to session.  Patient agreeable to participate. Sitter at bedside. Pt speaking on phone with her son (David) upon PT arrival and requesting therapist speak with her son. Therapist briefly discussed role of  "PT, and pt's living environment history with son.     Patient comments/goals: pt unable to clearly state goals; pt agreeable to participating in mobility tasks. Pt hyperverbal throughout.     Pain/Comfort: pt with difficulty localizing or rating pain on numeric scale; when prompted if she had pain, pt reporting previous pain in face and UE      Patients cultural, spiritual, Faith conflicts given the current situation: No known conflicts     Patient History: information obtained from pt and pt's son     Living Environment: Pt lives in apartment next to the house of her mother and nephew. Apt is 1 level with 1 threshold ESDRAS. Bathroom set-up: unknown  Prior Level of Function: unknown if pt was using a RW for mobility most recently prior to admit (pt is a poor historian and pt's son unsure of pt's DME at home). Pt stated that she "sometimes" used a RW for mobility; assisted with caregiving for her mother.   DME owned: rolling walker   Caregiver Assistance: limited assistance     Objective:     Patient found with: peripheral IV , restraints and blood pressure cuff    General Precautions: Fall, Standard and Hearing impaired  Orthopedic Precautions: N/A  Braces: N/A     Exams:     Cognition:  o Pt is alert and oriented x person, year (not month); also oriented to place when provided with binary choices   o Pt jo-ann to inconsistently follow single-step commands throughout session (~75 %)   o decreased safety awareness     Vision: impaired; pt reported that she is legally blind     Sensation:   o Grossly intact light touch sensation BLEs     Edema: none noted BLEs and BUEs      Posture: rounded shoulders, forward flexed trunk, forward head, posterior lean      Lower Extremity Range of Motion:  o Right Lower Extremity: WFL AROM  o Left Lower Extremity: WFL AROM      Lower Extremity Strength:  o Right Lower Extremity: grossly decreased, but at least 3+5; pt with difficulty following cueing for MMT, but able to hold " against minimal resistance   o Left Lower Extremity: grossly decreased, but at least 3+5; pt with difficulty following cueing for MMT, but able to hold against minimal resistance     Functional Mobility:    Bed Mobility:  · Supine > Sit: Minimal Assistance   · (A) required to re-direct pt to appropriate side of bed for sitting   · Sit > Supine: Stand-by Assistance    Sitting Balance EOB:   · Assistance level: Contact Guard Assistance    Transfers:   · Sit <> Stand Transfer: Minimal Assistance from EOB with no AD x 2 trials     Standing Balance:   · Pt able to maintain static standing balance and dynamic gait trials with 1 UE support and min A. No overt LOB, but generalized instability noted.                  Gait:  · Distance: pt performed ~5 forward and backward steps x 2 trials; unable to follow cueing/coordinate motor task of marching in place   · Assistance level: Minimal Assistance  · Assistive Device: no AD, 1 UE support provided   · Gait Assessment: narrow base of support, decreased step length, mild instability     Outcome Measure: AM-PAC 6 CLICK MOBILITY  Scorin= unable; 2= a lot; 3= a little; 4= none    Turning over in bed (including adjusting bedclothes, sheets and blankets)?  4   Moving from lying on back to sitting on the side of the bed?:  3   Sitting down on and standing up from a chair with arms (e.g., wheelchair, bedside commode, etc.)  3   Moving to and from a bed to a chair (including a wheelchair)?  3   Need to walk in hospital room?  3   Climbing 3-5 steps with a railing?  2   Basic Mobility Total Score  18       Patient/Caregiver Education:     Therapist educated pt/caregiver regarding:    PT POC and goals for therapy    Safety with mobility and fall risk    Instruction on use of call button and importance of calling nursing staff for assistance with mobility     Patient able to verbalize understanding, but will require reinforcement due to cognitive deficits; will follow-up with  pt/caregiver during current admit for additional questions/concerns within scope of practice.     White board updated.     Patient left HOB elevated with all lines intact, call button in reach, restraints reapplied at end of session, RN notified and sitter present.    GOALS:   Multidisciplinary Problems     Physical Therapy Goals        Problem: Physical Therapy Goal    Goal Priority Disciplines Outcome Goal Variances Interventions   Physical Therapy Goal     PT, PT/OT Ongoing (interventions implemented as appropriate)     Description:  Goals to be met by: 2019    Patient will increase functional independence with mobility by performin. Supine <> sit with supervision   2. Sit to stand transfer with Supervision  3. Gait  x 150 feet with Supervision using appropriate AD.   4. Stand for 3 minutes with Supervision while performing dynamic balance activities to reduce fall risk   5. Lower extremity exercise program x20 reps per handout, with assistance as needed                        History:     Past Medical History:   Diagnosis Date    Acid reflux     Anemia     Anxiety     Arthritis     Blindness - both eyes     Chronic kidney disease     COPD (chronic obstructive pulmonary disease)     GERD (gastroesophageal reflux disease)     Gout     Hypertension     Lung cancer     Lung cancer, main bronchus, right 2018    Osteopenia     Rheumatoid arteritis     SIADH (syndrome of inappropriate ADH production) 2017    Solitary kidney     Thyroid disease     Vitamin D deficiency        Past Surgical History:   Procedure Laterality Date    ABDOMINAL ADHESION SURGERY      ADENOIDECTOMY      kidney removal      right     L craniotomy for tumor Left 2019    Performed by Gabo Erickson MD at [facility] OR [address] [place] [address]    TONSILLECTOMY         Time Tracking:     PT Received On: 19   PT Start Time: 1024   PT Stop Time: 1048   PT Total Time (min): 24 min     Billable Minutes: Evaluation 24  min    Tasha Ureña, PT, DPT   8/14/2019  Pager: [phone]

## 2019-08-14 NOTE — PT/OT/SLP EVAL
Occupational Therapy   Evaluation    Name: Carmen Leyva  MRN: [id number]  Admitting Diagnosis:  Brain metastasis 2 Days Post-Op    Recommendations:     Discharge Recommendations: rehabilitation facility  Discharge Equipment Recommendations:  (TBD)  Barriers to discharge:  (unknown)    Assessment:     Carmen Leyva is a 59 y.o. female with a medical diagnosis of Brain metastasis.  She presents with decreased safety & independence with ADL's.  Pt is very impulsive & has very poor vision. Performance deficits affecting function: weakness, impaired self care skills, impaired functional mobilty, impaired sensation, impaired balance, impaired cognition, decreased safety awareness.      Rehab Prognosis: Fair; patient would benefit from acute skilled OT services to address these deficits and reach maximum level of function.       Plan:     Patient to be seen 3 x/week to address the above listed problems via self-care/home management, therapeutic activities, therapeutic exercises, cognitive retraining  · Plan of Care Expires: 09/13/19  · Plan of Care Reviewed with: patient    Subjective   Pt was very hyperverbal with pressured speech throughout session requiring redirections.    Chief Complaint: none stated  Patient/Family Comments/goals: none stated    Occupational Profile:  Living Environment & PLOF: Pt resides alone in apartment behind her mother's house with no steps to enter however mother's house has 1 step to enter.  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  Pt was independent with ADL's & ambulation.  Pt reports that she does not drive due to being legally blind.  Pt has a walk-in shower.  (pt is an inconsistent historian).  Equipment Used at Home:  (unknown - potentially a walker)  Assistance upon Discharge: unknown    Pain/Comfort:  · Pain Rating Post-Intervention 1: (pt reported pain in face & at surgical site however did not rate pain  level)    Patients cultural, spiritual, Latter-day conflicts given the current situation: no    Objective:     Communicated with: RN prior to session.  Patient found supine with blood pressure cuff, telemetry, pulse ox (continuous), restraints(sitter present in room) upon OT entry to room.    General Precautions: Standard, fall, seizure, hearing impaired, vision impaired     Occupational Performance:    Bed Mobility:    · Patient completed Supine to Sit with stand by assistance  · Patient completed Sit to Supine with stand by assistance    Functional Mobility/Transfers:  · Patient completed Sit <> Stand Transfer with minimum assistance  with  no assistive device     Activities of Daily Living:  · Upper Body Dressing: minimum assistance seated EOB  · Lower Body Dressing: stand by assistance donning socks seated EOB    Cognitive/Visual Perceptual:  Cognitive/Psychosocial Skills:     -       Oriented to: Person, Situation and hospital with choices & year but not month   -       Follows Commands/attention:Inattentive and Easily distracted  -       Communication: hyperverbal, pressured speech  -       Memory: questionable  -       Safety awareness/insight to disability: impaired   -       Mood/Affect/Coping skills/emotional control: impulsive, anxious  Visual/Perceptual:      -pt reported that she is legally blind, appeared to have visual inattention to the left during activities.      Physical Exam:  Sensation:    -       Impaired  questionably to the RUE per pt report  Dominant hand:    -       right  Upper Extremity Range of Motion:     -       Right Upper Extremity: WFL  -       Left Upper Extremity: WFL  Upper Extremity Strength:    -       Right Upper Extremity: WFL  -       Left Upper Extremity: WFL   Strength:    -       Right Upper Extremity: WFL  -       Left Upper Extremity: WFL    AMPAC 6 Click ADL:  AMPAC Total Score: 18    Treatment & Education:  Pt required frequent redirections during session.  Pt  with general tremors throughout body & was very impulsive with mobility.  Provided education regarding role of OT, POC, & discharge recommendations with pt verbalizing understanding.  Pt had no further questions & when asked whether there were any concerns pt reported none.      Education:    Patient left supine with all lines intact, call button in reach, bed alarm on, restraints reapplied at end of session, RN notified, sitter present and white board updated.    GOALS:   Multidisciplinary Problems     Occupational Therapy Goals        Problem: Occupational Therapy Goal    Goal Priority Disciplines Outcome Interventions   Occupational Therapy Goal     OT, PT/OT Ongoing (interventions implemented as appropriate)    Description:  Goals to be met by: 8/24     Patient will increase functional independence with ADLs by performing:    UE Dressing with Modified [place].  LE Dressing with Modified [place].  Grooming while standing with Modified [place].  Toileting from toilet with Modified [place] for hygiene and clothing management.   Supine to sit with Modified [place].  Stand pivot transfers with Modified [place].                      History:     Past Medical History:   Diagnosis Date    Acid reflux     Anemia     Anxiety     Arthritis     Blindness - both eyes     Chronic kidney disease     COPD (chronic obstructive pulmonary disease)     GERD (gastroesophageal reflux disease)     Gout     Hypertension     Lung cancer     Lung cancer, main bronchus, right 1/2/2018    Osteopenia     Rheumatoid arteritis     SIADH (syndrome of inappropriate ADH production) 11/12/2017    Solitary kidney     Thyroid disease     Vitamin D deficiency        Past Surgical History:   Procedure Laterality Date    ABDOMINAL ADHESION SURGERY      ADENOIDECTOMY      kidney removal      right     L craniotomy for tumor Left 8/12/2019    Performed by Gabo Erickson MD at [facility] OR [address] [place] [address]     TONSILLECTOMY         Time Tracking:     OT Date of Treatment: 08/14/19  OT Start Time: 1025  OT Stop Time: 1050  OT Total Time (min): 25 min    Billable Minutes:Evaluation 25    OSIEL Stiles  8/14/2019

## 2019-08-14 NOTE — PLAN OF CARE
Problem: Occupational Therapy Goal  Goal: Occupational Therapy Goal  Goals to be met by: 8/24     Patient will increase functional independence with ADLs by performing:    UE Dressing with Modified [place].  LE Dressing with Modified [place].  Grooming while standing with Modified [place].  Toileting from toilet with Modified [place] for hygiene and clothing management.   Supine to sit with Modified [place].  Stand pivot transfers with Modified [place].    Outcome: Ongoing (interventions implemented as appropriate)  OT eval completed.

## 2019-08-14 NOTE — PLAN OF CARE
Problem: Physical Therapy Goal  Goal: Physical Therapy Goal  Goals to be met by: 2019    Patient will increase functional independence with mobility by performin. Supine <> sit with supervision   2. Sit to stand transfer with Supervision  3. Gait  x 150 feet with Supervision using appropriate AD.   4. Stand for 3 minutes with Supervision while performing dynamic balance activities to reduce fall risk   5. Lower extremity exercise program x20 reps per handout, with assistance as needed    Outcome: Ongoing (interventions implemented as appropriate)  PT evaluation completed- see note for details. Goals established and POC initiated.     Tasha Ureña, PT, DPT   2019  Pager: [phone]

## 2019-08-14 NOTE — SUBJECTIVE & OBJECTIVE
Interval History:  >4 elements OR status of 3 inpatient conditions    Review of Systems   Unable to perform ROS: Mental status change     2 systems OR Unable to obtain a complete ROS due to level of consciousness.  Objective:     Vitals:  Temp: 97.5 °F (36.4 °C)  Pulse: (!) 111  Rhythm: sinus tachycardia  BP: 91/68  MAP (mmHg): 75  Resp: (!) 24  SpO2: 99 %  O2 Device (Oxygen Therapy): room air    Temp  Min: 97 °F (36.1 °C)  Max: 97.9 °F (36.6 °C)  Pulse  Min: 79  Max: 128  BP  Min: 91/68  Max: 141/85  MAP (mmHg)  Min: 68  Max: 107  Resp  Min: 12  Max: 57  SpO2  Min: 90 %  Max: 100 %    08/13 0701 - 08/14 0700  In: 1580.8 [P.O.:1250; I.V.:130.8]  Out: 2550 [Urine:2550]   Unmeasured Output  Urine Occurrence: 1  Stool Occurrence: 0  Pad Count: 2       Physical Exam  Constitutional: Agitated.  Eyes: Conjunctiva clear, anicteric. Lids no lesions.  Head/Ears/Nose/Mouth/Throat/Neck: Moist mucous membranes. External ears, nose atraumatic.   Cardiovascular: Regular rhythm. No murmurs.   Respiratory: Comfortable respirations. Clear to auscultation.  Gastrointestinal: No hernia. Soft, nondistended, nontender. + bowel sounds.      Neurologic Examination:    -Mental Status: Alert. Oriented to self but not to place and situation. Follows simple commands intermittently   -Cranial nerves: PERRL. Disconjugate gate   -Motor:  Moves all extremities spontaneously       Medications:  Continuous Scheduled  dexamethasone 4 mg Q6H   divalproex 500 mg Q12H   enoxaparin 30 mg Daily   levothyroxine 75 mcg Before breakfast   mupirocin 1 g BID   nicotine 1 patch Daily   pantoprazole 40 mg Daily   polyethylene glycol 17 g Daily   senna-docusate 8.6-50 mg 1 tablet BID   PRN  acetaminophen 650 mg Q6H PRN   albuterol-ipratropium 3 mL Q4H PRN   ondansetron 4 mg Q8H PRN   oxyCODONE 5 mg Q6H PRN   QUEtiapine 25 mg TID PRN   sodium chloride 0.9% 10 mL PRN     Today I personally reviewed pertinent medications, imaging, laboratory results,  notably:    Diet  Diet Adult Regular (IDDSI Level 7)  Diet Adult Regular (IDDSI Level 7)

## 2019-08-14 NOTE — PLAN OF CARE
Problem: Adult Inpatient Plan of Care  Goal: Patient-Specific Goal (Individualization)  Admit Date: 8/11/2019  8:03 PM      Admit Dx:Brain metastasis [C79.31]  Brain metastasis [C79.31]  Brain metastasis [C79.31]  Brain metastasis [C79.31]      Past Medical History:  No date: Acid reflux  No date: Anemia  No date: Anxiety  No date: Arthritis  No date: Blindness - both eyes  No date: Chronic kidney disease  No date: COPD (chronic obstructive pulmonary disease)  No date: GERD (gastroesophageal reflux disease)  No date: Gout  No date: Hypertension  No date: Lung cancer  1/2/2018: Lung cancer, main bronchus, right  No date: Osteopenia  No date: Rheumatoid arteritis  11/12/2017: SIADH (syndrome of inappropriate ADH production)  No date: Solitary kidney  No date: Thyroid disease  No date: Vitamin D deficiency    Past Surgical History:  No date: ABDOMINAL ADHESION SURGERY  No date: ADENOIDECTOMY  No date: kidney removal      Comment:  right   8/12/2019: L craniotomy for tumor; Left      Comment:  Performed by Gabo Erickson MD at [facility] OR [address] [place] [address]  No date: TONSILLECTOMY    Individualization:   1. Pt. Is confuse and needs a sitter.    Restraints: Bilateral soft restraints ordered because to patient is pulling lines.      Outcome: Ongoing (interventions implemented as appropriate)  POC reviewed with pt and family at 1200. Pt is still confuse. Education was given to the son.Questions and concerns addressed. Pt, with episode of agitation. Restraints is on and PRN Seraquel was given. Pt progressing toward goals. Pt. With transfer order to the floor. Will continue to monitor. See flowsheets for full assessment and VS info.

## 2019-08-14 NOTE — OP NOTE
DATE OF PROCEDURE:  8/12/2019     SURGEON:  Gabo Erickson M.D., Ph.D.     ASSISTANTS:   Yehuda Bradley M.D. (RES)     PREOPERATIVE DIAGNOSES:  1.   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  2.   History of small cell lung cancer.  3.  COPD.  4.  Altered mental status.    POSTOPERATIVE DIAGNOSES:  1.   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  2.   History of small cell lung cancer.  3.  COPD.  4.  Altered mental status.     PROCEDURES:  1.  Left temporal craniotomy for tumor.  2.  Stealth navigation.  3.  Microsurgical technique.     INDICATIONS IN DETAIL:    This patient is a 54-year-old woman with a known history of COPD and small cell lung cancer.  The patient has by report been treated for her small cell lung cancer with chemotherapy.  The patient presented to the hospital after altered mental status and problems with her gait.  There is no known history of a brain tumor in this patient.  There is no known history of treatment of the brain with whole brain radiation at this time.  The patient presented with a large temporal mass that is somewhat atypical for small cell lung cancer.  Given the changes in this patient and the atypical appearance, the patient warrants a decompression.  This patient is altered and would not tolerate an awake craniotomy with speech mapping.  We will therefore decompress her and take care not to remove any normal-appearing cortex in the speech area.    PROCEDURE IN DETAIL:    The patient was brought to the operating room directly from the ICU.  She was intubated by the anesthesia staff.  All proper lines were placed.  Patient was placed in the supine position with a large bump under her left shoulder.  The patient was placed in three-point fixation using a Mayen head navarro which was attached to the operative table.  All pressure points were padded in this patient.  The stealth navigation system was brought into the field at any an accurate registration was achieved  using the tracer function.  The area of tumor could be seen and was marked on the patient's skin.  The patient's hair was clipped in this region.  A reverse horseshoe line was then drawn from the root of the zygoma over the ear and back to the posterior temporal region.  Patient's head was cleaned prepped and draped in usual fashion.  A lidocaine bupivacaine mix was infiltrated under the skin.  Incision was made using 10.  Blade and carried down to the calvarium using Bovie cautery.  The skin and muscle was then dissected from the calvarium using a periosteal elevator and held in retraction using fishhooks which was then attached to a Mabel bar.  Two bur holes were made at the inferior extremes of the exposure, 1 over the root of zygoma and the other in the posterior temporal region.  Then using a high-speed [employer] drill with a craniotome attachment, a temporal craniotomy was turned.  Once the bone flap was removed tack-up sutures were made.  The microscope was brought into the field and inspection of the dura was carefully made.  There was clear tumor growing through the dura at this time.  We opened along the edges of the superior exposure.  We then made a circumferential opening given that the dura had been breached.  Under microscopic visualization we could see that the tumor had grown through the brain and into the dura.  We began by 1st  the dura from the exophytic portion of the tumor growing through the brain.  We then began by  normal brain from the tumor.  Once this was performed, we then debulked the tumor using suction and bipolar cautery.  This the bulking was quite extensive given the size of this tumor.  All the necrotic area was removed.  We dissected down until we could see what appeared to be a mixture of normal brain and tumor.  At this point we had greatly decompressed this region.  We continued all around and all quadrants of the exposure.  Once this was completed we had  successfully debulk this patient we turned our attention to closure.  The wound was irrigated copiously.  All bleeding points were coagulated.  The resection cavity was lined with Surgicel and sealed with Evicel.  We took a 2 x 2 cm piece of DuraGen to cover the area where the dura had been removed.  The bone flap was replaced using Barrington plates and screws.  The soft tissues were then closed in layers with staples in the skin.  A clean dressing was placed.  Patient was taken out 3 point fixation.  Patient was placed back in the ICU bed and awakened by the anesthesia staff.  At the point the patient was moving all extremities and awake she was extubated.  The patient was transferred back to the ICU in good condition.    All counts were correct at the end of the surgery.  There are no intraprocedural complications. Dr. Gabo Erickson was present during the entire procedure.  EBL was approximately 100 mL.

## 2019-08-14 NOTE — PT/OT/SLP EVAL
Speech Language Pathology Evaluation  Cognitive Communication    Patient Name:  Carmen Leyva   MRN:  [id number]  Admitting Diagnosis: Brain metastasis    Recommendations:     Recommendations:                General Recommendations:  Cognitive-linguistic therapy  Diet recommendations:  Regular, Thin   Aspiration Precautions: Feed only when awake/alert, HOB to 90 degrees and Small bites/sips   General Precautions: Standard, other (see comments)  Communication strategies:  none    History:     Past Medical History:   Diagnosis Date    Acid reflux     Anemia     Anxiety     Arthritis     Blindness - both eyes     Chronic kidney disease     COPD (chronic obstructive pulmonary disease)     GERD (gastroesophageal reflux disease)     Gout     Hypertension     Lung cancer     Lung cancer, main bronchus, right 1/2/2018    Osteopenia     Rheumatoid arteritis     SIADH (syndrome of inappropriate ADH production) 11/12/2017    Solitary kidney     Thyroid disease     Vitamin D deficiency        Past Surgical History:   Procedure Laterality Date    ABDOMINAL ADHESION SURGERY      ADENOIDECTOMY      kidney removal      right     L craniotomy for tumor Left 8/12/2019    [facility]    TONSILLECTOMY         Social History: Patient lives alone.    Prior diet: Regular/thin.    Occupation/hobbies/homemaking: Pt enjoys fishing and crabbing.      Subjective     Awake/alert    Pain/Comfort:  · Pain Rating 1: 0/10  · Pain Rating Post-Intervention 1: 0/10    Objective:   Cognitive Status:    Attention Divided attention deficit    Orientation Person  Problem Solving Conclusions 1/2 accy  and Categories divergent Pt named 3 objects within a concrete category with 50% accy       Receptive Language:   Comprehension:   Questions Complex yes/no 50%, simple 75% accy   Commands  One step 100%    Pragmatics:    topic maintenance poor    Expressive Language:  Verbal:    Verbal language skills  were wfl with no evidence of aphasia.  Pt. Expressed their thoughts coherently in conversation with no evidence of confusion or word finding deficits  Nonverbal:   WFL      Motor Speech:  WFL    Voice:   WFL    Visual-Spatial:  TBA    Reading:   TBA     Written Expression:   TBA        Assessment:   Carmen Leyva is a 59 y.o. female with an SLP diagnosis of Cognitive-Linguistic Impairment.      Goals:   Multidisciplinary Problems     SLP Goals        Problem: SLP Goal    Goal Priority Disciplines Outcome   SLP Goal     SLP    Description:  Speech Language Pathology Goals  Goals expected to be met by 8/20:  1. Patient will tolerate a regular diet and thin liquids with no overt signs of airway compromise.   2. Patient will participate in a full speech, language, and cognitive assessment when appropriate.  MET  3. Pt will answer simple yes/no questions with 80% accy   4. Pt will complete simple problem solving tasks with 70% accy   5. Pt will orient x4 with mod cues  6. Pt will participate in ongoing assessment of cognitive linguistic skills.                          Plan:   · Patient to be seen:  4 x/week   ·    · Plan of Care reviewed with:  patient   · SLP Follow-Up:  Yes       Discharge recommendations:  Discharge Facility/Level of Care Needs: other (see comments)       Time Tracking:   SLP Treatment Date:   08/14/19  Speech Start Time:  0856  Speech Stop Time:  0907     Speech Total Time (min):  11 min    Billable Minutes: Jacques 11     Mami Mo CCC-SLP  08/14/2019

## 2019-08-14 NOTE — SUBJECTIVE & OBJECTIVE
Interval History: NAEON. Pending floor transfer. Remains aphasic.     Medications:  Continuous Infusions:  Scheduled Meds:   dexamethasone  4 mg Intravenous Q6H    divalproex  500 mg Oral Q12H    enoxaparin  30 mg Subcutaneous Daily    levothyroxine  75 mcg Oral Before breakfast    mupirocin  1 g Nasal BID    nicotine  1 patch Transdermal Daily    pantoprazole  40 mg Oral Daily    polyethylene glycol  17 g Oral Daily    senna-docusate 8.6-50 mg  1 tablet Oral BID     PRN Meds:acetaminophen, albuterol-ipratropium, ondansetron, oxyCODONE, QUEtiapine, sodium chloride 0.9%     Review of Systems  Objective:     Weight: 35.4 kg (78 lb)  Body mass index is 14.74 kg/m².  Vital Signs (Most Recent):  Temp: 97.7 °F (36.5 °C) (08/14/19 0701)  Pulse: (!) 116 (08/14/19 1001)  Resp: (!) 31 (08/14/19 1001)  BP: 91/68 (08/14/19 1001)  SpO2: 95 % (08/14/19 1001) Vital Signs (24h Range):  Temp:  [97 °F (36.1 °C)-97.9 °F (36.6 °C)] 97.7 °F (36.5 °C)  Pulse:  [] 116  Resp:  [12-57] 31  SpO2:  [90 %-100 %] 95 %  BP: ()/(55-85) 91/68     Date 08/14/19 0700 - 08/15/19 0659   Shift 6997-7920 1319-0322 6399-6362 24 Hour Total   INTAKE   P.O. 200   200   I.V.(mL/kg) 10(0.3)   10(0.3)   IV Piggyback 100   100   Shift Total(mL/kg) 310(8.8)   310(8.8)   OUTPUT   Urine(mL/kg/hr) 300   300   Shift Total(mL/kg) 300(8.5)   300(8.5)   Weight (kg) 35.4 35.4 35.4 35.4                        Neurosurgery Physical Exam    General: AOx2, GCS E4V3M6, aphasic   CNII-XII: Intact on fine exam, PERRL, visual fields grossly intact, EOMi, facial sensation preserved, no facial assymetry, tongue/uvula/palate midline, shoulder shrug equal, no pronator drift  Extremities: FC x4 (R sided neglect)    Significant Labs:  Recent Labs   Lab 08/12/19  2112 08/12/19  2337 08/13/19  0228 08/14/19  0410   *  --  115*  115* 125*   * 136 136  136 142   K 4.3  --  4.7  4.7 4.0   CL 98  --  100  100 102   CO2 24  --  25  25 29   BUN 11  --   11  11 17   CREATININE 0.7  --  0.8  0.8 0.9   CALCIUM 8.5*  --  9.3  9.3 9.9   MG  --   --  2.0 2.2     Recent Labs   Lab 08/12/19 2112 08/13/19 0228 08/14/19  0410   WBC 8.77 13.09*  13.09* 13.45*   HGB 9.0* 9.2*  9.2* 8.6*   HCT 27.2* 28.7*  28.7* 27.3*    152  152 161         Significant Diagnostics:  I have reviewed and interpreted all pertinent imaging results/findings within the past 24 hours.   No results found in the last 24 hours.

## 2019-08-14 NOTE — ANESTHESIA POSTPROCEDURE EVALUATION
Anesthesia Post Evaluation    Patient: Carmen Leyva    Procedure(s) Performed: Procedure(s) (LRB):  L craniotomy for tumor (Left)    Final Anesthesia Type: general  Patient location during evaluation: NICU  Patient participation: Yes- Able to Participate  Level of consciousness: awake  Post-procedure vital signs: reviewed and stable  Pain management: adequate  Airway patency: patent  PONV status at discharge: No PONV  Anesthetic complications: no      Cardiovascular status: blood pressure returned to baseline  Respiratory status: unassisted  Hydration status: euvolemic  Follow-up not needed.          Vitals Value Taken Time   /55 8/13/2019  9:07 PM   Temp 36.3 °C (97.4 °F) 8/13/2019  8:00 PM   Pulse 95 8/13/2019  9:38 PM   Resp 43 8/13/2019  9:38 PM   SpO2 69 % 8/13/2019  9:38 PM   Vitals shown include unvalidated device data.      No case tracking events are documented in the log.      Pain/Brook Score: Pain Rating Prior to Med Admin: 5 (8/13/2019  7:30 AM)  Pain Rating Post Med Admin: 0 (8/12/2019 10:17 PM)

## 2019-08-14 NOTE — PROGRESS NOTES
[facility]  Neurosurgery  Progress Note    Subjective:     History of Present Illness: 59F w/ PMH COPD, HTN, hypothyroidism, stage 4 R small cell lung cancer s/p 6 cycles of carbo/etoposide in remission since 06/2018 who presents to [facility] ED from OSH w/ L parietal brain mass. Per EMS records patient had a 1d history of AMS and gait difficulty and was brought into the hospital by her family for evaluation. CTH @ OSH showed large L parietal brain mass with possible hemorrhagic component and she was transferred to [facility] for evaluation. MRI @ [facility] redemonstrated L nonenhancing parietal mass with minimal blood. Patient is confused and so ROS is tenuous.    Post-Op Info:  Procedure(s) (LRB):  L craniotomy for tumor (Left)   2 Days Post-Op     Interval History: NAEON. Pending floor transfer. Remains aphasic.     Medications:  Continuous Infusions:  Scheduled Meds:   dexamethasone  4 mg Intravenous Q6H    divalproex  500 mg Oral Q12H    enoxaparin  30 mg Subcutaneous Daily    levothyroxine  75 mcg Oral Before breakfast    mupirocin  1 g Nasal BID    nicotine  1 patch Transdermal Daily    pantoprazole  40 mg Oral Daily    polyethylene glycol  17 g Oral Daily    senna-docusate 8.6-50 mg  1 tablet Oral BID     PRN Meds:acetaminophen, albuterol-ipratropium, ondansetron, oxyCODONE, QUEtiapine, sodium chloride 0.9%     Review of Systems  Objective:     Weight: 35.4 kg (78 lb)  Body mass index is 14.74 kg/m².  Vital Signs (Most Recent):  Temp: 97.7 °F (36.5 °C) (08/14/19 0701)  Pulse: (!) 116 (08/14/19 1001)  Resp: (!) 31 (08/14/19 1001)  BP: 91/68 (08/14/19 1001)  SpO2: 95 % (08/14/19 1001) Vital Signs (24h Range):  Temp:  [97 °F (36.1 °C)-97.9 °F (36.6 °C)] 97.7 °F (36.5 °C)  Pulse:  [] 116  Resp:  [12-57] 31  SpO2:  [90 %-100 %] 95 %  BP: ()/(55-85) 91/68     Date 08/14/19 0700 - 08/15/19 0659   Shift 7900-4934 5530-0315 4608-0390 24 Hour Total   INTAKE   P.O. 200   200   I.V.(mL/kg) 10(0.3)    10(0.3)   IV Piggyback 100   100   Shift Total(mL/kg) 310(8.8)   310(8.8)   OUTPUT   Urine(mL/kg/hr) 300   300   Shift Total(mL/kg) 300(8.5)   300(8.5)   Weight (kg) 35.4 35.4 35.4 35.4                        Neurosurgery Physical Exam    General: AOx2, GCS E4V3M6, aphasic   CNII-XII: Intact on fine exam, PERRL, visual fields grossly intact, EOMi, facial sensation preserved, no facial assymetry, tongue/uvula/palate midline, shoulder shrug equal, no pronator drift  Extremities: FC x4 (R sided neglect)    Significant Labs:  Recent Labs   Lab 08/12/19 2112 08/12/19 2337 08/13/19 0228 08/14/19  0410   *  --  115*  115* 125*   * 136 136  136 142   K 4.3  --  4.7  4.7 4.0   CL 98  --  100  100 102   CO2 24  --  25  25 29   BUN 11  --  11  11 17   CREATININE 0.7  --  0.8  0.8 0.9   CALCIUM 8.5*  --  9.3  9.3 9.9   MG  --   --  2.0 2.2     Recent Labs   Lab 08/12/19 2112 08/13/19 0228 08/14/19  0410   WBC 8.77 13.09*  13.09* 13.45*   HGB 9.0* 9.2*  9.2* 8.6*   HCT 27.2* 28.7*  28.7* 27.3*    152  152 161         Significant Diagnostics:  I have reviewed and interpreted all pertinent imaging results/findings within the past 24 hours.   No results found in the last 24 hours.      Assessment/Plan:     * Brain metastasis  59F w/ PMH COPD, HTN, hypothyroidism, stage 4 R small cell lung cancer s/p 6 cycles of carbo/etoposide in remission since 06/2018 who presents to [facility] ED from OSH w/ L parietal brain mass:    Neuro stable.   --SBP <160   --Na >135  --Keppra 500 BID  --Dex 4q6, will taper at discharge to 3 week taper to 2 BID  --HOB >30  --Recommend Heme/onc work-up for restaging and prognostication  --PPI  PT, OT, ST.   TTF today        Jessica Escamilla MD  Neurosurgery  [facility]

## 2019-08-14 NOTE — PROGRESS NOTES
[facility]  Neurocritical Care  Progress Note    Admit Date: 8/11/2019  Service Date: 08/14/2019  Length of Stay: 3    Subjective:     Chief Complaint: Brain metastasis    History of Present Illness: is a 59 year old F with PMH of COPD, HTN, hypothyroidism,  stage 4 R small cell lung cancer s/p 6 cycles of carbo/etoposide in remission since 06/2018 who presents to [facility] for newly diagnosed L temporoparietal brain mass with vasogenic edema. She came as a transfer from [facility] ED for altered mental status, and abnormal gait. CT Head at [facility] revealed a L parietotemporal mass, likely metastatic 2/2 lung cancer. Per EMS, the family notes that when they saw her today she was confused and having difficulty walking and was last seen at baseline by family last night. She is being admitted to [facility] for a higher level of care.       Hospital Course: 8/11: Admit NCC: MRI w/wo, Dex, NSGY following, SBP<160, keppra  8/12: add synthroid, send urine sodium add PPI, follow na q 8 hr, plan for crani and tumor resection today-NPO  8/13: add nicotine patch, incentive spirometry, d/c IVF, switch synthroid to PO, stepdown to NGSY team   8/14: Intermittent agitation overnight. Started on seroquel     Interval History:  >4 elements OR status of 3 inpatient conditions    Review of Systems   Unable to perform ROS: Mental status change     2 systems OR Unable to obtain a complete ROS due to level of consciousness.  Objective:     Vitals:  Temp: 97.5 °F (36.4 °C)  Pulse: (!) 111  Rhythm: sinus tachycardia  BP: 91/68  MAP (mmHg): 75  Resp: (!) 24  SpO2: 99 %  O2 Device (Oxygen Therapy): room air    Temp  Min: 97 °F (36.1 °C)  Max: 97.9 °F (36.6 °C)  Pulse  Min: 79  Max: 128  BP  Min: 91/68  Max: 141/85  MAP (mmHg)  Min: 68  Max: 107  Resp  Min: 12  Max: 57  SpO2  Min: 90 %  Max: 100 %    08/13 0701 - 08/14 0700  In: 1580.8 [P.O.:1250; I.V.:130.8]  Out: 2550 [Urine:2550]   Unmeasured Output  Urine Occurrence:  1  Stool Occurrence: 0  Pad Count: 2       Physical Exam  Constitutional: Agitated.  Eyes: Conjunctiva clear, anicteric. Lids no lesions.  Head/Ears/Nose/Mouth/Throat/Neck: Moist mucous membranes. External ears, nose atraumatic.   Cardiovascular: Regular rhythm. No murmurs.   Respiratory: Comfortable respirations. Clear to auscultation.  Gastrointestinal: No hernia. Soft, nondistended, nontender. + bowel sounds.      Neurologic Examination:    -Mental Status: Alert. Oriented to self but not to place and situation. Follows simple commands intermittently   -Cranial nerves: PERRL. Disconjugate gate   -Motor:  Moves all extremities spontaneously       Medications:  Continuous Scheduled  dexamethasone 4 mg Q6H   divalproex 500 mg Q12H   enoxaparin 30 mg Daily   levothyroxine 75 mcg Before breakfast   mupirocin 1 g BID   nicotine 1 patch Daily   pantoprazole 40 mg Daily   polyethylene glycol 17 g Daily   senna-docusate 8.6-50 mg 1 tablet BID   PRN  acetaminophen 650 mg Q6H PRN   albuterol-ipratropium 3 mL Q4H PRN   ondansetron 4 mg Q8H PRN   oxyCODONE 5 mg Q6H PRN   QUEtiapine 25 mg TID PRN   sodium chloride 0.9% 10 mL PRN     Today I personally reviewed pertinent medications, imaging, laboratory results, notably:    Diet  Diet Adult Regular (IDDSI Level 7)  Diet Adult Regular (IDDSI Level 7)        Assessment/Plan:     Neuro  Encephalopathies  - Multifactorial  - Delirium precautions  - Seroquel PRN  - Transfer to floor     Vasogenic brain edema  See brain mass  Continue dex      Pulmonary  COPD (chronic obstructive pulmonary disease)  - duo nebs prn     Cardiac/Vascular  HTN (hypertension)  - SBP <160       Oncology  * Brain metastasis  - S/P resection  - Continue dexamethasone  - f/U path report     Endocrine  Hypothyroid  - Continue PO synthroid    Other  Episodic cigarette smoking dependence    -Nicotine patch initiated          The patient is being Prophylaxed for:  Venous Thromboembolism with: Mechanical or  Chemical  Stress Ulcer with: H2B  Ventilator Pneumonia with: not applicable    Activity Orders          Diet Adult Regular (IDDSI Level 7): Regular starting at 08/13 0902        Full Code    Level 3    Margie Aquino MD  Neurocritical Care  [facility]

## 2019-08-14 NOTE — ASSESSMENT & PLAN NOTE
59F w/ PMH COPD, HTN, hypothyroidism, stage 4 R small cell lung cancer s/p 6 cycles of carbo/etoposide in remission since 06/2018 who [place] [facility] [place] w/ L parietal brain mass:    Neuro stable.   --SBP <160   --Na >135  --Keppra 500 BID  --Dex 4q6, will taper at discharge to 3 week taper to 2 BID  --HOB >30  --Recommend Heme/onc work-up for restaging and prognostication  --PPI  PT, OT, ST.   TTF today

## 2019-08-14 NOTE — PLAN OF CARE
08/14/19 1617   Post-Acute Status   Post-Acute Authorization Placement   Post-Acute Placement Status Patient List Provided  (rehab)     BARBIE attempted to meet with Pt and Pt family at bedside. Pt unable to discuss rehab list. BARBIE contacted Pt aunt via phone. She advised her other family would make the decision. Especially her sister: Mercedes. Discussed therapy recs for rehab and report the rehab list is at bedside. She will try to reach the sister to ask her to contact this BARBIE asap.    Tiara Thompson, MARI  Neurocritical Care   [facility]  [id number]

## 2019-08-14 NOTE — PLAN OF CARE
Problem: Adult Inpatient Plan of Care  Goal: Plan of Care Review  Outcome: Ongoing (interventions implemented as appropriate)  POC reviewed with Carmen Leyva throughout the night. Pt is still confuse and only oriented to self and sometimes situation. Pt. Has been trying to get out from the bed all night with sitter at bedside. Bilateral wrist restraints restarted,  19 @ 0100. PRN Seroquel dose increased to 25mg, did help a little bit with the increased restlessness pt showed around midnight. Transfer orders still in and waiting to be assigned a bed. Pt progressing toward goals. Will continue to monitor. See flowsheets for full assessment and VS info.

## 2019-08-15 LAB
ALBUMIN SERPL BCP-MCNC: 3.6 G/DL (ref 3.5–5.2)
ALP SERPL-CCNC: 120 U/L (ref 55–135)
ALT SERPL W/O P-5'-P-CCNC: 13 U/L (ref 10–44)
ANION GAP SERPL CALC-SCNC: 9 MMOL/L (ref 8–16)
AST SERPL-CCNC: 19 U/L (ref 10–40)
BASOPHILS # BLD AUTO: 0.01 K/UL (ref 0–0.2)
BASOPHILS NFR BLD: 0.1 % (ref 0–1.9)
BILIRUB SERPL-MCNC: 0.3 MG/DL (ref 0.1–1)
BUN SERPL-MCNC: 22 MG/DL (ref 6–20)
CALCIUM SERPL-MCNC: 8.9 MG/DL (ref 8.7–10.5)
CHLORIDE SERPL-SCNC: 100 MMOL/L (ref 95–110)
CO2 SERPL-SCNC: 28 MMOL/L (ref 23–29)
CREAT SERPL-MCNC: 0.8 MG/DL (ref 0.5–1.4)
DIFFERENTIAL METHOD: ABNORMAL
EOSINOPHIL # BLD AUTO: 0 K/UL (ref 0–0.5)
EOSINOPHIL NFR BLD: 0 % (ref 0–8)
ERYTHROCYTE [DISTWIDTH] IN BLOOD BY AUTOMATED COUNT: 14.6 % (ref 11.5–14.5)
EST. GFR  (AFRICAN AMERICAN): >60 ML/MIN/1.73 M^2
EST. GFR  (NON AFRICAN AMERICAN): >60 ML/MIN/1.73 M^2
GLUCOSE SERPL-MCNC: 109 MG/DL (ref 70–110)
HCT VFR BLD AUTO: 26.2 % (ref 37–48.5)
HGB BLD-MCNC: 8.1 G/DL (ref 12–16)
IMM GRANULOCYTES # BLD AUTO: 0.06 K/UL (ref 0–0.04)
IMM GRANULOCYTES NFR BLD AUTO: 0.7 % (ref 0–0.5)
LYMPHOCYTES # BLD AUTO: 0.2 K/UL (ref 1–4.8)
LYMPHOCYTES NFR BLD: 2.3 % (ref 18–48)
MAGNESIUM SERPL-MCNC: 2 MG/DL (ref 1.6–2.6)
MCH RBC QN AUTO: 32.1 PG (ref 27–31)
MCHC RBC AUTO-ENTMCNC: 30.9 G/DL (ref 32–36)
MCV RBC AUTO: 104 FL (ref 82–98)
MONOCYTES # BLD AUTO: 0.5 K/UL (ref 0.3–1)
MONOCYTES NFR BLD: 5.4 % (ref 4–15)
NEUTROPHILS # BLD AUTO: 8.2 K/UL (ref 1.8–7.7)
NEUTROPHILS NFR BLD: 91.5 % (ref 38–73)
NRBC BLD-RTO: 0 /100 WBC
PHOSPHATE SERPL-MCNC: 2.8 MG/DL (ref 2.7–4.5)
PLATELET # BLD AUTO: 141 K/UL (ref 150–350)
PMV BLD AUTO: 10.5 FL (ref 9.2–12.9)
POCT GLUCOSE: 106 MG/DL (ref 70–110)
POCT GLUCOSE: 70 MG/DL (ref 70–110)
POCT GLUCOSE: 98 MG/DL (ref 70–110)
POTASSIUM SERPL-SCNC: 4.1 MMOL/L (ref 3.5–5.1)
PROT SERPL-MCNC: 6.5 G/DL (ref 6–8.4)
RBC # BLD AUTO: 2.52 M/UL (ref 4–5.4)
SODIUM SERPL-SCNC: 137 MMOL/L (ref 136–145)
WBC # BLD AUTO: 8.95 K/UL (ref 3.9–12.7)

## 2019-08-15 PROCEDURE — 85025 COMPLETE CBC W/AUTO DIFF WBC: CPT

## 2019-08-15 PROCEDURE — 92507 TX SP LANG VOICE COMM INDIV: CPT

## 2019-08-15 PROCEDURE — 63600175 PHARM REV CODE 636 W HCPCS: Performed by: NURSE PRACTITIONER

## 2019-08-15 PROCEDURE — S4991 NICOTINE PATCH NONLEGEND: HCPCS | Performed by: NURSE PRACTITIONER

## 2019-08-15 PROCEDURE — 94799 UNLISTED PULMONARY SVC/PX: CPT

## 2019-08-15 PROCEDURE — 25000003 PHARM REV CODE 250: Performed by: NURSE PRACTITIONER

## 2019-08-15 PROCEDURE — 92526 ORAL FUNCTION THERAPY: CPT

## 2019-08-15 PROCEDURE — 84100 ASSAY OF PHOSPHORUS: CPT

## 2019-08-15 PROCEDURE — 25000003 PHARM REV CODE 250: Performed by: STUDENT IN AN ORGANIZED HEALTH CARE EDUCATION/TRAINING PROGRAM

## 2019-08-15 PROCEDURE — 80053 COMPREHEN METABOLIC PANEL: CPT

## 2019-08-15 PROCEDURE — 20600001 HC STEP DOWN PRIVATE ROOM

## 2019-08-15 PROCEDURE — 63600175 PHARM REV CODE 636 W HCPCS: Performed by: STUDENT IN AN ORGANIZED HEALTH CARE EDUCATION/TRAINING PROGRAM

## 2019-08-15 PROCEDURE — 83735 ASSAY OF MAGNESIUM: CPT

## 2019-08-15 RX ORDER — DIPHENHYDRAMINE HCL 25 MG
25 CAPSULE ORAL NIGHTLY PRN
Status: COMPLETED | OUTPATIENT
Start: 2019-08-15 | End: 2019-08-16

## 2019-08-15 RX ADMIN — DEXAMETHASONE SODIUM PHOSPHATE 4 MG: 4 INJECTION, SOLUTION INTRAMUSCULAR; INTRAVENOUS at 11:08

## 2019-08-15 RX ADMIN — QUETIAPINE FUMARATE 25 MG: 25 TABLET ORAL at 09:08

## 2019-08-15 RX ADMIN — PANTOPRAZOLE SODIUM 40 MG: 40 TABLET, DELAYED RELEASE ORAL at 09:08

## 2019-08-15 RX ADMIN — SENNOSIDES,DOCUSATE SODIUM 1 TABLET: 8.6; 5 TABLET, FILM COATED ORAL at 09:08

## 2019-08-15 RX ADMIN — DIVALPROEX SODIUM 500 MG: 250 TABLET, DELAYED RELEASE ORAL at 09:08

## 2019-08-15 RX ADMIN — ENOXAPARIN SODIUM 30 MG: 100 INJECTION SUBCUTANEOUS at 05:08

## 2019-08-15 RX ADMIN — POLYETHYLENE GLYCOL 3350 17 G: 17 POWDER, FOR SOLUTION ORAL at 09:08

## 2019-08-15 RX ADMIN — DEXAMETHASONE SODIUM PHOSPHATE 4 MG: 4 INJECTION, SOLUTION INTRAMUSCULAR; INTRAVENOUS at 05:08

## 2019-08-15 RX ADMIN — QUETIAPINE FUMARATE 25 MG: 25 TABLET ORAL at 11:08

## 2019-08-15 RX ADMIN — LEVOTHYROXINE SODIUM 75 MCG: 75 TABLET ORAL at 05:08

## 2019-08-15 RX ADMIN — MUPIROCIN 1 G: 20 OINTMENT TOPICAL at 09:08

## 2019-08-15 RX ADMIN — NICOTINE 1 PATCH: 7 PATCH, EXTENDED RELEASE TRANSDERMAL at 09:08

## 2019-08-15 RX ADMIN — OXYCODONE HYDROCHLORIDE 5 MG: 5 TABLET ORAL at 11:08

## 2019-08-15 NOTE — NURSING TRANSFER
Nursing Transfer Note      8/15/2019     Transfer To: 706    Transfer via bed    Transfer with personal belongings including purse and two other bags from home    Transported by RNx1 and Irasema hagan    Medicines sent: mupirocin ointment    Chart send with patient: Yes    Notified: aunt and sister    Patient reassessed at: bedside with nurse Ari @ 1100    Upon arrival to floor: cardiac monitor applied, patient oriented to room, call bell in reach and bed in lowest position

## 2019-08-15 NOTE — PLAN OF CARE
Problem: SLP Goal  Goal: SLP Goal  Speech Language Pathology Goals  Goals expected to be met by 8/20:  1. Patient will tolerate a regular diet and thin liquids with no overt signs of airway compromise.   2. Patient will participate in a full speech, language, and cognitive assessment when appropriate.  MET  3. Pt will answer simple yes/no questions with 80% accy   4. Pt will complete simple problem solving tasks with 70% accy   5. Pt will orient x4 with mod cues  6. Pt will participate in ongoing assessment of cognitive linguistic skills.         Continue POC at this time, all goals remain appropriate at this time.   Mami Mo, LORETTA-SLP  8/15/2019

## 2019-08-15 NOTE — PROGRESS NOTES
[facility]  Neurosurgery  Progress Note    Subjective:     History of Present Illness: 59F w/ PMH COPD, HTN, hypothyroidism, stage 4 R small cell lung cancer s/p 6 cycles of carbo/etoposide in remission since 06/2018 who presents to [facility] ED from OSH w/ L parietal brain mass. Per EMS records patient had a 1d history of AMS and gait difficulty and was brought into the hospital by her family for evaluation. CTH @ OSH showed large L parietal brain mass with possible hemorrhagic component and she was transferred to [facility] for evaluation. MRI @ [facility] redemonstrated L nonenhancing parietal mass with minimal blood. Patient is confused and so ROS is tenuous.    Post-Op Info:  Procedure(s) (LRB):  L craniotomy for tumor (Left)   3 Days Post-Op     Interval History: NAEON. Neurologically stable on exam. Pending floor bed.     Medications:  Continuous Infusions:  Scheduled Meds:   dexamethasone  4 mg Intravenous Q6H    divalproex  500 mg Oral Q12H    enoxaparin  30 mg Subcutaneous Daily    levothyroxine  75 mcg Oral Before breakfast    mupirocin  1 g Nasal BID    nicotine  1 patch Transdermal Daily    pantoprazole  40 mg Oral Daily    polyethylene glycol  17 g Oral Daily    senna-docusate 8.6-50 mg  1 tablet Oral BID     PRN Meds:acetaminophen, albuterol-ipratropium, ondansetron, oxyCODONE, QUEtiapine, sodium chloride 0.9%     Review of Systems  Objective:     Weight: 35.4 kg (78 lb)  Body mass index is 14.74 kg/m².  Vital Signs (Most Recent):  Temp: 96.1 °F (35.6 °C) (08/15/19 1148)  Pulse: 96 (08/15/19 1148)  Resp: 20 (08/15/19 1148)  BP: 126/73 (08/15/19 1148)  SpO2: (!) 93 % (08/15/19 1148) Vital Signs (24h Range):  Temp:  [96.1 °F (35.6 °C)-98.3 °F (36.8 °C)] 96.1 °F (35.6 °C)  Pulse:  [] 96  Resp:  [11-34] 20  SpO2:  [93 %-100 %] 93 %  BP: (107-159)/(59-97) 126/73     Date 08/15/19 0700 - 08/16/19 0659   Shift 7593-4677 1240-0316 6243-0703 24 Hour Total   INTAKE   Shift Total(mL/kg)        OUTPUT   Urine(mL/kg/hr) 500   500   Shift Total(mL/kg) 500(14.1)   500(14.1)   Weight (kg) 35.4 35.4 35.4 35.4                        Neurosurgery Physical Exam  General: AOx1, GCS E4V3M6, aphasic   CNII-XII: Intact on fine exam, PERRL, visual fields grossly intact, EOMi, facial sensation preserved, no facial assymetry, tongue/uvula/palate midline, shoulder shrug equal, no pronator drift  Extremities: FC x4 (R sided neglect) with mimicking   Incision c/d/i  Significant Labs:  Recent Labs   Lab 08/14/19  0410 08/15/19  0312   * 109    137   K 4.0 4.1    100   CO2 29 28   BUN 17 22*   CREATININE 0.9 0.8   CALCIUM 9.9 8.9   MG 2.2 2.0     Recent Labs   Lab 08/14/19  0410 08/15/19  0312   WBC 13.45* 8.95   HGB 8.6* 8.1*   HCT 27.3* 26.2*    141*         Significant Diagnostics:  No results found in the last 24 hours.      Assessment/Plan:     * Brain metastasis  59F w/ PMH COPD, HTN, hypothyroidism, stage 4 R small cell lung cancer s/p 6 cycles of carbo/etoposide in remission since 06/2018 who [place] [facility] [place] w/ L parietal brain mass:    Neuro stable.   --SBP <160   --Na >135  --Keppra 500 BID  --Dex 4q6, will taper at discharge to 3 week taper to 2 BID  --HOB >30  --Recommend Heme/onc work-up for restaging and prognostication  --PPI  PT, OT, ST.   TTF today w/medicine        Jessica Escamilla MD  Neurosurgery  [facility]

## 2019-08-15 NOTE — PT/OT/SLP PROGRESS
Speech Language Pathology Treatment    Patient Name:  Carmen Leyva   MRN:  [id number]  Admitting Diagnosis: Brain metastasis    Recommendations:                 General Recommendations:  Cognitive-linguistic therapy  Diet recommendations:  Regular, Liquid Diet Level: Thin   Aspiration Precautions: Standard aspiration precautions   General Precautions: Standard, fall, seizure, hearing impaired, vision impaired  Communication strategies:  none    Subjective     Awake/confusion evident    Pain/Comfort:  · Pain Rating 1: 0/10  · Pain Rating Post-Intervention 1: 0/10    Objective:     Has the patient been evaluated by SLP for swallowing?   Yes  Keep patient NPO? No   Current Respiratory Status: room air      Pt upright in bed with sitter at bedside upon SLP entry. Pt confused with tangential speech and inappropriate laughter throughout session. Increased time to answer and repetition provided throughout therapeutic tasks. She was oriented to person and place, min cues required for time. She followed 1-2 step commands with 3/8 accy provided min cues. Simpe yes/no questions answered with 70% accy. Simple problem solving tasks completed with 70% accy provided redirection and repetition throughout task. Pt tolerating regular diet at this time. She tolerated grapes x5 and thin liquids x3 with timely swallow initiation and no overt clinical signs of airway compromise. Continue regular diet/thin liquids at this time.     Assessment:     Carmen Leyva is a 59 y.o. female with an SLP diagnosis of Dysphagia and Cognitive-Linguistic Impairment.    Goals:   Multidisciplinary Problems     SLP Goals        Problem: SLP Goal    Goal Priority Disciplines Outcome   SLP Goal     SLP    Description:  Speech Language Pathology Goals  Goals expected to be met by 8/20:  1. Patient will tolerate a regular diet and thin liquids with no overt signs of airway compromise.   2. Patient will participate in a full speech, language, and cognitive  assessment when appropriate.  MET  3. Pt will answer simple yes/no questions with 80% accy   4. Pt will complete simple problem solving tasks with 70% accy   5. Pt will orient x4 with mod cues  6. Pt will participate in ongoing assessment of cognitive linguistic skills.                          Plan:     · Patient to be seen:  4 x/week   · Plan of Care reviewed with:  patient   · SLP Follow-Up:  Yes       Discharge recommendations:  rehabilitation facility     Time Tracking:     SLP Treatment Date:   08/15/19  Speech Start Time:  0944  Speech Stop Time:  1000     Speech Total Time (min):  16 min    Billable Minutes: Speech Therapy Individual 8 and Treatment Swallowing Dysfunction 8    Mami Mo CCC-SLP  08/15/2019

## 2019-08-15 NOTE — SUBJECTIVE & OBJECTIVE
Interval History: NAEON. Neurologically stable on exam. Pending floor bed.     Medications:  Continuous Infusions:  Scheduled Meds:   dexamethasone  4 mg Intravenous Q6H    divalproex  500 mg Oral Q12H    enoxaparin  30 mg Subcutaneous Daily    levothyroxine  75 mcg Oral Before breakfast    mupirocin  1 g Nasal BID    nicotine  1 patch Transdermal Daily    pantoprazole  40 mg Oral Daily    polyethylene glycol  17 g Oral Daily    senna-docusate 8.6-50 mg  1 tablet Oral BID     PRN Meds:acetaminophen, albuterol-ipratropium, ondansetron, oxyCODONE, QUEtiapine, sodium chloride 0.9%     Review of Systems  Objective:     Weight: 35.4 kg (78 lb)  Body mass index is 14.74 kg/m².  Vital Signs (Most Recent):  Temp: 96.1 °F (35.6 °C) (08/15/19 1148)  Pulse: 96 (08/15/19 1148)  Resp: 20 (08/15/19 1148)  BP: 126/73 (08/15/19 1148)  SpO2: (!) 93 % (08/15/19 1148) Vital Signs (24h Range):  Temp:  [96.1 °F (35.6 °C)-98.3 °F (36.8 °C)] 96.1 °F (35.6 °C)  Pulse:  [] 96  Resp:  [11-34] 20  SpO2:  [93 %-100 %] 93 %  BP: (107-159)/(59-97) 126/73     Date 08/15/19 0700 - 08/16/19 0659   Shift 6941-2986 3355-3584 7251-8330 24 Hour Total   INTAKE   Shift Total(mL/kg)       OUTPUT   Urine(mL/kg/hr) 500   500   Shift Total(mL/kg) 500(14.1)   500(14.1)   Weight (kg) 35.4 35.4 35.4 35.4                        Neurosurgery Physical Exam  General: AOx1, GCS E4V3M6, aphasic   CNII-XII: Intact on fine exam, PERRL, visual fields grossly intact, EOMi, facial sensation preserved, no facial assymetry, tongue/uvula/palate midline, shoulder shrug equal, no pronator drift  Extremities: FC x4 (R sided neglect) with mimicking   Incision c/d/i  Significant Labs:  Recent Labs   Lab 08/14/19  0410 08/15/19  0312   * 109    137   K 4.0 4.1    100   CO2 29 28   BUN 17 22*   CREATININE 0.9 0.8   CALCIUM 9.9 8.9   MG 2.2 2.0     Recent Labs   Lab 08/14/19  0410 08/15/19  0312   WBC 13.45* 8.95   HGB 8.6* 8.1*   HCT 27.3* 26.2*     141*         Significant Diagnostics:  No results found in the last 24 hours.

## 2019-08-15 NOTE — ASSESSMENT & PLAN NOTE
59F w/ PMH COPD, HTN, hypothyroidism, stage 4 R small cell lung cancer s/p 6 cycles of carbo/etoposide in remission since 06/2018 who presents to [facility] ED from OSH w/ L parietal brain mass:    Neuro stable.   --SBP <160   --Na >135  --Keppra 500 BID  --Dex 4q6, will taper at discharge to 3 week taper to 2 BID  --HOB >30  --Recommend Heme/onc work-up for restaging and prognostication  --PPI  PT, OT, ST.   TTF today w/medicine

## 2019-08-15 NOTE — PLAN OF CARE
Problem: Adult Inpatient Plan of Care  Goal: Plan of Care Review  POC reviewed with pt at 0500. Pt unable to verbalize understanding due to confused state. Questions and concerns addressed. Pt remained afebrile overnight. Sitter present with patient throughout shift due to patient's restless state. Incision site remains clean, dry, and intact. Plan to transfer patient today. No acute events overnight. Pt progressing toward goals. Will continue to monitor. See flowsheets for full assessment and VS info

## 2019-08-15 NOTE — PLAN OF CARE
08/15/19 1629   Discharge Reassessment   Assessment Type Discharge Planning Reassessment   Provided patient/caregiver education on the expected discharge date and the discharge plan Yes   Do you have any problems affording any of your prescribed medications? No   Discharge Plan A Rehab   Discharge Plan B Home with family;Home Health   DME Needed Upon Discharge  other (see comments)  (tbd)   Anticipated Discharge Disposition Rehab   Can the patient answer the patient profile reliably? No, cognitively impaired   How does the patient rate their overall health at the present time? Fair   Describe the patient's ability to walk at the present time. Major restrictions/daily assistance from another person   How often would a person be available to care for the patient? Occasionally   Number of comorbid conditions (as recorded on the chart) Five or more   During the past month, has the patient often been bothered by feeling down, depressed or hopeless?   (teresa)   During the past month, has the patient often been bothered by little interest or pleasure in doing things?   (teresa)   Post-Acute Status   Post-Acute Authorization Placement   Post-Acute Placement Status Awaiting Internal Medical Clearance  ( provided list )   Discharge Delays None known at this time       Liz Love RN, CCRN-K, [facility]  Neuro-Critical Care   X [phone]

## 2019-08-16 LAB
ALBUMIN SERPL BCP-MCNC: 3.9 G/DL (ref 3.5–5.2)
ALP SERPL-CCNC: 122 U/L (ref 55–135)
ALT SERPL W/O P-5'-P-CCNC: 15 U/L (ref 10–44)
ANION GAP SERPL CALC-SCNC: 12 MMOL/L (ref 8–16)
AST SERPL-CCNC: 19 U/L (ref 10–40)
BACTERIA BLD CULT: NORMAL
BACTERIA BLD CULT: NORMAL
BASOPHILS # BLD AUTO: 0.02 K/UL (ref 0–0.2)
BASOPHILS NFR BLD: 0.2 % (ref 0–1.9)
BILIRUB SERPL-MCNC: 0.4 MG/DL (ref 0.1–1)
BLD PROD TYP BPU: NORMAL
BLD PROD TYP BPU: NORMAL
BLOOD UNIT EXPIRATION DATE: NORMAL
BLOOD UNIT EXPIRATION DATE: NORMAL
BLOOD UNIT TYPE CODE: 5100
BLOOD UNIT TYPE CODE: 5100
BLOOD UNIT TYPE: NORMAL
BLOOD UNIT TYPE: NORMAL
BUN SERPL-MCNC: 35 MG/DL (ref 6–20)
CALCIUM SERPL-MCNC: 9.4 MG/DL (ref 8.7–10.5)
CHLORIDE SERPL-SCNC: 94 MMOL/L (ref 95–110)
CO2 SERPL-SCNC: 29 MMOL/L (ref 23–29)
CODING SYSTEM: NORMAL
CODING SYSTEM: NORMAL
CREAT SERPL-MCNC: 1.1 MG/DL (ref 0.5–1.4)
DIFFERENTIAL METHOD: ABNORMAL
DISPENSE STATUS: NORMAL
DISPENSE STATUS: NORMAL
EOSINOPHIL # BLD AUTO: 0 K/UL (ref 0–0.5)
EOSINOPHIL NFR BLD: 0 % (ref 0–8)
ERYTHROCYTE [DISTWIDTH] IN BLOOD BY AUTOMATED COUNT: 14.9 % (ref 11.5–14.5)
EST. GFR  (AFRICAN AMERICAN): >60 ML/MIN/1.73 M^2
EST. GFR  (NON AFRICAN AMERICAN): 55.1 ML/MIN/1.73 M^2
GLUCOSE SERPL-MCNC: 104 MG/DL (ref 70–110)
HCT VFR BLD AUTO: 28.7 % (ref 37–48.5)
HGB BLD-MCNC: 8.9 G/DL (ref 12–16)
IMM GRANULOCYTES # BLD AUTO: 0.16 K/UL (ref 0–0.04)
IMM GRANULOCYTES NFR BLD AUTO: 1.3 % (ref 0–0.5)
LYMPHOCYTES # BLD AUTO: 0.4 K/UL (ref 1–4.8)
LYMPHOCYTES NFR BLD: 3.1 % (ref 18–48)
MAGNESIUM SERPL-MCNC: 2 MG/DL (ref 1.6–2.6)
MCH RBC QN AUTO: 32.4 PG (ref 27–31)
MCHC RBC AUTO-ENTMCNC: 31 G/DL (ref 32–36)
MCV RBC AUTO: 104 FL (ref 82–98)
MONOCYTES # BLD AUTO: 0.8 K/UL (ref 0.3–1)
MONOCYTES NFR BLD: 6.4 % (ref 4–15)
NEUTROPHILS # BLD AUTO: 11.1 K/UL (ref 1.8–7.7)
NEUTROPHILS NFR BLD: 89 % (ref 38–73)
NRBC BLD-RTO: 0 /100 WBC
NUM UNITS TRANS PACKED RBC: NORMAL
NUM UNITS TRANS PACKED RBC: NORMAL
PHOSPHATE SERPL-MCNC: 3.2 MG/DL (ref 2.7–4.5)
PLATELET # BLD AUTO: 159 K/UL (ref 150–350)
PMV BLD AUTO: 10.6 FL (ref 9.2–12.9)
POCT GLUCOSE: 100 MG/DL (ref 70–110)
POCT GLUCOSE: 88 MG/DL (ref 70–110)
POCT GLUCOSE: 91 MG/DL (ref 70–110)
POTASSIUM SERPL-SCNC: 4.3 MMOL/L (ref 3.5–5.1)
PROT SERPL-MCNC: 6.9 G/DL (ref 6–8.4)
RBC # BLD AUTO: 2.75 M/UL (ref 4–5.4)
SODIUM SERPL-SCNC: 135 MMOL/L (ref 136–145)
WBC # BLD AUTO: 12.43 K/UL (ref 3.9–12.7)

## 2019-08-16 PROCEDURE — 84100 ASSAY OF PHOSPHORUS: CPT

## 2019-08-16 PROCEDURE — 63600175 PHARM REV CODE 636 W HCPCS: Performed by: STUDENT IN AN ORGANIZED HEALTH CARE EDUCATION/TRAINING PROGRAM

## 2019-08-16 PROCEDURE — 94761 N-INVAS EAR/PLS OXIMETRY MLT: CPT

## 2019-08-16 PROCEDURE — 25000003 PHARM REV CODE 250: Performed by: STUDENT IN AN ORGANIZED HEALTH CARE EDUCATION/TRAINING PROGRAM

## 2019-08-16 PROCEDURE — 92507 TX SP LANG VOICE COMM INDIV: CPT

## 2019-08-16 PROCEDURE — 63600175 PHARM REV CODE 636 W HCPCS: Performed by: NURSE PRACTITIONER

## 2019-08-16 PROCEDURE — 80053 COMPREHEN METABOLIC PANEL: CPT

## 2019-08-16 PROCEDURE — 97535 SELF CARE MNGMENT TRAINING: CPT

## 2019-08-16 PROCEDURE — 20600001 HC STEP DOWN PRIVATE ROOM

## 2019-08-16 PROCEDURE — 99024 PR POST-OP FOLLOW-UP VISIT: ICD-10-PCS | Mod: ,,, | Performed by: PHYSICIAN ASSISTANT

## 2019-08-16 PROCEDURE — 85025 COMPLETE CBC W/AUTO DIFF WBC: CPT

## 2019-08-16 PROCEDURE — 27000221 HC OXYGEN, UP TO 24 HOURS

## 2019-08-16 PROCEDURE — 25000003 PHARM REV CODE 250: Performed by: PHYSICIAN ASSISTANT

## 2019-08-16 PROCEDURE — S4991 NICOTINE PATCH NONLEGEND: HCPCS | Performed by: NURSE PRACTITIONER

## 2019-08-16 PROCEDURE — 99024 POSTOP FOLLOW-UP VISIT: CPT | Mod: ,,, | Performed by: PHYSICIAN ASSISTANT

## 2019-08-16 PROCEDURE — 36415 COLL VENOUS BLD VENIPUNCTURE: CPT

## 2019-08-16 PROCEDURE — 83735 ASSAY OF MAGNESIUM: CPT

## 2019-08-16 PROCEDURE — 25000003 PHARM REV CODE 250: Performed by: NURSE PRACTITIONER

## 2019-08-16 RX ORDER — FERROUS SULFATE 325(65) MG
325 TABLET, DELAYED RELEASE (ENTERIC COATED) ORAL 2 TIMES DAILY
Status: DISCONTINUED | OUTPATIENT
Start: 2019-08-16 | End: 2019-08-28

## 2019-08-16 RX ORDER — ASCORBIC ACID 250 MG
250 TABLET ORAL 2 TIMES DAILY
Status: DISCONTINUED | OUTPATIENT
Start: 2019-08-16 | End: 2019-08-28

## 2019-08-16 RX ADMIN — PANTOPRAZOLE SODIUM 40 MG: 40 TABLET, DELAYED RELEASE ORAL at 09:08

## 2019-08-16 RX ADMIN — SENNOSIDES,DOCUSATE SODIUM 1 TABLET: 8.6; 5 TABLET, FILM COATED ORAL at 09:08

## 2019-08-16 RX ADMIN — OXYCODONE HYDROCHLORIDE 5 MG: 5 TABLET ORAL at 12:08

## 2019-08-16 RX ADMIN — Medication 250 MG: at 09:08

## 2019-08-16 RX ADMIN — DIVALPROEX SODIUM 500 MG: 250 TABLET, DELAYED RELEASE ORAL at 09:08

## 2019-08-16 RX ADMIN — DIPHENHYDRAMINE HYDROCHLORIDE 25 MG: 25 CAPSULE ORAL at 12:08

## 2019-08-16 RX ADMIN — DEXAMETHASONE SODIUM PHOSPHATE 4 MG: 4 INJECTION, SOLUTION INTRAMUSCULAR; INTRAVENOUS at 12:08

## 2019-08-16 RX ADMIN — FERROUS SULFATE TAB EC 325 MG (65 MG FE EQUIVALENT) 325 MG: 325 (65 FE) TABLET DELAYED RESPONSE at 09:08

## 2019-08-16 RX ADMIN — MUPIROCIN 1 G: 20 OINTMENT TOPICAL at 09:08

## 2019-08-16 RX ADMIN — OXYCODONE HYDROCHLORIDE 5 MG: 5 TABLET ORAL at 09:08

## 2019-08-16 RX ADMIN — ENOXAPARIN SODIUM 30 MG: 100 INJECTION SUBCUTANEOUS at 05:08

## 2019-08-16 RX ADMIN — NICOTINE 1 PATCH: 7 PATCH, EXTENDED RELEASE TRANSDERMAL at 09:08

## 2019-08-16 RX ADMIN — DEXAMETHASONE SODIUM PHOSPHATE 4 MG: 4 INJECTION, SOLUTION INTRAMUSCULAR; INTRAVENOUS at 05:08

## 2019-08-16 RX ADMIN — POLYETHYLENE GLYCOL 3350 17 G: 17 POWDER, FOR SOLUTION ORAL at 09:08

## 2019-08-16 RX ADMIN — DEXAMETHASONE SODIUM PHOSPHATE 4 MG: 4 INJECTION, SOLUTION INTRAMUSCULAR; INTRAVENOUS at 06:08

## 2019-08-16 RX ADMIN — QUETIAPINE FUMARATE 25 MG: 25 TABLET ORAL at 09:08

## 2019-08-16 RX ADMIN — LEVOTHYROXINE SODIUM 75 MCG: 75 TABLET ORAL at 06:08

## 2019-08-16 NOTE — PT/OT/SLP PROGRESS
Occupational Therapy   Treatment    Name: Carmen Leyva  MRN: [id number]  Admitting Diagnosis:  Brain metastasis  4 Days Post-Op    Recommendations:     Discharge Recommendations: rehabilitation facility  Discharge Equipment Recommendations:  (tbd)  Barriers to discharge:       Assessment:     Carmen Leyva is a 59 y.o. female with a medical diagnosis of Brain metastasis.  She presents with impaired ADL and functional mobility performance. Pt participated in bed mobility, EOB sitting balance, following commands needed for safe feeding, grooming, and UB/LB dressing. Pt able to tolerate sitting ~20 min with SBA-CGA however frequent, simple cues required as pt easily distracted by tactile fabric of gown. Pt with poor vision requiring HHA for grasping food items however once in hand pt easily able to manipulate. Pt oriented to person only and with teach back was unable to respond with location of therapy session or birthday.  Performance deficits affecting function are weakness, impaired endurance, impaired balance, impaired self care skills, decreased coordination, decreased safety awareness, impaired functional mobilty, gait instability, impaired cognition. Pt would benefit from continued OT skilled services 3x/wk to improve daily living skills to optimize QOL. Pt is recommended to discharge to rehab at this time.      Rehab Prognosis:  Fair; patient would benefit from acute skilled OT services to address these deficits and reach maximum level of function.       Plan:     Patient to be seen 3 x/week to address the above listed problems via self-care/home management, therapeutic activities, therapeutic exercises, cognitive retraining  · Plan of Care Expires: 09/13/19  · Plan of Care Reviewed with: patient    Subjective     Pain/Comfort:  · Pain Rating 1: 0/10  · Pain Rating Post-Intervention 1: 0/10    Objective:     Communicated with: RN prior to session.  Patient found supine with telemetry, restraints upon OT entry  to room.    General Precautions: Standard, fall, seizure, hearing impaired, vision impaired   Orthopedic Precautions:N/A   Braces: N/A     Occupational Performance:     Bed Mobility:    · Patient completed Rolling/Turning to Right with moderate assistance  · Patient completed Scooting/Bridging with maximal assistance  · Patient completed Supine to Sit with moderate assistance  · Patient completed Sit to Supine with moderate assistance       Activities of Daily Living:  · Feeding:  HHA required for locating food items on plate with pt at EOB. Once in hand, pt SBA  · Grooming: setup of face cloth at EOB   · Upper Body Dressing: moderate assistance donning gown at EOB   · Lower Body Dressing: stand by assistance with pt adjusting sock fit at EOB       [facility] 6 Click ADL: 18    Treatment & Education:  Pt educated on role of occupational therapy, POC, and safety during ADLs and functional mobility. Pt and OT discussed importance of safe, continued mobility to optimize daily living skills. Pt verbalized understanding however questionable retention with limited cognition. Pt completed the following during session: bed mobility, EOB sitting balance, feeding/eating, grooming, UB/LB dressing. White board updated during session. Pt given instruction to call for medical staff/nurse for assistance.       Patient left HOB elevated with all lines intact, call button in reach, bed alarm on and nurse Islia notifiedEducation:      GOALS:   Multidisciplinary Problems     Occupational Therapy Goals        Problem: Occupational Therapy Goal    Goal Priority Disciplines Outcome Interventions   Occupational Therapy Goal     OT, PT/OT Ongoing (interventions implemented as appropriate)    Description:  Goals to be met by: 8/24     Patient will increase functional independence with ADLs by performing:    UE Dressing with Modified [place].  LE Dressing with Modified [place].  Grooming while standing with Modified  [place].  Toileting from toilet with Modified [place] for hygiene and clothing management.   Supine to sit with Modified [place].  Stand pivot transfers with Modified [place].                      Time Tracking:     OT Date of Treatment: 08/16/19  OT Start Time: 0835  OT Stop Time: 0904  OT Total Time (min): 29 min    Billable Minutes:Self Care/Home Management 29 min    Sanjana Holloway OT  8/16/2019

## 2019-08-16 NOTE — PROGRESS NOTES
[facility]  Neurosurgery  Progress Note    Subjective:     History of Present Illness: 59F w/ PMH COPD, HTN, hypothyroidism, stage 4 R small cell lung cancer s/p 6 cycles of carbo/etoposide in remission since 06/2018 who [place] [facility] [place] w/ L parietal brain mass. Per EMS records patient had a 1d history of AMS and gait difficulty and was brought into the hospital by her family for evaluation. CTH @ OSH showed large L parietal brain mass with possible hemorrhagic component and she was [place] [facility] [place]. [place] [facility] redemonstrated L nonenhancing parietal mass with minimal blood. Patient is confused and so ROS is tenuous.    Post-Op Info:  Procedure(s) (LRB):  L craniotomy for tumor (Left)   4 Days Post-Op     Interval History:   Patient agitated overnight, placed in restraints and given PRN Seroquel. Patient resting comfortably in bed. Appears to be calm. Denies any pain. No family at bedside. Tolerating diet. Voiding appropriately.     Medications:  Continuous Infusions:  Scheduled Meds:   ascorbic acid (vitamin C)  250 mg Oral BID    dexamethasone  4 mg Intravenous Q6H    divalproex  500 mg Oral Q12H    enoxaparin  30 mg Subcutaneous Daily    ferrous sulfate  325 mg Oral BID    levothyroxine  75 mcg Oral Before breakfast    mupirocin  1 g Nasal BID    nicotine  1 patch Transdermal Daily    pantoprazole  40 mg Oral Daily    polyethylene glycol  17 g Oral Daily    senna-docusate 8.6-50 mg  1 tablet Oral BID     PRN Meds:acetaminophen, albuterol-ipratropium, ondansetron, oxyCODONE, QUEtiapine, sodium chloride 0.9%     Review of Systems  Objective:     Weight: 35.4 kg (78 lb)  Body mass index is 14.74 kg/m².  Vital Signs (Most Recent):  Temp: 98 °F (36.7 °C) (08/16/19 0810)  Pulse: 89 (08/16/19 0810)  Resp: 18 (08/16/19 0810)  BP: 107/69 (08/16/19 0810)  SpO2: 97 % (08/16/19 0810) Vital Signs (24h Range):  Temp:  [96.1 °F (35.6 °C)-98 °F (36.7 °C)] 98 °F (36.7  °C)  Pulse:  [] 89  Resp:  [18-20] 18  SpO2:  [91 %-99 %] 97 %  BP: (107-138)/(69-82) 107/69       Neurosurgery Physical Exam  General: well developed, well nourished, no distress.   Head: normocephalic  Neurologic: Inattentive, requires constant refocusing. Flat affect.   GCS: Motor: 6/Verbal: 4/Eyes: 4 GCS Total: 14  Mental Status: Awake, Alert, Oriented to self and place. Responses delayed.   Speech: Dysarthric   Cranial nerves: face symmetric, CN II-XII grossly intact.   Eyes: pupils equal, round, reactive to light with accomodation, EOMI.   Pulmonary: normal respirations, no signs of respiratory distress  Abdomen: soft, non-distended, not tender to palpation  Skin: Skin is warm, dry and intact.  Sensory: response to light touch throughout  Motor Strength: BUE wrist restraints in place. Intermittently follows simple commands in BUE. BLE move spontaneously, full strength.  No abnormal movements seen.   Babinski's: present on left.  Clonus: Negative.  Finger-to-nose: unable to assess 2/2 mental status  Pronator drift: unable to assess 2/2 mental status      Incision:  Clean, dry, staples intact. Skin edges well approximated. No surrounding erythema or edema. No drainage or TTP.          Significant Labs:  Recent Labs   Lab 08/15/19  0312 08/16/19  0259    104    135*   K 4.1 4.3    94*   CO2 28 29   BUN 22* 35*   CREATININE 0.8 1.1   CALCIUM 8.9 9.4   MG 2.0 2.0     Recent Labs   Lab 08/15/19  0312 08/16/19  0259   WBC 8.95 12.43   HGB 8.1* 8.9*   HCT 26.2* 28.7*   * 159         Assessment/Plan:     * Brain metastasis  59F w/ PMH COPD, HTN, hypothyroidism, stage 4 R small cell lung cancer s/p 6 cycles of carbo/etoposide in remission since 06/2018 who presents to [facility] ED from OSH w/ L parietal brain mass. Now s/p crani for debulking on 8/12.    -Patient neurologically stable on exam  -Pathology pending  -Continue Depakote for seizure prevention  -Cerebral edema: Continue Dex 4 mg q 6  hours. Will DC on 3 week taper to 2 mg BID. Continue PPI while on steroids.   -Psyc: DC wrist restraints once sitter at bedside. Continue redirection and delirium precautions. Continue Seroquel TID PRN.   -HTN: -129. Maintain SBP < 160. Home dose of Norvasc has been held since SBP at goal. Will monitor and restart if SBP becomes elevated.   -Hypothyroidism: Continue home dose of Synthroid  -Tobacco abuse: Continue nicotine patch  -COPD: Continue duo nebs PRN, pulse ox q 4 hours  -DVT prophylaxis: ABI's, SCD's, lovenox  -Bowel regimen: senna BID and miralax daily  -Atelectasis prevention: IS hourly while awake, PT/OT, OOB > 6 hours per day      DISPO: Pending acceptance to Rehab        Please call with any questions      Beth Loo PA-C   Neurosurgery   Pager: [phone]

## 2019-08-16 NOTE — ASSESSMENT & PLAN NOTE
59F w/ PMH COPD, HTN, hypothyroidism, stage 4 R small cell lung cancer s/p 6 cycles of carbo/etoposide in remission since 06/2018 who presents to [facility] ED from OSH w/ L parietal brain mass. Now s/p crani for debulking on 8/12.    -Patient neurologically stable on exam  -Pathology pending  -Continue Depakote for seizure prevention  -Cerebral edema: Continue Dex 4 mg q 6 hours. Will DC on 3 week taper to 2 mg BID. Continue PPI while on steroids.   -Psyc: DC wrist restraints once sitter at bedside. Continue redirection and delirium precautions. Continue Seroquel TID PRN.   -HTN: -129. Maintain SBP < 160. Home dose of Norvasc has been held since SBP at goal. Will monitor and restart if SBP becomes elevated.   -Hypothyroidism: Continue home dose of Synthroid  -Tobacco abuse: Continue nicotine patch  -COPD: Continue duo nebs PRN, pulse ox q 4 hours  -DVT prophylaxis: ABI's, SCD's, lovenox  -Bowel regimen: senna BID and miralax daily  -Atelectasis prevention: IS hourly while awake, PT/OT, OOB > 6 hours per day      DISPO: Pending acceptance to Rehab

## 2019-08-16 NOTE — SUBJECTIVE & OBJECTIVE
Interval History:   Patient agitated overnight, placed in restraints and given PRN Seroquel. Patient resting comfortably in bed. Appears to be calm. Denies any pain. No family at bedside. Tolerating diet. Voiding appropriately.     Medications:  Continuous Infusions:  Scheduled Meds:   ascorbic acid (vitamin C)  250 mg Oral BID    dexamethasone  4 mg Intravenous Q6H    divalproex  500 mg Oral Q12H    enoxaparin  30 mg Subcutaneous Daily    ferrous sulfate  325 mg Oral BID    levothyroxine  75 mcg Oral Before breakfast    mupirocin  1 g Nasal BID    nicotine  1 patch Transdermal Daily    pantoprazole  40 mg Oral Daily    polyethylene glycol  17 g Oral Daily    senna-docusate 8.6-50 mg  1 tablet Oral BID     PRN Meds:acetaminophen, albuterol-ipratropium, ondansetron, oxyCODONE, QUEtiapine, sodium chloride 0.9%     Review of Systems  Objective:     Weight: 35.4 kg (78 lb)  Body mass index is 14.74 kg/m².  Vital Signs (Most Recent):  Temp: 98 °F (36.7 °C) (08/16/19 0810)  Pulse: 89 (08/16/19 0810)  Resp: 18 (08/16/19 0810)  BP: 107/69 (08/16/19 0810)  SpO2: 97 % (08/16/19 0810) Vital Signs (24h Range):  Temp:  [96.1 °F (35.6 °C)-98 °F (36.7 °C)] 98 °F (36.7 °C)  Pulse:  [] 89  Resp:  [18-20] 18  SpO2:  [91 %-99 %] 97 %  BP: (107-138)/(69-82) 107/69       Neurosurgery Physical Exam  General: well developed, well nourished, no distress.   Head: normocephalic  Neurologic: Inattentive, requires constant refocusing. Flat affect.   GCS: Motor: 6/Verbal: 4/Eyes: 4 GCS Total: 14  Mental Status: Awake, Alert, Oriented to self and place. Responses delayed.   Speech: Dysarthric   Cranial nerves: face symmetric, CN II-XII grossly intact.   Eyes: pupils equal, round, reactive to light with accomodation, EOMI.   Pulmonary: normal respirations, no signs of respiratory distress  Abdomen: soft, non-distended, not tender to palpation  Skin: Skin is warm, dry and intact.  Sensory: response to light touch throughout  Motor  Strength: BUE wrist restraints in place. Intermittently follows simple commands in BUE. BLE move spontaneously, full strength.  No abnormal movements seen.   Babinski's: present on left.  Clonus: Negative.  Finger-to-nose: unable to assess 2/2 mental status  Pronator drift: unable to assess 2/2 mental status      Incision:  Clean, dry, staples intact. Skin edges well approximated. No surrounding erythema or edema. No drainage or TTP.          Significant Labs:  Recent Labs   Lab 08/15/19  0312 08/16/19  0259    104    135*   K 4.1 4.3    94*   CO2 28 29   BUN 22* 35*   CREATININE 0.8 1.1   CALCIUM 8.9 9.4   MG 2.0 2.0     Recent Labs   Lab 08/15/19  0312 08/16/19  0259   WBC 8.95 12.43   HGB 8.1* 8.9*   HCT 26.2* 28.7*   * 159

## 2019-08-16 NOTE — PT/OT/SLP PROGRESS
Speech Language Pathology Treatment    Patient Name:  Carmen Leyva   MRN:  [id number]  Admitting Diagnosis: Brain metastasis    Recommendations:                 General Recommendations:  Cognitive-linguistic therapy  Diet recommendations:  Regular, Liquid Diet Level: Thin   Aspiration Precautions: Feed only when awake/alert, HOB to 90 degrees, Small bites/sips and Standard aspiration precautions   General Precautions: Standard, fall, seizure, hearing impaired, vision impaired    Subjective     Awake/alert    Pain/Comfort:  · Pain Rating 1: 0/10  · Pain Rating Post-Intervention 1: 0/10    Objective:     Has the patient been evaluated by SLP for swallowing?   Yes  Keep patient NPO? No   Current Respiratory Status: room air      Pt upright in bed with sitter at bedside upon entry. She was oriented to person and situation ind'ly. She required mod-max cues for time. Pt with poor conversational awareness with tangential speech noted throughout session. Pt was unable to redirect for functional structured tasks this date. Education provided on need for ST services and POC. Pt verbalized understanding, reinforcement recommended. SLP will follow up for ongoing cognitive therapy.     Assessment:     Carmen Leyva is a 59 y.o. female with an SLP diagnosis of Cognitive-Linguistic Impairment.    Goals:   Multidisciplinary Problems     SLP Goals        Problem: SLP Goal    Goal Priority Disciplines Outcome   SLP Goal     SLP    Description:  Speech Language Pathology Goals  Goals expected to be met by 8/20:  1. Patient will tolerate a regular diet and thin liquids with no overt signs of airway compromise.   2. Patient will participate in a full speech, language, and cognitive assessment when appropriate.  MET  3. Pt will answer simple yes/no questions with 80% accy   4. Pt will complete simple problem solving tasks with 70% accy   5. Pt will orient x4 with mod cues  6. Pt will participate in ongoing assessment of cognitive  linguistic skills.                          Plan:     · Patient to be seen:  4 x/week   · Plan of Care expires:     · Plan of Care reviewed with:  patient   · SLP Follow-Up:  Yes       Discharge recommendations:  rehabilitation facility       Time Tracking:     SLP Treatment Date:   08/16/19  Speech Start Time:  1340  Speech Stop Time:  1350     Speech Total Time (min):  10 min    Billable Minutes: Speech Therapy Individual 10    Mami Mo CCC-SLP  08/16/2019

## 2019-08-16 NOTE — PLAN OF CARE
Problem: Occupational Therapy Goal  Goal: Occupational Therapy Goal  Goals to be met by: 8/24     Patient will increase functional independence with ADLs by performing:    UE Dressing with Modified [place].  LE Dressing with Modified [place].  Grooming while standing with Modified [place].  Toileting from toilet with Modified [place] for hygiene and clothing management.   Supine to sit with Modified [place].  Stand pivot transfers with Modified [place].     Outcome: Ongoing (interventions implemented as appropriate)  Continue OT POC.  Sanjana Holloway OT  8/16/2019
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
## 2019-08-16 NOTE — PLAN OF CARE
Problem: SLP Goal  Goal: SLP Goal  Speech Language Pathology Goals  Goals expected to be met by 8/20:  1. Patient will tolerate a regular diet and thin liquids with no overt signs of airway compromise.   2. Patient will participate in a full speech, language, and cognitive assessment when appropriate.  MET  3. Pt will answer simple yes/no questions with 80% accy   4. Pt will complete simple problem solving tasks with 70% accy   5. Pt will orient x4 with mod cues  6. Pt will participate in ongoing assessment of cognitive linguistic skills.         Continue POC at this time. All goals remain appropriate.   Mami Mo CCC-SLP   8/16/2019

## 2019-08-16 NOTE — PLAN OF CARE
BARBIE following for DC needs. BARBIE in communication with Marcia GARCIA spoke to Pt son, David ([phone]), to discuss inpatient rehab options. Pt son prefers facility close to Pt address, as Pt will have help from her sister. David is agreeable to BARBIE sending to [facility].        08/16/19 1057   Post-Acute Status   Post-Acute Authorization Placement   Post-Acute Placement Status Referrals Sent     Cindy Valladares LMSW  [facility]  [id number]

## 2019-08-16 NOTE — PLAN OF CARE
Problem: Adult Inpatient Plan of Care  Goal: Plan of Care Review  Outcome: Ongoing (interventions implemented as appropriate)  Patient remains free from injury or fall. Fall precautions applied. Sitter at the bedside. No neuro changes noted or reported from initial assessment.

## 2019-08-16 NOTE — CARE UPDATE
Rapid Response Respiratory Therapy Proactive Rounding Note      Time of visit: 1348    Code Status: Full Code   : 1960  Age: 59 y.o.  Weight:   Wt Readings from Last 1 Encounters:   19 35.4 kg (78 lb)     Sex: female  Race: White   Bed: 706/706 A:   MRN: [id number]    SITUATION     Evaluated patient for: MEWS Score    BACKGROUND     Patient has a past medical history of Acid reflux, Anemia, Anxiety, Arthritis, Blindness - both eyes, Chronic kidney disease, COPD (chronic obstructive pulmonary disease), GERD (gastroesophageal reflux disease), Gout, Hypertension, Lung cancer, Lung cancer, main bronchus, right, Osteopenia, Rheumatoid arteritis, SIADH (syndrome of inappropriate ADH production), Solitary kidney, Thyroid disease, and Vitamin D deficiency.  Pulmonary Hx: Lung Cancer  Clinically Significant Surgical Hx: None    ASSESSMENT     Pulse: Pulse: 95 Respiratory rate: Resp: 18 Temperature: Temp: 97.5 °F (36.4 °C) BP: BP: 108/69 SpO2:SpO2: 88%   Level of Consciousness: Level of Consciousness (AVPU): alert  Respiratory Effort: Respiratory Effort: Normal, Unlabored  Expansion/Accessory Muscle Usage: Expansion/Accessory Muscles/Retractions: expansion symmetric  All Lung Field Breath Sounds: All Lung Fields Breath Sounds: Anterior:, Lateral:, diminished  Cough Type: Cough Type: nonproductive  O2 Device/Concentration: O2 Device (Oxygen Therapy): room air      Most recent blood gas: No results for input(s): PH, PCO2, PO2, HCO3, POCSATURATED, BE in the last 72 hours.  Current Respiratory Care Orders:   19 1200  Incentive spirometry Every 4 hours (22 of 39 released)    Release    19 0859   19 0000  Pulse Oximetry Q4H Every 4 hours (25 of 42 released)    Release    19   Unscheduled  Inhalation Treatment Q4H PRN Every 4 hours PRN (0 of 01677 released)    Release    19 2156   Unscheduled  Oxygen PRN Use PRN (0 of 17029 released)    Release   References: Oxygen Titration  Protocol   Question Answer Comment   Device type: Low flow    Device: Nasal Cannula (1- 5 Liters)    LPM: 2    Titrate O2 per Oxygen Titration Protocol: Yes    To maintain SpO2 goal of: >= 90%    Notify MD of: Inability to achieve desired SpO2; Sudden change in patient status and requires 20% increase in FiO2; Patient requires >60% FiO2               NIPPV: No  Surgical airway: No  Ambu at bedside: Ambu bag with the patient?: Yes, Adult Ambu    INTERVENTIONS/RECOMMENDATIONS   ?  Pt assessed due to MEWS Score. Pt found on room air with an SpO2 of 88%, pulse of 95 and diminished breath sounds. Pt placed on nasal cannula of 2 and SpO2 was maintained at 95%. Pt does not show sign of respiratory distress at this time. Will continue to monitor.    Discussed plan of care with primary RTAlfred.     FOLLOW-UP     Please call back the Rapid Response RT, Opal Chaudhary, RRT at x [phone] for any questions or concerns.

## 2019-08-17 LAB
POCT GLUCOSE: 122 MG/DL (ref 70–110)
POCT GLUCOSE: 77 MG/DL (ref 70–110)
POCT GLUCOSE: 86 MG/DL (ref 70–110)

## 2019-08-17 PROCEDURE — 25000003 PHARM REV CODE 250: Performed by: STUDENT IN AN ORGANIZED HEALTH CARE EDUCATION/TRAINING PROGRAM

## 2019-08-17 PROCEDURE — S4991 NICOTINE PATCH NONLEGEND: HCPCS | Performed by: NURSE PRACTITIONER

## 2019-08-17 PROCEDURE — 63600175 PHARM REV CODE 636 W HCPCS: Performed by: NURSE PRACTITIONER

## 2019-08-17 PROCEDURE — 25000003 PHARM REV CODE 250: Performed by: PHYSICIAN ASSISTANT

## 2019-08-17 PROCEDURE — 63600175 PHARM REV CODE 636 W HCPCS: Performed by: STUDENT IN AN ORGANIZED HEALTH CARE EDUCATION/TRAINING PROGRAM

## 2019-08-17 PROCEDURE — 20600001 HC STEP DOWN PRIVATE ROOM

## 2019-08-17 PROCEDURE — 25000003 PHARM REV CODE 250: Performed by: NURSE PRACTITIONER

## 2019-08-17 PROCEDURE — 63600175 PHARM REV CODE 636 W HCPCS

## 2019-08-17 RX ORDER — HALOPERIDOL 5 MG/ML
5 INJECTION INTRAMUSCULAR ONCE
Status: COMPLETED | OUTPATIENT
Start: 2019-08-17 | End: 2019-08-17

## 2019-08-17 RX ORDER — HALOPERIDOL 5 MG/ML
INJECTION INTRAMUSCULAR
Status: COMPLETED
Start: 2019-08-17 | End: 2019-08-17

## 2019-08-17 RX ADMIN — LEVOTHYROXINE SODIUM 75 MCG: 75 TABLET ORAL at 05:08

## 2019-08-17 RX ADMIN — ENOXAPARIN SODIUM 30 MG: 100 INJECTION SUBCUTANEOUS at 04:08

## 2019-08-17 RX ADMIN — HALOPERIDOL 5 MG: 5 INJECTION INTRAMUSCULAR at 09:08

## 2019-08-17 RX ADMIN — QUETIAPINE FUMARATE 25 MG: 25 TABLET ORAL at 08:08

## 2019-08-17 RX ADMIN — DIVALPROEX SODIUM 500 MG: 250 TABLET, DELAYED RELEASE ORAL at 09:08

## 2019-08-17 RX ADMIN — HALOPERIDOL LACTATE 5 MG: 5 INJECTION INTRAMUSCULAR at 09:08

## 2019-08-17 RX ADMIN — DEXAMETHASONE SODIUM PHOSPHATE 4 MG: 4 INJECTION, SOLUTION INTRAMUSCULAR; INTRAVENOUS at 05:08

## 2019-08-17 RX ADMIN — NICOTINE 1 PATCH: 7 PATCH, EXTENDED RELEASE TRANSDERMAL at 09:08

## 2019-08-17 RX ADMIN — MUPIROCIN 1 G: 20 OINTMENT TOPICAL at 09:08

## 2019-08-17 RX ADMIN — PANTOPRAZOLE SODIUM 40 MG: 40 TABLET, DELAYED RELEASE ORAL at 09:08

## 2019-08-17 RX ADMIN — FERROUS SULFATE TAB EC 325 MG (65 MG FE EQUIVALENT) 325 MG: 325 (65 FE) TABLET DELAYED RESPONSE at 09:08

## 2019-08-17 RX ADMIN — Medication 250 MG: at 09:08

## 2019-08-17 RX ADMIN — POLYETHYLENE GLYCOL 3350 17 G: 17 POWDER, FOR SOLUTION ORAL at 09:08

## 2019-08-17 RX ADMIN — SENNOSIDES,DOCUSATE SODIUM 1 TABLET: 8.6; 5 TABLET, FILM COATED ORAL at 09:08

## 2019-08-17 RX ADMIN — DEXAMETHASONE SODIUM PHOSPHATE 4 MG: 4 INJECTION, SOLUTION INTRAMUSCULAR; INTRAVENOUS at 12:08

## 2019-08-17 NOTE — SUBJECTIVE & OBJECTIVE
Interval History: 8/17: remains in hospital with measures in place to prevent agitation. Medically stable pending placement to rehab.     Medications:  Continuous Infusions:  Scheduled Meds:   ascorbic acid (vitamin C)  250 mg Oral BID    dexamethasone  4 mg Intravenous Q6H    divalproex  500 mg Oral Q12H    enoxaparin  30 mg Subcutaneous Daily    ferrous sulfate  325 mg Oral BID    levothyroxine  75 mcg Oral Before breakfast    mupirocin  1 g Nasal BID    nicotine  1 patch Transdermal Daily    pantoprazole  40 mg Oral Daily    polyethylene glycol  17 g Oral Daily    senna-docusate 8.6-50 mg  1 tablet Oral BID     PRN Meds:acetaminophen, albuterol-ipratropium, ondansetron, oxyCODONE, QUEtiapine, sodium chloride 0.9%     Review of Systems  Objective:     Weight: 35.4 kg (78 lb)  Body mass index is 14.74 kg/m².  Vital Signs (Most Recent):  Temp: 97.5 °F (36.4 °C) (08/16/19 1504)  Pulse: 103 (08/16/19 1545)  Resp: 18 (08/16/19 1355)  BP: 108/69 (08/16/19 1504)  SpO2: 97 % (08/16/19 1504) Vital Signs (24h Range):  Temp:  [96.4 °F (35.8 °C)-98 °F (36.7 °C)] 97.5 °F (36.4 °C)  Pulse:  [] 103  Resp:  [17-18] 18  SpO2:  [88 %-97 %] 97 %  BP: (107-130)/(69-78) 108/69                          Neurosurgery Physical Exam    General: well developed, well nourished, no distress.   Head: normocephalic  Neurologic: Inattentive, requires constant refocusing. Flat affect.   GCS: Motor: 6/Verbal: 4/Eyes: 4 GCS Total: 14  Mental Status: Awake, Alert, Oriented to self and place. Responses delayed.   Speech: Dysarthric   Cranial nerves: face symmetric, CN II-XII grossly intact.   Eyes: pupils equal, round, reactive to light with accomodation, EOMI.   Pulmonary: normal respirations, no signs of respiratory distress  Abdomen: soft, non-distended, not tender to palpation  Skin: Skin is warm, dry and intact.  Sensory: response to light touch throughout  Motor Strength: BUE wrist restraints in place. Intermittently follows  simple commands in BUE. BLE move spontaneously, full strength.  No abnormal movements seen.   Babinski's: present on left.  Clonus: Negative.  Finger-to-nose: unable to assess 2/2 mental status  Pronator drift: unable to assess 2/2 mental status        Incision:  Clean, dry, staples intact. Skin edges well approximated. No surrounding erythema or edema. No drainage or TTP.      Significant Labs:  Recent Labs   Lab 08/15/19  0312 08/16/19 0259    104    135*   K 4.1 4.3    94*   CO2 28 29   BUN 22* 35*   CREATININE 0.8 1.1   CALCIUM 8.9 9.4   MG 2.0 2.0     Recent Labs   Lab 08/15/19  0312 08/16/19 0259   WBC 8.95 12.43   HGB 8.1* 8.9*   HCT 26.2* 28.7*   * 159     No results for input(s): LABPT, INR, APTT in the last 48 hours.  Microbiology Results (last 7 days)     ** No results found for the last 168 hours. **        Recent Lab Results       08/16/19  1711   08/16/19  1215   08/16/19  0259   08/15/19  2138        Albumin     3.9       Alkaline Phosphatase     122       ALT     15       Anion Gap     12       AST     19       Baso #     0.02       Basophil%     0.2       BILIRUBIN TOTAL     0.4  Comment:  For infants and newborns, interpretation of results should be based  on gestational age, weight and in agreement with clinical  observations.  Premature Infant recommended reference ranges:  Up to 24 hours.............<8.0 mg/dL  Up to 48 hours............<12.0 mg/dL  3-5 days..................<15.0 mg/dL  6-29 days.................<15.0 mg/dL         BUN, Bld     35       Calcium     9.4       Chloride     94       CO2     29       Creatinine     1.1       Differential Method     Automated       eGFR if      >60.0       eGFR if non      55.1  Comment:  Calculation used to obtain the estimated glomerular filtration  rate (eGFR) is the CKD-EPI equation.          Eos #     0.0       Eosinophil%     0.0       Glucose     104       Gran # (ANC)     11.1        Gran%     89.0       Hematocrit     28.7       Hemoglobin     8.9       Immature Grans (Abs)     0.16  Comment:  Mild elevation in immature granulocytes is non specific and   can be seen in a variety of conditions including stress response,   acute inflammation, trauma and pregnancy. Correlation with other   laboratory and clinical findings is essential.         Immature Granulocytes     1.3       Lymph #     0.4       Lymph%     3.1       Magnesium     2.0       MCH     32.4       MCHC     31.0       MCV     104       Mono #     0.8       Mono%     6.4       MPV     10.6       nRBC     0       Phosphorus     3.2       Platelets     159       POCT Glucose 88 100   98     Potassium     4.3       PROTEIN TOTAL     6.9       RBC     2.75       RDW     14.9       Sodium     135       WBC     12.43             Significant Diagnostics:  CT: No results found in the last 24 hours.  Echoencephalography: No results found in the last 24 hours.  MRI: No results found in the last 24 hours.

## 2019-08-17 NOTE — ASSESSMENT & PLAN NOTE
59F w/ PMH COPD, HTN, hypothyroidism, stage 4 R small cell lung cancer s/p 6 cycles of carbo/etoposide in remission since 06/2018 who presents to [facility] ED from OSH w/ L parietal brain mass. Now s/p crani for debulking on 8/12.    -Patient neurologically stable on exam  -Pathology pending  -Continue Depakote for seizure prevention  -Cerebral edema: Continue Dex 4 mg q 6 hours. Will DC on 3 week taper to 2 mg BID. Continue PPI while on steroids.   -Psyc: DC wrist restraints after sitter present at bedside. Continue redirection and delirium precautions. Continue Seroquel TID PRN. Medical and physical precautions to minimize agitation in place.   -HTN:  Maintain SBP < 160. Home dose of Norvasc has been held since SBP at goal. Will continue to monitor and restart if SBP becomes elevated.   -Hypothyroidism: Continue home dose of Synthroid  -Tobacco abuse: Continue nicotine patch  -COPD: Continue duo nebs PRN, pulse ox q 4 hours  -DVT prophylaxis: ABI's, SCD's, lovenox  -Bowel regimen: senna BID and miralax daily  -Atelectasis prevention: IS hourly while awake, PT/OT, OOB > 6 hours per day      DISPO: Pending acceptance to Rehab

## 2019-08-17 NOTE — PLAN OF CARE
Problem: Adult Inpatient Plan of Care  Goal: Plan of Care Review  Outcome: Ongoing (interventions implemented as appropriate)  Patient remains free from injury or fall. No neuro changes noted from initial assessment. Blood glucose monitored. Tolerating PO. Ambulated with un person assistance to bathroom. Continue dexamethasone IVP. Plan= Pending DC to Rehab. Will continue to monitor.

## 2019-08-17 NOTE — PROGRESS NOTES
[facility]  Neurosurgery  Progress Note    Subjective:     History of Present Illness: 59F w/ PMH COPD, HTN, hypothyroidism, stage 4 R small cell lung cancer s/p 6 cycles of carbo/etoposide in remission since 06/2018 who [facility]SH w/ L parietal brain mass. Per EMS records patient had a 1d history of AMS and gait difficulty and was brought into the hospital by her family for evaluation. CTH @ OSH showed large L parietal brain mass with possible hemorrhagic component and she was transferred [facility]. MRI @ [facility] redemonstrated L nonenhancing parietal mass with minimal blood. Patient is confused and so ROS is tenuous.    Post-Op Info:  Procedure(s) (LRB):  L craniotomy for tumor (Left)   4 Days Post-Op     Interval History: 8/17: remains in hospital with measures in place to prevent agitation. Per nursing remained agitated overnight. Patient appears calm this AM, not in restraints. Is now medically stable pending placement to rehab.     Medications:  Continuous Infusions:  Scheduled Meds:   ascorbic acid (vitamin C)  250 mg Oral BID    dexamethasone  4 mg Intravenous Q6H    divalproex  500 mg Oral Q12H    enoxaparin  30 mg Subcutaneous Daily    ferrous sulfate  325 mg Oral BID    levothyroxine  75 mcg Oral Before breakfast    mupirocin  1 g Nasal BID    nicotine  1 patch Transdermal Daily    pantoprazole  40 mg Oral Daily    polyethylene glycol  17 g Oral Daily    senna-docusate 8.6-50 mg  1 tablet Oral BID     PRN Meds:acetaminophen, albuterol-ipratropium, ondansetron, oxyCODONE, QUEtiapine, sodium chloride 0.9%     Review of Systems  Objective:     Weight: 35.4 kg (78 lb)  Body mass index is 14.74 kg/m².  Vital Signs (Most Recent):  Temp: 97.5 °F (36.4 °C) (08/16/19 1504)  Pulse: 103 (08/16/19 1545)  Resp: 18 (08/16/19 1355)  BP: 108/69 (08/16/19 1504)  SpO2: 97 % (08/16/19 1504) Vital Signs (24h Range):  Temp:  [96.4 °F (35.8 °C)-98 °F (36.7 °C)] 97.5 °F  (36.4 °C)  Pulse:  [] 103  Resp:  [17-18] 18  SpO2:  [88 %-97 %] 97 %  BP: (107-130)/(69-78) 108/69                          Neurosurgery Physical Exam    General: well developed, well nourished, no distress.   Head: normocephalic  Neurologic: Inattentive, requires constant refocusing. Flat affect.   GCS: Motor: 6/Verbal: 4/Eyes: 4 GCS Total: 14  Mental Status: Awake, Alert, Oriented to self and place. Responses delayed. Speech is not fluent but normal tempo, semantic paraphasias present.   Speech: Dysarthric     Cranial nerves: face symmetric, CN II-XII grossly intact.   Eyes: PERRLA.  Left eye with lateral gaze deviation, able to fully abduct and adduct both eyes.   Pulmonary: normal respirations, no signs of respiratory distress  Abdomen: soft, non-distended, not tender to palpation  Skin: Skin is warm, dry and intact.  Sensory: response to light touch throughout  Motor Strength: Intermittently follows simple commands in BUE. BLE move spontaneously, full strength.  No abnormal movements seen. Will intermittently follow commands in lower extremities.   Babinski's: present on left.  Clonus: Negative.  Finger-to-nose: unable to assess 2/2 mental status  Pronator drift: unable to assess 2/2 mental status        Incision:  Clean, dry, staples intact. Skin edges well approximated. No surrounding erythema or edema. No drainage or TTP.      Significant Labs:  Recent Labs   Lab 08/15/19  0312 08/16/19 0259    104    135*   K 4.1 4.3    94*   CO2 28 29   BUN 22* 35*   CREATININE 0.8 1.1   CALCIUM 8.9 9.4   MG 2.0 2.0     Recent Labs   Lab 08/15/19  0312 08/16/19  0259   WBC 8.95 12.43   HGB 8.1* 8.9*   HCT 26.2* 28.7*   * 159     No results for input(s): LABPT, INR, APTT in the last 48 hours.  Microbiology Results (last 7 days)     ** No results found for the last 168 hours. **        Recent Lab Results       08/16/19  1711   08/16/19  1215   08/16/19  0259   08/15/19  2138        Albumin      3.9       Alkaline Phosphatase     122       ALT     15       Anion Gap     12       AST     19       Baso #     0.02       Basophil%     0.2       BILIRUBIN TOTAL     0.4  Comment:  For infants and newborns, interpretation of results should be based  on gestational age, weight and in agreement with clinical  observations.  Premature Infant recommended reference ranges:  Up to 24 hours.............<8.0 mg/dL  Up to 48 hours............<12.0 mg/dL  3-5 days..................<15.0 mg/dL  6-29 days.................<15.0 mg/dL         BUN, Bld     35       Calcium     9.4       Chloride     94       CO2     29       Creatinine     1.1       Differential Method     Automated       eGFR if      >60.0       eGFR if non      55.1  Comment:  Calculation used to obtain the estimated glomerular filtration  rate (eGFR) is the CKD-EPI equation.          Eos #     0.0       Eosinophil%     0.0       Glucose     104       Gran # (ANC)     11.1       Gran%     89.0       Hematocrit     28.7       Hemoglobin     8.9       Immature Grans (Abs)     0.16  Comment:  Mild elevation in immature granulocytes is non specific and   can be seen in a variety of conditions including stress response,   acute inflammation, trauma and pregnancy. Correlation with other   laboratory and clinical findings is essential.         Immature Granulocytes     1.3       Lymph #     0.4       Lymph%     3.1       Magnesium     2.0       MCH     32.4       MCHC     31.0       MCV     104       Mono #     0.8       Mono%     6.4       MPV     10.6       nRBC     0       Phosphorus     3.2       Platelets     159       POCT Glucose 88 100   98     Potassium     4.3       PROTEIN TOTAL     6.9       RBC     2.75       RDW     14.9       Sodium     135       WBC     12.43             Significant Diagnostics:  CT: No results found in the last 24 hours.  Echoencephalography: No results found in the last 24 hours.  MRI: No results  found in the last 24 hours.    Assessment/Plan:     * Brain metastasis  59F w/ PMH COPD, HTN, hypothyroidism, stage 4 R small cell lung cancer s/p 6 cycles of carbo/etoposide in remission since 06/2018 who presents to [facility] ED from OSH w/ L parietal brain mass. Now s/p crani for debulking on 8/12.    -Patient neurologically stable on exam  -Pathology pending  -Continue Depakote for seizure prevention  -Cerebral edema: Continue Dex 4 mg q 6 hours. Will DC on 3 week taper to 2 mg BID. Continue PPI while on steroids.   -Psyc: Sitter present at bedside, restraints discontinued. Continue redirection and delirium precautions. Continue Seroquel TID PRN. Medical and physical precautions to minimize agitation in place. Less agitated today.   -HTN:  Maintain SBP < 160. Home dose of Norvasc has been held since SBP at goal. -135. Will continue to monitor and restart if SBP becomes elevated.   -Hypothyroidism: Continue home dose of Synthroid  -Tobacco abuse: Continue nicotine patch  -COPD: Continue duo nebs PRN, pulse ox q 4 hours  -DVT prophylaxis: ABI's, SCD's, lovenox  -Bowel regimen: senna BID and miralax daily  -Atelectasis prevention: IS hourly while awake, PT/OT, OOB > 6 hours per day      DISPO: Pending acceptance to Rehab        Duglas Hidalgo MD  Neurosurgery  [facility]

## 2019-08-18 LAB
POCT GLUCOSE: 100 MG/DL (ref 70–110)
POCT GLUCOSE: 107 MG/DL (ref 70–110)
POCT GLUCOSE: 128 MG/DL (ref 70–110)
POCT GLUCOSE: 202 MG/DL (ref 70–110)

## 2019-08-18 PROCEDURE — 25000003 PHARM REV CODE 250: Performed by: NURSE PRACTITIONER

## 2019-08-18 PROCEDURE — 97530 THERAPEUTIC ACTIVITIES: CPT

## 2019-08-18 PROCEDURE — 20600001 HC STEP DOWN PRIVATE ROOM

## 2019-08-18 PROCEDURE — 63600175 PHARM REV CODE 636 W HCPCS: Performed by: NURSE PRACTITIONER

## 2019-08-18 PROCEDURE — 63600175 PHARM REV CODE 636 W HCPCS: Performed by: STUDENT IN AN ORGANIZED HEALTH CARE EDUCATION/TRAINING PROGRAM

## 2019-08-18 PROCEDURE — 97116 GAIT TRAINING THERAPY: CPT

## 2019-08-18 PROCEDURE — 25000003 PHARM REV CODE 250: Performed by: PHYSICIAN ASSISTANT

## 2019-08-18 PROCEDURE — S4991 NICOTINE PATCH NONLEGEND: HCPCS | Performed by: NURSE PRACTITIONER

## 2019-08-18 PROCEDURE — 25000003 PHARM REV CODE 250: Performed by: STUDENT IN AN ORGANIZED HEALTH CARE EDUCATION/TRAINING PROGRAM

## 2019-08-18 RX ORDER — HALOPERIDOL 5 MG/ML
5 INJECTION INTRAMUSCULAR NIGHTLY PRN
Status: DISCONTINUED | OUTPATIENT
Start: 2019-08-18 | End: 2019-09-04

## 2019-08-18 RX ADMIN — DEXAMETHASONE SODIUM PHOSPHATE 4 MG: 4 INJECTION, SOLUTION INTRAMUSCULAR; INTRAVENOUS at 12:08

## 2019-08-18 RX ADMIN — NICOTINE 1 PATCH: 7 PATCH, EXTENDED RELEASE TRANSDERMAL at 08:08

## 2019-08-18 RX ADMIN — FERROUS SULFATE TAB EC 325 MG (65 MG FE EQUIVALENT) 325 MG: 325 (65 FE) TABLET DELAYED RESPONSE at 08:08

## 2019-08-18 RX ADMIN — Medication 250 MG: at 08:08

## 2019-08-18 RX ADMIN — LEVOTHYROXINE SODIUM 75 MCG: 75 TABLET ORAL at 05:08

## 2019-08-18 RX ADMIN — DEXAMETHASONE SODIUM PHOSPHATE 4 MG: 4 INJECTION, SOLUTION INTRAMUSCULAR; INTRAVENOUS at 06:08

## 2019-08-18 RX ADMIN — POLYETHYLENE GLYCOL 3350 17 G: 17 POWDER, FOR SOLUTION ORAL at 08:08

## 2019-08-18 RX ADMIN — SENNOSIDES,DOCUSATE SODIUM 1 TABLET: 8.6; 5 TABLET, FILM COATED ORAL at 08:08

## 2019-08-18 RX ADMIN — PANTOPRAZOLE SODIUM 40 MG: 40 TABLET, DELAYED RELEASE ORAL at 08:08

## 2019-08-18 RX ADMIN — DEXAMETHASONE SODIUM PHOSPHATE 4 MG: 4 INJECTION, SOLUTION INTRAMUSCULAR; INTRAVENOUS at 05:08

## 2019-08-18 RX ADMIN — DIVALPROEX SODIUM 500 MG: 250 TABLET, DELAYED RELEASE ORAL at 08:08

## 2019-08-18 RX ADMIN — ENOXAPARIN SODIUM 30 MG: 100 INJECTION SUBCUTANEOUS at 06:08

## 2019-08-18 NOTE — SUBJECTIVE & OBJECTIVE
Interval History: 8/18: Had a code gray called over night for agitation, however was calm this morning after receiving one time haldol. Added as PRN in addition to seroquel at night for active delerium. Otherwise neuro stable, not in restraints and preparing for hopeful rehab placement early next week.     Medications:  Continuous Infusions:  Scheduled Meds:   ascorbic acid (vitamin C)  250 mg Oral BID    dexamethasone  4 mg Intravenous Q6H    divalproex  500 mg Oral Q12H    enoxaparin  30 mg Subcutaneous Daily    ferrous sulfate  325 mg Oral BID    levothyroxine  75 mcg Oral Before breakfast    nicotine  1 patch Transdermal Daily    pantoprazole  40 mg Oral Daily    polyethylene glycol  17 g Oral Daily    senna-docusate 8.6-50 mg  1 tablet Oral BID     PRN Meds:acetaminophen, albuterol-ipratropium, haloperidol lactate, ondansetron, oxyCODONE, QUEtiapine, sodium chloride 0.9%     Review of Systems  Objective:     Weight: 35.4 kg (78 lb)  Body mass index is 14.74 kg/m².  Vital Signs (Most Recent):  Temp: 97.9 °F (36.6 °C) (08/18/19 1605)  Pulse: 99 (08/18/19 1605)  Resp: 18 (08/18/19 0730)  BP: (!) 95/55 (08/18/19 1605)  SpO2: (!) 93 % (08/18/19 1605) Vital Signs (24h Range):  Temp:  [97.9 °F (36.6 °C)-98.6 °F (37 °C)] 97.9 °F (36.6 °C)  Pulse:  [] 99  Resp:  [18] 18  SpO2:  [92 %-96 %] 93 %  BP: ()/(55-66) 95/55     Date 08/18/19 0700 - 08/19/19 0659   Shift 0518-0842 8298-0021 2483-1553 24 Hour Total   INTAKE   P.O. 480   480   Shift Total(mL/kg) 480(13.6)   480(13.6)   OUTPUT   Shift Total(mL/kg)       Weight (kg) 35.4 35.4 35.4 35.4                        Neurosurgery Physical Exam  General: well developed, well nourished, no distress.   Head: normocephalic  Neurologic: Inattentive, requires constant refocusing. Flat affect.   GCS: Motor: 6/Verbal: 4/Eyes: 4 GCS Total: 14  Mental Status: Awake, Alert, Oriented to self and place. Responses delayed. Speech is not fluent but normal tempo,  semantic paraphasias present.   Speech: Dysarthric     Cranial nerves: face symmetric, CN II-XII grossly intact.   Eyes: PERRLA.  Lateral eye gaze deviation, able to fully abduct and adduct both eyes.   Pulmonary: normal respirations, no signs of respiratory distress  Abdomen: soft, non-distended, not tender to palpation  Skin: Skin is warm, dry and intact.  Sensory: response to light touch throughout  Motor Strength: Intermittently follows simple commands in BUE. BLE move spontaneously, full strength.  No abnormal movements seen. Will intermittently follow commands in lower extremities.   Babinski's: present on left.  Clonus: Negative.  Finger-to-nose: unable to assess 2/2 mental status  Pronator drift: unable to assess 2/2 mental status        Incision:  Clean, dry, staples intact. Skin edges well approximated. No surrounding erythema or edema. No drainage or TTP.       Significant Labs:  No results for input(s): GLU, NA, K, CL, CO2, BUN, CREATININE, CALCIUM, MG in the last 48 hours.  No results for input(s): WBC, HGB, HCT, PLT in the last 48 hours.  No results for input(s): LABPT, INR, APTT in the last 48 hours.  Microbiology Results (last 7 days)     ** No results found for the last 168 hours. **        Recent Lab Results       08/18/19  1244   08/18/19  0738        POCT Glucose 100 107           Significant Diagnostics:  CT: No results found in the last 24 hours.  Echoencephalography: No results found in the last 24 hours.  MRI: No results found in the last 24 hours.

## 2019-08-18 NOTE — NURSING
Code kj called at 2130 d/t pt attempting to leave room, pushing at staff, and being a safety hazard to herself d/t unsteady gait.  Pt confused and agitated-- stating that she thought this was her house and for staff to get out .Multiple attempts to redirect pt without success. Neuro surgery aware and ordered 5mg of haldol IVP. Pt now back in bed, lights off and calming channel on. Sitter remains at bedside.

## 2019-08-18 NOTE — PT/OT/SLP PROGRESS
Physical Therapy Treatment    Patient Name:  Carmen Leyva   MRN:  [id number]    Recommendations:     Discharge Recommendations:  rehabilitation facility   Discharge Equipment Recommendations: (TBD pending progress)   Barriers to discharge: Inaccessible home and Decreased caregiver support    Assessment:     Carmen Leyva is a 59 y.o. female admitted with a medical diagnosis of Brain metastasis.  She presents with the following impairments/functional limitations:  weakness, gait instability, impaired balance, impaired cognition, decreased coordination, decreased safety awareness, impaired self care skills, impaired endurance . Patient was very impulsive and initially had difficulty and was resisting safety instructions, but was more calm and more aware as treatment progressed. Patient ambulates with min unsteady gait pattern and narrow base of support.   .    Rehab Prognosis: Good; patient would benefit from acute skilled PT services to address these deficits and reach maximum level of function.    Recent Surgery: Procedure(s) (LRB):  L craniotomy for tumor (Left) 6 Days Post-Op    Plan:     During this hospitalization, patient to be seen 4 x/week to address the identified rehab impairments via gait training, therapeutic activities, therapeutic exercises, neuromuscular re-education and progress toward the following goals:    · Plan of Care Expires:  09/13/19    Subjective     Chief Complaint: none  Patient/Family Comments/goals: N/A due to cognitive deficit.  Pain/Comfort:  · Pain Rating 1: 0/10  · Pain Rating Post-Intervention 1: 0/10      Objective:     Communicated with nsg prior to session.  Patient found supine with   upon PT entry to room.     General Precautions: Standard, fall, seizure, hearing impaired, vision impaired   Orthopedic Precautions:N/A   Braces: N/A     Functional Mobility:  · Bed Mobility:     · Rolling Left:  modified independence  · Scooting: modified independence  · Supine to Sit: modified  independence  · Transfers:     · Sit to Stand:  minimum assistance with hand-held assist  · Bed to Chair: minimum assistance with  hand-held assist  using  Stand Pivot  · Gait: 240 ft x 2 trials with HHA and min assistance, with a long rest break in sitting.      AM-PAC 6 CLICK MOBILITY  Turning over in bed (including adjusting bedclothes, sheets and blankets)?: 4  Sitting down on and standing up from a chair with arms (e.g., wheelchair, bedside commode, etc.): 3  Moving from lying on back to sitting on the side of the bed?: 4  Moving to and from a bed to a chair (including a wheelchair)?: 3  Need to walk in hospital room?: 3  Climbing 3-5 steps with a railing?: 2  Basic Mobility Total Score: 19       Therapeutic Activities and Exercises:   Donned a second gown. Patient transferred to bedside chair with HHA and min assistance.    Patient left up in chair with all lines intact, call button in reach and sitter present..    GOALS:   Multidisciplinary Problems     Physical Therapy Goals        Problem: Physical Therapy Goal    Goal Priority Disciplines Outcome Goal Variances Interventions   Physical Therapy Goal     PT, PT/OT Ongoing (interventions implemented as appropriate)     Description:  Goals to be met by: 2019    Patient will increase functional independence with mobility by performin. Supine <> sit with supervision - MET  2. Sit to stand transfer with Supervision-   3. Gait  x 150 feet with Supervision using appropriate AD.   4. Stand for 3 minutes with Supervision while performing dynamic balance activities to reduce fall risk   5. Lower extremity exercise program x20 reps per handout, with assistance as needed                       Time Tracking:     PT Received On: 19  PT Start Time: 1427     PT Stop Time: 1451  PT Total Time (min): 24 min     Billable Minutes: Gait Training 15 and Therapeutic Activity 9    Treatment Type: Treatment  PT/PTA: PTA     PTA Visit Number:    Talya, PTA  08/18/2019

## 2019-08-18 NOTE — PROGRESS NOTES
[facility]  Neurosurgery  Progress Note    Subjective:     History of Present Illness: 59F w/ PMH COPD, HTN, hypothyroidism, stage 4 R small cell lung cancer s/p 6 cycles of carbo/etoposide in remission since 06/2018 who [place] [facility] [place] w/ L parietal brain mass. Per EMS records patient had a 1d history of AMS and gait difficulty and was brought into the hospital by her family for evaluation. CTH @ OSH showed large L parietal brain mass with possible hemorrhagic component and she was transferred to [facility] for [place]. MRI @ [facility] redemonstrated L nonenhancing parietal mass with minimal blood. Patient is confused and so ROS is tenuous.    Post-Op Info:  Procedure(s) (LRB):  L craniotomy for tumor (Left)   6 Days Post-Op     Interval History: 8/18: Had a code gray called over night for agitation, however was calm this morning after receiving one time haldol. Added as PRN in addition to seroquel at night for active delerium. Otherwise neuro stable, not in restraints and preparing for hopeful rehab placement early next week.     Medications:  Continuous Infusions:  Scheduled Meds:   ascorbic acid (vitamin C)  250 mg Oral BID    dexamethasone  4 mg Intravenous Q6H    divalproex  500 mg Oral Q12H    enoxaparin  30 mg Subcutaneous Daily    ferrous sulfate  325 mg Oral BID    levothyroxine  75 mcg Oral Before breakfast    nicotine  1 patch Transdermal Daily    pantoprazole  40 mg Oral Daily    polyethylene glycol  17 g Oral Daily    senna-docusate 8.6-50 mg  1 tablet Oral BID     PRN Meds:acetaminophen, albuterol-ipratropium, haloperidol lactate, ondansetron, oxyCODONE, QUEtiapine, sodium chloride 0.9%     Review of Systems  Objective:     Weight: 35.4 kg (78 lb)  Body mass index is 14.74 kg/m².  Vital Signs (Most Recent):  Temp: 97.9 °F (36.6 °C) (08/18/19 1605)  Pulse: 99 (08/18/19 1605)  Resp: 18 (08/18/19 0730)  BP: (!) 95/55 (08/18/19 1605)  SpO2: (!) 93 % (08/18/19 1605) Vital  Signs (24h Range):  Temp:  [97.9 °F (36.6 °C)-98.6 °F (37 °C)] 97.9 °F (36.6 °C)  Pulse:  [] 99  Resp:  [18] 18  SpO2:  [92 %-96 %] 93 %  BP: ()/(55-66) 95/55     Date 08/18/19 0700 - 08/19/19 0659   Shift 9837-9039 3197-6734 4330-8062 24 Hour Total   INTAKE   P.O. 480   480   Shift Total(mL/kg) 480(13.6)   480(13.6)   OUTPUT   Shift Total(mL/kg)       Weight (kg) 35.4 35.4 35.4 35.4                        Neurosurgery Physical Exam  General: well developed, well nourished, no distress.   Head: normocephalic  Neurologic: Inattentive, requires constant refocusing. Flat affect.   GCS: Motor: 6/Verbal: 4/Eyes: 4 GCS Total: 14  Mental Status: Awake, Alert, Oriented x3. Responses delayed. Speech is not fluent but normal tempo, semantic paraphasias present.   Speech: Dysarthric     Cranial nerves: face symmetric, CN II-XII grossly intact.   Eyes: PERRLA.  Lateral eye gaze deviation, able to fully abduct and adduct both eyes.   Pulmonary: normal respirations, no signs of respiratory distress  Abdomen: soft, non-distended, not tender to palpation  Skin: Skin is warm, dry and intact.  Sensory: response to light touch throughout  Motor Strength: Intermittently follows simple commands in BUE. BLE move spontaneously, full strength.  No abnormal movements seen. Will intermittently follow commands in lower extremities.   Babinski's: present on left.  Clonus: Negative.  Finger-to-nose: unable to assess 2/2 mental status  Pronator drift: unable to assess 2/2 mental status        Incision:  Clean, dry, staples intact. Skin edges well approximated. No surrounding erythema or edema. No drainage or TTP.       Significant Labs:  No results for input(s): GLU, NA, K, CL, CO2, BUN, CREATININE, CALCIUM, MG in the last 48 hours.  No results for input(s): WBC, HGB, HCT, PLT in the last 48 hours.  No results for input(s): LABPT, INR, APTT in the last 48 hours.  Microbiology Results (last 7 days)     ** No results found for the last  168 hours. **        Recent Lab Results       08/18/19  1244   08/18/19  0738        POCT Glucose 100 107           Significant Diagnostics:  CT: No results found in the last 24 hours.  Echoencephalography: No results found in the last 24 hours.  MRI: No results found in the last 24 hours.    Assessment/Plan:     * Brain metastasis  59F w/ PMH COPD, HTN, hypothyroidism, stage 4 R small cell lung cancer s/p 6 cycles of carbo/etoposide in remission since 06/2018 who presents to [facility] ED from OSH w/ L parietal brain mass. Now s/p crani for debulking on 8/12.    -Patient neurologically stable on exam  -Pathology pending  -Continue Depakote for seizure prevention  -Cerebral edema: Continue Dex 4 mg q 6 hours. Will DC on 3 week taper to 2 mg BID. Continue PPI while on steroids.   -Psyc: DC wrist restraints after sitter present at bedside. Continue redirection and delirium precautions. Continue Seroquel TID PRN. Added haldol qhs prn 5mg for agitation. Medical and physical precautions to minimize agitation in place.   -HTN:  Maintain SBP < 160. Home dose of Norvasc has been held since SBP at goal. Will continue to monitor and restart if SBP becomes elevated.   -Hypothyroidism: Continue home dose of Synthroid  -Tobacco abuse: Continue nicotine patch  -COPD: Continue duo nebs PRN, pulse ox q 4 hours  -DVT prophylaxis: ABI's, SCD's, lovenox  -Bowel regimen: senna BID and miralax daily  -Atelectasis prevention: IS hourly while awake, PT/OT, OOB > 6 hours per day      DISPO: Pending acceptance to Rehab        Duglas Hidalgo MD  Neurosurgery  [facility]

## 2019-08-18 NOTE — PLAN OF CARE
Problem: Adult Inpatient Plan of Care  Goal: Plan of Care Review  Patient remains unaware of safety and her risk for falls.    Comments: POC reviewed with patient. Vital signs stable throughout shift. For full assessment please refer to flowsheet, patient refused to be assessed by RN and refused most care including routine medications. Patient is confused and has been combative toward staff members, code white called, haloperidol given per MD order. Fall/ safety precautions implemented & maintained. Bed locked in lowest position, bed alarm activated & audible, & call light in reach. Sitter at bedside to ensure safety. Will continue to monitor.

## 2019-08-18 NOTE — PLAN OF CARE
Problem: Physical Therapy Goal  Goal: Physical Therapy Goal  Goals to be met by: 2019    Patient will increase functional independence with mobility by performin. Supine <> sit with supervision - MET  2. Sit to stand transfer with Supervision-   3. Gait  x 150 feet with Supervision using appropriate AD.   4. Stand for 3 minutes with Supervision while performing dynamic balance activities to reduce fall risk   5. Lower extremity exercise program x20 reps per handout, with assistance as needed     Outcome: Ongoing (interventions implemented as appropriate)  Cognitive and judgement deficit continue to limit patient's independence and safety. Improved bed mobility and walking range noted today.

## 2019-08-19 LAB
ALBUMIN SERPL BCP-MCNC: 3 G/DL (ref 3.5–5.2)
ALP SERPL-CCNC: 88 U/L (ref 55–135)
ALT SERPL W/O P-5'-P-CCNC: 12 U/L (ref 10–44)
ANION GAP SERPL CALC-SCNC: 13 MMOL/L (ref 8–16)
ANISOCYTOSIS BLD QL SMEAR: SLIGHT
AST SERPL-CCNC: 9 U/L (ref 10–40)
BASOPHILS # BLD AUTO: 0.01 K/UL (ref 0–0.2)
BASOPHILS NFR BLD: 0.1 % (ref 0–1.9)
BILIRUB SERPL-MCNC: 0.3 MG/DL (ref 0.1–1)
BUN SERPL-MCNC: 35 MG/DL (ref 6–20)
CALCIUM SERPL-MCNC: 9.2 MG/DL (ref 8.7–10.5)
CHLORIDE SERPL-SCNC: 100 MMOL/L (ref 95–110)
CO2 SERPL-SCNC: 27 MMOL/L (ref 23–29)
CREAT SERPL-MCNC: 0.9 MG/DL (ref 0.5–1.4)
DIFFERENTIAL METHOD: ABNORMAL
EOSINOPHIL # BLD AUTO: 0 K/UL (ref 0–0.5)
EOSINOPHIL NFR BLD: 0 % (ref 0–8)
ERYTHROCYTE [DISTWIDTH] IN BLOOD BY AUTOMATED COUNT: 15.3 % (ref 11.5–14.5)
EST. GFR  (AFRICAN AMERICAN): >60 ML/MIN/1.73 M^2
EST. GFR  (NON AFRICAN AMERICAN): >60 ML/MIN/1.73 M^2
GLUCOSE SERPL-MCNC: 100 MG/DL (ref 70–110)
HCT VFR BLD AUTO: 29.8 % (ref 37–48.5)
HGB BLD-MCNC: 9.4 G/DL (ref 12–16)
IMM GRANULOCYTES # BLD AUTO: 0.23 K/UL (ref 0–0.04)
IMM GRANULOCYTES NFR BLD AUTO: 1.4 % (ref 0–0.5)
LYMPHOCYTES # BLD AUTO: 0.2 K/UL (ref 1–4.8)
LYMPHOCYTES NFR BLD: 1.4 % (ref 18–48)
MCH RBC QN AUTO: 32 PG (ref 27–31)
MCHC RBC AUTO-ENTMCNC: 31.5 G/DL (ref 32–36)
MCV RBC AUTO: 101 FL (ref 82–98)
MONOCYTES # BLD AUTO: 0.4 K/UL (ref 0.3–1)
MONOCYTES NFR BLD: 2.7 % (ref 4–15)
NEUTROPHILS # BLD AUTO: 15.5 K/UL (ref 1.8–7.7)
NEUTROPHILS NFR BLD: 94.4 % (ref 38–73)
NRBC BLD-RTO: 0 /100 WBC
PLATELET # BLD AUTO: 72 K/UL (ref 150–350)
PLATELET BLD QL SMEAR: ABNORMAL
PMV BLD AUTO: 10.8 FL (ref 9.2–12.9)
POCT GLUCOSE: 145 MG/DL (ref 70–110)
POLYCHROMASIA BLD QL SMEAR: ABNORMAL
POTASSIUM SERPL-SCNC: 4.9 MMOL/L (ref 3.5–5.1)
PROT SERPL-MCNC: 5.9 G/DL (ref 6–8.4)
RBC # BLD AUTO: 2.94 M/UL (ref 4–5.4)
SODIUM SERPL-SCNC: 140 MMOL/L (ref 136–145)
WBC # BLD AUTO: 16.44 K/UL (ref 3.9–12.7)

## 2019-08-19 PROCEDURE — 92507 TX SP LANG VOICE COMM INDIV: CPT

## 2019-08-19 PROCEDURE — 99024 PR POST-OP FOLLOW-UP VISIT: ICD-10-PCS | Mod: ,,, | Performed by: PHYSICIAN ASSISTANT

## 2019-08-19 PROCEDURE — 25000003 PHARM REV CODE 250: Performed by: PHYSICIAN ASSISTANT

## 2019-08-19 PROCEDURE — 25000003 PHARM REV CODE 250: Performed by: STUDENT IN AN ORGANIZED HEALTH CARE EDUCATION/TRAINING PROGRAM

## 2019-08-19 PROCEDURE — 85025 COMPLETE CBC W/AUTO DIFF WBC: CPT

## 2019-08-19 PROCEDURE — 63600175 PHARM REV CODE 636 W HCPCS: Performed by: NURSE PRACTITIONER

## 2019-08-19 PROCEDURE — 80053 COMPREHEN METABOLIC PANEL: CPT

## 2019-08-19 PROCEDURE — S4991 NICOTINE PATCH NONLEGEND: HCPCS | Performed by: PHYSICIAN ASSISTANT

## 2019-08-19 PROCEDURE — 20600001 HC STEP DOWN PRIVATE ROOM

## 2019-08-19 PROCEDURE — 63600175 PHARM REV CODE 636 W HCPCS: Performed by: PHYSICIAN ASSISTANT

## 2019-08-19 PROCEDURE — 99024 POSTOP FOLLOW-UP VISIT: CPT | Mod: ,,, | Performed by: PHYSICIAN ASSISTANT

## 2019-08-19 PROCEDURE — 97535 SELF CARE MNGMENT TRAINING: CPT

## 2019-08-19 PROCEDURE — 36415 COLL VENOUS BLD VENIPUNCTURE: CPT

## 2019-08-19 RX ORDER — LEVETIRACETAM 500 MG/1
500 TABLET ORAL 2 TIMES DAILY
Status: DISCONTINUED | OUTPATIENT
Start: 2019-08-19 | End: 2019-08-27

## 2019-08-19 RX ORDER — DEXAMETHASONE 4 MG/1
4 TABLET ORAL EVERY 8 HOURS
Status: DISCONTINUED | OUTPATIENT
Start: 2019-08-19 | End: 2019-08-26

## 2019-08-19 RX ORDER — DEXAMETHASONE SODIUM PHOSPHATE 4 MG/ML
4 INJECTION, SOLUTION INTRA-ARTICULAR; INTRALESIONAL; INTRAMUSCULAR; INTRAVENOUS; SOFT TISSUE EVERY 8 HOURS
Status: DISCONTINUED | OUTPATIENT
Start: 2019-08-19 | End: 2019-08-19

## 2019-08-19 RX ORDER — DEXAMETHASONE 4 MG/1
4 TABLET ORAL EVERY 6 HOURS
Status: DISCONTINUED | OUTPATIENT
Start: 2019-08-19 | End: 2019-08-19

## 2019-08-19 RX ORDER — ENOXAPARIN SODIUM 100 MG/ML
40 INJECTION SUBCUTANEOUS EVERY 24 HOURS
Status: DISCONTINUED | OUTPATIENT
Start: 2019-08-19 | End: 2019-09-04

## 2019-08-19 RX ADMIN — LEVOTHYROXINE SODIUM 75 MCG: 75 TABLET ORAL at 05:08

## 2019-08-19 RX ADMIN — Medication 250 MG: at 09:08

## 2019-08-19 RX ADMIN — DEXAMETHASONE 4 MG: 4 TABLET ORAL at 09:08

## 2019-08-19 RX ADMIN — DIVALPROEX SODIUM 500 MG: 250 TABLET, DELAYED RELEASE ORAL at 09:08

## 2019-08-19 RX ADMIN — PANTOPRAZOLE SODIUM 40 MG: 40 TABLET, DELAYED RELEASE ORAL at 09:08

## 2019-08-19 RX ADMIN — LEVETIRACETAM 500 MG: 500 TABLET, FILM COATED ORAL at 09:08

## 2019-08-19 RX ADMIN — FERROUS SULFATE TAB EC 325 MG (65 MG FE EQUIVALENT) 325 MG: 325 (65 FE) TABLET DELAYED RESPONSE at 09:08

## 2019-08-19 RX ADMIN — SENNOSIDES,DOCUSATE SODIUM 1 TABLET: 8.6; 5 TABLET, FILM COATED ORAL at 09:08

## 2019-08-19 RX ADMIN — DEXAMETHASONE SODIUM PHOSPHATE 4 MG: 4 INJECTION, SOLUTION INTRAMUSCULAR; INTRAVENOUS at 05:08

## 2019-08-19 RX ADMIN — DEXAMETHASONE 4 MG: 4 TABLET ORAL at 02:08

## 2019-08-19 RX ADMIN — NICOTINE 1 PATCH: 7 PATCH, EXTENDED RELEASE TRANSDERMAL at 09:08

## 2019-08-19 RX ADMIN — DEXAMETHASONE SODIUM PHOSPHATE 4 MG: 4 INJECTION, SOLUTION INTRAMUSCULAR; INTRAVENOUS at 01:08

## 2019-08-19 NOTE — PLAN OF CARE
BARBIE following for DC needs. BARBIE in communication with Rhonda GARCIA spoke to Shell at [facility]. Shell stated that she is still following the patient but patient does not look ready at this time due to aggitation and requiring haldol. Shell will continue to follow patient's progress.     Abigail with [facility] is also following the patient.      08/19/19 1428   Post-Acute Status   Post-Acute Authorization Placement   Post-Acute Placement Status Pending Post-Acute Clinical Review     Cindy Valladares, MARI  [facility]  [id number]

## 2019-08-19 NOTE — PT/OT/SLP PROGRESS
"  Speech Language Pathology Treatment    Patient Name:  Carmen Leyva   MRN:  [id number]  Admitting Diagnosis: Brain metastasis    Recommendations:                 General Recommendations:  Cognitive-linguistic therapy  Diet recommendations:  Regular, Liquid Diet Level: Thin   Aspiration Precautions: Standard aspiration precautions   General Precautions: Standard, fall, vision impaired, seizure, hearing impaired  Communication strategies:  none    Subjective     Asleep upon ST entry to room. Sitter present at the bedside.   Confusion evident    Pain/Comfort:  Pain Rating 1: 0/10    Objective:     Has the patient been evaluated by SLP for swallowing?   Yes  Keep patient NPO? No   Current Respiratory Status: room air      Patient seen for ongoing cognitive linguistic therapy.  Patient oriented to self and , however not oriented to date, place, or situation. Patient stated month of "November".  Patient completed auto speech tasks of BER[place], ROULA, and counting with 70% acc indep, patient often needing initiation cues - difficulty task switching/perseverations noted at this time. Patient did not count backwards from 10 given max A.  Confrontation naming tasks completed with 2/6 indep. Responsive naming tasks completed with 3/6. Patient with difficulty determining question vs utterance, patient often answering "yeah" and "Uh- huh" in response to confrontation/naming tasks.  Patient appearing with improved ability for participation in structured speech tasks. Goals remain appropriate.     Assessment:     Carmen Leyva is a 59 y.o. female with an SLP diagnosis of  Cognitive-Linguistic Impairment.    Goals:   Multidisciplinary Problems     SLP Goals        Problem: SLP Goal    Goal Priority Disciplines Outcome   SLP Goal     SLP    Description:  Speech Language Pathology Goals  Goals expected to be met by :  1. Patient will tolerate a regular diet and thin liquids with no overt signs of airway compromise.   2. Patient will " participate in a full speech, language, and cognitive assessment when appropriate.  MET  3. Pt will answer simple yes/no questions with 80% accy   4. Pt will complete simple problem solving tasks with 70% accy   5. Pt will orient x4 with mod cues  6. Pt will participate in ongoing assessment of cognitive linguistic skills.                          Plan:     · Patient to be seen:  4 x/week   · Plan of Care reviewed with:  patient   · SLP Follow-Up:  Yes       Discharge recommendations:  rehabilitation facility     Time Tracking:     SLP Treatment Date:   08/19/19  Speech Start Time:  1035  Speech Stop Time:  1043     Speech Total Time (min):  8 min    Billable Minutes: Speech Therapy Individual 8     Emily Abadie, CCC-SLP  08/19/2019

## 2019-08-19 NOTE — PRE ADMISSION SCREENING
[facility] has referral on this patient, however she lives in [place] close to rehab [facility]. I will call spouse to see where is first choice for iprehab when ready. Patient not ready at this time due to agiation and haldol being used.

## 2019-08-19 NOTE — PLAN OF CARE
Problem: Occupational Therapy Goal  Goal: Occupational Therapy Goal  Goals to be met by: 8/24     Patient will increase functional independence with ADLs by performing:    UE Dressing with Modified [place].  LE Dressing with Modified [place].  Grooming while standing with Modified [place].  Toileting from toilet with Modified [place] for hygiene and clothing management.   Supine to sit with Modified [place].  Stand pivot transfers with Modified [place].     Goals remain appropriate.  OSIEL Lynch  8/19/2019

## 2019-08-19 NOTE — PLAN OF CARE
Problem: Adult Inpatient Plan of Care  Goal: Plan of Care Review  Outcome: Ongoing (interventions implemented as appropriate)  Patient remains free from injury or fall. No neuro changes noted or reported from initial assessment. Ambulating in hallway with PT. Improved PO intake Patient was calm and cooperative during this shift.  Plan=possible  DC Rehab tomorrow.

## 2019-08-19 NOTE — ASSESSMENT & PLAN NOTE
59F w/ PMH COPD, HTN, hypothyroidism, stage 4 R small cell lung cancer s/p 6 cycles of carbo/etoposide in remission since 06/2018 who presents to [facility] ED from OSH w/ L parietal brain mass. Now s/p crani for debulking on 8/12.    -Patient neurologically stable on exam  -Pathology pending  -Continue Depakote for seizure prevention  -Cerebral edema: Continue Dex 4 mg q 6 hours. Will DC on 3 week taper to 2 mg BID. Continue PPI while on steroids.   - Malnutrition: Albumin 3.0 today. Will start jennifer supplement tid to promote wound healing.    -Psyc: No use of wrist restraints in past 2 days. Continue redirection and delirium precautions. Continue Seroquel TID PRN. Haldol prn qhs. Medical and physical precautions to minimize agitation in place.   -HTN:  Maintain SBP < 160. Home dose of Norvasc has been held since SBP at goal. Will continue to monitor and restart if SBP becomes elevated.   -Hypothyroidism: Continue home dose of Synthroid  -Tobacco abuse: Continue nicotine patch  -COPD: Continue duo nebs PRN, pulse ox q 4 hours  -DVT prophylaxis: ABI's, SCD's, lovenox  -Bowel regimen: senna BID and miralax daily  -Atelectasis prevention: IS hourly while awake, PT/OT, OOB > 6 hours per day      DISPO: Pending acceptance to Rehab.     Discussed with Dr. Erickson

## 2019-08-19 NOTE — PROGRESS NOTES
[facility]  Neurosurgery  Progress Note    Subjective:     History of Present Illness: 59F w/ PMH COPD, HTN, hypothyroidism, stage 4 R small cell lung cancer s/p 6 cycles of carbo/etoposide in remission since 06/2018 who presents to [facility] ED from OSH w/ L parietal brain mass. Per EMS records patient had a 1d history of AMS and gait difficulty and was brought into the hospital by her family for evaluation. CTH @ OSH showed large L parietal brain mass with possible hemorrhagic component and she was transferred to [facility] for evaluation. MRI @ [facility] redemonstrated L nonenhancing parietal mass with minimal blood. Patient is confused and so ROS is tenuous.    Post-Op Info:  Procedure(s) (LRB):  L craniotomy for tumor (Left)   7 Days Post-Op     Interval History: NAEON. Pt sitting up comfortably in bed with no restraints. No family at bedside. Denies any complaints. Reports pain is controlled. Pt tolerating diet and voiding appropriately.    Medications:  Continuous Infusions:  Scheduled Meds:   ascorbic acid (vitamin C)  250 mg Oral BID    dexAMETHasone  4 mg Oral Q8H    divalproex  500 mg Oral Q12H    enoxaparin  40 mg Subcutaneous Daily    ferrous sulfate  325 mg Oral BID    levETIRAcetam  500 mg Oral BID    levothyroxine  75 mcg Oral Before breakfast    nicotine  1 patch Transdermal Daily    pantoprazole  40 mg Oral Daily    polyethylene glycol  17 g Oral Daily    senna-docusate 8.6-50 mg  1 tablet Oral BID     PRN Meds:acetaminophen, albuterol-ipratropium, haloperidol lactate, ondansetron, oxyCODONE, QUEtiapine, sodium chloride 0.9%     Review of Systems  Objective:     Weight: 35.4 kg (78 lb)  Body mass index is 14.74 kg/m².  Vital Signs (Most Recent):  Temp: 97 °F (36.1 °C) (08/19/19 0735)  Pulse: 74 (08/19/19 1125)  Resp: 18 (08/19/19 0735)  BP: (!) 113/58 (08/19/19 0735)  SpO2: (!) 94 % (08/19/19 0735) Vital Signs (24h Range):  Temp:  [97 °F (36.1 °C)-97.9 °F (36.6 °C)] 97 °F (36.1  °C)  Pulse:  [74-99] 74  Resp:  [16-18] 18  SpO2:  [92 %-94 %] 94 %  BP: ()/(55-65) 113/58                          Neurosurgery Physical Exam   General: well developed, well nourished, no distress.   Head: normocephalic  Neurologic: Alert and oriented.  Inattentiveness.   GCS: Motor: 6/Verbal: 4/Eyes: 4 GCS Total: 14  Mental Status: Awake, Alert, Oriented x 4  Language: Receptive aphasia   Speech: No dysarthria  Cranial nerves: face symmetric, tongue midline, CN II-XII grossly intact.   Eyes: pupils round, reactive to light with accomodation, EOMI. Right pupil > Left pupil. Dysconjugate gaze.  Pulmonary: normal respirations, no signs of respiratory distress  Abdomen: soft, non-distended, not tender to palpation  Skin: Skin is warm, dry and intact.  Sensory: intact to light touch throughout  Motor Strength:Moves all extremities spontaneously with good tone.  Full strength upper and lower extremities. No abnormal movements seen.     Babinski's: Negative.  Clonus: Negative.    Finger-to-nose: Unable to assess due to mental status  Pronator drift: Unable to assess due to mental status    Incision: Clean, dry, staples intact. Skin edges well approximated. No surrounding erythema or edema. No drainage or TTP.     Significant Labs:  Recent Labs   Lab 08/19/19  0525         K 4.9      CO2 27   BUN 35*   CREATININE 0.9   CALCIUM 9.2     Recent Labs   Lab 08/19/19  0837   WBC 16.44*   HGB 9.4*   HCT 29.8*   PLT 72*     No results for input(s): LABPT, INR, APTT in the last 48 hours.  Microbiology Results (last 7 days)     ** No results found for the last 168 hours. **        Assessment/Plan:     * Brain metastasis  59F w/ PMH COPD, HTN, hypothyroidism, stage 4 R small cell lung cancer s/p 6 cycles of carbo/etoposide in remission since 06/2018 who [place] [facility] [place] w/ L parietal brain mass. Now s/p crani for debulking on 8/12.    -Patient neurologically stable on exam  -Pathology  pending  -Continue Depakote for seizure prevention  -Cerebral edema: Decrease to Dex 4 mg q 8  hours. Begin 3 week taper to 2 mg BID. Continue PPI while on steroids.   - Malnutrition: Albumin 3.0 today. Will start jennifer supplement tid to promote wound healing.    -Psyc: No use of wrist restraints in past 2 days. Continue redirection and delirium precautions. Continue Seroquel TID PRN. Haldol prn qhs. Medical and physical precautions to minimize agitation in place.   -HTN:  Maintain SBP < 160. Home dose of Norvasc has been held since SBP at goal. Will continue to monitor and restart if SBP becomes elevated.   -Hypothyroidism: Continue home dose of Synthroid  -Tobacco abuse: Continue nicotine patch  -COPD: Continue duo nebs PRN, pulse ox q 4 hours  -DVT prophylaxis: ABI's, SCD's, lovenox  -Bowel regimen: senna BID and miralax daily  -Atelectasis prevention: IS hourly while awake, PT/OT, OOB > 6 hours per day      DISPO: Pending acceptance to Rehab. Medically stable for discharge    Attempted to call patient's sister, Ms. Young, with number in chart. Attempted to leave a message, but voicemail was not set up.    Discussed with Dr. Georgina Collins, PA-C   [phone]  Neurosurgery  [facility]

## 2019-08-19 NOTE — PT/OT/SLP PROGRESS
"Occupational Therapy   Treatment    Name: Carmen Leyva  MRN: [id number]  Admitting Diagnosis:  Brain metastasis  7 Days Post-Op    Recommendations:     Discharge Recommendations: rehabilitation facility  Discharge Equipment Recommendations:  (TBD)  Barriers to discharge:  Decreased caregiver support    Assessment:     Carmen Leyva is a 59 y.o. female with a medical diagnosis of Brain metastasis.  She presents with performance deficits affecting function are weakness, impaired self care skills, impaired balance, decreased coordination, impaired endurance, impaired functional mobilty, impaired cognition, decreased lower extremity function, decreased upper extremity function.     Rehab Prognosis:  Good; patient would benefit from acute skilled OT services to address these deficits and reach maximum level of function.       Plan:     Patient to be seen 3 x/week to address the above listed problems via self-care/home management, neuromuscular re-education, cognitive retraining, therapeutic activities, therapeutic exercises, sensory integration  · Plan of Care Expires: 09/11/19  · Plan of Care Reviewed with: patient    Subjective   Patient:  "I feel good today."  Pain/Comfort:  · Pain Rating 1: 0/10  · Pain Rating Post-Intervention 1: 0/10    Objective:     Communicated with: Nurse prior to session.  Patient found supine with telemetry, peripheral IV, bed alarm([employer] Camera monitoring) upon OT entry to room.  Sitter present.  General Precautions: Standard, aspiration, fall, vision impaired, hearing impaired, seizure   Orthopedic Precautions:N/A   Braces: N/A     Occupational Performance:     Bed Mobility:    · Patient completed Rolling/Turning to Left with  supervision  · Patient completed Rolling/Turning to Right with supervision  · Patient completed Scooting/Bridging with stand by assistance  · Patient completed Supine to Sit with stand by assistance  · Patient completed Sit to Supine with stand by assistance "     Functional Mobility/Transfers:  · Patient completed Sit <> Stand Transfer with contact guard assistance  with  no assistive device   · Patient completed Bed <> Chair Transfer using Stand Pivot technique with contact guard assistance with no assistive device    Activities of Daily Living:  · Grooming: contact guard assistance while standing  · Upper Body Dressing: contact guard assistance while standing to gather clothing  · Lower Body Dressing: contact guard assistance    · Toileting: contact guard assistance      [facility] 6 Click ADL: 18    Treatment & Education:  Patient education provided on role of OT and need for continued OT upon discharge.  Patient education provided on dressing technique, positioning, postural control, and transfers.  Continued education, patient/ family training recommended.  Patient alert and oriented x person only.  Able to follow 4/4 one step commands.  Patient attentive and interactive throughout the session.  Patient able to identify 2/3 body parts.  Able to name 3/3 objects.  White board updated in patient's room.  OT asked if there were any other questions; patient/ family had no further questions.     Patient left supine with all lines intact, call button in reach, bed alarm on and sitter presentEducation:      GOALS:   Multidisciplinary Problems     Occupational Therapy Goals        Problem: Occupational Therapy Goal    Goal Priority Disciplines Outcome Interventions   Occupational Therapy Goal     OT, PT/OT Ongoing (interventions implemented as appropriate)    Description:  Goals set 8/19 to be addressed for 14 days with expiration date, 9/2:  Patient will increase functional independence with ADLs by performing:    Patient will demonstrate rolling to the right with modified independence.  Not met   Patient will demonstrate rolling to the left with modified independence.   Not met  Patient will demonstrate supine -sit with modified independence.   Not met  Patient will  demonstrate stand pivot transfers with modified independence.   Not met  Patient will demonstrate grooming while standing with modified independence.   Not met  Patient will demonstrate upper body dressing with modified independence while seated EOB.   Not met  Patient will demonstrate lower body dressing with modified independence while seated EOB.   Not met  Patient will demonstrate toileting with modified independence.   Not met  Patient will demonstrate bathing while seated EOB with modified independence.   Not met                    Time Tracking:     OT Date of Treatment: 08/19/19  OT Start Time: 0712  OT Stop Time: 0735  OT Total Time (min): 23 min    Billable Minutes:Self Care/Home Management 23    OSIEL Lynch  8/19/2019

## 2019-08-19 NOTE — PROGRESS NOTES
Pharmacist Renal Dose Adjustment Note    Carmen Leyva is a 59 y.o. female being treated with the medication enoxaparin    Patient Data:    Vital Signs (Most Recent):  Temp: 97 °F (36.1 °C) (08/19/19 0735)  Pulse: 91 (08/19/19 0745)  Resp: 18 (08/19/19 0735)  BP: (!) 113/58 (08/19/19 0735)  SpO2: (!) 94 % (08/19/19 0735)   Vital Signs (72h Range):  Temp:  [96.4 °F (35.8 °C)-98.6 °F (37 °C)]   Pulse:  []   Resp:  [16-19]   BP: ()/(55-73)   SpO2:  [88 %-97 %]      Recent Labs   Lab 08/15/19  0312 08/16/19  0259 08/19/19  0525   CREATININE 0.8 1.1 0.9     Serum creatinine: 0.9 mg/dL 08/19/19 0525  Estimated creatinine clearance: 37.6 mL/min    Medication:enoxaparin dose: 30 mg frequency daily will be changed to medication:enoxaparin dose:40 mg frequency:daily    Pharmacist's Name: Stella Waldrop  Pharmacist's Extension: [id number]

## 2019-08-19 NOTE — PLAN OF CARE
Problem: SLP Goal  Goal: SLP Goal  Speech Language Pathology Goals  Goals expected to be met by 8/20:  1. Patient will tolerate a regular diet and thin liquids with no overt signs of airway compromise.   2. Patient will participate in a full speech, language, and cognitive assessment when appropriate.  MET  3. Pt will answer simple yes/no questions with 80% accy   4. Pt will complete simple problem solving tasks with 70% accy   5. Pt will orient x4 with mod cues  6. Pt will participate in ongoing assessment of cognitive linguistic skills.         Goals remain appropriate.   Emily P. Abadie M.S., CCC-SLP  Speech Language Pathologist  [phone]  08/19/2019

## 2019-08-19 NOTE — PLAN OF CARE
Problem: Adult Inpatient Plan of Care  Goal: Plan of Care Review  Outcome: Ongoing (interventions implemented as appropriate)  Patient has been calm and following directions for the last day and remains free from falls or injury.  Goal: Absence of Hospital-Acquired Illness or Injury    Intervention: Prevent Infection  Monitor for s/s of infection.      Comments: POC reviewed with patient. All questions and concerns reviewed. Vital signs stable throughout shift. For full assessment please refer to flowsheet. Fall/ safety precautions implemented & maintained. Bed locked in lowest position, bed alarm activated & audible, & call light in reach. Will continue to monitor.

## 2019-08-19 NOTE — PLAN OF CARE
08/19/19 1519   Discharge Reassessment   Assessment Type Discharge Planning Reassessment   Provided patient/caregiver education on the expected discharge date and the discharge plan No   Do you have any problems affording any of your prescribed medications? No   Discharge Plan A Rehab   Discharge Plan B Home with family;Home Health   DME Needed Upon Discharge  none   Patient choice form signed by patient/caregiver N/A   Anticipated Discharge Disposition Rehab

## 2019-08-19 NOTE — SUBJECTIVE & OBJECTIVE
Interval History: NAEON. Pt sitting up comfortably in bed with no restraints. Denies any complaints. Reports pain is controlled. Pt tolerating diet and voiding appropriately.    Medications:  Continuous Infusions:  Scheduled Meds:   ascorbic acid (vitamin C)  250 mg Oral BID    dexAMETHasone  4 mg Oral Q8H    divalproex  500 mg Oral Q12H    enoxaparin  40 mg Subcutaneous Daily    ferrous sulfate  325 mg Oral BID    levETIRAcetam  500 mg Oral BID    levothyroxine  75 mcg Oral Before breakfast    nicotine  1 patch Transdermal Daily    pantoprazole  40 mg Oral Daily    polyethylene glycol  17 g Oral Daily    senna-docusate 8.6-50 mg  1 tablet Oral BID     PRN Meds:acetaminophen, albuterol-ipratropium, haloperidol lactate, ondansetron, oxyCODONE, QUEtiapine, sodium chloride 0.9%     Review of Systems  Objective:     Weight: 35.4 kg (78 lb)  Body mass index is 14.74 kg/m².  Vital Signs (Most Recent):  Temp: 97 °F (36.1 °C) (08/19/19 0735)  Pulse: 74 (08/19/19 1125)  Resp: 18 (08/19/19 0735)  BP: (!) 113/58 (08/19/19 0735)  SpO2: (!) 94 % (08/19/19 0735) Vital Signs (24h Range):  Temp:  [97 °F (36.1 °C)-97.9 °F (36.6 °C)] 97 °F (36.1 °C)  Pulse:  [74-99] 74  Resp:  [16-18] 18  SpO2:  [92 %-94 %] 94 %  BP: ()/(55-65) 113/58                          Neurosurgery Physical Exam   General: well developed, well nourished, no distress.   Head: normocephalic  Neurologic: Alert and oriented.  Inattentiveness.   GCS: Motor: 6/Verbal: 4/Eyes: 4 GCS Total: 14  Mental Status: Awake, Alert, Oriented x 4  Language: Receptive aphasia   Speech: No dysarthria  Cranial nerves: face symmetric, tongue midline, CN II-XII grossly intact.   Eyes: pupils round, reactive to light with accomodation, EOMI. Right pupil > Left pupil. Dysconjugate gaze.  Pulmonary: normal respirations, no signs of respiratory distress  Abdomen: soft, non-distended, not tender to palpation  Skin: Skin is warm, dry and intact.  Sensory: intact to light  touch throughout  Motor Strength:Moves all extremities spontaneously with good tone.  Full strength upper and lower extremities. No abnormal movements seen.     Babinski's: Negative.  Clonus: Negative.    Finger-to-nose: Unable to assess due to mental status  Pronator drift: Unable to assess due to mental status    Incision: Clean, dry, staples intact. Skin edges well approximated. No surrounding erythema or edema. No drainage or TTP.     Significant Labs:  Recent Labs   Lab 08/19/19  0525         K 4.9      CO2 27   BUN 35*   CREATININE 0.9   CALCIUM 9.2     Recent Labs   Lab 08/19/19  0837   WBC 16.44*   HGB 9.4*   HCT 29.8*   PLT 72*     No results for input(s): LABPT, INR, APTT in the last 48 hours.  Microbiology Results (last 7 days)     ** No results found for the last 168 hours. **

## 2019-08-20 LAB
POCT GLUCOSE: 106 MG/DL (ref 70–110)
POCT GLUCOSE: 109 MG/DL (ref 70–110)

## 2019-08-20 PROCEDURE — 25000003 PHARM REV CODE 250: Performed by: STUDENT IN AN ORGANIZED HEALTH CARE EDUCATION/TRAINING PROGRAM

## 2019-08-20 PROCEDURE — 63600175 PHARM REV CODE 636 W HCPCS: Performed by: PHYSICIAN ASSISTANT

## 2019-08-20 PROCEDURE — 97530 THERAPEUTIC ACTIVITIES: CPT

## 2019-08-20 PROCEDURE — 25000003 PHARM REV CODE 250: Performed by: PHYSICIAN ASSISTANT

## 2019-08-20 PROCEDURE — 20600001 HC STEP DOWN PRIVATE ROOM

## 2019-08-20 PROCEDURE — 63600175 PHARM REV CODE 636 W HCPCS: Performed by: NEUROLOGICAL SURGERY

## 2019-08-20 PROCEDURE — S4991 NICOTINE PATCH NONLEGEND: HCPCS | Performed by: PHYSICIAN ASSISTANT

## 2019-08-20 PROCEDURE — 92507 TX SP LANG VOICE COMM INDIV: CPT

## 2019-08-20 RX ADMIN — NICOTINE 1 PATCH: 7 PATCH, EXTENDED RELEASE TRANSDERMAL at 09:08

## 2019-08-20 RX ADMIN — Medication 250 MG: at 08:08

## 2019-08-20 RX ADMIN — LEVETIRACETAM 500 MG: 500 TABLET, FILM COATED ORAL at 08:08

## 2019-08-20 RX ADMIN — LEVOTHYROXINE SODIUM 75 MCG: 75 TABLET ORAL at 05:08

## 2019-08-20 RX ADMIN — DEXAMETHASONE 4 MG: 4 TABLET ORAL at 05:08

## 2019-08-20 RX ADMIN — PANTOPRAZOLE SODIUM 40 MG: 40 TABLET, DELAYED RELEASE ORAL at 09:08

## 2019-08-20 RX ADMIN — DIVALPROEX SODIUM 500 MG: 250 TABLET, DELAYED RELEASE ORAL at 09:08

## 2019-08-20 RX ADMIN — DIVALPROEX SODIUM 500 MG: 250 TABLET, DELAYED RELEASE ORAL at 08:08

## 2019-08-20 RX ADMIN — ENOXAPARIN SODIUM 40 MG: 100 INJECTION SUBCUTANEOUS at 05:08

## 2019-08-20 RX ADMIN — DEXAMETHASONE 4 MG: 4 TABLET ORAL at 02:08

## 2019-08-20 RX ADMIN — FERROUS SULFATE TAB EC 325 MG (65 MG FE EQUIVALENT) 325 MG: 325 (65 FE) TABLET DELAYED RESPONSE at 08:08

## 2019-08-20 RX ADMIN — Medication 250 MG: at 09:08

## 2019-08-20 RX ADMIN — SENNOSIDES,DOCUSATE SODIUM 1 TABLET: 8.6; 5 TABLET, FILM COATED ORAL at 08:08

## 2019-08-20 RX ADMIN — LEVETIRACETAM 500 MG: 500 TABLET, FILM COATED ORAL at 09:08

## 2019-08-20 RX ADMIN — FERROUS SULFATE TAB EC 325 MG (65 MG FE EQUIVALENT) 325 MG: 325 (65 FE) TABLET DELAYED RESPONSE at 09:08

## 2019-08-20 RX ADMIN — DEXAMETHASONE 4 MG: 4 TABLET ORAL at 10:08

## 2019-08-20 NOTE — PLAN OF CARE
Problem: SLP Goal  Goal: SLP Goal  Speech Language Pathology Goals  Goals expected to be met by 8/27:  1. Patient will tolerate a regular diet and thin liquids with no overt signs of airway compromise.   2. Pt will answer simple yes/no questions with 80% accy   3. Pt will complete simple problem solving tasks with 70% accy with min cues  4. Pt will orient x4 with mod cues  5. Pt will participate in ongoing assessment of cognitive linguistic skills.         Pt's POC updated. Continue all goals at this time.   Mami Mo CCC-SLP  8/20/2019

## 2019-08-20 NOTE — PLAN OF CARE
Problem: Physical Therapy Goal  Goal: Physical Therapy Goal  Goals to be met by: 2019    Patient will increase functional independence with mobility by performin. Supine <> sit with supervision - MET 2019  2. Sit to stand transfer with Supervision- MET 2019  3. Gait  x 150 feet with Supervision using appropriate AD.   4. Stand for 3 minutes with Supervision while performing dynamic balance activities to reduce fall risk - MET 2019  5. Lower extremity exercise program x20 reps per handout, with assistance as needed      Outcome: Ongoing (interventions implemented as appropriate)  LTGs remain appropriate. Pt will continue PT POC.    Keara Newton, PT  2019

## 2019-08-20 NOTE — SUBJECTIVE & OBJECTIVE
Interval History: 8/20: NAEON, AFVSS, Exam stable, minimal agitation overnight, not requiring restraints. Now marks 72h without restraints. Medically stable for rehab.    Medications:  Continuous Infusions:  Scheduled Meds:   ascorbic acid (vitamin C)  250 mg Oral BID    dexAMETHasone  4 mg Oral Q8H    divalproex  500 mg Oral Q12H    enoxaparin  40 mg Subcutaneous Daily    ferrous sulfate  325 mg Oral BID    levETIRAcetam  500 mg Oral BID    levothyroxine  75 mcg Oral Before breakfast    nicotine  1 patch Transdermal Daily    pantoprazole  40 mg Oral Daily    polyethylene glycol  17 g Oral Daily    senna-docusate 8.6-50 mg  1 tablet Oral BID     PRN Meds:acetaminophen, albuterol-ipratropium, haloperidol lactate, ondansetron, oxyCODONE, QUEtiapine, sodium chloride 0.9%     Review of Systems    Objective:     Weight: 35.4 kg (78 lb)  Body mass index is 14.74 kg/m².  Vital Signs (Most Recent):  Temp: 98 °F (36.7 °C) (08/20/19 0757)  Pulse: 94 (08/20/19 1126)  Resp: 18 (08/20/19 0757)  BP: 113/67 (08/20/19 0757)  SpO2: 98 % (08/20/19 0757) Vital Signs (24h Range):  Temp:  [96.8 °F (36 °C)-98 °F (36.7 °C)] 98 °F (36.7 °C)  Pulse:  [66-94] 94  Resp:  [16-18] 18  SpO2:  [92 %-98 %] 98 %  BP: ()/(58-67) 113/67                          Neurosurgery Physical Exam  General: well developed, well nourished, no distress.   Head: normocephalic  Neurologic: Alert and oriented.  Inattentiveness.   GCS: Motor: 6/Verbal: 4/Eyes: 4 GCS Total: 14  Mental Status: Awake, Alert, Oriented x 4  Language: Receptive aphasia   Speech: No dysarthria  Cranial nerves: face symmetric, tongue midline, CN II-XII grossly intact.   Eyes: pupils round, reactive to light with accomodation, EOMI. Right pupil > Left pupil. Dysconjugate gaze.  Skin: Skin is warm, dry and intact.  Sensory: intact to light touch throughout  Motor Strength:Moves all extremities spontaneously with good tone.  Full strength upper and lower extremities. No  abnormal movements seen.     Babinski's: Negative.  Clonus: Negative.    Finger-to-nose: Unable to assess due to mental status  Pronator drift: Unable to assess due to mental status    Incision: Clean, dry, staples intact. Skin edges well approximated. No surrounding erythema or edema. No drainage or TTP.     Significant Labs:  Recent Labs   Lab 08/19/19  0525         K 4.9      CO2 27   BUN 35*   CREATININE 0.9   CALCIUM 9.2     Recent Labs   Lab 08/19/19  0837   WBC 16.44*   HGB 9.4*   HCT 29.8*   PLT 72*     No results for input(s): LABPT, INR, APTT in the last 48 hours.  Microbiology Results (last 7 days)     ** No results found for the last 168 hours. **

## 2019-08-20 NOTE — PLAN OF CARE
Care team recommendation changed to Nursing Home with 24 hour care.     SW [facility] NH ref[facility]      08/20/19 1637   Post-Acute Status   Post-Acute Authorization Placement   Post-Acute Placement Status Referrals Sent     Cindy Valladares LMSW  [facility]  [id number]

## 2019-08-20 NOTE — PT/OT/SLP PROGRESS
"Physical Therapy Treatment    Patient Name:  Carmen Leyva   MRN:  [id number]    Recommendations:     Discharge Recommendations:  nursing facility, basic(or home w/ 24hour SPV due to impaired cognition)   Discharge Equipment Recommendations: (TBD via family/caregiver, unable to assess due to pt cognition)   Barriers to discharge: Decreased caregiver support (lives alone)    Assessment:     Carmen Leyva is a 59 y.o. female admitted with a medical diagnosis of Brain metastasis.  She presents with the following impairments/functional limitations:  impaired self care skills, impaired functional mobilty, gait instability, impaired cognition .    Pt angela session well w/ good participation. She is progressing well w/ mobility towards goals. She is primarily limited by impaired cognition due to brain metastasis. Pt is recommended to have 24hour SPV for safety upon D/C.    Rehab Prognosis: Good; patient would benefit from acute skilled PT services to address these deficits and reach maximum level of function.    Recent Surgery: Procedure(s) (LRB):  L craniotomy for tumor (Left) 8 Days Post-Op    Plan:     During this hospitalization, patient to be seen 4 x/week to address the identified rehab impairments via gait training, therapeutic activities and progress toward the following goals:    · Plan of Care Expires:  09/13/19    Subjective     Chief Complaint: none  Patient/Family Comments/goals: "[place], [place]." (when asked for pt's location). Pt oriented to self only. Disoriented to place, time, and situation.   Pain/Comfort:  · Pain Rating 1: 0/10      Objective:     Communicated with nursing prior to session.  Patient found supine with telemetry(sitter present) upon PT entry to room.     General Precautions: Standard, fall, vision impaired, seizure   Orthopedic Precautions:N/A   Braces: N/A     Functional Mobility:  · Bed Mobility:   · Supine<>sit on bed Johnson  · Transfers:  · Sit<>Stand to/from EOB w/o AD w/ " SPV  · Gait:   · ~400ft w/o AD w/ SBA for safety  · Swing through gait pattern w/ good step length/height and floor clearance  · Balance:   · Static/dynamic standing ~5min to don gown. Pt also standing at elevated bedside table to poor herself water and eat candy. No LOB.      AM-PAC 6 CLICK MOBILITY  Turning over in bed (including adjusting bedclothes, sheets and blankets)?: 4  Sitting down on and standing up from a chair with arms (e.g., wheelchair, bedside commode, etc.): 3  Moving from lying on back to sitting on the side of the bed?: 4  Moving to and from a bed to a chair (including a wheelchair)?: 3  Need to walk in hospital room?: 3  Climbing 3-5 steps with a railing?: 3  Basic Mobility Total Score: 20       Patient left supine with all lines intact, call button in reach and sitter present..    GOALS:   Multidisciplinary Problems     Physical Therapy Goals        Problem: Physical Therapy Goal    Goal Priority Disciplines Outcome Goal Variances Interventions   Physical Therapy Goal     PT, PT/OT Ongoing (interventions implemented as appropriate)     Description:  Goals to be met by: 2019    Patient will increase functional independence with mobility by performin. Supine <> sit with supervision - MET 2019  2. Sit to stand transfer with Supervision- MET 2019  3. Gait  x 150 feet with Supervision using appropriate AD.   4. Stand for 3 minutes with Supervision while performing dynamic balance activities to reduce fall risk - MET 2019  5. Lower extremity exercise program x20 reps per handout, with assistance as needed                        Time Tracking:     PT Received On: 19  PT Start Time: 112     PT Stop Time: 1133  PT Total Time (min): 12 min     Billable Minutes: Therapeutic Activity 12 and Total Time 12    Treatment Type: Treatment  PT/PTA: PT     PTA Visit Number: 0     Keara Newton PT  2019

## 2019-08-20 NOTE — ASSESSMENT & PLAN NOTE
59F w/ PMH COPD, HTN, hypothyroidism, stage 4 R small cell lung cancer s/p 6 cycles of carbo/etoposide in remission since 06/2018 who [place] [facility] [place]SH w/ L parietal brain mass. Now s/p crani for debulking on 8/12.    -Patient neurologically stable on exam  -Pathology pending  -Continue Depakote for seizure prevention  -Cerebral edema: Continue Dex 4 mg q 6 hours. Will DC on 3 week taper to 2 mg BID. Continue PPI while on steroids.   - Malnutrition: Albumin 3.0 today. Will start jennifer supplement tid to promote wound healing.    -Psyc: No use of wrist restraints in past 2 days. Continue redirection and delirium precautions. Continue Seroquel TID PRN. Haldol prn qhs. Medical and physical precautions to minimize agitation in place.   -HTN:  Maintain SBP < 160. Home dose of Norvasc has been held since SBP at goal. Will continue to monitor and restart if SBP becomes elevated.   -Hypothyroidism: Continue home dose of Synthroid  -Tobacco abuse: Continue nicotine patch  -COPD: Continue duo nebs PRN, pulse ox q 4 hours  -DVT prophylaxis: ABI's, SCD's, lovenox  -Bowel regimen: senna BID and miralax daily  -Atelectasis prevention: IS hourly while awake, PT/OT, OOB > 6 hours per day      DISPO: No restraints x72h; medically stable, pending acceptance to Rehab

## 2019-08-20 NOTE — PROGRESS NOTES
[facility]  Neurosurgery  Progress Note    Subjective:     History of Present Illness: 59F w/ PMH COPD, HTN, hypothyroidism, stage 4 R small cell lung cancer s/p 6 cycles of carbo/etoposide in remission since 06/2018 who [place] [facility] [place] w/ L parietal brain mass. Per EMS records patient had a 1d history of AMS and gait difficulty and was brought into the hospital by her family for evaluation. CTH @ OSH showed large L parietal brain mass with possible hemorrhagic component and she was [place] [facility] [place]. [place] [facility] redemonstrated L nonenhancing parietal mass with minimal blood. Patient is confused and so ROS is tenuous.    Post-Op Info:  Procedure(s) (LRB):  L craniotomy for tumor (Left)   8 Days Post-Op     Interval History: 8/20: NAEON, AFVSS, Exam stable, minimal agitation overnight, not requiring restraints. Now marks 72h without restraints. Medically stable for rehab.    Medications:  Continuous Infusions:  Scheduled Meds:   ascorbic acid (vitamin C)  250 mg Oral BID    dexAMETHasone  4 mg Oral Q8H    divalproex  500 mg Oral Q12H    enoxaparin  40 mg Subcutaneous Daily    ferrous sulfate  325 mg Oral BID    levETIRAcetam  500 mg Oral BID    levothyroxine  75 mcg Oral Before breakfast    nicotine  1 patch Transdermal Daily    pantoprazole  40 mg Oral Daily    polyethylene glycol  17 g Oral Daily    senna-docusate 8.6-50 mg  1 tablet Oral BID     PRN Meds:acetaminophen, albuterol-ipratropium, haloperidol lactate, ondansetron, oxyCODONE, QUEtiapine, sodium chloride 0.9%     Review of Systems    Objective:     Weight: 35.4 kg (78 lb)  Body mass index is 14.74 kg/m².  Vital Signs (Most Recent):  Temp: 98 °F (36.7 °C) (08/20/19 0757)  Pulse: 94 (08/20/19 1126)  Resp: 18 (08/20/19 0757)  BP: 113/67 (08/20/19 0757)  SpO2: 98 % (08/20/19 0757) Vital Signs (24h Range):  Temp:  [96.8 °F (36 °C)-98 °F (36.7 °C)] 98 °F (36.7 °C)  Pulse:  [66-94] 94  Resp:  [16-18]  18  SpO2:  [92 %-98 %] 98 %  BP: ()/(58-67) 113/67                          Neurosurgery Physical Exam  General: well developed, well nourished, no distress.   Head: normocephalic  Neurologic: Alert and oriented.  Inattentiveness.   GCS: Motor: 6/Verbal: 4/Eyes: 4 GCS Total: 14  Mental Status: Awake, Alert, Oriented x 4  Language: Receptive aphasia   Speech: No dysarthria  Cranial nerves: face symmetric, tongue midline, CN II-XII grossly intact.   Eyes: pupils round, reactive to light with accomodation, EOMI. Right pupil > Left pupil. Dysconjugate gaze.  Skin: Skin is warm, dry and intact.  Sensory: intact to light touch throughout  Motor Strength:Moves all extremities spontaneously with good tone.  Full strength upper and lower extremities. No abnormal movements seen.     Babinski's: Negative.  Clonus: Negative.    Finger-to-nose: Unable to assess due to mental status  Pronator drift: Unable to assess due to mental status    Incision: Clean, dry, staples intact. Skin edges well approximated. No surrounding erythema or edema. No drainage or TTP.     Significant Labs:  Recent Labs   Lab 08/19/19  0525         K 4.9      CO2 27   BUN 35*   CREATININE 0.9   CALCIUM 9.2     Recent Labs   Lab 08/19/19  0837   WBC 16.44*   HGB 9.4*   HCT 29.8*   PLT 72*     No results for input(s): LABPT, INR, APTT in the last 48 hours.  Microbiology Results (last 7 days)     ** No results found for the last 168 hours. **        Assessment/Plan:     * Brain metastasis  59F w/ PMH COPD, HTN, hypothyroidism, stage 4 R small cell lung cancer s/p 6 cycles of carbo/etoposide in remission since 06/2018 who presents to [facility] [place] ED from OSH w/ L parietal brain mass. Now s/p crani for debulking on 8/12.    -Patient neurologically stable on exam  -Pathology pending  -Continue Depakote for seizure prevention  -Cerebral edema: Continue Dex 4 mg q 6 hours. Will DC on 3 week taper to 2 mg BID. Continue PPI while on steroids.   -  Malnutrition: Albumin 3.0 today. Will start jennifer supplement tid to promote wound healing.    -Psyc: No use of wrist restraints in past 2 days. Continue redirection and delirium precautions. Continue Seroquel TID PRN. Haldol prn qhs. Medical and physical precautions to minimize agitation in place.   -HTN:  Maintain SBP < 160. Home dose of Norvasc has been held since SBP at goal. Will continue to monitor and restart if SBP becomes elevated.   -Hypothyroidism: Continue home dose of Synthroid  -Tobacco abuse: Continue nicotine patch  -COPD: Continue duo nebs PRN, pulse ox q 4 hours  -DVT prophylaxis: ABI's, SCD's, lovenox  -Bowel regimen: senna BID and miralax daily  -Atelectasis prevention: IS hourly while awake, PT/OT, OOB > 6 hours per day      DISPO: No restraints x72h; medically stable, pending acceptance to Rehab        Yehuda Bradley MD  Neurosurgery  [facility]

## 2019-08-20 NOTE — PLAN OF CARE
CM spoke with patients son David regarding discharge.  Therapy originally recommend Rehab but now feels that long term care would be better.  CM discussed this with son who is agreeable to plan.  CM requested SW to send referrals to nursing home facilities in the [place] area as discussed with son.    CM also requested that Lela Tripp reach out to patients son to see if she qualifies.

## 2019-08-20 NOTE — PRE ADMISSION SCREENING
[facility] will be out to see patient in the AM. Patient does live closer to [facility] rehab. Will speak with family on Wednesday when I am able to see patient.

## 2019-08-20 NOTE — PLAN OF CARE
Problem: Adult Inpatient Plan of Care  Goal: Plan of Care Review  Outcome: Ongoing (interventions implemented as appropriate)  No acute events occurred throughout the shift. See flowsheets for assessments and VS. Plan of care reviewed with Carmen Leyva and Carmen Leyva's family. All questions answered in turn. Pt progressing towards goals as noted. Pt remains confused throughout shift. Pt impulsive and has a sitter at bedside to prevent falls. Will continue to monitor.

## 2019-08-20 NOTE — PT/OT/SLP PROGRESS
"Speech Language Pathology Treatment    Patient Name:  Carmen Leyva   MRN:  [id number]  Admitting Diagnosis: Brain metastasis    Recommendations:                 General Recommendations:  Cognitive-linguistic therapy  Diet recommendations:  Regular, Liquid Diet Level: Thin   Aspiration Precautions: 1 bite/sip at a time, HOB to 90 degrees, Small bites/sips and Standard aspiration precautions   General Precautions: Standard, fall, vision impaired, seizure    Subjective     Awake/alert  Sitter at bedside     Pain/Comfort:  · Pain Rating 1: 0/10  · Pain Rating Post-Intervention 1: 0/10    Objective:     Has the patient been evaluated by SLP for swallowing?   Yes  Keep patient NPO? No   Current Respiratory Status: room air      Pt upright in bed upon entry. She was oriented to name, place, and city ind'ly. Pt unable to orient to time, birthday, and name of hospital despite max cues. Perseveration and difficulty task switching noted throughout session. Pt requires redirection often. Simple yes/no questions answered with 60% accy ind'ly increasing to 70% with min cues. She followed single step commands with 80% accy provided occasional cues. Divergent category task completed with 5/8 accy provided mod cues. She sang "Happy Birthday ind'ly, and names months of the year provided one cue for initiation. Continue POC at this time. All goals remain appropriate.     Assessment:     Carmen Leyva is a 59 y.o. female with an SLP diagnosis of Cognitive-Linguistic Impairment.     Goals:   Multidisciplinary Problems     SLP Goals        Problem: SLP Goal    Goal Priority Disciplines Outcome   SLP Goal     SLP    Description:  Speech Language Pathology Goals  Goals expected to be met by 8/27:  1. Patient will tolerate a regular diet and thin liquids with no overt signs of airway compromise.   2. Pt will answer simple yes/no questions with 80% accy   3. Pt will complete simple problem solving tasks with 70% accy with min cues  4. Pt " will orient x4 with mod cues  5. Pt will participate in ongoing assessment of cognitive linguistic skills.                           Plan:     · Patient to be seen:  3 x/week   · Plan of Care reviewed with:  patient   · SLP Follow-Up:  Yes       Discharge recommendations:  rehabilitation facility       Time Tracking:     SLP Treatment Date:   08/20/19  Speech Start Time:  0853  Speech Stop Time:  0916     Speech Total Time (min):  23 min    Billable Minutes: Speech Therapy Individual 23    Mami Mo CCC-SLP  08/20/2019

## 2019-08-21 LAB
POCT GLUCOSE: 128 MG/DL (ref 70–110)
POCT GLUCOSE: 92 MG/DL (ref 70–110)

## 2019-08-21 PROCEDURE — S4991 NICOTINE PATCH NONLEGEND: HCPCS | Performed by: PHYSICIAN ASSISTANT

## 2019-08-21 PROCEDURE — 63600175 PHARM REV CODE 636 W HCPCS: Performed by: NEUROLOGICAL SURGERY

## 2019-08-21 PROCEDURE — 25000003 PHARM REV CODE 250: Performed by: PHYSICIAN ASSISTANT

## 2019-08-21 PROCEDURE — 97127 HC THERAPEUTIC INTVTN, COGN FUNCTION - OT: CPT

## 2019-08-21 PROCEDURE — 20600001 HC STEP DOWN PRIVATE ROOM

## 2019-08-21 PROCEDURE — 97535 SELF CARE MNGMENT TRAINING: CPT

## 2019-08-21 PROCEDURE — 97116 GAIT TRAINING THERAPY: CPT

## 2019-08-21 PROCEDURE — 99024 PR POST-OP FOLLOW-UP VISIT: ICD-10-PCS | Mod: ,,, | Performed by: PHYSICIAN ASSISTANT

## 2019-08-21 PROCEDURE — 25000003 PHARM REV CODE 250: Performed by: STUDENT IN AN ORGANIZED HEALTH CARE EDUCATION/TRAINING PROGRAM

## 2019-08-21 PROCEDURE — 63600175 PHARM REV CODE 636 W HCPCS: Performed by: PHYSICIAN ASSISTANT

## 2019-08-21 PROCEDURE — 99024 POSTOP FOLLOW-UP VISIT: CPT | Mod: ,,, | Performed by: PHYSICIAN ASSISTANT

## 2019-08-21 RX ADMIN — NICOTINE 1 PATCH: 7 PATCH, EXTENDED RELEASE TRANSDERMAL at 10:08

## 2019-08-21 RX ADMIN — Medication 250 MG: at 09:08

## 2019-08-21 RX ADMIN — Medication 250 MG: at 10:08

## 2019-08-21 RX ADMIN — DIVALPROEX SODIUM 500 MG: 250 TABLET, DELAYED RELEASE ORAL at 10:08

## 2019-08-21 RX ADMIN — FERROUS SULFATE TAB EC 325 MG (65 MG FE EQUIVALENT) 325 MG: 325 (65 FE) TABLET DELAYED RESPONSE at 09:08

## 2019-08-21 RX ADMIN — DEXAMETHASONE 4 MG: 4 TABLET ORAL at 02:08

## 2019-08-21 RX ADMIN — DEXAMETHASONE 4 MG: 4 TABLET ORAL at 05:08

## 2019-08-21 RX ADMIN — LEVETIRACETAM 500 MG: 500 TABLET, FILM COATED ORAL at 10:08

## 2019-08-21 RX ADMIN — FERROUS SULFATE TAB EC 325 MG (65 MG FE EQUIVALENT) 325 MG: 325 (65 FE) TABLET DELAYED RESPONSE at 10:08

## 2019-08-21 RX ADMIN — DEXAMETHASONE 4 MG: 4 TABLET ORAL at 09:08

## 2019-08-21 RX ADMIN — PANTOPRAZOLE SODIUM 40 MG: 40 TABLET, DELAYED RELEASE ORAL at 10:08

## 2019-08-21 RX ADMIN — LEVETIRACETAM 500 MG: 500 TABLET, FILM COATED ORAL at 09:08

## 2019-08-21 RX ADMIN — ENOXAPARIN SODIUM 40 MG: 100 INJECTION SUBCUTANEOUS at 05:08

## 2019-08-21 RX ADMIN — SENNOSIDES,DOCUSATE SODIUM 1 TABLET: 8.6; 5 TABLET, FILM COATED ORAL at 09:08

## 2019-08-21 RX ADMIN — DIVALPROEX SODIUM 500 MG: 250 TABLET, DELAYED RELEASE ORAL at 09:08

## 2019-08-21 RX ADMIN — LEVOTHYROXINE SODIUM 75 MCG: 75 TABLET ORAL at 05:08

## 2019-08-21 NOTE — PLAN OF CARE
Problem: Physical Therapy Goal  Goal: Physical Therapy Goal  Goals to be met by: 2019    Patient will increase functional independence with mobility by performin. Supine <> sit with supervision - MET 2019  2. Sit to stand transfer with Supervision- MET 2019  3. Gait  x 150 feet with Supervision using appropriate AD.   4. Stand for 3 minutes with Supervision while performing dynamic balance activities to reduce fall risk - MET 2019  5. Lower extremity exercise program x20 reps per handout, with assistance as needed       Outcome: Ongoing (interventions implemented as appropriate)  Continue with plan of care.   Jennifer Schwarz, PT  2019

## 2019-08-21 NOTE — ASSESSMENT & PLAN NOTE
59F w/ PMH COPD, HTN, hypothyroidism, stage 4 R small cell lung cancer s/p 6 cycles of carbo/etoposide in remission since 06/2018 who presents to [facility] ED from OSH w/ L parietal brain mass. Now s/p crani for debulking on 8/12.    -Patient neurologically stable on exam  -Pathology (8/19): Metastatic small cell carcinoma  -Continue Depakote for seizure prevention  -Cerebral edema: Continue Dex 4 mg q 6 hours. Will DC on 3 week taper to 2 mg BID. Continue PPI while on steroids.   - Malnutrition: Continue jennifer supplement tid to promote wound healing.    -Psyc: No use of wrist restraints in past 72 hours. Pt requires frequent redirection to be compliant with fall precautions. Will try to ween sitter. Continue redirection and delirium precautions. Continue Seroquel TID PRN. Haldol prn qhs. Medical and physical precautions to minimize agitation in place.   -HTN:  Maintain SBP < 160. Home dose of Norvasc has been held since SBP at goal. Will continue to monitor and restart if SBP becomes elevated.   -Hypothyroidism: Continue home dose of Synthroid  -Tobacco abuse: Continue nicotine patch  -COPD: Continue duo nebs PRN, pulse ox q 4 hours  -DVT prophylaxis: ABI's, SCD's, lovenox  -Bowel regimen: senna BID and miralax daily  -Atelectasis prevention: IS hourly while awake, PT/OT, OOB > 6 hours per day      DISPO: Medically stable for discharge. Pending nursing home acceptance.

## 2019-08-21 NOTE — PLAN OF CARE
Problem: Adult Inpatient Plan of Care  Goal: Plan of Care Review  Recommendations     Recommendation/Intervention: 1.) Continue regular diet. 2.) Quentin only to be given BID with 30mL of water. 3.) Suggest Boost Plus TID. 4.) Daily weights  Goals: 1.) Pt to consume/tolerate >75% EEN and EPN by follow up  Nutrition Goal Status: new  Communication of RD Recs: (POC)    Assessment and Plan  Nutrition Problem  Unintended weight loss     Related to (etiology):   Lung cancer s/p chemo     Signs and Symptoms (as evidenced by):   Clinically severe wt loss 19.5% x ~10 months     Interventions/Recommendations (treatment strategy):  Collaboration with other providers; general healthful diet; supplemental beverage/food     Nutrition Diagnosis Status:   New

## 2019-08-21 NOTE — PT/OT/SLP PROGRESS
"Physical Therapy Treatment    Patient Name:  Carmen Leyva   MRN:  [id number]    Recommendations:     Discharge Recommendations:  (24 hr assist/NH)   Discharge Equipment Recommendations: shower chair   Barriers to discharge: impaired cognition and safety awareness, balance impairment, increased risk of falls    Assessment:     Carmen Leyva is a 59 y.o. female admitted with a medical diagnosis of Brain metastasis.  She presents with the following impairments/functional limitations:  gait instability, impaired functional mobilty, impaired cognition, decreased safety awareness, impaired self care skills, impaired balance, visual deficits. The patient has significant cognitive impairments, she is unable to follow simple motor cues and instructions for safety, unable to re-orient patient. She demonstrates R path deviation with gait, she needs cuing to avoid obstacles (more so on R side vs L), she has delayed compensatory balance response. Based on the above deficits, she is not safe to return home at this time and requires 24 hour care for safety. She is safe to ambulate with RN assist of 1 person and would benefit from RN mobility protocol.     Rehab Prognosis: Fair; depending on cognitive status and ability to actively participate in treatment, patient would benefit from acute skilled PT services to address these deficits and reach maximum level of function.    Recent Surgery: Procedure(s) (LRB):  L craniotomy for tumor (Left) 9 Days Post-Op    Plan:     During this hospitalization, patient to be seen 3 x/week to address the identified rehab impairments via gait training, therapeutic activities, therapeutic exercises, neuromuscular re-education and progress toward the following goals:    · Plan of Care Expires:  09/13/19    Subjective     Chief Complaint: "I'm ok"  Patient/Family Comments/goals: patient unable to state  Pain/Comfort:  · Pain Rating 1: 0/10      Objective:     Communicated with RN prior to session.  " Patient found HOB elevated with bed alarm, telemetry, peripheral IV(sitter) upon PT entry to room.     General Precautions: Standard, fall, vision impaired, seizure   Orthopedic Precautions:N/A   Braces: N/A     Functional Mobility:    Bed Mobility  Supine to Sit:  supervision assistance for safety   Transfers Sit to Stand:  stand by assistance for safety; patient needs repeated cues to sit EOB and where to sit. Patient scooted herself up to head of bed in long sitting.    Gait  Gait Distance: 400 ft with no Ad  Assistance Level: stand by assistance for safety  Description: reciprocal strides, wide DALLIN, patient's path veers side to side R>L, difficulty navigating around obstacles in spite of cuing. Patient unable to follow cues to turn head to assess dynamic balance. Patient with delayed/incorrect responses to questions with gait (patient unable to read signs, state color of objects in veloz, name objects). Patient needing repeated cuing to turn and delayed motor repsponses with poor motor planning.           AM-PAC 6 CLICK MOBILITY  Turning over in bed (including adjusting bedclothes, sheets and blankets)?: 4  Sitting down on and standing up from a chair with arms (e.g., wheelchair, bedside commode, etc.): 4  Moving from lying on back to sitting on the side of the bed?: 4  Moving to and from a bed to a chair (including a wheelchair)?: 3  Need to walk in hospital room?: 3  Climbing 3-5 steps with a railing?: 3  Basic Mobility Total Score: 21       Therapeutic Activities and Exercises:   Patient educated on role of therapy, goals of session, benefits of out of bed mobility. Patient agreeable to mobilize with therapy.  Discussed PT plan of care during hospitalization. Patient educated that they need to call for assistance to mobilize out of bed. Whiteboard updated as appropriate. Patient educated on how their diagnosis impacts their mobility within PT scope of practice.     Patient left HOB elevated with all lines  intact, call button in reach, bed alarm on and sitter and neurosurgery PA present..    GOALS:   Multidisciplinary Problems     Physical Therapy Goals        Problem: Physical Therapy Goal    Goal Priority Disciplines Outcome Goal Variances Interventions   Physical Therapy Goal     PT, PT/OT Ongoing (interventions implemented as appropriate)     Description:  Goals to be met by: 2019    Patient will increase functional independence with mobility by performin. Supine <> sit with supervision - MET 2019  2. Sit to stand transfer with Supervision- MET 2019  3. Gait  x 150 feet with Supervision using appropriate AD.   4. Stand for 3 minutes with Supervision while performing dynamic balance activities to reduce fall risk - MET 2019  5. Lower extremity exercise program x20 reps per handout, with assistance as needed                        Time Tracking:     PT Received On: 19  PT Start Time: 1030     PT Stop Time: 1054  PT Total Time (min): 24 min     Billable Minutes: Gait Training 24    Treatment Type: Treatment  PT/PTA: PT     PTA Visit Number: 0     Jennifer Schwarz, PT  2019

## 2019-08-21 NOTE — SUBJECTIVE & OBJECTIVE
Interval History: NAEON. Sitter at bedside. Pt sitting up comfortable in bed. Denies pain or new symptoms. Pt tolerating po and voiding appropriately.     Medications:  Continuous Infusions:  Scheduled Meds:   ascorbic acid (vitamin C)  250 mg Oral BID    dexAMETHasone  4 mg Oral Q8H    divalproex  500 mg Oral Q12H    enoxaparin  40 mg Subcutaneous Daily    ferrous sulfate  325 mg Oral BID    levETIRAcetam  500 mg Oral BID    levothyroxine  75 mcg Oral Before breakfast    nicotine  1 patch Transdermal Daily    pantoprazole  40 mg Oral Daily    polyethylene glycol  17 g Oral Daily    senna-docusate 8.6-50 mg  1 tablet Oral BID     PRN Meds:acetaminophen, albuterol-ipratropium, haloperidol lactate, ondansetron, oxyCODONE, QUEtiapine, sodium chloride 0.9%     Review of Systems  Objective:     Weight: 35.4 kg (78 lb)  Body mass index is 14.74 kg/m².  Vital Signs (Most Recent):  Temp: 97.2 °F (36.2 °C) (08/21/19 1119)  Pulse: 82 (08/21/19 1119)  Resp: 17 (08/21/19 1119)  BP: 120/72 (08/21/19 1119)  SpO2: (!) 93 % (08/21/19 1119) Vital Signs (24h Range):  Temp:  [97 °F (36.1 °C)-98 °F (36.7 °C)] 97.2 °F (36.2 °C)  Pulse:  [62-91] 82  Resp:  [17-18] 17  SpO2:  [93 %-100 %] 93 %  BP: (106-130)/(62-78) 120/72                       Neurosurgery Physical Exam   General: well developed, well nourished, no distress.   Head: normocephalic  Neurologic: Alert and oriented.  Inattentiveness.   GCS: Motor: 6/Verbal: 4/Eyes: 4 GCS Total: 14  Mental Status: Awake, Alert, Oriented x 4  Language: Receptive aphasia   Speech: No dysarthria  Cranial nerves: face symmetric, tongue midline, CN II-XII grossly intact.   Eyes: pupils round, reactive to light with accomodation, EOMI. Right pupil > Left pupil. Dysconjugate gaze.  Pulmonary: normal respirations, no signs of respiratory distress  Abdomen: soft, non-distended, not tender to palpation  Skin: Skin is warm, dry and intact.  Sensory: intact to light touch throughout  Motor  Strength:Moves all extremities spontaneously with good tone.  Full strength upper and lower extremities. No abnormal movements seen.      Babinski's: Negative.  Clonus: Negative.     Finger-to-nose: Unable to assess due to mental status  Pronator drift: Unable to assess due to mental status     Incision: Clean, dry, staples intact. Skin edges well approximated. No surrounding erythema or edema. No drainage or TTP.     Significant Labs:  No results for input(s): GLU, NA, K, CL, CO2, BUN, CREATININE, CALCIUM, MG in the last 48 hours.  No results for input(s): WBC, HGB, HCT, PLT in the last 48 hours.  No results for input(s): LABPT, INR, APTT in the last 48 hours.  Microbiology Results (last 7 days)     ** No results found for the last 168 hours. **

## 2019-08-21 NOTE — PLAN OF CARE
Problem: Adult Inpatient Plan of Care  Goal: Plan of Care Review  Outcome: Ongoing (interventions implemented as appropriate)  POC reviewed with pt at 0500. Pt confused and requires frequent reinforcement. Questions and concerns addressed. No acute events overnight. Pt progressing toward goals. Will continue to monitor. See flowsheets for full assessment and VS info

## 2019-08-21 NOTE — PT/OT/SLP PROGRESS
"Occupational Therapy   Treatment    Name: Carmen Leyva  MRN: [id number]  Admitting Diagnosis:  Brain metastasis  9 Days Post-Op    Recommendations:     Discharge Recommendations: (24 hour assist/ NH)  Discharge Equipment Recommendations:  shower chair  Barriers to discharge:  Decreased caregiver support    Assessment:     Carmen Leyva is a 59 y.o. female with a medical diagnosis of Brain metastasis.  She presents with performance deficits affecting function are weakness, impaired self care skills, impaired balance, impaired functional mobilty, impaired cognition.     Rehab Prognosis:  Good; patient would benefit from acute skilled OT services to address these deficits and reach maximum level of function.       Plan:     Patient to be seen 3 x/week to address the above listed problems via self-care/home management, neuromuscular re-education, cognitive retraining, therapeutic activities, therapeutic exercises  · Plan of Care Expires: 09/11/19  · Plan of Care Reviewed with: patient    Subjective   Patient:  "Did my son come?"  Sitter:  "She was up all night."  "She's been confused thinking she had to get up to go do something."  Pain/Comfort:  · Pain Rating 1: 0/10  · Pain Rating Post-Intervention 1: 0/10    Objective:     Communicated with: Nurse prior to session.  Patient found supine with telemetry, bed alarm, peripheral IV(sitter) upon OT entry to room.  Family not present.    General Precautions: Standard, fall, vision impaired, seizure   Orthopedic Precautions:N/A   Braces: N/A     Occupational Performance:     Bed Mobility:    · Patient completed Rolling/Turning to Left with  contact guard assistance  · Patient completed Rolling/Turning to Right with contact guard assistance  · Patient completed Scooting/Bridging with contact guard assistance  · Patient completed Supine to Sit with contact guard assistance  · Patient completed Sit to Supine with contact guard assistance     Functional " Mobility/Transfers:  · Patient completed Sit <> Stand Transfer with stand by assistance  with  no assistive device   · Patient completed Bed <> Chair Transfer using Stand Pivot technique with contact guard assistance with no assistive device    Activities of Daily Living:  · Grooming: minimum assistance while standing  · Upper Body Dressing: maximal assistance while seated EOB 2* confusion  · Lower Body Dressing: maximal assistance while seated EOB    AMPAC 6 Click ADL: 12    Treatment & Education:  Patient education provided on role of OT and need for continued OT upon discharge.  Patient education provided on dressing technique, positioning, postural control, and transfers.  Continued education, patient/ family training recommended.  Patient alert; daily orientation provided.  Patient interactive throughout the session.  Patient unable to identify body parts.  Unable to name objects.  White board updated in patient's room.  OT asked if there were any other questions; patient/ family had no further questions.    Patient left supine with all lines intact, call button in reach, bed alarm on and sitter present. presentEducation:      GOALS:   Multidisciplinary Problems     Occupational Therapy Goals        Problem: Occupational Therapy Goal    Goal Priority Disciplines Outcome Interventions   Occupational Therapy Goal     OT, PT/OT Ongoing (interventions implemented as appropriate)    Description:  Goals set 8/19 to be addressed for 14 days with expiration date, 9/2:  Patient will increase functional independence with ADLs by performing:    Patient will demonstrate rolling to the right with modified independence.  Not met   Patient will demonstrate rolling to the left with modified independence.   Not met  Patient will demonstrate supine -sit with modified independence.   Not met  Patient will demonstrate stand pivot transfers with modified independence.   Not met  Patient will demonstrate grooming while standing with  modified independence.   Not met  Patient will demonstrate upper body dressing with modified independence while seated EOB.   Not met  Patient will demonstrate lower body dressing with modified independence while seated EOB.   Not met  Patient will demonstrate toileting with modified independence.   Not met  Patient will demonstrate bathing while seated EOB with modified independence.   Not met                    Time Tracking:     OT Date of Treatment: 08/21/19  OT Start Time: 0650  OT Stop Time: 0715  OT Total Time (min): 25 min    Billable Minutes:Self Care/Home Management 13  Cognitive Retraining 12    OSIEL Lynch  8/21/2019

## 2019-08-21 NOTE — PROGRESS NOTES
"[facility]  Adult Nutrition  Progress Note    SUMMARY       Recommendations    Recommendation/Intervention: 1.) Continue regular diet. 2.) Quentin only to be given BID with 30mL of water. 3.) Suggest Boost Plus TID. 4.) Daily weights  Goals: 1.) Pt to consume/tolerate >75% EEN and EPN by follow up  Nutrition Goal Status: new  Communication of RD Recs: (POC)    Reason for Assessment    Reason For Assessment: length of stay  Diagnosis: surgery/postoperative complications(left craniotomy)  Relevant Medical History: CKD, COPD, GERD, HTN, RA, lung cancer, osteopenia, gout, anemia, anxiety, blindness  Interdisciplinary Rounds: did not attend  General Information Comments: S/p craniotomy. Pt with bilateral blindness, no family present at this time. Pt confused. Per chart, pt consuming ~75% of meals. No GI distress. After chart review, last wt 10/2018-# reflecting 19.5% wt loss x ~10 months. Of note, pt received 6 cycles of carbo/etoposide for lung cancer. NFPE unable to be performed at bedside due to confusion. Pt appears with fat/muscle wasting. Unable to clarify degree of malnutrition.   Nutrition Discharge Planning: Adequate intake to meet nutritional needs.     Nutrition Risk Screen    Nutrition Risk Screen: no indicators present    Nutrition/Diet History    Spiritual, Cultural Beliefs, Evangelical Practices, Values that Affect Care: no    Anthropometrics    Temp: 98 °F (36.7 °C)  Height: 5' 1" (154.9 cm)  Height (inches): 61 in  Weight Method: Bed Scale  Weight: 35.4 kg (78 lb)  Weight (lb): 78 lb  Ideal Body Weight (IBW), Female: 105 lb  % Ideal Body Weight, Female (lb): 74.29 lb  BMI (Calculated): 14.8  Usual Body Weight (UBW), k kg(10/2018)  % Usual Body Weight: 80.58       Lab/Procedures/Meds    Pertinent Labs Reviewed: reviewed  Pertinent Labs Comments: BUN 35, AST 9  Pertinent Medications Reviewed: reviewed  Pertinent Medications Comments: vitamin C, dexamethasone, ferrous sulfate, " nicotine, protonix    Estimated/Assessed Needs    Weight Used For Calorie Calculations: 35.4 kg (78 lb 0.7 oz)  Energy Calorie Requirements (kcal): 5104-3963  Energy Need Method: Kcal/kg(35-40 kcal/kg)  Protein Requirements: 53-71(g/day)  Weight Used For Protein Calculations: 35.4 kg (78 lb 0.7 oz)(1.5-2.0 g/kg)     Estimated Fluid Requirement Method: RDA Method(or per MD)  RDA Method (mL): 1239         Nutrition Prescription Ordered    Current Diet Order: regular  Oral Nutrition Supplement: Quentin TID    Evaluation of Received Nutrient/Fluid Intake    I/O: +1.5L since admit  Comments: LBM 8/20  % Intake of Estimated Energy Needs: 50 - 75 %  % Meal Intake: 50 - 75 %    Nutrition Risk    Level of Risk/Frequency of Follow-up: low     Assessment and Plan  Nutrition Problem  Unintended weight loss    Related to (etiology):   Lung cancer s/p chemo    Signs and Symptoms (as evidenced by):   Clinically severe wt loss 19.5% x ~10 months    Interventions/Recommendations (treatment strategy):  Collaboration with other providers; general healthful diet; supplemental beverage/food    Nutrition Diagnosis Status:   New           Monitor and Evaluation    Food and Nutrient Intake: energy intake, food and beverage intake  Food and Nutrient Adminstration: diet order  Anthropometric Measurements: weight, weight change, body mass index  Biochemical Data, Medical Tests and Procedures: electrolyte and renal panel, gastrointestinal profile, glucose/endocrine profile  Nutrition-Focused Physical Findings: overall appearance       Nutrition Follow-Up    RD Follow-up?: Yes

## 2019-08-21 NOTE — PLAN OF CARE
Problem: Occupational Therapy Goal  Goal: Occupational Therapy Goal  Goals set 8/19 to be addressed for 14 days with expiration date, 9/2:  Patient will increase functional independence with ADLs by performing:    Patient will demonstrate rolling to the right with modified independence.  Not met   Patient will demonstrate rolling to the left with modified independence.   Not met  Patient will demonstrate supine -sit with modified independence.   Not met  Patient will demonstrate stand pivot transfers with modified independence.   Not met  Patient will demonstrate grooming while standing with modified independence.   Not met  Patient will demonstrate upper body dressing with modified independence while seated EOB.   Not met  Patient will demonstrate lower body dressing with modified independence while seated EOB.   Not met  Patient will demonstrate toileting with modified independence.   Not met  Patient will demonstrate bathing while seated EOB with modified independence.   Not met   Goals remain appropriate.  OSIEL Lynch  8/21/2019

## 2019-08-21 NOTE — PROGRESS NOTES
[facility]  Neurosurgery  Progress Note    Subjective:     History of Present Illness: 59F w/ PMH COPD, HTN, hypothyroidism, stage 4 R small cell lung cancer s/p 6 cycles of carbo/etoposide in remission since 06/2018 who presents to [facility] ED from OSH w/ L parietal brain mass. Per EMS records patient had a 1d history of AMS and gait difficulty and was brought into the hospital by her family for evaluation. CTH @ OSH showed large L parietal brain mass with possible hemorrhagic component and she was transferred to [facility] for evaluation. MRI @ [facility] redemonstrated L nonenhancing parietal mass with minimal blood. Patient is confused and so ROS is tenuous.    Post-Op Info:  Procedure(s) (LRB):  L craniotomy for tumor (Left)   9 Days Post-Op     Interval History: NAEON. Sitter at bedside. Pt sitting up comfortable in bed. Denies pain or new symptoms. Pt tolerating po and voiding appropriately.     Medications:  Continuous Infusions:  Scheduled Meds:   ascorbic acid (vitamin C)  250 mg Oral BID    dexAMETHasone  4 mg Oral Q8H    divalproex  500 mg Oral Q12H    enoxaparin  40 mg Subcutaneous Daily    ferrous sulfate  325 mg Oral BID    levETIRAcetam  500 mg Oral BID    levothyroxine  75 mcg Oral Before breakfast    nicotine  1 patch Transdermal Daily    pantoprazole  40 mg Oral Daily    polyethylene glycol  17 g Oral Daily    senna-docusate 8.6-50 mg  1 tablet Oral BID     PRN Meds:acetaminophen, albuterol-ipratropium, haloperidol lactate, ondansetron, oxyCODONE, QUEtiapine, sodium chloride 0.9%     Review of Systems  Objective:     Weight: 35.4 kg (78 lb)  Body mass index is 14.74 kg/m².  Vital Signs (Most Recent):  Temp: 97.2 °F (36.2 °C) (08/21/19 1119)  Pulse: 82 (08/21/19 1119)  Resp: 17 (08/21/19 1119)  BP: 120/72 (08/21/19 1119)  SpO2: (!) 93 % (08/21/19 1119) Vital Signs (24h Range):  Temp:  [97 °F (36.1 °C)-98 °F (36.7 °C)] 97.2 °F (36.2 °C)  Pulse:  [62-91] 82  Resp:  [17-18] 17  SpO2:  [93  %-100 %] 93 %  BP: (106-130)/(62-78) 120/72     Neurosurgery Physical Exam   General: well developed, well nourished, no distress.   Head: normocephalic  Neurologic: Alert and oriented.  Inattentiveness.   GCS: Motor: 6/Verbal: 4/Eyes: 4 GCS Total: 14  Mental Status: Awake, Alert, Oriented x 4  Language: Receptive aphasia   Speech: No dysarthria  Cranial nerves: face symmetric, tongue midline, CN II-XII grossly intact.   Eyes: pupils round, reactive to light with accomodation, EOMI. Right pupil > Left pupil. Dysconjugate gaze.  Pulmonary: normal respirations, no signs of respiratory distress  Abdomen: soft, non-distended, not tender to palpation  Skin: Skin is warm, dry and intact.  Sensory: intact to light touch throughout  Motor Strength:Moves all extremities spontaneously with good tone.  Full strength upper and lower extremities. No abnormal movements seen.      Babinski's: Negative.  Clonus: Negative.     Finger-to-nose: Unable to assess due to mental status  Pronator drift: Unable to assess due to mental status     Incision: Clean, dry, staples intact. Skin edges well approximated. No surrounding erythema or edema. No drainage or TTP.     Significant Labs:  No results for input(s): GLU, NA, K, CL, CO2, BUN, CREATININE, CALCIUM, MG in the last 48 hours.  No results for input(s): WBC, HGB, HCT, PLT in the last 48 hours.  No results for input(s): LABPT, INR, APTT in the last 48 hours.  Microbiology Results (last 7 days)     ** No results found for the last 168 hours. **        Assessment/Plan:     * Brain metastasis  59F w/ PMH COPD, HTN, hypothyroidism, stage 4 R small cell lung cancer s/p 6 cycles of carbo/etoposide in remission since 06/2018 who presents to [facility] ED from OSH w/ L parietal brain mass. Now s/p crani for debulking on 8/12.    -Patient neurologically stable on exam  -Pathology (8/19): Metastatic small cell carcinoma  -Continue Depakote for seizure prevention  -Cerebral edema: Continue Dex 4 mg q 6  hours. Will DC on 3 week taper to 2 mg BID. Continue PPI while on steroids.   - Malnutrition: Continue jennifer supplement tid to promote wound healing.    -Psyc: No use of wrist restraints in past 72 hours. Pt requires frequent redirection to be compliant with fall precautions. Will try to ween sitter. Continue redirection and delirium precautions. Continue Seroquel TID PRN. Haldol prn qhs. Medical and physical precautions to minimize agitation in place.   -HTN:  Maintain SBP < 160. Home dose of Norvasc has been held since SBP at goal. Will continue to monitor and restart if SBP becomes elevated.   -Hypothyroidism: Continue home dose of Synthroid  -Tobacco abuse: Continue nicotine patch  -COPD: Continue duo nebs PRN, pulse ox q 4 hours  -DVT prophylaxis: ABI's, SCD's, lovenox  -Bowel regimen: senna BID and miralax daily  -Atelectasis prevention: IS hourly while awake, PT/OT, OOB > 6 hours per day      DISPO: Medically stable for discharge. Pending nursing home acceptance.         JEANMARIE GaricaC   [phone]  Neurosurgery  [facility]

## 2019-08-22 LAB
ALBUMIN SERPL BCP-MCNC: 2.7 G/DL (ref 3.5–5.2)
ALP SERPL-CCNC: 75 U/L (ref 55–135)
ALT SERPL W/O P-5'-P-CCNC: 10 U/L (ref 10–44)
ANION GAP SERPL CALC-SCNC: 10 MMOL/L (ref 8–16)
AST SERPL-CCNC: 7 U/L (ref 10–40)
BASOPHILS # BLD AUTO: 0.02 K/UL (ref 0–0.2)
BASOPHILS NFR BLD: 0.2 % (ref 0–1.9)
BILIRUB SERPL-MCNC: 0.3 MG/DL (ref 0.1–1)
BLD PROD TYP BPU: NORMAL
BLD PROD TYP BPU: NORMAL
BLOOD UNIT EXPIRATION DATE: NORMAL
BLOOD UNIT EXPIRATION DATE: NORMAL
BLOOD UNIT TYPE CODE: 5100
BLOOD UNIT TYPE CODE: 5100
BLOOD UNIT TYPE: NORMAL
BLOOD UNIT TYPE: NORMAL
BUN SERPL-MCNC: 30 MG/DL (ref 6–20)
CALCIUM SERPL-MCNC: 8.5 MG/DL (ref 8.7–10.5)
CHLORIDE SERPL-SCNC: 95 MMOL/L (ref 95–110)
CO2 SERPL-SCNC: 29 MMOL/L (ref 23–29)
CODING SYSTEM: NORMAL
CODING SYSTEM: NORMAL
CREAT SERPL-MCNC: 0.8 MG/DL (ref 0.5–1.4)
DIFFERENTIAL METHOD: ABNORMAL
DISPENSE STATUS: NORMAL
DISPENSE STATUS: NORMAL
EOSINOPHIL # BLD AUTO: 0 K/UL (ref 0–0.5)
EOSINOPHIL NFR BLD: 0 % (ref 0–8)
ERYTHROCYTE [DISTWIDTH] IN BLOOD BY AUTOMATED COUNT: 15 % (ref 11.5–14.5)
EST. GFR  (AFRICAN AMERICAN): >60 ML/MIN/1.73 M^2
EST. GFR  (NON AFRICAN AMERICAN): >60 ML/MIN/1.73 M^2
GLUCOSE SERPL-MCNC: 105 MG/DL (ref 70–110)
HCT VFR BLD AUTO: 28.2 % (ref 37–48.5)
HGB BLD-MCNC: 9.2 G/DL (ref 12–16)
IMM GRANULOCYTES # BLD AUTO: 0.29 K/UL (ref 0–0.04)
IMM GRANULOCYTES NFR BLD AUTO: 3.2 % (ref 0–0.5)
LYMPHOCYTES # BLD AUTO: 0.2 K/UL (ref 1–4.8)
LYMPHOCYTES NFR BLD: 2.1 % (ref 18–48)
MCH RBC QN AUTO: 32.7 PG (ref 27–31)
MCHC RBC AUTO-ENTMCNC: 32.6 G/DL (ref 32–36)
MCV RBC AUTO: 100 FL (ref 82–98)
MONOCYTES # BLD AUTO: 0.4 K/UL (ref 0.3–1)
MONOCYTES NFR BLD: 4.6 % (ref 4–15)
NEUTROPHILS # BLD AUTO: 8.3 K/UL (ref 1.8–7.7)
NEUTROPHILS NFR BLD: 89.9 % (ref 38–73)
NRBC BLD-RTO: 0 /100 WBC
PLATELET # BLD AUTO: 55 K/UL (ref 150–350)
PMV BLD AUTO: 11.3 FL (ref 9.2–12.9)
POCT GLUCOSE: 104 MG/DL (ref 70–110)
POCT GLUCOSE: 92 MG/DL (ref 70–110)
POCT GLUCOSE: 92 MG/DL (ref 70–110)
POCT GLUCOSE: 98 MG/DL (ref 70–110)
POTASSIUM SERPL-SCNC: 4.9 MMOL/L (ref 3.5–5.1)
PROT SERPL-MCNC: 5.2 G/DL (ref 6–8.4)
RBC # BLD AUTO: 2.81 M/UL (ref 4–5.4)
SODIUM SERPL-SCNC: 134 MMOL/L (ref 136–145)
TRANS PLATPHERESIS VOL PATIENT: NORMAL ML
TRANS PLATPHERESIS VOL PATIENT: NORMAL ML
WBC # BLD AUTO: 9.19 K/UL (ref 3.9–12.7)

## 2019-08-22 PROCEDURE — 36430 TRANSFUSION BLD/BLD COMPNT: CPT

## 2019-08-22 PROCEDURE — 63600175 PHARM REV CODE 636 W HCPCS: Performed by: NEUROLOGICAL SURGERY

## 2019-08-22 PROCEDURE — 92507 TX SP LANG VOICE COMM INDIV: CPT

## 2019-08-22 PROCEDURE — 99024 POSTOP FOLLOW-UP VISIT: CPT | Mod: ,,, | Performed by: PHYSICIAN ASSISTANT

## 2019-08-22 PROCEDURE — 25000003 PHARM REV CODE 250: Performed by: STUDENT IN AN ORGANIZED HEALTH CARE EDUCATION/TRAINING PROGRAM

## 2019-08-22 PROCEDURE — 97535 SELF CARE MNGMENT TRAINING: CPT

## 2019-08-22 PROCEDURE — 36415 COLL VENOUS BLD VENIPUNCTURE: CPT

## 2019-08-22 PROCEDURE — 20600001 HC STEP DOWN PRIVATE ROOM

## 2019-08-22 PROCEDURE — P9035 PLATELET PHERES LEUKOREDUCED: HCPCS

## 2019-08-22 PROCEDURE — 80053 COMPREHEN METABOLIC PANEL: CPT

## 2019-08-22 PROCEDURE — 25000003 PHARM REV CODE 250: Performed by: PHYSICIAN ASSISTANT

## 2019-08-22 PROCEDURE — 85025 COMPLETE CBC W/AUTO DIFF WBC: CPT

## 2019-08-22 PROCEDURE — 63600175 PHARM REV CODE 636 W HCPCS: Performed by: PHYSICIAN ASSISTANT

## 2019-08-22 PROCEDURE — 99024 PR POST-OP FOLLOW-UP VISIT: ICD-10-PCS | Mod: ,,, | Performed by: PHYSICIAN ASSISTANT

## 2019-08-22 PROCEDURE — S4991 NICOTINE PATCH NONLEGEND: HCPCS | Performed by: PHYSICIAN ASSISTANT

## 2019-08-22 RX ORDER — HYDROCODONE BITARTRATE AND ACETAMINOPHEN 500; 5 MG/1; MG/1
TABLET ORAL
Status: DISCONTINUED | OUTPATIENT
Start: 2019-08-22 | End: 2019-08-28

## 2019-08-22 RX ADMIN — FERROUS SULFATE TAB EC 325 MG (65 MG FE EQUIVALENT) 325 MG: 325 (65 FE) TABLET DELAYED RESPONSE at 08:08

## 2019-08-22 RX ADMIN — DEXAMETHASONE 4 MG: 4 TABLET ORAL at 05:08

## 2019-08-22 RX ADMIN — SENNOSIDES,DOCUSATE SODIUM 1 TABLET: 8.6; 5 TABLET, FILM COATED ORAL at 09:08

## 2019-08-22 RX ADMIN — NICOTINE 1 PATCH: 7 PATCH, EXTENDED RELEASE TRANSDERMAL at 08:08

## 2019-08-22 RX ADMIN — DIVALPROEX SODIUM 500 MG: 250 TABLET, DELAYED RELEASE ORAL at 09:08

## 2019-08-22 RX ADMIN — ENOXAPARIN SODIUM 40 MG: 100 INJECTION SUBCUTANEOUS at 05:08

## 2019-08-22 RX ADMIN — LEVETIRACETAM 500 MG: 500 TABLET, FILM COATED ORAL at 09:08

## 2019-08-22 RX ADMIN — DEXAMETHASONE 4 MG: 4 TABLET ORAL at 02:08

## 2019-08-22 RX ADMIN — Medication 250 MG: at 08:08

## 2019-08-22 RX ADMIN — SENNOSIDES,DOCUSATE SODIUM 1 TABLET: 8.6; 5 TABLET, FILM COATED ORAL at 08:08

## 2019-08-22 RX ADMIN — FERROUS SULFATE TAB EC 325 MG (65 MG FE EQUIVALENT) 325 MG: 325 (65 FE) TABLET DELAYED RESPONSE at 09:08

## 2019-08-22 RX ADMIN — Medication 250 MG: at 09:08

## 2019-08-22 RX ADMIN — LEVOTHYROXINE SODIUM 75 MCG: 75 TABLET ORAL at 05:08

## 2019-08-22 RX ADMIN — DIVALPROEX SODIUM 500 MG: 250 TABLET, DELAYED RELEASE ORAL at 08:08

## 2019-08-22 RX ADMIN — LEVETIRACETAM 500 MG: 500 TABLET, FILM COATED ORAL at 08:08

## 2019-08-22 RX ADMIN — PANTOPRAZOLE SODIUM 40 MG: 40 TABLET, DELAYED RELEASE ORAL at 08:08

## 2019-08-22 RX ADMIN — DEXAMETHASONE 4 MG: 4 TABLET ORAL at 09:08

## 2019-08-22 NOTE — SUBJECTIVE & OBJECTIVE
Interval History: NAEON. Pt sitting up comfortably in bed with sitter at bedside. Pt denies pain, vision changes, headache, n/v. Tolerating diet and voiding appropriately.     Medications:  Continuous Infusions:  Scheduled Meds:   ascorbic acid (vitamin C)  250 mg Oral BID    dexAMETHasone  4 mg Oral Q8H    divalproex  500 mg Oral Q12H    enoxaparin  40 mg Subcutaneous Daily    ferrous sulfate  325 mg Oral BID    levETIRAcetam  500 mg Oral BID    levothyroxine  75 mcg Oral Before breakfast    nicotine  1 patch Transdermal Daily    pantoprazole  40 mg Oral Daily    polyethylene glycol  17 g Oral Daily    senna-docusate 8.6-50 mg  1 tablet Oral BID     PRN Meds:sodium chloride, sodium chloride, acetaminophen, albuterol-ipratropium, haloperidol lactate, ondansetron, oxyCODONE, QUEtiapine, sodium chloride 0.9%     Review of Systems  Objective:     Weight: 35.4 kg (78 lb)  Body mass index is 14.74 kg/m².  Vital Signs (Most Recent):  Temp: 98 °F (36.7 °C) (08/22/19 1143)  Pulse: 74 (08/22/19 1143)  Resp: 18 (08/22/19 1143)  BP: 118/69 (08/22/19 1143)  SpO2: (!) 91 % (08/22/19 1143) Vital Signs (24h Range):  Temp:  [96.2 °F (35.7 °C)-98 °F (36.7 °C)] 98 °F (36.7 °C)  Pulse:  [] 74  Resp:  [16-20] 18  SpO2:  [91 %-100 %] 91 %  BP: ()/(60-77) 118/69     Date 08/22/19 0700 - 08/23/19 0659   Shift 1877-4694 2078-3328 0713-8360 24 Hour Total   INTAKE   Blood 543.8   543.8   Shift Total(mL/kg) 543.8(15.4)   543.8(15.4)   OUTPUT   Shift Total(mL/kg)       Weight (kg) 35.4 35.4 35.4 35.4                        Neurosurgery Physical Exam     General: well developed, well nourished, no distress.   Head: normocephalic  Neurologic: Alert and oriented.  Inattentiveness.   GCS: Motor: 6/Verbal: 4/Eyes: 4 GCS Total: 14  Mental Status: Awake, Alert, Oriented x 3 (year is 2016)  Language: Receptive aphasia   Speech: No dysarthria  Cranial nerves: face symmetric, tongue midline, CN II-XII grossly intact.   Eyes:  pupils round, reactive to light with accomodation, EOMI. Right pupil > Left pupil. Dysconjugate gaze.  Pulmonary: normal respirations, no signs of respiratory distress  Abdomen: soft, non-distended, not tender to palpation  Skin: Skin is warm, dry and intact.  Sensory: intact to light touch throughout  Motor Strength:Moves all extremities spontaneously with good tone.  Full strength upper and lower extremities. No abnormal movements seen.      Babinski's: Negative.  Clonus: Negative.     Finger-to-nose: Unable to assess due to mental status  Pronator drift: Unable to assess due to mental status     Incision: Clean, dry, staples intact. Skin edges well approximated. No surrounding erythema or edema. No drainage or TTP.     Significant Labs:  Recent Labs   Lab 08/22/19  0340      *   K 4.9   CL 95   CO2 29   BUN 30*   CREATININE 0.8   CALCIUM 8.5*     Recent Labs   Lab 08/22/19  0340   WBC 9.19   HGB 9.2*   HCT 28.2*   PLT 55*     No results for input(s): LABPT, INR, APTT in the last 48 hours.  Microbiology Results (last 7 days)     ** No results found for the last 168 hours. **

## 2019-08-22 NOTE — PRE ADMISSION SCREENING
[facility] was out to eval this patient. See notes, recommendation has been changed to SNF placement.   [facility] ins will close this case at this time and agree with SNF placement of this patient.

## 2019-08-22 NOTE — PLAN OF CARE
Patient has not been declined by:  [facility]  [facility] sent additional referrals to:  [facility]  [facility]  [facility]    Cindy Valladares LMSW  [facility]  [phone]

## 2019-08-22 NOTE — PLAN OF CARE
Problem: SLP Goal  Goal: SLP Goal  Speech Language Pathology Goals  Goals expected to be met by 8/27:  1. Patient will tolerate a regular diet and thin liquids with no overt signs of airway compromise.   2. Pt will answer simple yes/no questions with 80% accy   3. Pt will complete simple problem solving tasks with 70% accy with min cues  4. Pt will orient x4 with mod cues  5. Pt will participate in ongoing assessment of cognitive linguistic skills.            Pt oriented to person only. Frequent redirection needed.     VINCE Henderson, CCC-SLP  8/22/2019

## 2019-08-22 NOTE — CARE UPDATE
Rapid Response Nurse Chart Check     Chart check completed, abnormal VS noted. Bedside RN Regine contacted, no concerns verbalized at this time, instructed to call [phone] for further concerns or assistance.

## 2019-08-22 NOTE — PT/OT/SLP PROGRESS
"Speech Language Pathology Treatment    Patient Name:  Carmen Leyva   MRN:  [id number]  Admitting Diagnosis: Brain metastasis    Recommendations:                 General Recommendations:  Dysphagia therapy and Speech/language therapy  Diet recommendations:  Regular, Liquid Diet Level: Thin   Aspiration Precautions: Eliminate distractions and Standard aspiration precautions   General Precautions: Standard, hearing impaired, vision impaired, aspiration, fall  Communication strategies:  none    Subjective     "I'm legally blind."  Patient goals: unable to state    Pain/Comfort:  · Pain Rating 1: 0/10  · Pain Rating Post-Intervention 1: 0/10    Objective:     Has the patient been evaluated by SLP for swallowing?   Yes  Keep patient NPO? No   Current Respiratory Status: room air      Pt seen bedside with no family present. Pt awake/alert. Pt oriented to person only with poor recall of orientation information once provided. She was able to state her  and age. She followed simple commands with 50% acc with accuracy decreasing toward the end of task due to perseverations. She responded to simple y/n questions with 70% acc. Pt initially unable to name days of week despite max cues but with significant delay pt able to name months of the year. She named one item in a category with 75% acc with perseverations and redirection needed. Pt with tangential speech and often off task remarks with frequent redirection needed. White board updated.     Assessment:     Carmen Leyva is a 59 y.o. female with an SLP diagnosis of Cognitive-Linguistic Impairment.  .    Goals:   Multidisciplinary Problems     SLP Goals        Problem: SLP Goal    Goal Priority Disciplines Outcome   SLP Goal     SLP    Description:  Speech Language Pathology Goals  Goals expected to be met by :  1. Patient will tolerate a regular diet and thin liquids with no overt signs of airway compromise.   2. Pt will answer simple yes/no questions with 80% accy   3. " Pt will complete simple problem solving tasks with 70% accy with min cues  4. Pt will orient x4 with mod cues  5. Pt will participate in ongoing assessment of cognitive linguistic skills.                           Plan:     · Patient to be seen:  3 x/week   · Plan of Care expires:     · Plan of Care reviewed with:  patient   · SLP Follow-Up:  Yes       Discharge recommendations:  nursing facility, basic       Time Tracking:     SLP Treatment Date:   08/22/19  Speech Start Time:  0940  Speech Stop Time:  0954     Speech Total Time (min):  14 min    Billable Minutes: Speech Therapy Individual 14    VINCE Henderson, CCC-SLP  08/22/2019

## 2019-08-22 NOTE — PLAN OF CARE
Problem: Adult Inpatient Plan of Care  Goal: Plan of Care Review  Outcome: Ongoing (interventions implemented as appropriate)  POC reviewed with patient. All questions and concerns reviewed. Requires reinforcement. No evidence of learning. VSS throughout shift. Fall/safety precautions implemented and maintained. Blood glucose monitored. Bed locked in lowest position. Call bell within reach. Will continue to monitor.

## 2019-08-22 NOTE — NURSING
Patient is alert, oriented to person. Confused  with periods of orientation to place. 2 units of platelets given today and tolerated. Vital signs stable. 121/76- 80- 18- 98.1 - 02 saturation 99 percent. PCT at bedside. Safety precautions in use. Continent of bowel and bladder. Toiletting offered.CBG within normal range with no coverage required.

## 2019-08-22 NOTE — PLAN OF CARE
SW following for DC needs. BARBIE in communication with Rhonda GARCIA.    Patient has been denied by [facility], [facility].   Referral is still under review by [facility].       08/22/19 1139   Post-Acute Status   Post-Acute Authorization Placement   Post-Acute Placement Status Pending Post-Acute Clinical Review     Cindy Valladares LMSW  [facility]  [phone]

## 2019-08-22 NOTE — PLAN OF CARE
08/22/19 1721   Discharge Reassessment   Assessment Type Discharge Planning Reassessment   Provided patient/caregiver education on the expected discharge date and the discharge plan No   Do you have any problems affording any of your prescribed medications? No   Discharge Plan A [facility] placement [facility]   Discharge Plan B Home with family   DME Needed Upon Discharge  none   Patient choice form signed by patient/caregiver N/A   Anticipated Discharge Disposition alf Nu   Can the patient answer the patient profile reliably? No, cognitively impaired

## 2019-08-22 NOTE — ASSESSMENT & PLAN NOTE
59F w/ PMH COPD, HTN, hypothyroidism, stage 4 R small cell lung cancer s/p 6 cycles of carbo/etoposide in remission since 06/2018 who [place] [facility] [place] w/ L parietal brain mass. Now s/p crani for debulking on 8/12.    -Patient neurologically stable on exam  -Thrombocytopenia: Plts 55. Transfused 2 units of plts. Do not need CTH due to pt's unchanged neurostatus.    -Pathology (8/19): Metastatic small cell carcinoma. Pt scheduled for f/u tomorrow to discuss pathology report with Dr. Erickson and family.  -Continue Depakote for seizure prevention  -Cerebral edema: Continue Dex 4 mg q 6 hours. Will DC on 3 week taper to 2 mg BID. Continue PPI while on steroids.   - Malnutrition: Albumin 2.7. Will begin boost supplement and continue jennifer supplement tid to promote wound healing.    -Psyc: No use of wrist restraints. Pt requires frequent redirection to be compliant with fall precautions. Will try to ween sitter. Continue redirection and delirium precautions. Continue Seroquel TID PRN. Haldol prn qhs. Medical and physical precautions to minimize agitation in place.   -HTN:  Maintain SBP < 160. Home dose of Norvasc has been held since SBP at goal. Will continue to monitor and restart if SBP becomes elevated.   -Hypothyroidism: Continue home dose of Synthroid  -Tobacco abuse: Continue nicotine patch  -COPD: Continue duo nebs PRN, pulse ox q 4 hours  -DVT prophylaxis: ABI's, SCD's, lovenox  -Bowel regimen: senna BID and miralax daily  -Atelectasis prevention: IS hourly while awake, PT/OT, OOB > 6 hours per day      DISPO: Medically stable for discharge. Pending nursing home acceptance.

## 2019-08-22 NOTE — PROGRESS NOTES
[facility]  Neurosurgery  Progress Note    Subjective:     History of Present Illness: 59F w/ PMH COPD, HTN, hypothyroidism, stage 4 R small cell lung cancer s/p 6 cycles of carbo/etoposide in remission since 06/2018 who presents to [facility] ED from OSH w/ L parietal brain mass. Per EMS records patient had a 1d history of AMS and gait difficulty and was brought into the hospital by her family for evaluation. CTH @ OSH showed large L parietal brain mass with possible hemorrhagic component and she was transferred to [facility] for evaluation. MRI @ [facility] redemonstrated L nonenhancing parietal mass with minimal blood. Patient is confused and so ROS is tenuous.    Post-Op Info:  Procedure(s) (LRB):  L craniotomy for tumor (Left)   10 Days Post-Op     Interval History: NAEON. Pt sitting up comfortably in bed with sitter at bedside. Pt denies pain, vision changes, headache, n/v. Tolerating diet and voiding appropriately.     Medications:  Continuous Infusions:  Scheduled Meds:   ascorbic acid (vitamin C)  250 mg Oral BID    dexAMETHasone  4 mg Oral Q8H    divalproex  500 mg Oral Q12H    enoxaparin  40 mg Subcutaneous Daily    ferrous sulfate  325 mg Oral BID    levETIRAcetam  500 mg Oral BID    levothyroxine  75 mcg Oral Before breakfast    nicotine  1 patch Transdermal Daily    pantoprazole  40 mg Oral Daily    polyethylene glycol  17 g Oral Daily    senna-docusate 8.6-50 mg  1 tablet Oral BID     PRN Meds:sodium chloride, sodium chloride, acetaminophen, albuterol-ipratropium, haloperidol lactate, ondansetron, oxyCODONE, QUEtiapine, sodium chloride 0.9%     Review of Systems  Objective:     Weight: 35.4 kg (78 lb)  Body mass index is 14.74 kg/m².  Vital Signs (Most Recent):  Temp: 98 °F (36.7 °C) (08/22/19 1143)  Pulse: 74 (08/22/19 1143)  Resp: 18 (08/22/19 1143)  BP: 118/69 (08/22/19 1143)  SpO2: (!) 91 % (08/22/19 1143) Vital Signs (24h Range):  Temp:  [96.2 °F (35.7 °C)-98 °F (36.7 °C)] 98 °F (36.7  °C)  Pulse:  [] 74  Resp:  [16-20] 18  SpO2:  [91 %-100 %] 91 %  BP: ()/(60-77) 118/69     Date 08/22/19 0700 - 08/23/19 0659   Shift 6219-4342 9444-0343 3838-4540 24 Hour Total   INTAKE   Blood 543.8   543.8   Shift Total(mL/kg) 543.8(15.4)   543.8(15.4)   OUTPUT   Shift Total(mL/kg)       Weight (kg) 35.4 35.4 35.4 35.4                        Neurosurgery Physical Exam     General: well developed, well nourished, no distress.   Head: normocephalic  Neurologic: Alert and oriented.  Inattentiveness.   GCS: Motor: 6/Verbal: 4/Eyes: 4 GCS Total: 14  Mental Status: Awake, Alert, Oriented x 3 (year is 2016)  Language: Receptive aphasia   Speech: No dysarthria  Cranial nerves: face symmetric, tongue midline, CN II-XII grossly intact.   Eyes: pupils round, reactive to light with accomodation, EOMI. Right pupil > Left pupil. Dysconjugate gaze.  Pulmonary: normal respirations, no signs of respiratory distress  Abdomen: soft, non-distended, not tender to palpation  Skin: Skin is warm, dry and intact.  Sensory: intact to light touch throughout  Motor Strength:Moves all extremities spontaneously with good tone.  Full strength upper and lower extremities. No abnormal movements seen.      Babinski's: Negative.  Clonus: Negative.     Finger-to-nose: Unable to assess due to mental status  Pronator drift: Unable to assess due to mental status     Incision: Clean, dry, staples intact. Skin edges well approximated. No surrounding erythema or edema. No drainage or TTP.     Significant Labs:  Recent Labs   Lab 08/22/19  0340      *   K 4.9   CL 95   CO2 29   BUN 30*   CREATININE 0.8   CALCIUM 8.5*     Recent Labs   Lab 08/22/19  0340   WBC 9.19   HGB 9.2*   HCT 28.2*   PLT 55*     No results for input(s): LABPT, INR, APTT in the last 48 hours.  Microbiology Results (last 7 days)     ** No results found for the last 168 hours. **        Assessment/Plan:     * Brain metastasis  59F w/ PMH COPD, HTN,  hypothyroidism, stage 4 R small cell lung cancer s/p 6 cycles of carbo/etoposide in remission since 06/2018 who presents to [facility] [place] OSH w/ L parietal brain mass. Now s/p crani for debulking on 8/12.    -Patient neurologically stable on exam  -Thrombocytopenia: Plts 55. Transfused 2 units of plts. Do not need CTH due to pt's unchanged neurostatus.    -Pathology (8/19): Metastatic small cell carcinoma. Pt scheduled for f/u tomorrow to discuss pathology report with Dr. Erickson and family.  -Continue Depakote for seizure prevention  -Cerebral edema: Continue Dex 4 mg q 6 hours. Will DC on 3 week taper to 2 mg BID. Continue PPI while on steroids.   - Malnutrition: Albumin 2.7. Will begin boost supplement and continue jennifer supplement tid to promote wound healing.    -Psyc: No use of wrist restraints. Pt requires frequent redirection to be compliant with fall precautions. Will try to ween sitter. Continue redirection and delirium precautions. Continue Seroquel TID PRN. Haldol prn qhs. Medical and physical precautions to minimize agitation in place.   -HTN:  Maintain SBP < 160. Home dose of Norvasc has been held since SBP at goal. Will continue to monitor and restart if SBP becomes elevated.   -Hypothyroidism: Continue home dose of Synthroid  -Tobacco abuse: Continue nicotine patch  -COPD: Continue duo nebs PRN, pulse ox q 4 hours  -DVT prophylaxis: ABI's, SCD's, lovenox  -Bowel regimen: senna BID and miralax daily  -Atelectasis prevention: IS hourly while awake, PT/OT, OOB > 6 hours per day      DISPO: Medically stable for discharge. Pending nursing home acceptance.       JEANMARIE GarciaC   [phone]  Neurosurgery  [facility]

## 2019-08-22 NOTE — PT/OT/SLP PROGRESS
"Occupational Therapy   Treatment    Name: Carmen Leyva  MRN: [id number]  Admitting Diagnosis:  Brain metastasis  10 Days Post-Op    Recommendations:     Discharge Recommendations: (24 hour assist/NH)  Discharge Equipment Recommendations:  shower chair  Barriers to discharge:  Decreased caregiver support    Assessment:     Carmen Leyva is a 59 y.o. female with a medical diagnosis of Brain metastasis.  She presents with performance deficits affecting function are weakness, impaired self care skills, impaired balance, decreased coordination, impaired endurance, impaired functional mobilty, gait instability, impaired cognition, impaired coordination, visual deficits.     Rehab Prognosis:  Good; patient would benefit from acute skilled OT services to address these deficits and reach maximum level of function.       Plan:     Patient to be seen 3 x/week to address the above listed problems via self-care/home management, neuromuscular re-education, cognitive retraining, therapeutic activities, therapeutic exercises, sensory integration  · Plan of Care Expires: 09/11/19  · Plan of Care Reviewed with: patient    Subjective   Patient:  "I am sure they'll be coming to check my blood pressure."  Sitter:  "She's been talking about making gumbo.  She just brushed her teeth."    Pain/Comfort:  · Pain Rating 1: 0/10  · Pain Rating Post-Intervention 1: 0/10    Objective:     Communicated with: nurse prior to session.  Patient found supine with bed alarm, telemetry, peripheral IV upon OT entry to room.  Sitter present.    General Precautions: Standard, hearing impaired, vision impaired, aspiration, fall   Orthopedic Precautions:N/A   Braces: N/A     Occupational Performance:     Bed Mobility:    · Patient completed Rolling/Turning to Left with  supervision  · Patient completed Rolling/Turning to Right with supervision  · Patient completed Scooting/Bridging with supervision  · Patient completed Supine to Sit with " supervision  · Patient completed Sit to Supine with supervision     Functional Mobility/Transfers:  · Patient completed Sit <> Stand Transfer with stand by assistance  with  no assistive device   · Patient completed Bed <> Chair Transfer using Stand Pivot technique with contact guard assistance with no assistive device    Activities of Daily Living:  · Grooming: contact guard assistance    · Upper Body Dressing: contact guard assistance    · Lower Body Dressing: contact guard assistance    · Toileting: contact guard assistance        [facility] 6 Click ADL: 18    Treatment & Education:  Patient education provided on role of OT and need for continued OT upon discharge.  Patient education provided on dressing technique, positioning, postural control, and transfers.  Continued education, patient/ family training recommended.  Patient alert and oriented x person only.   Patient attentive and interactive throughout the session. White board updated in patient's room.  OT asked if there were any other questions; patient/ family had no further questions.     Patient left supine with all lines intact, call button in reach, bed alarm on and sitter presentEducation:      GOALS:   Multidisciplinary Problems     Occupational Therapy Goals        Problem: Occupational Therapy Goal    Goal Priority Disciplines Outcome Interventions   Occupational Therapy Goal     OT, PT/OT Ongoing (interventions implemented as appropriate)    Description:  Goals set 8/19 to be addressed for 14 days with expiration date, 9/2:  Patient will increase functional independence with ADLs by performing:    Patient will demonstrate rolling to the right with modified independence.  Not met   Patient will demonstrate rolling to the left with modified independence.   Not met  Patient will demonstrate supine -sit with modified independence.   Not met  Patient will demonstrate stand pivot transfers with modified independence.   Not met  Patient will demonstrate  grooming while standing with modified independence.   Not met  Patient will demonstrate upper body dressing with modified independence while seated EOB.   Not met  Patient will demonstrate lower body dressing with modified independence while seated EOB.   Not met  Patient will demonstrate toileting with modified independence.   Not met  Patient will demonstrate bathing while seated EOB with modified independence.   Not met                    Time Tracking:     OT Date of Treatment: 08/22/19  OT Start Time: 0626  OT Stop Time: 0649  OT Total Time (min): 23 min    Billable Minutes:Self Care/Home Management 23    OSIEL Lynch  8/22/2019

## 2019-08-22 NOTE — PLAN OF CARE
Problem: Occupational Therapy Goal  Goal: Occupational Therapy Goal  Goals set 8/19 to be addressed for 14 days with expiration date, 9/2:  Patient will increase functional independence with ADLs by performing:    Patient will demonstrate rolling to the right with modified independence.  Not met   Patient will demonstrate rolling to the left with modified independence.   Not met  Patient will demonstrate supine -sit with modified independence.   Not met  Patient will demonstrate stand pivot transfers with modified independence.   Not met  Patient will demonstrate grooming while standing with modified independence.   Not met  Patient will demonstrate upper body dressing with modified independence while seated EOB.   Not met  Patient will demonstrate lower body dressing with modified independence while seated EOB.   Not met  Patient will demonstrate toileting with modified independence.   Not met  Patient will demonstrate bathing while seated EOB with modified independence.   Not met   Goals remain appropriate.  OSIEL Lynch  8/22/2019

## 2019-08-23 LAB — POCT GLUCOSE: 131 MG/DL (ref 70–110)

## 2019-08-23 PROCEDURE — 25000003 PHARM REV CODE 250: Performed by: STUDENT IN AN ORGANIZED HEALTH CARE EDUCATION/TRAINING PROGRAM

## 2019-08-23 PROCEDURE — 25000003 PHARM REV CODE 250: Performed by: PHYSICIAN ASSISTANT

## 2019-08-23 PROCEDURE — S4991 NICOTINE PATCH NONLEGEND: HCPCS | Performed by: PHYSICIAN ASSISTANT

## 2019-08-23 PROCEDURE — 92507 TX SP LANG VOICE COMM INDIV: CPT

## 2019-08-23 PROCEDURE — 20600001 HC STEP DOWN PRIVATE ROOM

## 2019-08-23 PROCEDURE — 97535 SELF CARE MNGMENT TRAINING: CPT

## 2019-08-23 PROCEDURE — 63600175 PHARM REV CODE 636 W HCPCS: Performed by: PHYSICIAN ASSISTANT

## 2019-08-23 PROCEDURE — 94761 N-INVAS EAR/PLS OXIMETRY MLT: CPT

## 2019-08-23 RX ADMIN — LEVOTHYROXINE SODIUM 75 MCG: 75 TABLET ORAL at 05:08

## 2019-08-23 RX ADMIN — SENNOSIDES,DOCUSATE SODIUM 1 TABLET: 8.6; 5 TABLET, FILM COATED ORAL at 09:08

## 2019-08-23 RX ADMIN — LEVETIRACETAM 500 MG: 500 TABLET, FILM COATED ORAL at 09:08

## 2019-08-23 RX ADMIN — PANTOPRAZOLE SODIUM 40 MG: 40 TABLET, DELAYED RELEASE ORAL at 09:08

## 2019-08-23 RX ADMIN — FERROUS SULFATE TAB EC 325 MG (65 MG FE EQUIVALENT) 325 MG: 325 (65 FE) TABLET DELAYED RESPONSE at 09:08

## 2019-08-23 RX ADMIN — DIVALPROEX SODIUM 500 MG: 250 TABLET, DELAYED RELEASE ORAL at 09:08

## 2019-08-23 RX ADMIN — DEXAMETHASONE 4 MG: 4 TABLET ORAL at 09:08

## 2019-08-23 RX ADMIN — DEXAMETHASONE 4 MG: 4 TABLET ORAL at 02:08

## 2019-08-23 RX ADMIN — NICOTINE 1 PATCH: 7 PATCH, EXTENDED RELEASE TRANSDERMAL at 09:08

## 2019-08-23 RX ADMIN — Medication 250 MG: at 09:08

## 2019-08-23 RX ADMIN — POLYETHYLENE GLYCOL 3350 17 G: 17 POWDER, FOR SOLUTION ORAL at 09:08

## 2019-08-23 RX ADMIN — DEXAMETHASONE 4 MG: 4 TABLET ORAL at 05:08

## 2019-08-23 NOTE — PT/OT/SLP PROGRESS
Speech Language Pathology Treatment    Patient Name:  Carmen Leyva   MRN:  [id number]  Admitting Diagnosis: Brain metastasis    Recommendations:                 General Recommendations:  Cognitive-linguistic therapy  Diet recommendations:  Regular, Liquid Diet Level: Thin   Aspiration Precautions: Standard aspiration precautions   General Precautions: Standard, hearing impaired, vision impaired, aspiration, fall      Subjective     Awake/confused    Pain/Comfort:  · Pain Rating 1: 0/10  · Pain Rating Post-Intervention 1: 0/10    Objective:     Has the patient been evaluated by SLP for swallowing?   Yes  Keep patient NPO? No   Current Respiratory Status: room air      Pt oriented to person, hospital ind'ly; however pt unable to orient to city and time despite max cues. Problem solving task completed with 80% accy provided mod cues. She answered simple yes/no questions with 80% accy ind'ly. Single step commands followed with 70% accy and min cues. Tangential speech and poor topic maintenance throughout session requiring redirection. SLP provided ongoing education on speech therapy, POC, and need for cognitive therapy. Pt verbalized understanding; however questionable carryover noted. Ongoing reinforcement recommended. Continue regular diet/thin liquids at this time.     Assessment:     Carmen Leyva is a 59 y.o. female with an SLP diagnosis of Cognitive-Linguistic Impairment.   Goals:   Multidisciplinary Problems     SLP Goals        Problem: SLP Goal    Goal Priority Disciplines Outcome   SLP Goal     SLP    Description:  Speech Language Pathology Goals  Goals expected to be met by 8/27:  1. Patient will tolerate a regular diet and thin liquids with no overt signs of airway compromise.   2. Pt will answer simple yes/no questions with 80% accy   3. Pt will complete simple problem solving tasks with 70% accy with min cues  4. Pt will orient x4 with mod cues  5. Pt will participate in ongoing assessment of cognitive  linguistic skills.                           Plan:     · Patient to be seen:  3 x/week   · Plan of Care reviewed with:  patient   · SLP Follow-Up:  Yes       Discharge recommendations:  nursing facility, basic       Time Tracking:     SLP Treatment Date:   08/23/19  Speech Start Time:  0820  Speech Stop Time:  0836     Speech Total Time (min):  16 min    Billable Minutes: Speech Therapy Individual 8 and Seld Care/Home Management Training 8    Mami Mo CCC-SLP  08/23/2019

## 2019-08-23 NOTE — PLAN OF CARE
CM spoke with patients son and explained that we have had multiple denials and that we have expanded the radius to include [place] and [place].  Son is in agreement with this plan.  Multiple facilites are reviewing records.

## 2019-08-23 NOTE — PT/OT/SLP PROGRESS
Physical Therapy  Pt Not Seen    Patient Name:  Carmen Leyva   MRN:  [id number]    Patient not seen today secondary to  Other (Comment)(Unable to reach RN (Vicente) at 1: 45 pm). Will follow-up on next scheduled visit.    Ameena Escamilla, PTA  8/23/2019

## 2019-08-23 NOTE — PLAN OF CARE
Problem: Adult Inpatient Plan of Care  Goal: Plan of Care Review  Outcome: Ongoing (interventions implemented as appropriate)  POC reviewed with patient. All questions and concerns reviewed.  No evidence of learning. Requires reinforcement. VSS throughout shift. Fall/safety precautions implemented and maintained. Blood glucose monitored. Bed locked in lowest position. Call bell within reach. Will continue to monitor.

## 2019-08-23 NOTE — PLAN OF CARE
BARBIE following for DC needs. BARBIE in communication with Rhonda GARCIA.    BARBIE completed LOCET and faxed PASRR to state. Waiting on 142.     Patient has not been declined by:  [facility]  Guest [facility] [place]     Additional referrals sent to:  [facility]      Cindy Valladares, LMSW [facility]  [id number]

## 2019-08-23 NOTE — PLAN OF CARE
Problem: SLP Goal  Goal: SLP Goal  Speech Language Pathology Goals  Goals expected to be met by 8/27:  1. Patient will tolerate a regular diet and thin liquids with no overt signs of airway compromise.   2. Pt will answer simple yes/no questions with 80% accy   3. Pt will complete simple problem solving tasks with 70% accy with min cues  4. Pt will orient x4 with mod cues  5. Pt will participate in ongoing assessment of cognitive linguistic skills.          Continue POC at this time, all goals remain appropriate.   Mami Mo CCC-SLP  8/23/2019

## 2019-08-24 LAB
POCT GLUCOSE: 108 MG/DL (ref 70–110)
POCT GLUCOSE: 120 MG/DL (ref 70–110)
POCT GLUCOSE: 128 MG/DL (ref 70–110)
POCT GLUCOSE: 92 MG/DL (ref 70–110)

## 2019-08-24 PROCEDURE — 25000003 PHARM REV CODE 250: Performed by: STUDENT IN AN ORGANIZED HEALTH CARE EDUCATION/TRAINING PROGRAM

## 2019-08-24 PROCEDURE — 25000003 PHARM REV CODE 250: Performed by: PHYSICIAN ASSISTANT

## 2019-08-24 PROCEDURE — 63600175 PHARM REV CODE 636 W HCPCS: Performed by: NEUROLOGICAL SURGERY

## 2019-08-24 PROCEDURE — 63600175 PHARM REV CODE 636 W HCPCS: Performed by: PHYSICIAN ASSISTANT

## 2019-08-24 PROCEDURE — 94761 N-INVAS EAR/PLS OXIMETRY MLT: CPT

## 2019-08-24 PROCEDURE — S4991 NICOTINE PATCH NONLEGEND: HCPCS | Performed by: PHYSICIAN ASSISTANT

## 2019-08-24 PROCEDURE — 20600001 HC STEP DOWN PRIVATE ROOM

## 2019-08-24 RX ADMIN — LEVOTHYROXINE SODIUM 75 MCG: 75 TABLET ORAL at 06:08

## 2019-08-24 RX ADMIN — FERROUS SULFATE TAB EC 325 MG (65 MG FE EQUIVALENT) 325 MG: 325 (65 FE) TABLET DELAYED RESPONSE at 08:08

## 2019-08-24 RX ADMIN — SENNOSIDES,DOCUSATE SODIUM 1 TABLET: 8.6; 5 TABLET, FILM COATED ORAL at 08:08

## 2019-08-24 RX ADMIN — LEVETIRACETAM 500 MG: 500 TABLET, FILM COATED ORAL at 08:08

## 2019-08-24 RX ADMIN — PANTOPRAZOLE SODIUM 40 MG: 40 TABLET, DELAYED RELEASE ORAL at 08:08

## 2019-08-24 RX ADMIN — DIVALPROEX SODIUM 500 MG: 250 TABLET, DELAYED RELEASE ORAL at 09:08

## 2019-08-24 RX ADMIN — DEXAMETHASONE 4 MG: 4 TABLET ORAL at 06:08

## 2019-08-24 RX ADMIN — DEXAMETHASONE 4 MG: 4 TABLET ORAL at 08:08

## 2019-08-24 RX ADMIN — NICOTINE 1 PATCH: 7 PATCH, EXTENDED RELEASE TRANSDERMAL at 09:08

## 2019-08-24 RX ADMIN — DIVALPROEX SODIUM 500 MG: 250 TABLET, DELAYED RELEASE ORAL at 08:08

## 2019-08-24 RX ADMIN — DEXAMETHASONE 4 MG: 4 TABLET ORAL at 02:08

## 2019-08-24 RX ADMIN — ENOXAPARIN SODIUM 40 MG: 100 INJECTION SUBCUTANEOUS at 05:08

## 2019-08-24 RX ADMIN — QUETIAPINE FUMARATE 25 MG: 25 TABLET ORAL at 08:08

## 2019-08-24 RX ADMIN — POLYETHYLENE GLYCOL 3350 17 G: 17 POWDER, FOR SOLUTION ORAL at 08:08

## 2019-08-24 RX ADMIN — Medication 250 MG: at 08:08

## 2019-08-24 NOTE — PLAN OF CARE
Problem: Adult Inpatient Plan of Care  Goal: Plan of Care Review  Outcome: Ongoing (interventions implemented as appropriate)  POC reviewed with pt. Pt only oriented to self; neuro Q4 with no changes. VSS on RA. Remains free of falls and injury. No complaints of pain or nausea. Up with stand by assist to bathroom. Tele sitter and bed alarm in use for safety. SCDs in place. Resting between care. No acute events. No distress noted. Will continue to monitor.

## 2019-08-24 NOTE — ASSESSMENT & PLAN NOTE
59F w/ PMH COPD, HTN, hypothyroidism, stage 4 R small cell lung cancer s/p 6 cycles of carbo/etoposide in remission since 06/2018 who presents to [place] ED from OSH w/ L parietal brain mass. Now s/p crani for debulking on 8/12.    -Patient neurologically stable on exam  -Thrombocytopenia: Plts 55. Transfused 2 units of plts. Do not need CTH due to pt's unchanged neurostatus.    -Pathology (8/19): Metastatic small cell carcinoma. Pt scheduled for f/u tomorrow to discuss pathology report with Dr. Erickson and family.  -Continue Depakote for seizure prevention  -Cerebral edema: Continue Dex 4 mg q 6 hours. Will DC on 3 week taper to 2 mg BID. Continue PPI while on steroids.   - Malnutrition: Albumin 2.7. Will begin boost supplement and continue jennifer supplement tid to promote wound healing.    -Psyc: No use of wrist restraints. Pt requires frequent redirection to be compliant with fall precautions. Will try to ween sitter. Continue redirection and delirium precautions. Continue Seroquel TID PRN. Haldol prn qhs. Medical and physical precautions to minimize agitation in place.   -HTN:  Maintain SBP < 160. Home dose of Norvasc has been held since SBP at goal. Will continue to monitor and restart if SBP becomes elevated.   -Hypothyroidism: Continue home dose of Synthroid  -Tobacco abuse: Continue nicotine patch  -COPD: Continue duo nebs PRN, pulse ox q 4 hours  -DVT prophylaxis: ABI's, SCD's, lovenox  -Bowel regimen: senna BID and miralax daily  -Atelectasis prevention: IS hourly while awake, PT/OT, OOB > 6 hours per day      DISPO: Medically stable for discharge. Pending nursing home acceptance.

## 2019-08-24 NOTE — PLAN OF CARE
Problem: Adult Inpatient Plan of Care  Goal: Plan of Care Review  Outcome: Ongoing (interventions implemented as appropriate)  Pt is confused but alert. Reoriented to place and time. Sat out in chair with supervision. POC reviewed with pt. Bed in low and locked position. Sitter with pt.

## 2019-08-24 NOTE — PROGRESS NOTES
[facility]  Neurosurgery  Progress Note    Subjective:     History of Present Illness: 59F w/ PMH COPD, HTN, hypothyroidism, stage 4 R small cell lung cancer s/p 6 cycles of carbo/etoposide in remission since 06/2018 who presents to [facility] ED from OSH w/ L parietal brain mass. Per EMS records patient had a 1d history of AMS and gait difficulty and was brought into the hospital by her family for evaluation. CTH @ OSH showed large L parietal brain mass with possible hemorrhagic component and she was transferred to [facility] for evaluation. MRI @ [facility] redemonstrated L nonenhancing parietal mass with minimal blood. Patient is confused and so ROS is tenuous.    Post-Op Info:  Procedure(s) (LRB):  L craniotomy for tumor (Left)   11 Days Post-Op     Interval History: pending placement    Medications:  Continuous Infusions:  Scheduled Meds:   ascorbic acid (vitamin C)  250 mg Oral BID    dexAMETHasone  4 mg Oral Q8H    divalproex  500 mg Oral Q12H    enoxaparin  40 mg Subcutaneous Daily    ferrous sulfate  325 mg Oral BID    levETIRAcetam  500 mg Oral BID    levothyroxine  75 mcg Oral Before breakfast    nicotine  1 patch Transdermal Daily    pantoprazole  40 mg Oral Daily    polyethylene glycol  17 g Oral Daily    senna-docusate 8.6-50 mg  1 tablet Oral BID     PRN Meds:sodium chloride, sodium chloride, acetaminophen, albuterol-ipratropium, haloperidol lactate, ondansetron, oxyCODONE, QUEtiapine, sodium chloride 0.9%     Review of Systems  Objective:     Weight: 35.4 kg (78 lb)  Body mass index is 14.74 kg/m².  Vital Signs (Most Recent):  Temp: 98.3 °F (36.8 °C) (08/23/19 1957)  Pulse: 88 (08/23/19 1957)  Resp: 16 (08/23/19 1957)  BP: (!) 107/57 (08/23/19 1957)  SpO2: 100 % (08/23/19 1957) Vital Signs (24h Range):  Temp:  [97.7 °F (36.5 °C)-98.7 °F (37.1 °C)] 98.3 °F (36.8 °C)  Pulse:  [] 88  Resp:  [16-18] 16  SpO2:  [95 %-100 %] 100 %  BP: ()/(57-63) 107/57                           Neurosurgery Physical Exam  General: well developed, well nourished, no distress.   Head: normocephalic  Neurologic: Alert and oriented.  Inattentiveness.   GCS: Motor: 6/Verbal: 4/Eyes: 4 GCS Total: 14  Mental Status: Awake, Alert, Oriented x 3 (year is 2016)  Language: Receptive aphasia   Speech: No dysarthria  Cranial nerves: face symmetric, tongue midline, CN II-XII grossly intact.   Eyes: pupils round, reactive to light with accomodation, EOMI. Right pupil > Left pupil. Dysconjugate gaze.  Pulmonary: normal respirations, no signs of respiratory distress  Abdomen: soft, non-distended, not tender to palpation  Skin: Skin is warm, dry and intact.  Sensory: intact to light touch throughout  Motor Strength:Moves all extremities spontaneously with good tone.  Full strength upper and lower extremities. No abnormal movements seen.      Babinski's: Negative.  Clonus: Negative.     Finger-to-nose: Unable to assess due to mental status  Pronator drift: Unable to assess due to mental status     Incision: Clean, dry, staples intact. Skin edges well approximated. No surrounding erythema or edema. No drainage or TTP.   Significant Labs:  Recent Labs   Lab 08/22/19  0340      *   K 4.9   CL 95   CO2 29   BUN 30*   CREATININE 0.8   CALCIUM 8.5*     Recent Labs   Lab 08/22/19  0340   WBC 9.19   HGB 9.2*   HCT 28.2*   PLT 55*     No results for input(s): LABPT, INR, APTT in the last 48 hours.  Microbiology Results (last 7 days)     ** No results found for the last 168 hours. **        All pertinent labs from the last 24 hours have been reviewed.    Significant Diagnostics:  I have reviewed all pertinent imaging results/findings within the past 24 hours.    Assessment/Plan:     * Brain metastasis  59F w/ PMH COPD, HTN, hypothyroidism, stage 4 R small cell lung cancer s/p 6 cycles of carbo/etoposide in remission since 06/2018 who [place] [facility] [place] w/ L parietal brain mass. Now s/p crani for debulking on  8/12.    -Patient neurologically stable on exam  -Thrombocytopenia: Plts 55. Transfused 2 units of plts. Do not need CTH due to pt's unchanged neurostatus.    -Pathology (8/19): Metastatic small cell carcinoma. Pt scheduled for f/u tomorrow to discuss pathology report with Dr. Erickson and family.  -Continue Depakote for seizure prevention  -Cerebral edema: Continue Dex 4 mg q 6 hours. Will DC on 3 week taper to 2 mg BID. Continue PPI while on steroids.   - Malnutrition: Albumin 2.7. Will begin boost supplement and continue jennifer supplement tid to promote wound healing.    -Psyc: No use of wrist restraints. Pt requires frequent redirection to be compliant with fall precautions. Will try to ween sitter. Continue redirection and delirium precautions. Continue Seroquel TID PRN. Haldol prn qhs. Medical and physical precautions to minimize agitation in place.   -HTN:  Maintain SBP < 160. Home dose of Norvasc has been held since SBP at goal. Will continue to monitor and restart if SBP becomes elevated.   -Hypothyroidism: Continue home dose of Synthroid  -Tobacco abuse: Continue nicotine patch  -COPD: Continue duo nebs PRN, pulse ox q 4 hours  -DVT prophylaxis: ABI's, SCD's, lovenox  -Bowel regimen: senna BID and miralax daily  -Atelectasis prevention: IS hourly while awake, PT/OT, OOB > 6 hours per day      DISPO: Medically stable for discharge. Pending nursing home acceptance.         Ken Araiza MD  Neurosurgery  [facility]

## 2019-08-25 LAB
ALBUMIN SERPL BCP-MCNC: 2.4 G/DL (ref 3.5–5.2)
ALP SERPL-CCNC: 71 U/L (ref 55–135)
ALT SERPL W/O P-5'-P-CCNC: 9 U/L (ref 10–44)
ANION GAP SERPL CALC-SCNC: 9 MMOL/L (ref 8–16)
AST SERPL-CCNC: 8 U/L (ref 10–40)
BASOPHILS # BLD AUTO: 0.04 K/UL (ref 0–0.2)
BASOPHILS NFR BLD: 0.3 % (ref 0–1.9)
BILIRUB SERPL-MCNC: 0.2 MG/DL (ref 0.1–1)
BUN SERPL-MCNC: 33 MG/DL (ref 6–20)
CALCIUM SERPL-MCNC: 8.2 MG/DL (ref 8.7–10.5)
CHLORIDE SERPL-SCNC: 98 MMOL/L (ref 95–110)
CO2 SERPL-SCNC: 28 MMOL/L (ref 23–29)
CREAT SERPL-MCNC: 0.8 MG/DL (ref 0.5–1.4)
DIFFERENTIAL METHOD: ABNORMAL
EOSINOPHIL # BLD AUTO: 0 K/UL (ref 0–0.5)
EOSINOPHIL NFR BLD: 0 % (ref 0–8)
ERYTHROCYTE [DISTWIDTH] IN BLOOD BY AUTOMATED COUNT: 15.3 % (ref 11.5–14.5)
EST. GFR  (AFRICAN AMERICAN): >60 ML/MIN/1.73 M^2
EST. GFR  (NON AFRICAN AMERICAN): >60 ML/MIN/1.73 M^2
GLUCOSE SERPL-MCNC: 83 MG/DL (ref 70–110)
HCT VFR BLD AUTO: 28.1 % (ref 37–48.5)
HGB BLD-MCNC: 8.7 G/DL (ref 12–16)
IMM GRANULOCYTES # BLD AUTO: 0.54 K/UL (ref 0–0.04)
IMM GRANULOCYTES NFR BLD AUTO: 4.2 % (ref 0–0.5)
LYMPHOCYTES # BLD AUTO: 0.3 K/UL (ref 1–4.8)
LYMPHOCYTES NFR BLD: 2.5 % (ref 18–48)
MCH RBC QN AUTO: 32.5 PG (ref 27–31)
MCHC RBC AUTO-ENTMCNC: 31 G/DL (ref 32–36)
MCV RBC AUTO: 105 FL (ref 82–98)
MONOCYTES # BLD AUTO: 0.9 K/UL (ref 0.3–1)
MONOCYTES NFR BLD: 6.7 % (ref 4–15)
NEUTROPHILS # BLD AUTO: 11.2 K/UL (ref 1.8–7.7)
NEUTROPHILS NFR BLD: 86.3 % (ref 38–73)
NRBC BLD-RTO: 0 /100 WBC
PLATELET # BLD AUTO: 115 K/UL (ref 150–350)
PMV BLD AUTO: 10.5 FL (ref 9.2–12.9)
POCT GLUCOSE: 66 MG/DL (ref 70–110)
POCT GLUCOSE: 69 MG/DL (ref 70–110)
POCT GLUCOSE: 96 MG/DL (ref 70–110)
POTASSIUM SERPL-SCNC: 5 MMOL/L (ref 3.5–5.1)
PROT SERPL-MCNC: 4.7 G/DL (ref 6–8.4)
RBC # BLD AUTO: 2.68 M/UL (ref 4–5.4)
SODIUM SERPL-SCNC: 135 MMOL/L (ref 136–145)
WBC # BLD AUTO: 13 K/UL (ref 3.9–12.7)

## 2019-08-25 PROCEDURE — 25000003 PHARM REV CODE 250: Performed by: STUDENT IN AN ORGANIZED HEALTH CARE EDUCATION/TRAINING PROGRAM

## 2019-08-25 PROCEDURE — 63600175 PHARM REV CODE 636 W HCPCS: Performed by: PHYSICIAN ASSISTANT

## 2019-08-25 PROCEDURE — 25000003 PHARM REV CODE 250: Performed by: PHYSICIAN ASSISTANT

## 2019-08-25 PROCEDURE — 63600175 PHARM REV CODE 636 W HCPCS: Performed by: NEUROLOGICAL SURGERY

## 2019-08-25 PROCEDURE — 85025 COMPLETE CBC W/AUTO DIFF WBC: CPT

## 2019-08-25 PROCEDURE — 63600175 PHARM REV CODE 636 W HCPCS: Performed by: STUDENT IN AN ORGANIZED HEALTH CARE EDUCATION/TRAINING PROGRAM

## 2019-08-25 PROCEDURE — 20600001 HC STEP DOWN PRIVATE ROOM

## 2019-08-25 PROCEDURE — S4991 NICOTINE PATCH NONLEGEND: HCPCS | Performed by: PHYSICIAN ASSISTANT

## 2019-08-25 PROCEDURE — 36415 COLL VENOUS BLD VENIPUNCTURE: CPT

## 2019-08-25 PROCEDURE — 80053 COMPREHEN METABOLIC PANEL: CPT

## 2019-08-25 RX ADMIN — LEVOTHYROXINE SODIUM 75 MCG: 75 TABLET ORAL at 06:08

## 2019-08-25 RX ADMIN — POLYETHYLENE GLYCOL 3350 17 G: 17 POWDER, FOR SOLUTION ORAL at 08:08

## 2019-08-25 RX ADMIN — FERROUS SULFATE TAB EC 325 MG (65 MG FE EQUIVALENT) 325 MG: 325 (65 FE) TABLET DELAYED RESPONSE at 09:08

## 2019-08-25 RX ADMIN — DEXAMETHASONE 4 MG: 4 TABLET ORAL at 06:08

## 2019-08-25 RX ADMIN — SENNOSIDES,DOCUSATE SODIUM 1 TABLET: 8.6; 5 TABLET, FILM COATED ORAL at 08:08

## 2019-08-25 RX ADMIN — DEXAMETHASONE 4 MG: 4 TABLET ORAL at 09:08

## 2019-08-25 RX ADMIN — NICOTINE 1 PATCH: 7 PATCH, EXTENDED RELEASE TRANSDERMAL at 08:08

## 2019-08-25 RX ADMIN — Medication 250 MG: at 08:08

## 2019-08-25 RX ADMIN — LEVETIRACETAM 500 MG: 500 TABLET, FILM COATED ORAL at 08:08

## 2019-08-25 RX ADMIN — DIVALPROEX SODIUM 500 MG: 250 TABLET, DELAYED RELEASE ORAL at 08:08

## 2019-08-25 RX ADMIN — SODIUM CHLORIDE 1000 ML: 0.9 INJECTION, SOLUTION INTRAVENOUS at 01:08

## 2019-08-25 RX ADMIN — Medication 250 MG: at 09:08

## 2019-08-25 RX ADMIN — DEXAMETHASONE 4 MG: 4 TABLET ORAL at 01:08

## 2019-08-25 RX ADMIN — DIVALPROEX SODIUM 500 MG: 250 TABLET, DELAYED RELEASE ORAL at 09:08

## 2019-08-25 RX ADMIN — SENNOSIDES,DOCUSATE SODIUM 1 TABLET: 8.6; 5 TABLET, FILM COATED ORAL at 09:08

## 2019-08-25 RX ADMIN — PANTOPRAZOLE SODIUM 40 MG: 40 TABLET, DELAYED RELEASE ORAL at 08:08

## 2019-08-25 RX ADMIN — FERROUS SULFATE TAB EC 325 MG (65 MG FE EQUIVALENT) 325 MG: 325 (65 FE) TABLET DELAYED RESPONSE at 08:08

## 2019-08-25 RX ADMIN — ENOXAPARIN SODIUM 40 MG: 100 INJECTION SUBCUTANEOUS at 05:08

## 2019-08-25 RX ADMIN — LEVETIRACETAM 500 MG: 500 TABLET, FILM COATED ORAL at 09:08

## 2019-08-25 NOTE — PLAN OF CARE
Problem: Adult Inpatient Plan of Care  Goal: Plan of Care Review  Outcome: Ongoing (interventions implemented as appropriate)  POC reviewed with pt at 1000. Pt verbalized understanding but unsure of education due to confusion. Questions and concerns addressed. No acute events today. Encouraging more intake during meals. Pt progressing toward goals. Will continue to monitor. See flowsheets for full assessment and VS info.

## 2019-08-25 NOTE — PROGRESS NOTES
[facility]  Neurosurgery  Progress Note    Subjective:     History of Present Illness: 59F w/ PMH COPD, HTN, hypothyroidism, stage 4 R small cell lung cancer s/p 6 cycles of carbo/etoposide in remission since 06/2018 who presents to [facility] ED from OSH w/ L parietal brain mass. Per EMS records patient had a 1d history of AMS and gait difficulty and was brought into the hospital by her family for evaluation. CTH @ OSH showed large L parietal brain mass with possible hemorrhagic component and she was transferred to [facility] for evaluation. MRI @ [facility] redemonstrated L nonenhancing parietal mass with minimal blood. Patient is confused and so ROS is tenuous.    Post-Op Info:  Procedure(s) (LRB):  L craniotomy for tumor (Left)   13 Days Post-Op     Interval History: pending placement    Medications:  Continuous Infusions:  Scheduled Meds:   ascorbic acid (vitamin C)  250 mg Oral BID    dexAMETHasone  4 mg Oral Q8H    divalproex  500 mg Oral Q12H    enoxaparin  40 mg Subcutaneous Daily    ferrous sulfate  325 mg Oral BID    levETIRAcetam  500 mg Oral BID    levothyroxine  75 mcg Oral Before breakfast    nicotine  1 patch Transdermal Daily    pantoprazole  40 mg Oral Daily    polyethylene glycol  17 g Oral Daily    senna-docusate 8.6-50 mg  1 tablet Oral BID     PRN Meds:sodium chloride, sodium chloride, acetaminophen, albuterol-ipratropium, haloperidol lactate, ondansetron, oxyCODONE, QUEtiapine, sodium chloride 0.9%     Review of Systems  Objective:     Weight: 35.4 kg (78 lb)  Body mass index is 14.74 kg/m².  Vital Signs (Most Recent):  Temp: 97.9 °F (36.6 °C) (08/24/19 2352)  Pulse: 71 (08/24/19 2352)  Resp: 18 (08/24/19 2352)  BP: (!) 80/55 (08/25/19 0027)  SpO2: 97 % (08/24/19 2352) Vital Signs (24h Range):  Temp:  [96.7 °F (35.9 °C)-98.7 °F (37.1 °C)] 97.9 °F (36.6 °C)  Pulse:  [] 71  Resp:  [16-18] 18  SpO2:  [95 %-99 %] 97 %  BP: ()/(55-75) 80/55                           Neurosurgery Physical Exam  GCS [id number] AOx1, Aphasia  PERRL, EOMI, Face Symmetric, Tongue Midline  RUE: FC LUE FC  RLE: FC LLE: FC  SILT  No pronator drift    Significant Labs:  No results for input(s): GLU, NA, K, CL, CO2, BUN, CREATININE, CALCIUM, MG in the last 48 hours.  No results for input(s): WBC, HGB, HCT, PLT in the last 48 hours.  No results for input(s): LABPT, INR, APTT in the last 48 hours.  Microbiology Results (last 7 days)     ** No results found for the last 168 hours. **        All pertinent labs from the last 24 hours have been reviewed.    Significant Diagnostics:  I have reviewed all pertinent imaging results/findings within the past 24 hours.    Assessment/Plan:     * Brain metastasis  59F w/ PMH COPD, HTN, hypothyroidism, stage 4 R small cell lung cancer s/p 6 cycles of carbo/etoposide in remission since 06/2018 who presents to [facility] ED from OSH w/ L parietal brain mass. Now s/p crani for debulking on 8/12.    -Patient neurologically stable on exam  -Thrombocytopenia: Plts 55. Transfused 2 units of plts. Do not need CTH due to pt's unchanged neurostatus.    -Pathology (8/19): Metastatic small cell carcinoma. Pt scheduled for f/u tomorrow to discuss pathology report with Dr. Erickson and family.  -Continue Depakote for seizure prevention  -Cerebral edema: Continue Dex 4 mg q 6 hours. Will DC on 3 week taper to 2 mg BID. Continue PPI while on steroids.   - Malnutrition: Albumin 2.7. Will begin boost supplement and continue jennifer supplement tid to promote wound healing.    -Psyc: No use of wrist restraints. Pt requires frequent redirection to be compliant with fall precautions. Will try to ween sitter. Continue redirection and delirium precautions. Continue Seroquel TID PRN. Haldol prn qhs. Medical and physical precautions to minimize agitation in place.   -HTN:  Maintain SBP < 160. Home dose of Norvasc has been held since SBP at goal. Will continue to monitor and restart if SBP becomes elevated.    -Hypothyroidism: Continue home dose of Synthroid  -Tobacco abuse: Continue nicotine patch  -COPD: Continue duo nebs PRN, pulse ox q 4 hours  -DVT prophylaxis: ABI's, SCD's, lovenox  -Bowel regimen: senna BID and miralax daily  -Atelectasis prevention: IS hourly while awake, PT/OT, OOB > 6 hours per day    DISPO: Medically stable for discharge. Pending nursing home acceptance.         Ken Araiza MD  Neurosurgery  [facility]

## 2019-08-25 NOTE — ASSESSMENT & PLAN NOTE
59F w/ PMH COPD, HTN, hypothyroidism, stage 4 R small cell lung cancer s/p 6 cycles of carbo/etoposide in remission since 06/2018 who presents to [facility] [place] ED from OSH w/ L parietal brain mass. Now s/p crani for debulking on 8/12.    -Patient neurologically stable on exam  -Thrombocytopenia: Plts 55. Transfused 2 units of plts. Do not need CTH due to pt's unchanged neurostatus.    -Pathology (8/19): Metastatic small cell carcinoma. Pt scheduled for f/u tomorrow to discuss pathology report with Dr. Erickson and family.  -Continue Depakote for seizure prevention  -Cerebral edema: Continue Dex 4 mg q 6 hours. Will DC on 3 week taper to 2 mg BID. Continue PPI while on steroids.   - Malnutrition: Albumin 2.7. Will begin boost supplement and continue jennifer supplement tid to promote wound healing.    -Psyc: No use of wrist restraints. Pt requires frequent redirection to be compliant with fall precautions. Will try to ween sitter. Continue redirection and delirium precautions. Continue Seroquel TID PRN. Haldol prn qhs. Medical and physical precautions to minimize agitation in place.   -HTN:  Maintain SBP < 160. Home dose of Norvasc has been held since SBP at goal. Will continue to monitor and restart if SBP becomes elevated.   -Hypothyroidism: Continue home dose of Synthroid  -Tobacco abuse: Continue nicotine patch  -COPD: Continue duo nebs PRN, pulse ox q 4 hours  -DVT prophylaxis: ABI's, SCD's, lovenox  -Bowel regimen: senna BID and miralax daily  -Atelectasis prevention: IS hourly while awake, PT/OT, OOB > 6 hours per day    DISPO: Medically stable for discharge. Pending nursing home acceptance.

## 2019-08-25 NOTE — SUBJECTIVE & OBJECTIVE
Interval History: pending placement    Medications:  Continuous Infusions:  Scheduled Meds:   ascorbic acid (vitamin C)  250 mg Oral BID    dexAMETHasone  4 mg Oral Q8H    divalproex  500 mg Oral Q12H    enoxaparin  40 mg Subcutaneous Daily    ferrous sulfate  325 mg Oral BID    levETIRAcetam  500 mg Oral BID    levothyroxine  75 mcg Oral Before breakfast    nicotine  1 patch Transdermal Daily    pantoprazole  40 mg Oral Daily    polyethylene glycol  17 g Oral Daily    senna-docusate 8.6-50 mg  1 tablet Oral BID     PRN Meds:sodium chloride, sodium chloride, acetaminophen, albuterol-ipratropium, haloperidol lactate, ondansetron, oxyCODONE, QUEtiapine, sodium chloride 0.9%     Review of Systems  Objective:     Weight: 35.4 kg (78 lb)  Body mass index is 14.74 kg/m².  Vital Signs (Most Recent):  Temp: 97.9 °F (36.6 °C) (08/24/19 2352)  Pulse: 71 (08/24/19 2352)  Resp: 18 (08/24/19 2352)  BP: (!) 80/55 (08/25/19 0027)  SpO2: 97 % (08/24/19 2352) Vital Signs (24h Range):  Temp:  [96.7 °F (35.9 °C)-98.7 °F (37.1 °C)] 97.9 °F (36.6 °C)  Pulse:  [] 71  Resp:  [16-18] 18  SpO2:  [95 %-99 %] 97 %  BP: ()/(55-75) 80/55                          Neurosurgery Physical Exam  GCS [id number] AOx1, Aphasia  PERRL, EOMI, Face Symmetric, Tongue Midline  RUE: FC LUE FC  RLE: FC LLE: FC  SILT  No pronator drift    Significant Labs:  No results for input(s): GLU, NA, K, CL, CO2, BUN, CREATININE, CALCIUM, MG in the last 48 hours.  No results for input(s): WBC, HGB, HCT, PLT in the last 48 hours.  No results for input(s): LABPT, INR, APTT in the last 48 hours.  Microbiology Results (last 7 days)     ** No results found for the last 168 hours. **        All pertinent labs from the last 24 hours have been reviewed.    Significant Diagnostics:  I have reviewed all pertinent imaging results/findings within the past 24 hours.

## 2019-08-25 NOTE — PROGRESS NOTES
[facility]  Neurosurgery  Progress Note    Subjective:     History of Present Illness: 59F w/ PMH COPD, HTN, hypothyroidism, stage 4 R small cell lung cancer s/p 6 cycles of carbo/etoposide in remission since 06/2018 who [place] [facility] w/ L parietal brain mass. Per EMS records patient had a 1d history of AMS and gait difficulty and was brought into the hospital by her family for evaluation. CTH @ OSH showed large L parietal brain mass with possible hemorrhagic component and she was [place] [facility] [place]. [place] [facility] redemonstrated L nonenhancing parietal mass with minimal blood. Patient is confused and so ROS is tenuous.    Post-Op Info:  Procedure(s) (LRB):  L craniotomy for tumor (Left)   13 Days Post-Op     Interval History: Pending placement    Medications:  Continuous Infusions:  Scheduled Meds:   ascorbic acid (vitamin C)  250 mg Oral BID    dexAMETHasone  4 mg Oral Q8H    divalproex  500 mg Oral Q12H    enoxaparin  40 mg Subcutaneous Daily    ferrous sulfate  325 mg Oral BID    levETIRAcetam  500 mg Oral BID    levothyroxine  75 mcg Oral Before breakfast    nicotine  1 patch Transdermal Daily    pantoprazole  40 mg Oral Daily    polyethylene glycol  17 g Oral Daily    senna-docusate 8.6-50 mg  1 tablet Oral BID     PRN Meds:sodium chloride, sodium chloride, acetaminophen, albuterol-ipratropium, haloperidol lactate, ondansetron, oxyCODONE, QUEtiapine, sodium chloride 0.9%     Review of Systems  Objective:     Weight: 35.4 kg (78 lb)  Body mass index is 14.74 kg/m².  Vital Signs (Most Recent):  Temp: 97.9 °F (36.6 °C) (08/24/19 2352)  Pulse: 71 (08/24/19 2352)  Resp: 18 (08/24/19 2352)  BP: (!) 80/55 (08/25/19 0027)  SpO2: 97 % (08/24/19 2352) Vital Signs (24h Range):  Temp:  [96.7 °F (35.9 °C)-98.7 °F (37.1 °C)] 97.9 °F (36.6 °C)  Pulse:  [] 71  Resp:  [16-18] 18  SpO2:  [95 %-99 %] 97 %  BP: ()/(55-75) 80/55                           Neurosurgery Physical Exam  GCS E4V3M6 AOx1, Aphasia  PERRL, EOMI, Face Symmetric, Tongue Midline  RUE: FC LUE FC  RLE: FC LLE: FC  SILT  No pronator drift  Significant Labs:  No results for input(s): GLU, NA, K, CL, CO2, BUN, CREATININE, CALCIUM, MG in the last 48 hours.  No results for input(s): WBC, HGB, HCT, PLT in the last 48 hours.  No results for input(s): LABPT, INR, APTT in the last 48 hours.  Microbiology Results (last 7 days)     ** No results found for the last 168 hours. **        All pertinent labs from the last 24 hours have been reviewed.    Significant Diagnostics:  I have reviewed all pertinent imaging results/findings within the past 24 hours.    Assessment/Plan:     * Brain metastasis  59F w/ PMH COPD, HTN, hypothyroidism, stage 4 R small cell lung cancer s/p 6 cycles of carbo/etoposide in remission since 06/2018 who presents to [facility] [place] ED from OSH w/ L parietal brain mass. Now s/p crani for debulking on 8/12.    -Patient neurologically stable on exam  -Thrombocytopenia: Plts 55. Transfused 2 units of plts. Do not need CTH due to pt's unchanged neurostatus.    -Pathology (8/19): Metastatic small cell carcinoma. Pt scheduled for f/u tomorrow to discuss pathology report with Dr. Erickson and family.  -Continue Depakote for seizure prevention  -Cerebral edema: Continue Dex 4 mg q 6 hours. Will DC on 3 week taper to 2 mg BID. Continue PPI while on steroids.   - Malnutrition: Albumin 2.7. Will begin boost supplement and continue jennifer supplement tid to promote wound healing.    -Psyc: No use of wrist restraints. Pt requires frequent redirection to be compliant with fall precautions. Will try to ween sitter. Continue redirection and delirium precautions. Continue Seroquel TID PRN. Haldol prn qhs. Medical and physical precautions to minimize agitation in place.   -HTN:  Maintain SBP < 160. Home dose of Norvasc has been held since SBP at goal. Will continue to monitor and restart if SBP becomes elevated.    -Hypothyroidism: Continue home dose of Synthroid  -Tobacco abuse: Continue nicotine patch  -COPD: Continue duo nebs PRN, pulse ox q 4 hours  -DVT prophylaxis: ABI's, SCD's, lovenox  -Bowel regimen: senna BID and miralax daily  -Atelectasis prevention: IS hourly while awake, PT/OT, OOB > 6 hours per day    DISPO: Medically stable for discharge. Pending nursing home acceptance.         Ken Araiza MD  Neurosurgery  [facility]

## 2019-08-25 NOTE — PLAN OF CARE
Problem: Adult Inpatient Plan of Care  Goal: Plan of Care Review  Outcome: Ongoing (interventions implemented as appropriate)  Plan of care discussed with pt. No evidence of learning. Pt disoriented x4. No distress or pain observed. Staples intact to incision site without any drainage observed. Avasys camera in place. Fall precautions in place. Frequent monitoring.

## 2019-08-25 NOTE — ASSESSMENT & PLAN NOTE
59F w/ PMH COPD, HTN, hypothyroidism, stage 4 R small cell lung cancer s/p 6 cycles of carbo/etoposide in remission since 06/2018 who [place] [facility] w/ L parietal brain mass. Now s/p crani for debulking on 8/12.    -Patient neurologically stable on exam  -Thrombocytopenia: Plts 55. Transfused 2 units of plts. Do not need CTH due to pt's unchanged neurostatus.    -Pathology (8/19): Metastatic small cell carcinoma. Pt scheduled for f/u tomorrow to discuss pathology report with Dr. Erickson and family.  -Continue Depakote for seizure prevention  -Cerebral edema: Continue Dex 4 mg q 6 hours. Will DC on 3 week taper to 2 mg BID. Continue PPI while on steroids.   - Malnutrition: Albumin 2.7. Will begin boost supplement and continue jennifer supplement tid to promote wound healing.    -Psyc: No use of wrist restraints. Pt requires frequent redirection to be compliant with fall precautions. Will try to ween sitter. Continue redirection and delirium precautions. Continue Seroquel TID PRN. Haldol prn qhs. Medical and physical precautions to minimize agitation in place.   -HTN:  Maintain SBP < 160. Home dose of Norvasc has been held since SBP at goal. Will continue to monitor and restart if SBP becomes elevated.   -Hypothyroidism: Continue home dose of Synthroid  -Tobacco abuse: Continue nicotine patch  -COPD: Continue duo nebs PRN, pulse ox q 4 hours  -DVT prophylaxis: ABI's, SCD's, lovenox  -Bowel regimen: senna BID and miralax daily  -Atelectasis prevention: IS hourly while awake, PT/OT, OOB > 6 hours per day    DISPO: Medically stable for discharge. Pending nursing home acceptance.

## 2019-08-26 LAB
POCT GLUCOSE: 104 MG/DL (ref 70–110)
POCT GLUCOSE: 109 MG/DL (ref 70–110)
POCT GLUCOSE: 137 MG/DL (ref 70–110)
POCT GLUCOSE: 66 MG/DL (ref 70–110)
POCT GLUCOSE: 84 MG/DL (ref 70–110)
POCT GLUCOSE: 88 MG/DL (ref 70–110)

## 2019-08-26 PROCEDURE — 97535 SELF CARE MNGMENT TRAINING: CPT

## 2019-08-26 PROCEDURE — 20600001 HC STEP DOWN PRIVATE ROOM

## 2019-08-26 PROCEDURE — 25000003 PHARM REV CODE 250: Performed by: STUDENT IN AN ORGANIZED HEALTH CARE EDUCATION/TRAINING PROGRAM

## 2019-08-26 PROCEDURE — 25000003 PHARM REV CODE 250: Performed by: PHYSICIAN ASSISTANT

## 2019-08-26 PROCEDURE — 97116 GAIT TRAINING THERAPY: CPT

## 2019-08-26 PROCEDURE — 99024 POSTOP FOLLOW-UP VISIT: CPT | Mod: ,,, | Performed by: PHYSICIAN ASSISTANT

## 2019-08-26 PROCEDURE — 63600175 PHARM REV CODE 636 W HCPCS: Performed by: PHYSICIAN ASSISTANT

## 2019-08-26 PROCEDURE — 63600175 PHARM REV CODE 636 W HCPCS: Performed by: NEUROLOGICAL SURGERY

## 2019-08-26 PROCEDURE — S4991 NICOTINE PATCH NONLEGEND: HCPCS | Performed by: PHYSICIAN ASSISTANT

## 2019-08-26 PROCEDURE — 97530 THERAPEUTIC ACTIVITIES: CPT

## 2019-08-26 PROCEDURE — 99024 PR POST-OP FOLLOW-UP VISIT: ICD-10-PCS | Mod: ,,, | Performed by: PHYSICIAN ASSISTANT

## 2019-08-26 RX ORDER — DEXAMETHASONE 4 MG/1
4 TABLET ORAL EVERY 12 HOURS
Status: DISCONTINUED | OUTPATIENT
Start: 2019-08-26 | End: 2019-08-28

## 2019-08-26 RX ORDER — DEXAMETHASONE 1 MG/1
3 TABLET ORAL EVERY 12 HOURS
Status: DISCONTINUED | OUTPATIENT
Start: 2019-09-02 | End: 2019-08-28

## 2019-08-26 RX ORDER — DEXAMETHASONE 1 MG/1
2 TABLET ORAL EVERY 12 HOURS
Status: DISCONTINUED | OUTPATIENT
Start: 2019-09-09 | End: 2019-08-28

## 2019-08-26 RX ORDER — DEXAMETHASONE 4 MG/1
4 TABLET ORAL EVERY 12 HOURS
Status: DISCONTINUED | OUTPATIENT
Start: 2019-08-26 | End: 2019-08-26

## 2019-08-26 RX ADMIN — SENNOSIDES,DOCUSATE SODIUM 1 TABLET: 8.6; 5 TABLET, FILM COATED ORAL at 09:08

## 2019-08-26 RX ADMIN — LEVETIRACETAM 500 MG: 500 TABLET, FILM COATED ORAL at 09:08

## 2019-08-26 RX ADMIN — Medication 250 MG: at 09:08

## 2019-08-26 RX ADMIN — DEXAMETHASONE 4 MG: 4 TABLET ORAL at 09:08

## 2019-08-26 RX ADMIN — FERROUS SULFATE TAB EC 325 MG (65 MG FE EQUIVALENT) 325 MG: 325 (65 FE) TABLET DELAYED RESPONSE at 09:08

## 2019-08-26 RX ADMIN — PANTOPRAZOLE SODIUM 40 MG: 40 TABLET, DELAYED RELEASE ORAL at 09:08

## 2019-08-26 RX ADMIN — DEXAMETHASONE 4 MG: 4 TABLET ORAL at 06:08

## 2019-08-26 RX ADMIN — DEXAMETHASONE 4 MG: 4 TABLET ORAL at 02:08

## 2019-08-26 RX ADMIN — NICOTINE 1 PATCH: 7 PATCH, EXTENDED RELEASE TRANSDERMAL at 09:08

## 2019-08-26 RX ADMIN — DIVALPROEX SODIUM 500 MG: 250 TABLET, DELAYED RELEASE ORAL at 09:08

## 2019-08-26 RX ADMIN — ENOXAPARIN SODIUM 40 MG: 100 INJECTION SUBCUTANEOUS at 05:08

## 2019-08-26 RX ADMIN — POLYETHYLENE GLYCOL 3350 17 G: 17 POWDER, FOR SOLUTION ORAL at 09:08

## 2019-08-26 RX ADMIN — LEVOTHYROXINE SODIUM 75 MCG: 75 TABLET ORAL at 06:08

## 2019-08-26 NOTE — PROGRESS NOTES
[facility]  Neurosurgery  Progress Note    Subjective:     History of Present Illness: 59F w/ PMH COPD, HTN, hypothyroidism, stage 4 R small cell lung cancer s/p 6 cycles of carbo/etoposide in remission since 06/2018 who [place] to [facility] [place] from OSH w/ L parietal brain mass. Per EMS records patient had a 1d history of AMS and gait difficulty and was brought into the hospital by her family for evaluation. CTH @ OSH showed large L parietal brain mass with possible hemorrhagic component and she was transferred to [facility] for evaluation. MRI @ [facility] [place] redemonstrated L nonenhancing parietal mass with minimal blood. Patient is confused and so ROS is tenuous.    Post-Op Info:  Procedure(s) (LRB):  L craniotomy for tumor (Left)   14 Days Post-Op     Interval History: NAEON. Patient resting comfortably in bed, remains free of restraints. Tele-sitter present, no family at bedside. Patient remains disoriented but calm. Denies headache, changes in vision, dizziness, weakness/numbness, and nausea/vomiting. Tolerating diet, voiding appropriately.    Medications:  Continuous Infusions:  Scheduled Meds:   ascorbic acid (vitamin C)  250 mg Oral BID    dexAMETHasone  4 mg Oral Q8H    divalproex  500 mg Oral Q12H    enoxaparin  40 mg Subcutaneous Daily    ferrous sulfate  325 mg Oral BID    levETIRAcetam  500 mg Oral BID    levothyroxine  75 mcg Oral Before breakfast    nicotine  1 patch Transdermal Daily    pantoprazole  40 mg Oral Daily    polyethylene glycol  17 g Oral Daily    senna-docusate 8.6-50 mg  1 tablet Oral BID     PRN Meds:sodium chloride, sodium chloride, acetaminophen, albuterol-ipratropium, haloperidol lactate, ondansetron, oxyCODONE, QUEtiapine, sodium chloride 0.9%     Review of Systems  Objective:     Weight: 35.4 kg (78 lb)  Body mass index is 14.74 kg/m².  Vital Signs (Most Recent):  Temp: 97.3 °F (36.3 °C) (08/26/19 1134)  Pulse: 74 (08/26/19 1134)  Resp: 16 (08/26/19 0831)  BP: 96/62  (08/26/19 1134)  SpO2: 98 % (08/26/19 1134) Vital Signs (24h Range):  Temp:  [97.2 °F (36.2 °C)-98.3 °F (36.8 °C)] 97.3 °F (36.3 °C)  Pulse:  [65-86] 74  Resp:  [16-18] 16  SpO2:  [96 %-100 %] 98 %  BP: ()/(58-82) 96/62     Date 08/26/19 0700 - 08/27/19 0659   Shift 0126-0464 4997-8211 8965-6624 24 Hour Total   INTAKE   P.O. 240   240   Shift Total(mL/kg) 240(6.8)   240(6.8)   OUTPUT   Shift Total(mL/kg)       Weight (kg) 35.4 35.4 35.4 35.4                        Neurosurgery Physical Exam    General: well developed, well nourished, no distress.   Head: normocephalic  Neurologic: Alert and oriented. Somewhat inattentive.  GCS: Motor: 6/Verbal: 4/Eyes: 4 GCS Total: 14  Mental Status: Awake, Alert, Oriented x 2 (birthday when asked year, location [facility])  Language: Receptive aphasia  Speech: No dysarthria  Cranial nerves: face symmetric, tongue midline, CN II-XII grossly intact.   Eyes: pupils equal, round, reactive to light with accomodation, EOMI.   Pulmonary: normal respirations, no signs of respiratory distress  Abdomen: soft, non-distended, not tender to palpation  Skin: Skin is warm, dry and intact.  Sensory: intact to light touch throughout    Motor Strength: Moves all extremities spontaneously with good tone.  Full strength upper and lower extremities. No abnormal movements seen.     Babinski's: Negative.  Clonus: Negative.     Finger-to-nose: intact bilaterally   Pronator drift: absent bilaterally    Incision: Clean, dry, staples intact. Skin edges well approximated. No surrounding erythema or edema. No drainage or TTP.       Significant Labs:  Recent Labs   Lab 08/25/19 0419   GLU 83   *   K 5.0   CL 98   CO2 28   BUN 33*   CREATININE 0.8   CALCIUM 8.2*     Recent Labs   Lab 08/25/19 0419   WBC 13.00*   HGB 8.7*   HCT 28.1*   *     No results for input(s): LABPT, INR, APTT in the last 48 hours.  Microbiology Results (last 7 days)     ** No results found for the last 168  hours. **        All pertinent labs from the last 24 hours have been reviewed.    Significant Diagnostics:  I have reviewed and interpreted all pertinent imaging results/findings within the past 24 hours.    Assessment/Plan:     * Brain metastasis  59F w/ PMH COPD, HTN, hypothyroidism, stage 4 R small cell lung cancer s/p 6 cycles of carbo/etoposide in remission since 06/2018 who [place] [facility] [place] w/ L parietal brain mass. Now s/p crani for debulking on 8/12.    -Patient neurologically stable on exam  -Continue Depakote 500mg BID and Keppra 500mg BID for seizure prevention  -Neuro checks q4h  -Thrombocytopenia: PLT count stable. CTM.   -Pathology (8/19): Metastatic small cell carcinoma  -Cerebral edema: Continue 3 week Dex taper to 2 mg BID - decreased to 4mg q12h. Continue PPI while on steroids.   -Malnutrition: Albumin 2.4. Continue boost and jennifer supplements TID to promote wound healing.    -Psyc: Agitation improved. No use of wrist restraints. Pt requires frequent redirection to be compliant with fall precautions. Weaned to tele-sitter. Continue redirection and delirium precautions. Continue Seroquel TID PRN. Haldol prn qhs. Medical and physical precautions to minimize agitation in place.   -HTN:  Maintain SBP < 160. Home dose of Norvasc has been held since SBP at goal. Will continue to monitor and restart if SBP becomes elevated.   -Hypothyroidism: Continue home dose of Synthroid  -Tobacco abuse: Continue nicotine patch  -COPD: Continue duo nebs PRN, pulse ox q 4 hours  -DVT prophylaxis: ABI's, SCD's, lovenox  -Bowel regimen: senna BID and miralax daily  -Atelectasis prevention: IS hourly while awake, PT/OT, OOB > 6 hours per day    DISPO: Medically stable for discharge. Pending nursing home acceptance.         JEANMARIE CheneyC  Neurosurgery  [facility]

## 2019-08-26 NOTE — PLAN OF CARE
08/26/19 1742   Discharge Reassessment   Assessment Type Discharge Planning Reassessment   Provided patient/caregiver education on the expected discharge date and the discharge plan No   Do you have any problems affording any of your prescribed medications? No   Discharge Plan A New Nursing Home placement - [facility]iary [facility] facility   Discharge Plan B Home with family   DME Needed Upon Discharge  none   Patient choice form signed by patient/caregiver N/A   Anticipated Discharge Disposition [facility] care [facility]   Can the patient answer the patient profile reliably? No, cognitively impaired   Describe the patient's ability to walk at the present time. Major restrictions/daily assistance from another person

## 2019-08-26 NOTE — PT/OT/SLP PROGRESS
Physical Therapy Treatment    Patient Name:  Carmen Leyva   MRN:  [id number]    Recommendations:     Discharge Recommendations:  other (see comments)(NH w/ 24 hr supervision)   Discharge Equipment Recommendations: shower chair   Barriers to discharge: None    Assessment:     Carmen Leyva is a 59 y.o. female admitted with a medical diagnosis of Brain metastasis.  She presents with the following impairments/functional limitations:  weakness, impaired functional mobilty, impaired balance, impaired cognition, impaired self care skills, gait instability, decreased safety awareness. Pt demonstrated more difficulty with ambulation today, declining to amb. only 150 ft w/ CGA, as compared to 400 ft in previous session. Pt demonstrated significant path deviation to the Rt during amb. today as well as increased trunk sway, which further contributed to pt's instability. Due to this, the rest of the session was performed in the pt's room. Dynamic balance was trained using sidestepping to the Lt/Rt to address anticipatory postural control. Pt did not demonstrate any LOB throughout the 2 trials of sidestepping. Static balance with vision occluded was attempted but unable to be performed 2/2 pt being unable to follow command to close eyes. Due to pt's difficulty following commands, PT switched session focus to more automatic tasks such as self-care. Pt tolerated standing self care activity well. Pt demo'ed good anticipatory control while brushing teeth and was able to use ankle strategy to adjust to self perturbations. However, pt did demonstrate ideational apraxia and was unable to correctly utilize a toothbrush. Pt still appropriate for skilled PT services.    Rehab Prognosis: Good; patient would benefit from acute skilled PT services to address these deficits and reach maximum level of function.    Recent Surgery: Procedure(s) (LRB):  L craniotomy for tumor (Left) 14 Days Post-Op    Plan:     During this hospitalization,  "patient to be seen 3 x/week to address the identified rehab impairments via gait training, therapeutic activities, therapeutic exercises, neuromuscular re-education and progress toward the following goals:    · Plan of Care Expires:  09/13/19    Subjective     Chief Complaint: "I'm fine"  Patient/Family Comments/goals: safely d/c to [facility]  Pain/Comfort:  · Pain Rating 1: 0/10  · Pain Rating Post-Intervention 1: 0/10      Objective:     Communicated with RN prior to session.  Patient found HOB elevated with bed alarm, telemetry upon PT entry to room.     General Precautions: Standard, fall, vision impaired, hearing impaired   Orthopedic Precautions:N/A   Braces: N/A     Functional Mobility:  · Bed Mobility:     · Rolling Left:  independence  · Supine to Sit: independence  · Transfers:     · Sit to Stand:  supervision with no AD  · Gait: 150 ft w/ CGA and no AD  · Balance:   · attempted static standing balance in romberg EO/EC  · Pt w/ LOB in romberg EO but pt could not follow commands to close eyes in romberg stance  · Toe tapping and marching in standing attempted but not completed 2/2 pt being unable to follow commands  · Gait:   · 150 ft w/ CGA and no AD  · Pt w/ veering to the Rt throughout ambulation as well as increased trunk sway  · Pt demonstrated no major LOB but did demonstrate unsteadiness and more imbalance with gait than with previously documented sessions  · Lt/Rt sidestepping (8 steps each direction) x 2 trials  · Pt req'd HHA for balance      AM-PAC 6 CLICK MOBILITY  Turning over in bed (including adjusting bedclothes, sheets and blankets)?: 4  Sitting down on and standing up from a chair with arms (e.g., wheelchair, bedside commode, etc.): 4  Moving from lying on back to sitting on the side of the bed?: 4  Moving to and from a bed to a chair (including a wheelchair)?: 3  Need to walk in hospital room?: 3  Climbing 3-5 steps with a railing?: 3  Basic Mobility Total Score: 21       Therapeutic " Activities and Exercises:   Standing self- care in bathroom performed x 15 min   Pt able to tolerate standing and amb. To/from bathroom well and demonstrated 1 LOB at bathroom threshold while exiting but was able to self correct with step strategy.    Patient left HOB elevated with all lines intact, call button in reach, bed alarm off, avaysis notified and PCT present.   PCT entered room at end of session and PT notified PCT that bed alarm was not activating correctly.    GOALS:   Multidisciplinary Problems     Physical Therapy Goals        Problem: Physical Therapy Goal    Goal Priority Disciplines Outcome Goal Variances Interventions   Physical Therapy Goal     PT, PT/OT Ongoing (interventions implemented as appropriate)     Description:  Goals to be met by: 2019    Patient will increase functional independence with mobility by performin. Supine <> sit with supervision - MET 2019  2. Sit to stand transfer with Supervision- MET 2019  3. Gait  x 150 feet with Supervision using appropriate AD.   4. Stand for 3 minutes with Supervision while performing dynamic balance activities to reduce fall risk - MET  5. Reach for object on floor outside of DALLIN with no LOB and Supervision to reduce fall risk  6. Lower extremity exercise program x20 reps per handout, with assistance as needed                         Time Tracking:     PT Received On: 19  PT Start Time: 1457     PT Stop Time: 1520  PT Total Time (min): 23 min     Billable Minutes: Gait Training 8 and Therapeutic Activity 15    Treatment Type: Treatment  PT/PTA: PT     PTA Visit Number: 0     Cassy Guerra PT, DPT  2019  Pager: [phone]

## 2019-08-26 NOTE — PLAN OF CARE
Problem: Occupational Therapy Goal  Goal: Occupational Therapy Goal  Goals set 8/19 to be addressed for 14 days with expiration date, 9/2:  Patient will increase functional independence with ADLs by performing:    Patient will demonstrate rolling to the right with modified independence.  Not met   Patient will demonstrate rolling to the left with modified independence.   Not met  Patient will demonstrate supine -sit with modified independence.   Not met  Patient will demonstrate stand pivot transfers with modified independence.   Not met  Patient will demonstrate grooming while standing with modified independence.   Not met  Patient will demonstrate upper body dressing with modified independence while seated EOB.   Not met  Patient will demonstrate lower body dressing with modified independence while seated EOB.   Not met  Patient will demonstrate toileting with modified independence.   Not met  Patient will demonstrate bathing while seated EOB with modified independence.   Not met   Goals remain appropriate.  OSIEL Lynch  8/26/2019

## 2019-08-26 NOTE — PLAN OF CARE
BARBIE following for DC needs. BARBIE in communication with Rhonda GARCIA.    BARBIE called the following facilities to follow up on the patient's referral:     [facility] via [facility]. Referral is still under review.     [facility] ([phone]). BARBIE spoke to Yodit in admissions who stated that she is going to reach out to the patient's son.     [facility] ([phone]). BARBIE spoke to Jennifer in admissions who stated that she did not receive a referral. BARBIE resent referral      [facility] ([phone]). BARBIE spoke to admissions who stated the the referral is still under review.     Cindy Valladares, BARBIE  [facility]  [id number]

## 2019-08-26 NOTE — PT/OT/SLP PROGRESS
"Occupational Therapy   Treatment    Name: Carmen Leyva  MRN: [id number]  Admitting Diagnosis:  Brain metastasis  14 Days Post-Op    Recommendations:     Discharge Recommendations: (NH; needs 24/7 supervision)  Discharge Equipment Recommendations:  shower chair  Barriers to discharge:  Decreased caregiver support    Assessment:     Carmen Leyva is a 59 y.o. female with a medical diagnosis of Brain metastasis.  She presents with performance deficits affecting function are weakness, impaired self care skills, impaired functional mobilty, impaired endurance, impaired cognition.     Rehab Prognosis:  Good; patient would benefit from acute skilled OT services to address these deficits and reach maximum level of function.       Plan:     Patient to be seen 3 x/week to address the above listed problems via self-care/home management, neuromuscular re-education, therapeutic activities, therapeutic exercises, sensory integration, cognitive retraining  · Plan of Care Expires: 09/11/19  · Plan of Care Reviewed with: patient    Subjective   Patient: "I had lots of company this weekend."  Pain/Comfort:  · Pain Rating 1: 0/10  · Pain Rating Post-Intervention 1: 0/10    Objective:     Communicated with: Nurse prior to session.  Patient found supine with bed alarm, telemetry, peripheral IV upon OT entry to room.    General Precautions: Standard, aspiration, fall, hearing impaired, vision impaired   Orthopedic Precautions:N/A   Braces: N/A     Occupational Performance:     Bed Mobility:    · Patient completed Rolling/Turning to Left with  supervision  · Patient completed Rolling/Turning to Right with supervision  · Patient completed Scooting/Bridging with supervision  · Patient completed Supine to Sit with supervision  · Patient completed Sit to Supine with supervision     Functional Mobility/Transfers:  · Patient completed Sit <> Stand Transfer with stand by assistance  with  no assistive device   · Patient completed Bed <> Chair " Transfer using Stand Pivot technique with stand by assistance with no assistive device    Activities of Daily Living:  · Grooming: stand by assistance while standing  · Upper Body Dressing: stand by assistance    · Lower Body Dressing: stand by assistance      [facility] 6 Click ADL: 18    Patient left supine with all lines intact, call button in reach and bed alarm onEducation:      GOALS:   Multidisciplinary Problems     Occupational Therapy Goals        Problem: Occupational Therapy Goal    Goal Priority Disciplines Outcome Interventions   Occupational Therapy Goal     OT, PT/OT Ongoing (interventions implemented as appropriate)    Description:  Goals set 8/19 to be addressed for 14 days with expiration date, 9/2:  Patient will increase functional independence with ADLs by performing:    Patient will demonstrate rolling to the right with modified independence.  Not met   Patient will demonstrate rolling to the left with modified independence.   Not met  Patient will demonstrate supine -sit with modified independence.   Not met  Patient will demonstrate stand pivot transfers with modified independence.   Not met  Patient will demonstrate grooming while standing with modified independence.   Not met  Patient will demonstrate upper body dressing with modified independence while seated EOB.   Not met  Patient will demonstrate lower body dressing with modified independence while seated EOB.   Not met  Patient will demonstrate toileting with modified independence.   Not met  Patient will demonstrate bathing while seated EOB with modified independence.   Not met                    Time Tracking:     OT Date of Treatment: 08/26/19  OT Start Time: 1015  OT Stop Time: 1032  OT Total Time (min): 17 min    Billable Minutes:Self Care/Home Management 17    OSIEL Lynch  8/26/2019

## 2019-08-26 NOTE — PLAN OF CARE
Problem: Physical Therapy Goal  Goal: Physical Therapy Goal  Goals to be met by: 2019    Patient will increase functional independence with mobility by performin. Supine <> sit with supervision - MET 2019  2. Sit to stand transfer with Supervision- MET 2019  3. Gait  x 150 feet with Supervision using appropriate AD.   4. Stand for 3 minutes with Supervision while performing dynamic balance activities to reduce fall risk - MET  5. Reach for object on floor outside of DALLIN with no LOB and Supervision to reduce fall risk  6. Lower extremity exercise program x20 reps per handout, with assistance as needed       Outcome: Ongoing (interventions implemented as appropriate)  Goals updated to reflect pt's current functional status. Pt still appropriate for PT. Continue with POC.

## 2019-08-26 NOTE — PLAN OF CARE
Problem: Infection  Goal: Infection Symptom Resolution  Outcome: Ongoing (interventions implemented as appropriate)  Intervention: Prevent or Manage Infection  Standard precautions maintained, all lines free of s/s infection. Potential s/s of infection to report to HCP discussed, needs reenforcement.

## 2019-08-26 NOTE — SUBJECTIVE & OBJECTIVE
Interval History: NAEON. Patient resting comfortably in bed, remains free of restraints. Tele-sitter present, no family at bedside. Patient remains disoriented but calm. Denies headache, changes in vision, dizziness, weakness/numbness, and nausea/vomiting. Tolerating diet, voiding appropriately.    Medications:  Continuous Infusions:  Scheduled Meds:   ascorbic acid (vitamin C)  250 mg Oral BID    dexAMETHasone  4 mg Oral Q8H    divalproex  500 mg Oral Q12H    enoxaparin  40 mg Subcutaneous Daily    ferrous sulfate  325 mg Oral BID    levETIRAcetam  500 mg Oral BID    levothyroxine  75 mcg Oral Before breakfast    nicotine  1 patch Transdermal Daily    pantoprazole  40 mg Oral Daily    polyethylene glycol  17 g Oral Daily    senna-docusate 8.6-50 mg  1 tablet Oral BID     PRN Meds:sodium chloride, sodium chloride, acetaminophen, albuterol-ipratropium, haloperidol lactate, ondansetron, oxyCODONE, QUEtiapine, sodium chloride 0.9%     Review of Systems  Objective:     Weight: 35.4 kg (78 lb)  Body mass index is 14.74 kg/m².  Vital Signs (Most Recent):  Temp: 97.3 °F (36.3 °C) (08/26/19 1134)  Pulse: 74 (08/26/19 1134)  Resp: 16 (08/26/19 0831)  BP: 96/62 (08/26/19 1134)  SpO2: 98 % (08/26/19 1134) Vital Signs (24h Range):  Temp:  [97.2 °F (36.2 °C)-98.3 °F (36.8 °C)] 97.3 °F (36.3 °C)  Pulse:  [65-86] 74  Resp:  [16-18] 16  SpO2:  [96 %-100 %] 98 %  BP: ()/(58-82) 96/62     Date 08/26/19 0700 - 08/27/19 0659   Shift 5190-0763 9802-6309 [id number] 24 Hour Total   INTAKE   P.O. 240   240   Shift Total(mL/kg) 240(6.8)   240(6.8)   OUTPUT   Shift Total(mL/kg)       Weight (kg) 35.4 35.4 35.4 35.4                        Neurosurgery Physical Exam    General: well developed, well nourished, no distress.   Head: normocephalic  Neurologic: Alert and oriented. Somewhat inattentive.  GCS: Motor: 6/Verbal: 5/Eyes: 4 GCS Total: 15  Mental Status: Awake, Alert, Oriented x 2 (birthday when asked year, location  [facility])  Language: Receptive aphasia  Speech: No dysarthria  Cranial nerves: face symmetric, tongue midline, CN II-XII grossly intact.   Eyes: pupils equal, round, reactive to light with accomodation, EOMI.   Pulmonary: normal respirations, no signs of respiratory distress  Abdomen: soft, non-distended, not tender to palpation  Skin: Skin is warm, dry and intact.  Sensory: intact to light touch throughout    Motor Strength: Moves all extremities spontaneously with good tone.  Full strength upper and lower extremities. No abnormal movements seen.     Babinski's: Negative.  Clonus: Negative.     Finger-to-nose: intact bilaterally   Pronator drift: absent bilaterally    Incision: Clean, dry, staples intact. Skin edges well approximated. No surrounding erythema or edema. No drainage or TTP.       Significant Labs:  Recent Labs   Lab 08/25/19 0419   GLU 83   *   K 5.0   CL 98   CO2 28   BUN 33*   CREATININE 0.8   CALCIUM 8.2*     Recent Labs   Lab 08/25/19 0419   WBC 13.00*   HGB 8.7*   HCT 28.1*   *     No results for input(s): LABPT, INR, APTT in the last 48 hours.  Microbiology Results (last 7 days)     ** No results found for the last 168 hours. **        All pertinent labs from the last 24 hours have been reviewed.    Significant Diagnostics:  I have reviewed and interpreted all pertinent imaging results/findings within the past 24 hours.

## 2019-08-26 NOTE — ASSESSMENT & PLAN NOTE
59F w/ PMH COPD, HTN, hypothyroidism, stage 4 R small cell lung cancer s/p 6 cycles of carbo/etoposide in remission since 06/2018 who presents to [facility] ED from OSH w/ L parietal brain mass. Now s/p crani for debulking on 8/12.    -Patient neurologically stable on exam  -Continue Depakote 500mg BID and Keppra 500mg BID for seizure prevention  -Neuro checks q4h  -Thrombocytopenia: Plts 55. Transfused 2 units of plts. Do not need CTH due to pt's unchanged neurostatus.    -Pathology (8/19): Metastatic small cell carcinoma. Pt scheduled for f/u tomorrow to discuss pathology report with Dr. Erickson and family.  -Cerebral edema: Continue 3 week Dex taper to 2 mg BID - currently 4mg q12h. Continue PPI while on steroids.   -Malnutrition: Albumin 2.4. Continue boost and jennifer supplements TID to promote wound healing.    -Psyc: Agitation improved. No use of wrist restraints. Pt requires frequent redirection to be compliant with fall precautions. Weaned to tele-sitter. Continue redirection and delirium precautions. Continue Seroquel TID PRN. Haldol prn qhs. Medical and physical precautions to minimize agitation in place.   -HTN:  Maintain SBP < 160. Home dose of Norvasc has been held since SBP at goal. Will continue to monitor and restart if SBP becomes elevated.   -Hypothyroidism: Continue home dose of Synthroid  -Tobacco abuse: Continue nicotine patch  -COPD: Continue duo nebs PRN, pulse ox q 4 hours  -DVT prophylaxis: ABI's, SCD's, lovenox  -Bowel regimen: senna BID and miralax daily  -Atelectasis prevention: IS hourly while awake, PT/OT, OOB > 6 hours per day    DISPO: Medically stable for discharge. Pending nursing home acceptance.

## 2019-08-27 PROBLEM — E44.0 MODERATE MALNUTRITION: Status: ACTIVE | Noted: 2019-08-27

## 2019-08-27 LAB
POCT GLUCOSE: 21 MG/DL (ref 70–110)
POCT GLUCOSE: 82 MG/DL (ref 70–110)
POCT GLUCOSE: 84 MG/DL (ref 70–110)
POCT GLUCOSE: 97 MG/DL (ref 70–110)

## 2019-08-27 PROCEDURE — 97802 MEDICAL NUTRITION INDIV IN: CPT

## 2019-08-27 PROCEDURE — 25000003 PHARM REV CODE 250: Performed by: PHYSICIAN ASSISTANT

## 2019-08-27 PROCEDURE — 25000003 PHARM REV CODE 250: Performed by: STUDENT IN AN ORGANIZED HEALTH CARE EDUCATION/TRAINING PROGRAM

## 2019-08-27 PROCEDURE — 63600175 PHARM REV CODE 636 W HCPCS: Performed by: PHYSICIAN ASSISTANT

## 2019-08-27 PROCEDURE — 20600001 HC STEP DOWN PRIVATE ROOM

## 2019-08-27 PROCEDURE — 92507 TX SP LANG VOICE COMM INDIV: CPT

## 2019-08-27 PROCEDURE — S4991 NICOTINE PATCH NONLEGEND: HCPCS | Performed by: PHYSICIAN ASSISTANT

## 2019-08-27 RX ADMIN — SENNOSIDES,DOCUSATE SODIUM 1 TABLET: 8.6; 5 TABLET, FILM COATED ORAL at 09:08

## 2019-08-27 RX ADMIN — DEXAMETHASONE 4 MG: 4 TABLET ORAL at 08:08

## 2019-08-27 RX ADMIN — PANTOPRAZOLE SODIUM 40 MG: 40 TABLET, DELAYED RELEASE ORAL at 08:08

## 2019-08-27 RX ADMIN — POLYETHYLENE GLYCOL 3350 17 G: 17 POWDER, FOR SOLUTION ORAL at 08:08

## 2019-08-27 RX ADMIN — Medication 250 MG: at 08:08

## 2019-08-27 RX ADMIN — FERROUS SULFATE TAB EC 325 MG (65 MG FE EQUIVALENT) 325 MG: 325 (65 FE) TABLET DELAYED RESPONSE at 08:08

## 2019-08-27 RX ADMIN — DIVALPROEX SODIUM 500 MG: 250 TABLET, DELAYED RELEASE ORAL at 09:08

## 2019-08-27 RX ADMIN — DEXAMETHASONE 4 MG: 4 TABLET ORAL at 09:08

## 2019-08-27 RX ADMIN — Medication 250 MG: at 09:08

## 2019-08-27 RX ADMIN — SENNOSIDES,DOCUSATE SODIUM 1 TABLET: 8.6; 5 TABLET, FILM COATED ORAL at 08:08

## 2019-08-27 RX ADMIN — DIVALPROEX SODIUM 500 MG: 250 TABLET, DELAYED RELEASE ORAL at 08:08

## 2019-08-27 RX ADMIN — NICOTINE 1 PATCH: 7 PATCH, EXTENDED RELEASE TRANSDERMAL at 08:08

## 2019-08-27 RX ADMIN — FERROUS SULFATE TAB EC 325 MG (65 MG FE EQUIVALENT) 325 MG: 325 (65 FE) TABLET DELAYED RESPONSE at 09:08

## 2019-08-27 RX ADMIN — LEVOTHYROXINE SODIUM 75 MCG: 75 TABLET ORAL at 06:08

## 2019-08-27 RX ADMIN — LEVETIRACETAM 500 MG: 500 TABLET, FILM COATED ORAL at 08:08

## 2019-08-27 NOTE — PLAN OF CARE
Problem: Adult Inpatient Plan of Care  Goal: Plan of Care Review  Nutrition Problem:  Moderate Protein-Calorie Malnutrition  Malnutrition in the context of Chronic Illness/Injury    Related to (etiology):  Poor po intake in setting of lung cancer with brain metastasis    Signs and Symptoms (as evidenced by):    Body Fat Depletion: moderate depletion of triceps, thoracic region  Muscle Mass Depletion: moderate depletion of temples, scapula, interosseous muscles, lower extremities   Weight Loss: 19.5% x 10 months    Interventions (treatment strategy):  Collaboration of care to providers    Nutrition Diagnosis Status:  New    Recommendations     1. Continue regular diet as feasible. 2. Continue Boost Plus and Quentin TID.   Goals: 1.) Pt to consume/tolerate >75% EEN and EPN by follow up  Nutrition Goal Status: progressing towards goal  Communication of RD Recs: (POC)

## 2019-08-27 NOTE — PT/OT/SLP PROGRESS
Speech Language Pathology Treatment  Discharge    Patient Name:  Carmen Leyva   MRN:  [id number]  Admitting Diagnosis: Brain metastasis    Recommendations:                 General Recommendations:  Follow-up not indicated  Diet recommendations:  Regular, Liquid Diet Level: Thin   Aspiration Precautions: Feed only when awake/alert, HOB to 90 degrees, Small bites/sips and Standard aspiration precautions   General Precautions: Standard, fall, vision impaired, hearing impaired      Subjective     Awake/confused    Pain/Comfort:  · Pain Rating 1: 0/10  · Pain Rating Post-Intervention 1: 0/10    Objective:     Has the patient been evaluated by SLP for swallowing?   Yes  Keep patient NPO? No   Current Respiratory Status: room air      Pt upright at EOB upon SLP entry. Pt disoriented to place, situation, and time despite max cues and binary choice. Pt counted 1-10 provided mod cues and listed 2/7 days of the week provided max cues. Perseveration, tangential speech and jargon noted throughout session. Pt unable to redirect to most tasks despite max cues. Pt unable to complete simple problem solving task despite max cues. Despite continuous therapy, pt has made no functional cognitive gains at this time. Cognitive linguistic aspects has declined significantly over past few ST sessions. Pt unable to participate in structured tasks at this time. No further ST recommended at this time.     Assessment:     Carmen Leyva is a 59 y.o. female with an SLP diagnosis of Cognitive-Linguistic Impairment.      Goals:   Multidisciplinary Problems     SLP Goals        Problem: SLP Goal    Goal Priority Disciplines Outcome   SLP Goal     SLP    Description:  Speech Language Pathology Goals  Goals expected to be met by 8/27:  1. Patient will tolerate a regular diet and thin liquids with no overt signs of airway compromise.   2. Pt will answer simple yes/no questions with 80% accy   3. Pt will complete simple problem solving tasks with 70%  accy with min cues  4. Pt will orient x4 with mod cues  5. Pt will participate in ongoing assessment of cognitive linguistic skills.                           Plan:     · Plan of Care reviewed with:  patient   · SLP Follow-Up:  No       Discharge recommendations:  nursing facility, basic       Time Tracking:     SLP Treatment Date:   08/27/19  Speech Start Time:  1109  Speech Stop Time:  1117     Speech Total Time (min):  8 min    Billable Minutes: Speech Therapy Individual 8    Mami Mo CCC-SLP  08/27/2019

## 2019-08-27 NOTE — SUBJECTIVE & OBJECTIVE
Interval History: NAEON. Pending placement.     Medications:  Continuous Infusions:  Scheduled Meds:   ascorbic acid (vitamin C)  250 mg Oral BID    dexAMETHasone  4 mg Oral Q12H    Followed by    [START ON 9/2/2019] dexAMETHasone  3 mg Oral Q12H    Followed by    [START ON 9/9/2019] dexAMETHasone  2 mg Oral Q12H    divalproex  500 mg Oral Q12H    enoxaparin  40 mg Subcutaneous Daily    ferrous sulfate  325 mg Oral BID    levothyroxine  75 mcg Oral Before breakfast    nicotine  1 patch Transdermal Daily    pantoprazole  40 mg Oral Daily    polyethylene glycol  17 g Oral Daily    senna-docusate 8.6-50 mg  1 tablet Oral BID     PRN Meds:sodium chloride, sodium chloride, acetaminophen, albuterol-ipratropium, haloperidol lactate, ondansetron, oxyCODONE, QUEtiapine, sodium chloride 0.9%     Review of Systems  Objective:     Weight: 35.4 kg (78 lb)  Body mass index is 14.74 kg/m².  Vital Signs (Most Recent):  Temp: 98.2 °F (36.8 °C) (08/27/19 0800)  Pulse: 65 (08/27/19 1137)  Resp: 18 (08/27/19 0800)  BP: (!) 148/95 (08/27/19 0800)  SpO2: 96 % (08/27/19 0800) Vital Signs (24h Range):  Temp:  [96.3 °F (35.7 °C)-98.5 °F (36.9 °C)] 98.2 °F (36.8 °C)  Pulse:  [65-87] 65  Resp:  [14-18] 18  SpO2:  [95 %-100 %] 96 %  BP: ()/(58-95) 148/95                          Neurosurgery Physical Exam   General: well developed, well nourished, no distress.   Head: normocephalic  Neurologic: Alert and oriented. Somewhat inattentive.  GCS: Motor: 6/Verbal: 4/Eyes: 4 GCS Total: 14  Mental Status: Awake, Alert, Oriented x 2 (birthday when asked year, location [facility])  Language: Receptive aphasia  Speech: No dysarthria  Cranial nerves: face symmetric, tongue midline, CN II-XII grossly intact.   Eyes: pupils equal, round, reactive to light with accomodation, EOMI.   Pulmonary: normal respirations, no signs of respiratory distress  Abdomen: soft, non-distended, not tender to palpation  Skin: Skin is warm, dry and  intact.  Sensory: intact to light touch throughout     Motor Strength: Moves all extremities spontaneously with good tone.  Full strength upper and lower extremities. No abnormal movements seen.      Incision: Clean, dry, staples intact. Skin edges well approximated. No surrounding erythema or edema. No drainage or TTP.     Significant Labs:  q72h labs    Significant Diagnostics:  No results found in the last 24 hours.

## 2019-08-27 NOTE — PLAN OF CARE
Problem: SLP Goal  Goal: SLP Goal  Speech Language Pathology Goals  Goals expected to be met by 8/27:  1. Patient will tolerate a regular diet and thin liquids with no overt signs of airway compromise.   2. Pt will answer simple yes/no questions with 80% accy   3. Pt will complete simple problem solving tasks with 70% accy with min cues  4. Pt will orient x4 with mod cues  5. Pt will participate in ongoing assessment of cognitive linguistic skills.          No further ST warranted at this time. Despite continuous SLP services pt with no improvement in cognitive status. Pt unable to participate in structured speech tasks at this time. No further ST warranted.   Mami Mo, LORETTA-SLP  8/27/2019

## 2019-08-27 NOTE — PROGRESS NOTES
[facility]  Neurosurgery  Progress Note    Subjective:     History of Present Illness: 59F w/ PMH COPD, HTN, hypothyroidism, stage 4 R small cell lung cancer s/p 6 cycles of carbo/etoposide in remission since 06/2018 who presents to [facility] ED from OSH w/ L parietal brain mass. Per EMS records patient had a 1d history of AMS and gait difficulty and was brought into the hospital by her family for evaluation. CTH @ OSH showed large L parietal brain mass with possible hemorrhagic component and she was transferred to [facility] for evaluation. MRI @ [facility] redemonstrated L nonenhancing parietal mass with minimal blood. Patient is confused and so ROS is tenuous.    Post-Op Info:  Procedure(s) (LRB):  L craniotomy for tumor (Left)   15 Days Post-Op     Interval History: NAEON. Pending placement.     Medications:  Continuous Infusions:  Scheduled Meds:   ascorbic acid (vitamin C)  250 mg Oral BID    dexAMETHasone  4 mg Oral Q12H    Followed by    [START ON 9/2/2019] dexAMETHasone  3 mg Oral Q12H    Followed by    [START ON 9/9/2019] dexAMETHasone  2 mg Oral Q12H    divalproex  500 mg Oral Q12H    enoxaparin  40 mg Subcutaneous Daily    ferrous sulfate  325 mg Oral BID    levothyroxine  75 mcg Oral Before breakfast    nicotine  1 patch Transdermal Daily    pantoprazole  40 mg Oral Daily    polyethylene glycol  17 g Oral Daily    senna-docusate 8.6-50 mg  1 tablet Oral BID     PRN Meds:sodium chloride, sodium chloride, acetaminophen, albuterol-ipratropium, haloperidol lactate, ondansetron, oxyCODONE, QUEtiapine, sodium chloride 0.9%     Review of Systems  Objective:     Weight: 35.4 kg (78 lb)  Body mass index is 14.74 kg/m².  Vital Signs (Most Recent):  Temp: 98.2 °F (36.8 °C) (08/27/19 0800)  Pulse: 65 (08/27/19 1137)  Resp: 18 (08/27/19 0800)  BP: (!) 148/95 (08/27/19 0800)  SpO2: 96 % (08/27/19 0800) Vital Signs (24h Range):  Temp:  [96.3 °F (35.7 °C)-98.5 °F (36.9 °C)] 98.2 °F (36.8 °C)  Pulse:   [65-87] 65  Resp:  [14-18] 18  SpO2:  [95 %-100 %] 96 %  BP: ()/(58-95) 148/95                          Neurosurgery Physical Exam   General: well developed, well nourished, no distress.   Head: normocephalic  Neurologic: Alert and oriented. Somewhat inattentive.  GCS: Motor: 6/Verbal: 4/Eyes: 4 GCS Total: 14  Mental Status: Awake, Alert, Oriented x 2 (birthday when asked year, location [facility])  Language: Receptive aphasia  Speech: No dysarthria  Cranial nerves: face symmetric, tongue midline, CN II-XII grossly intact.   Eyes: pupils equal, round, reactive to light with accomodation, EOMI.   Pulmonary: normal respirations, no signs of respiratory distress  Abdomen: soft, non-distended, not tender to palpation  Skin: Skin is warm, dry and intact.  Sensory: intact to light touch throughout     Motor Strength: Moves all extremities spontaneously with good tone.  Full strength upper and lower extremities. No abnormal movements seen.      Incision: Clean, dry, staples intact. Skin edges well approximated. No surrounding erythema or edema. No drainage or TTP.     Significant Labs:  q72h labs    Significant Diagnostics:  No results found in the last 24 hours.      Assessment/Plan:     * Brain metastasis  59F w/ PMH COPD, HTN, hypothyroidism, stage 4 R small cell lung cancer s/p 6 cycles of carbo/etoposide in remission since 06/2018 who presents to [facility] ED from OSH w/ L parietal brain mass. Now s/p crani for debulking on 8/12.    -Patient neurologically stable on exam  -Continue Depakote 500mg BID and Keppra 500mg BID for seizure prevention  -Neuro checks q4h  -Thrombocytopenia: Plts 115 on last count (8/25)  -Pathology (8/19): Metastatic small cell carcinoma  -Cerebral edema: Continue 3 week Dex taper to 2 mg BID - currently 4mg q12h. Continue PPI while on steroids.   -Malnutrition:  Continue boost and jennifer supplements TID to promote wound healing.    -Psyc: Agitation improved. No use of wrist  restraints. Pt requires frequent redirection to be compliant with fall precautions. Weaned to tele-sitter. Continue redirection and delirium precautions. Continue Seroquel TID PRN. Haldol prn qhs. Medical and physical precautions to minimize agitation in place.   -HTN:  Maintain SBP < 160. Home dose of Norvasc has been held since SBP at goal. Will continue to monitor and restart if SBP becomes elevated.   -Hypothyroidism: Continue home dose of Synthroid  -Tobacco abuse: Continue nicotine patch  -COPD: Continue duo nebs PRN, pulse ox q 4 hours  -DVT prophylaxis: ABI's, SCD's, lovenox  -Bowel regimen: senna BID and miralax daily  -Atelectasis prevention: IS hourly while awake, PT/OT, OOB > 6 hours per day    DISPO: Medically stable for discharge. Pending nursing home acceptance.         Jessica Escamilla MD  Neurosurgery  [facility]

## 2019-08-27 NOTE — PHYSICIAN QUERY
"PT Name: Carmen Leyva  MR #: [id number]    Physician Query Form - Nutrition Clarification     CDS: Rita Madsen RN, CCDS       Contact information: [email or web address]    This form is a permanent document in the medical record.     Query Date: August 27, 2019    By submitting this query, we are merely seeking further clarification of documentation.. Please utilize your independent clinical judgment when addressing the question(s) below.    The Medical record contains the following:   Indicators  Supporting Clinical Findings Location in Medical Record    % of Estimated Energy Intake over a time frame from p.o., TF, or TPN     X Weight Status over a time frame Weight Loss: 19.5% x 10 months 8/27-RD Note   X Subcutaneous Fat and/or Muscle Loss Body Fat Depletion: moderate depletion of triceps, thoracic region  Muscle Mass Depletion: moderate depletion of temples, scapula, interosseous muscles, lower extremities    8/27-RD Note    Fluid Accumulation or Edema      Reduced  Strength     X Wt / BMI / Usual Body Weight Height: 5' 1" (154.9 cm)  Weight: 35.4 kg (78 lb)  Weight (lb): 78 lb  BMI (Calculated): 14.8   8/27-RD Note    Delayed Wound Healing / Failure to Thrive     X Acute or Chronic Illness 59F w/ PMH COPD, HTN, hypothyroidism, stage 4 R small cell lung cancer s/p 6 cycles of carbo/etoposide in remission since 06/2018 who [place] [facility] [place] OSH w/ L parietal brain mass. Now s/p crani for debulking on 8/12.   8/27-Neurosurgery PN    Medication     X   Treatment 1. Continue regular diet as feasible.  2. Continue Boost Plus and Quentin TID.    8/27-RD Note   X      X Other -Malnutrition:  Continue boost and quentin supplements TID to promote wound healing.     Completed NFPE: pt has moderate fat and muscle depletion and meets criteria for chronic moderate protein-calorie malnutrition.   8/27-Neurosurgery PN      8/27-RD Note     AND / ASPEN Clinical Characteristics (October 2011)  A minimum of two " characteristics is recommended for diagnosing either moderate or severe malnutrition   Mild Malnutrition Moderate Malnutrition Severe Malnutrition   Energy Intake from p.o., TF or TPN. < 75% intake of estimated energy needs for less than 7 days < 75% intake of estimated energy needs for greater than 7 days < 50% intake of estimated energy needs for > 5 days   Weight Loss 1-2% in 1 month  5% in 3 months  7.5% in 6 months  10% in 1 year 1-2 % in 1 week  5% in 1 month  7.5% in 3 months  10% in 6 months  20% in 1 year > 2% in 1 week  > 5% in 1 month  > 7.5% in 3 months  > 10% in 6 months  > 20% in 1 year   Physical Findings     None *Mild subcutaneous fat and/or muscle loss  *Mild fluid accumulation  *Stage II decubitus  *Surgical wound or non-healing wound *Mod/severe subcutaneous fat and/or muscle loss  *Mod/severe fluid accumulation  *Stage III or IV decubitus  *Non-healing surgical wound     Provider, please specify diagnosis or diagnoses associated with above clinical findings.    Please clarify degree of malnutrition.    [ x] Moderate Protein-Calorie Malnutrition   [  ] Other Nutritional Diagnosis (please specify):    [  ] Other:    [  ] Clinically Undetermined       Please document in your progress notes daily for the duration of treatment until resolved and include in your discharge summary.

## 2019-08-27 NOTE — PROGRESS NOTES
"[facility]  Adult Nutrition  Progress Note    SUMMARY       Recommendations    1. Continue regular diet as feasible. 2. Continue Boost Plus and Quentin TID.   Goals: 1.) Pt to consume/tolerate >75% EEN and EPN by follow up  Nutrition Goal Status: progressing towards goal  Communication of RD Recs: (POC)    Reason for Assessment    Reason For Assessment: RD follow-up  Diagnosis: surgery/postoperative complications(left craniotomy)  Relevant Medical History: CKD, COPD, GERD, HTN, RA, lung cancer, osteopenia, gout, anemia, anxiety, blindness  Interdisciplinary Rounds: attended  General Information Comments: Pt s/p 6 cycles chemo for lung cancer with brain mets and s/p craniotomy  for debulking. Pt has fair intake meals (50-75%). Pt with severe wt loss (19.5% x 10 months), BMI < 18.5. Completed NFPE: pt has moderate fat and muscle depletion and meets criteria for chronic moderate protein-calorie malnutrition.  Nutrition Discharge Planning: Pt to follow high protein/high kcal diet to avoid further wt loss.    Nutrition Risk Screen    Nutrition Risk Screen: no indicators present    Nutrition/Diet History    Spiritual, Cultural Beliefs, Worship Practices, Values that Affect Care: no    Anthropometrics    Temp: 98.2 °F (36.8 °C)  Height: 5' 1" (154.9 cm)  Height (inches): 61 in  Weight Method: Bed Scale  Weight: 35.4 kg (78 lb)  Weight (lb): 78 lb  Ideal Body Weight (IBW), Female: 105 lb  % Ideal Body Weight, Female (lb): 74.29 lb  BMI (Calculated): 14.8  Usual Body Weight (UBW), k kg(10/2018)  % Usual Body Weight: 80.58       Lab/Procedures/Meds    Pertinent Labs Reviewed: reviewed  Pertinent Labs Comments: H/H 8.7/28.1, platelets 115, Na 135, BUN 33, Alb 2.4  Pertinent Medications Reviewed: reviewed  Pertinent Medications Comments: ascorbic acid, pantoprazole, ferrous sulfate, dexamethasone      Estimated/Assessed Needs    Weight Used For Calorie Calculations: 35.4 kg (78 lb 0.7 oz)  Energy " Calorie Requirements (kcal): 7798-5724  Energy Need Method: Kcal/kg(35-40 kcal/kg)  Protein Requirements: 53-71(g/day)  Weight Used For Protein Calculations: 35.4 kg (78 lb 0.7 oz)(1.5-2.0 g/kg)     Estimated Fluid Requirement Method: RDA Method(or per MD)  RDA Method (mL): 1239         Nutrition Prescription Ordered    Current Diet Order: NPO  Oral Nutrition Supplement: Boost Plus TID, Quentin TID    Evaluation of Received Nutrient/Fluid Intake    I/O: +4.7L since admission  Comments: LBM 8/25  % Intake of Estimated Energy Needs: 50 - 75 %  % Meal Intake: 50 - 75 %    Nutrition Risk    Level of Risk/Frequency of Follow-up: low     Assessment and Plan    Nutrition Problem:  Moderate Protein-Calorie Malnutrition  Malnutrition in the context of Chronic Illness/Injury    Related to (etiology):  Poor po intake in setting of lung cancer with brain metastasis    Signs and Symptoms (as evidenced by):    Body Fat Depletion: moderate depletion of triceps, thoracic region  Muscle Mass Depletion: moderate depletion of temples, scapula, interosseous muscles, lower extremities   Weight Loss: 19.5% x 10 months    Interventions (treatment strategy):  Collaboration of care to providers    Nutrition Diagnosis Status:  New    Monitor and Evaluation    Food and Nutrient Intake: energy intake, food and beverage intake  Food and Nutrient Adminstration: diet order  Anthropometric Measurements: weight, weight change, body mass index  Biochemical Data, Medical Tests and Procedures: electrolyte and renal panel, gastrointestinal profile, glucose/endocrine profile  Nutrition-Focused Physical Findings: overall appearance     Malnutrition Assessment             Weight Loss (Malnutrition): greater than 10% in 6 months(19.5% x 10 months)   Orbital Region (Subcutaneous Fat Loss): mild depletion  Upper Arm Region (Subcutaneous Fat Loss): moderate depletion  Thoracic and Lumbar Region: moderate depletion   [place] Region (Muscle Loss): moderate  depletion  Clavicle Bone Region (Muscle Loss): mild depletion  Clavicle and Acromion Bone Region (Muscle Loss): moderate depletion  Scapular Bone Region (Muscle Loss): moderate depletion  Dorsal Hand (Muscle Loss): moderate depletion  Patellar Region (Muscle Loss): moderate depletion  Anterior Thigh Region (Muscle Loss): moderate depletion  Posterior Calf Region (Muscle Loss): moderate depletion                 Nutrition Follow-Up    RD Follow-up?: Yes

## 2019-08-27 NOTE — PLAN OF CARE
BARBIE following for DC needs. BARBIE in communication with Rhonda GARCIA.    BARBIE called [facility] ([phone]) to follow up on referral. Yodit with admissions is currently in a meeting and will call BARBIE back.     Patient has been declined by the following facilities:   [facility]     Cindy Valladares LMSW  [facility]  [id number]

## 2019-08-27 NOTE — ASSESSMENT & PLAN NOTE
59F w/ PMH COPD, HTN, hypothyroidism, stage 4 R small cell lung cancer s/p 6 cycles of carbo/etoposide in remission since 06/2018 who presents to [facility] ED from OSH w/ L parietal brain mass. Now s/p crani for debulking on 8/12.    -Patient neurologically stable on exam  -Continue Depakote 500mg BID and Keppra 500mg BID for seizure prevention  -Neuro checks q4h  -Thrombocytopenia: Plts 115 on last count (8/25)  -Pathology (8/19): Metastatic small cell carcinoma  -Cerebral edema: Continue 3 week Dex taper to 2 mg BID - currently 4mg q12h. Continue PPI while on steroids.   -Malnutrition:  Continue boost and jennifer supplements TID to promote wound healing.    -Psyc: Agitation improved. No use of wrist restraints. Pt requires frequent redirection to be compliant with fall precautions. Weaned to tele-sitter. Continue redirection and delirium precautions. Continue Seroquel TID PRN. Haldol prn qhs. Medical and physical precautions to minimize agitation in place.   -HTN:  Maintain SBP < 160. Home dose of Norvasc has been held since SBP at goal. Will continue to monitor and restart if SBP becomes elevated.   -Hypothyroidism: Continue home dose of Synthroid  -Tobacco abuse: Continue nicotine patch  -COPD: Continue duo nebs PRN, pulse ox q 4 hours  -DVT prophylaxis: ABI's, SCD's, lovenox  -Bowel regimen: senna BID and miralax daily  -Atelectasis prevention: IS hourly while awake, PT/OT, OOB > 6 hours per day    DISPO: Medically stable for discharge. Pending nursing home acceptance.

## 2019-08-27 NOTE — PLAN OF CARE
Problem: Adult Inpatient Plan of Care  Goal: Plan of Care Review  Outcome: Ongoing (interventions implemented as appropriate)  POC reviewed with patient. All questions and concerns reviewed. VSS throughout shift. Fall/safety precautions implemented and maintained. Blood glucose monitored. Bed locked in lowest position. Call bell within reach. Will continue to monitor.

## 2019-08-28 LAB
ALBUMIN SERPL BCP-MCNC: 3 G/DL (ref 3.5–5.2)
ALP SERPL-CCNC: 79 U/L (ref 55–135)
ALT SERPL W/O P-5'-P-CCNC: 9 U/L (ref 10–44)
ANION GAP SERPL CALC-SCNC: 10 MMOL/L (ref 8–16)
ANION GAP SERPL CALC-SCNC: 14 MMOL/L (ref 8–16)
AST SERPL-CCNC: 12 U/L (ref 10–40)
BASOPHILS # BLD AUTO: 0.02 K/UL (ref 0–0.2)
BASOPHILS NFR BLD: 0.1 % (ref 0–1.9)
BILIRUB SERPL-MCNC: 0.3 MG/DL (ref 0.1–1)
BUN SERPL-MCNC: 27 MG/DL (ref 6–20)
BUN SERPL-MCNC: 28 MG/DL (ref 6–20)
CALCIUM SERPL-MCNC: 8.9 MG/DL (ref 8.7–10.5)
CALCIUM SERPL-MCNC: 9 MG/DL (ref 8.7–10.5)
CHLORIDE SERPL-SCNC: 89 MMOL/L (ref 95–110)
CHLORIDE SERPL-SCNC: 92 MMOL/L (ref 95–110)
CO2 SERPL-SCNC: 24 MMOL/L (ref 23–29)
CO2 SERPL-SCNC: 27 MMOL/L (ref 23–29)
CREAT SERPL-MCNC: 0.8 MG/DL (ref 0.5–1.4)
CREAT SERPL-MCNC: 0.9 MG/DL (ref 0.5–1.4)
DIFFERENTIAL METHOD: ABNORMAL
EOSINOPHIL # BLD AUTO: 0 K/UL (ref 0–0.5)
EOSINOPHIL NFR BLD: 0.1 % (ref 0–8)
ERYTHROCYTE [DISTWIDTH] IN BLOOD BY AUTOMATED COUNT: 14.7 % (ref 11.5–14.5)
EST. GFR  (AFRICAN AMERICAN): >60 ML/MIN/1.73 M^2
EST. GFR  (AFRICAN AMERICAN): >60 ML/MIN/1.73 M^2
EST. GFR  (NON AFRICAN AMERICAN): >60 ML/MIN/1.73 M^2
EST. GFR  (NON AFRICAN AMERICAN): >60 ML/MIN/1.73 M^2
GLUCOSE SERPL-MCNC: 163 MG/DL (ref 70–110)
GLUCOSE SERPL-MCNC: 86 MG/DL (ref 70–110)
HCT VFR BLD AUTO: 28.6 % (ref 37–48.5)
HGB BLD-MCNC: 9.4 G/DL (ref 12–16)
IMM GRANULOCYTES # BLD AUTO: 0.62 K/UL (ref 0–0.04)
IMM GRANULOCYTES NFR BLD AUTO: 4.2 % (ref 0–0.5)
LYMPHOCYTES # BLD AUTO: 0.3 K/UL (ref 1–4.8)
LYMPHOCYTES NFR BLD: 2.1 % (ref 18–48)
MCH RBC QN AUTO: 32.9 PG (ref 27–31)
MCHC RBC AUTO-ENTMCNC: 32.9 G/DL (ref 32–36)
MCV RBC AUTO: 100 FL (ref 82–98)
MONOCYTES # BLD AUTO: 1 K/UL (ref 0.3–1)
MONOCYTES NFR BLD: 7.1 % (ref 4–15)
NEUTROPHILS # BLD AUTO: 12.6 K/UL (ref 1.8–7.7)
NEUTROPHILS NFR BLD: 86.4 % (ref 38–73)
NRBC BLD-RTO: 0 /100 WBC
PLATELET # BLD AUTO: 89 K/UL (ref 150–350)
PMV BLD AUTO: 11.5 FL (ref 9.2–12.9)
POCT GLUCOSE: 104 MG/DL (ref 70–110)
POCT GLUCOSE: 27 MG/DL (ref 70–110)
POCT GLUCOSE: 33 MG/DL (ref 70–110)
POCT GLUCOSE: 53 MG/DL (ref 70–110)
POCT GLUCOSE: 62 MG/DL (ref 70–110)
POCT GLUCOSE: 73 MG/DL (ref 70–110)
POCT GLUCOSE: 90 MG/DL (ref 70–110)
POCT GLUCOSE: 99 MG/DL (ref 70–110)
POTASSIUM SERPL-SCNC: 4.9 MMOL/L (ref 3.5–5.1)
POTASSIUM SERPL-SCNC: 5.5 MMOL/L (ref 3.5–5.1)
PROT SERPL-MCNC: 5.5 G/DL (ref 6–8.4)
RBC # BLD AUTO: 2.86 M/UL (ref 4–5.4)
SODIUM SERPL-SCNC: 126 MMOL/L (ref 136–145)
SODIUM SERPL-SCNC: 130 MMOL/L (ref 136–145)
WBC # BLD AUTO: 14.6 K/UL (ref 3.9–12.7)

## 2019-08-28 PROCEDURE — 25000003 PHARM REV CODE 250: Performed by: PHYSICIAN ASSISTANT

## 2019-08-28 PROCEDURE — 25000003 PHARM REV CODE 250: Performed by: STUDENT IN AN ORGANIZED HEALTH CARE EDUCATION/TRAINING PROGRAM

## 2019-08-28 PROCEDURE — 36415 COLL VENOUS BLD VENIPUNCTURE: CPT

## 2019-08-28 PROCEDURE — 63600175 PHARM REV CODE 636 W HCPCS: Mod: JG

## 2019-08-28 PROCEDURE — S4991 NICOTINE PATCH NONLEGEND: HCPCS | Performed by: PHYSICIAN ASSISTANT

## 2019-08-28 PROCEDURE — 80053 COMPREHEN METABOLIC PANEL: CPT

## 2019-08-28 PROCEDURE — 86580 TB INTRADERMAL TEST: CPT | Performed by: NEUROLOGICAL SURGERY

## 2019-08-28 PROCEDURE — 20600001 HC STEP DOWN PRIVATE ROOM

## 2019-08-28 PROCEDURE — 63600175 PHARM REV CODE 636 W HCPCS: Performed by: NEUROLOGICAL SURGERY

## 2019-08-28 PROCEDURE — 63600175 PHARM REV CODE 636 W HCPCS: Performed by: PHYSICIAN ASSISTANT

## 2019-08-28 PROCEDURE — 97116 GAIT TRAINING THERAPY: CPT

## 2019-08-28 PROCEDURE — 80048 BASIC METABOLIC PNL TOTAL CA: CPT

## 2019-08-28 PROCEDURE — 85025 COMPLETE CBC W/AUTO DIFF WBC: CPT

## 2019-08-28 PROCEDURE — 27000221 HC OXYGEN, UP TO 24 HOURS

## 2019-08-28 PROCEDURE — 30200315 PPD INTRADERMAL TEST REV CODE 302: Performed by: NEUROLOGICAL SURGERY

## 2019-08-28 PROCEDURE — 97535 SELF CARE MNGMENT TRAINING: CPT

## 2019-08-28 RX ORDER — IBUPROFEN 200 MG
24 TABLET ORAL
Status: DISCONTINUED | OUTPATIENT
Start: 2019-08-28 | End: 2019-09-10 | Stop reason: HOSPADM

## 2019-08-28 RX ORDER — IBUPROFEN 200 MG
16 TABLET ORAL
Status: DISCONTINUED | OUTPATIENT
Start: 2019-08-28 | End: 2019-09-10 | Stop reason: HOSPADM

## 2019-08-28 RX ORDER — FERROUS SULFATE 325(65) MG
325 TABLET, DELAYED RELEASE (ENTERIC COATED) ORAL 3 TIMES DAILY
Status: DISCONTINUED | OUTPATIENT
Start: 2019-08-28 | End: 2019-09-10 | Stop reason: HOSPADM

## 2019-08-28 RX ORDER — GLUCAGON 1 MG
KIT INJECTION
Status: COMPLETED
Start: 2019-08-28 | End: 2019-08-28

## 2019-08-28 RX ORDER — ASCORBIC ACID 250 MG
250 TABLET ORAL 3 TIMES DAILY
Status: DISCONTINUED | OUTPATIENT
Start: 2019-08-28 | End: 2019-09-10 | Stop reason: HOSPADM

## 2019-08-28 RX ORDER — DEXAMETHASONE 1 MG/1
2 TABLET ORAL EVERY 8 HOURS
Status: COMPLETED | OUTPATIENT
Start: 2019-08-29 | End: 2019-09-01

## 2019-08-28 RX ORDER — GLUCAGON 1 MG
1 KIT INJECTION
Status: DISCONTINUED | OUTPATIENT
Start: 2019-08-28 | End: 2019-09-04 | Stop reason: SDUPTHER

## 2019-08-28 RX ORDER — DEXAMETHASONE 4 MG/1
4 TABLET ORAL EVERY 12 HOURS
Status: COMPLETED | OUTPATIENT
Start: 2019-08-28 | End: 2019-08-29

## 2019-08-28 RX ORDER — DEXAMETHASONE 1 MG/1
2 TABLET ORAL EVERY 12 HOURS
Status: DISCONTINUED | OUTPATIENT
Start: 2019-09-01 | End: 2019-09-10 | Stop reason: HOSPADM

## 2019-08-28 RX ADMIN — Medication 250 MG: at 07:08

## 2019-08-28 RX ADMIN — SENNOSIDES,DOCUSATE SODIUM 1 TABLET: 8.6; 5 TABLET, FILM COATED ORAL at 07:08

## 2019-08-28 RX ADMIN — Medication 250 MG: at 03:08

## 2019-08-28 RX ADMIN — FERROUS SULFATE TAB EC 325 MG (65 MG FE EQUIVALENT) 325 MG: 325 (65 FE) TABLET DELAYED RESPONSE at 07:08

## 2019-08-28 RX ADMIN — GLUCAGON 1 MG: 1 INJECTION, POWDER, LYOPHILIZED, FOR SOLUTION INTRAMUSCULAR; INTRAVENOUS at 12:08

## 2019-08-28 RX ADMIN — DEXAMETHASONE 4 MG: 4 TABLET ORAL at 07:08

## 2019-08-28 RX ADMIN — GLUCAGON 1 MG: KIT at 12:08

## 2019-08-28 RX ADMIN — LEVOTHYROXINE SODIUM 75 MCG: 75 TABLET ORAL at 05:08

## 2019-08-28 RX ADMIN — DIVALPROEX SODIUM 500 MG: 250 TABLET, DELAYED RELEASE ORAL at 10:08

## 2019-08-28 RX ADMIN — Medication 250 MG: at 10:08

## 2019-08-28 RX ADMIN — FERROUS SULFATE TAB EC 325 MG (65 MG FE EQUIVALENT) 325 MG: 325 (65 FE) TABLET DELAYED RESPONSE at 03:08

## 2019-08-28 RX ADMIN — SENNOSIDES,DOCUSATE SODIUM 1 TABLET: 8.6; 5 TABLET, FILM COATED ORAL at 10:08

## 2019-08-28 RX ADMIN — ENOXAPARIN SODIUM 40 MG: 100 INJECTION SUBCUTANEOUS at 05:08

## 2019-08-28 RX ADMIN — FERROUS SULFATE TAB EC 325 MG (65 MG FE EQUIVALENT) 325 MG: 325 (65 FE) TABLET DELAYED RESPONSE at 10:08

## 2019-08-28 RX ADMIN — POLYETHYLENE GLYCOL 3350 17 G: 17 POWDER, FOR SOLUTION ORAL at 10:08

## 2019-08-28 RX ADMIN — DEXAMETHASONE 4 MG: 4 TABLET ORAL at 10:08

## 2019-08-28 RX ADMIN — NICOTINE 1 PATCH: 7 PATCH, EXTENDED RELEASE TRANSDERMAL at 10:08

## 2019-08-28 RX ADMIN — Medication 5 UNITS: at 10:08

## 2019-08-28 RX ADMIN — DIVALPROEX SODIUM 500 MG: 250 TABLET, DELAYED RELEASE ORAL at 07:08

## 2019-08-28 RX ADMIN — SODIUM CHLORIDE TAB 1 GM 2 G: 1 TAB at 05:08

## 2019-08-28 RX ADMIN — PANTOPRAZOLE SODIUM 40 MG: 40 TABLET, DELAYED RELEASE ORAL at 10:08

## 2019-08-28 RX ADMIN — SODIUM CHLORIDE TAB 1 GM 2 G: 1 TAB at 07:08

## 2019-08-28 NOTE — PT/OT/SLP PROGRESS
Physical Therapy Treatment    Patient Name:  Carmen Leyva   MRN:  [id number]    Recommendations:     Discharge Recommendations:  nursing facility, basic   Discharge Equipment Recommendations: (unable to determine due to pt's cognition)   Barriers to discharge: Decreased caregiver support (will need 24hour care)    Assessment:     Carmen Leyva is a 59 y.o. female admitted with a medical diagnosis of Brain metastasis.  She presents with the following impairments/functional limitations:  weakness, impaired endurance, impaired self care skills, impaired functional mobilty, gait instability, impaired balance, visual deficits, impaired cognition .     Pt angela session well but remains limited by the above listed deficits. She presented today w/ increased instability compared to previous PT session- especially noted during ambulation in veloz. Pt's deficits are complicated w/ impaired cognition and therefore difficulty following PT cues for safety and fall prevention. Pt will require 24hour care upon D/C.    Rehab Prognosis: Fair; patient would benefit from acute skilled PT services to address these deficits and reach maximum level of function.    Recent Surgery: Procedure(s) (LRB):  L craniotomy for tumor (Left) 16 Days Post-Op    Plan:     During this hospitalization, patient to be seen 2 x/week to address the identified rehab impairments via gait training, therapeutic activities, therapeutic exercises and progress toward the following goals:    · Plan of Care Expires:  09/13/19    Subjective     Chief Complaint: none  Patient/Family Comments/goals: Pt agreeable to session.  Pain/Comfort:  · Pain Rating 1: 0/10      Objective:     Communicated with nursing prior to session.  Patient found supine with bed alarm, telemetry(SARA System camera) upon PT entry to room.     General Precautions: Standard, aspiration, fall, vision impaired, hearing impaired   Orthopedic Precautions:N/A   Braces: N/A     Functional Mobility:  · Bed  Mobility:   · Supine<>sit on bed Johnson  · Transfers:   · Sit<>stand to/from EOB w/o AD w/ SBA  · Gait:   · ~150ft w/o AD w/ Min/CGA for stability  · Pt holding veloz rail at times to stabilize  · Multiple LOB noted requiring Kimberly to recover  · Balance:   · Static stand w/o AD w/ CGA      AM-PAC 6 CLICK MOBILITY  Turning over in bed (including adjusting bedclothes, sheets and blankets)?: 4  Sitting down on and standing up from a chair with arms (e.g., wheelchair, bedside commode, etc.): 3  Moving from lying on back to sitting on the side of the bed?: 4  Moving to and from a bed to a chair (including a wheelchair)?: 3  Need to walk in hospital room?: 3  Climbing 3-5 steps with a railing?: 3  Basic Mobility Total Score: 20       Therapeutic Activities and Exercises:   Pt re-educated on PT role and POC along w/ use of call light for assistance.     Patient left supine with all lines intact, call button in reach, bed alarm on and SARA System camera present..    GOALS:   Multidisciplinary Problems     Physical Therapy Goals        Problem: Physical Therapy Goal    Goal Priority Disciplines Outcome Goal Variances Interventions   Physical Therapy Goal     PT, PT/OT Ongoing (interventions implemented as appropriate)     Description:  Goals to be met by: 2019    Patient will increase functional independence with mobility by performin. Supine <> sit with supervision - MET 2019  2. Sit to stand transfer with Supervision- MET 2019  3. Gait  x 150 feet with Supervision using appropriate AD.   4. Stand for 3 minutes with Supervision while performing dynamic balance activities to reduce fall risk - MET  5. Reach for object on floor outside of DALLIN with no LOB and Supervision to reduce fall risk  6. Lower extremity exercise program x20 reps per handout, with assistance as needed                         Time Tracking:     PT Received On: 19  PT Start Time: 906     PT Stop Time: 921  PT Total Time (min): 15  min     Billable Minutes: Gait Training 15 and Total Time 15    Treatment Type: Treatment  PT/PTA: PT     PTA Visit Number: 0     Keara Newton PT  08/28/2019

## 2019-08-28 NOTE — PLAN OF CARE
BARBIE received call from patient's sister, Salma ([phone]), who requested a call from the patient's physician regarding the patient's prognosis and path report. BARBIE informed Salma that the patient does not qualify for Rehab anymore and that the current care team recommendation is Nursing Home or Home with 24 hour care. Salma stated that she does not want the patient to go to a Nursing Home to die but understands that she needs 24 hours care. BARBIE informed Salma that the patient has been denied by multiple Nursing Homes in the [facility].   BARBIE informed MARY ELLEN Campos, to call patient's son, David to give an update.     Cindy Valladares, LMSW [facility]  [id number]

## 2019-08-28 NOTE — PROGRESS NOTES
Spoke with patient's son, David, to give path results and plan regarding referrals to Oncology. All of his questions were answered.         Please call with any questions      Beth Loo PA-C   Neurosurgery   Pager: [phone]

## 2019-08-28 NOTE — PROGRESS NOTES
[facility]  Neurosurgery  Progress Note    Subjective:     History of Present Illness: 59F w/ PMH COPD, HTN, hypothyroidism, stage 4 R small cell lung cancer s/p 6 cycles of carbo/etoposide in remission since 06/2018 who [place] [facility] [place] w/ L parietal brain mass. Per EMS records patient had a 1d history of AMS and gait difficulty and was brought into the hospital by her family for evaluation. CTH @ OSH showed large L parietal brain mass with possible hemorrhagic component and she was transferred [place] [facility] [place]. [place] @ [facility] redemonstrated L nonenhancing parietal mass with minimal blood. Patient is confused and so ROS is tenuous.    Post-Op Info:  Procedure(s) (LRB):  L craniotomy for tumor (Left)   16 Days Post-Op     Interval History:   Hypoglycemia due to poor PO intake. Improved with food and glucagon injection. Patient continues to deny headaches, dizziness, N/V, vision changes, or any new symptoms. Tolerates diet but inattentive and often forgets to eat. Voiding appropriately.     Medications:  Continuous Infusions:  Scheduled Meds:   ascorbic acid (vitamin C)  250 mg Oral TID    dexAMETHasone  4 mg Oral Q12H    Followed by    [START ON 9/2/2019] dexAMETHasone  3 mg Oral Q12H    Followed by    [START ON 9/9/2019] dexAMETHasone  2 mg Oral Q12H    divalproex  500 mg Oral Q12H    enoxaparin  40 mg Subcutaneous Daily    ferrous sulfate  325 mg Oral TID    glucagon (human recombinant)        levothyroxine  75 mcg Oral Before breakfast    nicotine  1 patch Transdermal Daily    pantoprazole  40 mg Oral Daily    polyethylene glycol  17 g Oral Daily    senna-docusate 8.6-50 mg  1 tablet Oral BID     PRN Meds:acetaminophen, albuterol-ipratropium, dextrose 50%, dextrose 50%, glucagon (human recombinant), glucose, glucose, haloperidol lactate, ondansetron, oxyCODONE, QUEtiapine, sodium chloride 0.9%     Review of Systems  Objective:     Weight: 35.4 kg (78 lb)  Body mass  index is 14.74 kg/m².  Vital Signs (Most Recent):  Temp: 96 °F (35.6 °C) (08/28/19 0516)  Pulse: 92 (08/28/19 1242)  Resp: 16 (08/28/19 1242)  BP: 112/74 (08/28/19 0802)  SpO2: (S) (unable to detect due to perfusion to fingers) (08/28/19 1242) Vital Signs (24h Range):  Temp:  [96 °F (35.6 °C)-97.4 °F (36.3 °C)] 96 °F (35.6 °C)  Pulse:  [55-92] 92  Resp:  [16-18] 16  SpO2:  [95 %-98 %] 96 %  BP: (104-112)/(62-74) 112/74       Neurosurgery Physical Exam   General: well developed, well nourished, no distress. Flat affect.   Head: normocephalic  Neurologic: Alert and oriented. Somewhat inattentive.   GCS: Motor: 6/Verbal: 4/Eyes: 4 GCS Total: 14  Mental Status: Awake, Alert, Oriented x 2  Language: Receptive aphasia  Speech: No dysarthria  Cranial nerves: face symmetric, tongue midline, CN II-XII grossly intact.   Eyes: Pupils anisocoric, R>L, round, reactive to light with accomodation, EOMI.   Pulmonary: normal respirations, no signs of respiratory distress  Abdomen: soft, non-distended, not tender to palpation  Skin: Skin is warm, dry and intact.  Sensory: response to light touch throughout  Motor Strength: Moves all extremities spontaneously with good tone.  Full strength upper and lower extremities. No abnormal movements seen.   Pronator drift: absent bilaterally  Finger to nose: unable to assess 2/2 mental status       Incision:   Clean, dry, staples intact. Skin edges well approximated. No surrounding erythema or edema. No drainage or TTP. Incision appears to have healed well.       Significant Labs:  Recent Labs   Lab 08/28/19 0314   GLU 86   *   K 5.5*   CL 92*   CO2 24   BUN 28*   CREATININE 0.8   CALCIUM 8.9     Recent Labs   Lab 08/28/19 0314   WBC 14.60*   HGB 9.4*   HCT 28.6*   PLT 89*         Assessment/Plan:     * Brain metastasis  59F w/ PMH COPD, HTN, hypothyroidism, stage 4 R small cell lung cancer s/p 6 cycles of carbo/etoposide in remission since 06/2018 who [place] [facility] [place] w/ L  parietal brain mass. Now s/p crani for debulking on 8/12.    -Patient neurologically stable on exam  -Pathology (8/19): Metastatic small cell carcinoma. Patient established with Dr. Winston Miller ([facility]). Routine FU scheduled for 9/25. Clinic messaged about moving up appt.  -Staples removed without complication. Patient tolerated removal well.   -Seizure prevention: Continue Depakote 500mg BID. Patient was on dual AED's. There was no reported seizure activity pre or post op. Keppra stopped on 8/27.  -Cerebral edema: Continue 3 week Dex taper to 2 mg BID - currently 4mg q12h. Continue PPI while on steroids.   -Thrombocytopenia: Plts 89. Stable. Will continue to monitor.     -Malnutrition:  Continue boost and jennifer supplements TID to promote wound healing.    -Hypoglycemia 2/2 malnutrition: hypoglycemia orders placed. Patient to have assistance with all meals to maximize intake.   -Hyponatremia: Na 130 this morning but dropped to 126 this afternoon. Begin Na tabs 2 g QID. Will continue Na tabs until PO intake improves.   -Hyperkalemia: K 5.5 this morning but repeat K now 4.9. Will continue to monitor.     -Psyc: No use of wrist restraints. Weaned to tele-sitter. Continue redirection and delirium precautions. Continue Seroquel TID PRN. Haldol prn qhs. Medical and physical precautions to minimize agitation in place.   -HTN:  Maintain SBP < 160. Home dose of Norvasc has been held since SBP at goal. Will continue to monitor and restart if SBP becomes elevated.   -Hypothyroidism: Continue home dose of Synthroid  -Tobacco abuse: Continue nicotine patch  -COPD: Continue duo nebs PRN, pulse ox q 4 hours    -DVT prophylaxis: ABI's, SCD's, lovenox  -Bowel regimen: senna BID and miralax daily  -Atelectasis prevention: IS hourly while awake, PT/OT, OOB > 6 hours per day    DISPO: Medically stable for discharge. Pending nursing home acceptance.         Please call with any questions      Beth Loo PA-C   Neurosurgery    Pager: [phone]

## 2019-08-28 NOTE — PLAN OF CARE
CM had spoken with patients son and explained that we had received  multiple denials and that we had to expand the search for penitentiary placement.  Son in agreement.

## 2019-08-28 NOTE — ASSESSMENT & PLAN NOTE
59F w/ PMH COPD, HTN, hypothyroidism, stage 4 R small cell lung cancer s/p 6 cycles of carbo/etoposide in remission since 06/2018 who presents to [facility] ED from OSH w/ L parietal brain mass. Now s/p crani for debulking on 8/12.    -Patient neurologically stable on exam  -Pathology (8/19): Metastatic small cell carcinoma. Patient established with Dr. Winston Miller ([facility]). Routine FU scheduled for 9/25. Clinic messaged about moving up appt.  -Seizure prevention: Continue Depakote 500mg BID. Patient was on dual AED's. There was no reported seizure activity pre or post op. Keppra stopped on 8/27.  -Cerebral edema: Continue 3 week Dex taper to 2 mg BID - currently 4mg q12h. Continue PPI while on steroids.   -Thrombocytopenia: Plts 89. Stable. Will continue to monitor.     -Malnutrition:  Continue boost and jennifer supplements TID to promote wound healing.    -Hypoglycemia 2/2 malnutrition: hypoglycemia orders placed. Patient to have assistance with all meals to maximize intake.   -Hyponatremia: Na 130 this morning but dropped to 126 this afternoon. Begin Na tabs 2 g QID. Will continue Na tabs until PO intake improves.   -Hyperkalemia: K 5.5 this morning but repeat K now 4.9. Will continue to monitor.     -Psyc: No use of wrist restraints. Weaned to tele-sitter. Continue redirection and delirium precautions. Continue Seroquel TID PRN. Haldol prn qhs. Medical and physical precautions to minimize agitation in place.   -HTN:  Maintain SBP < 160. Home dose of Norvasc has been held since SBP at goal. Will continue to monitor and restart if SBP becomes elevated.   -Hypothyroidism: Continue home dose of Synthroid  -Tobacco abuse: Continue nicotine patch  -COPD: Continue duo nebs PRN, pulse ox q 4 hours    -DVT prophylaxis: ABI's, SCD's, lovenox  -Bowel regimen: senna BID and miralax daily  -Atelectasis prevention: IS hourly while awake, PT/OT, OOB > 6 hours per day    DISPO: Medically stable for discharge. Pending nursing  home acceptance.

## 2019-08-28 NOTE — SUBJECTIVE & OBJECTIVE
Interval History:   Hypoglycemia due to poor PO intake. Improved with food and glucagon injection. Patient continues to deny headaches, dizziness, N/V, vision changes, or any new symptoms. Tolerates diet but inattentive and often forgets to eat. Voiding appropriately.     Medications:  Continuous Infusions:  Scheduled Meds:   ascorbic acid (vitamin C)  250 mg Oral TID    dexAMETHasone  4 mg Oral Q12H    Followed by    [START ON 9/2/2019] dexAMETHasone  3 mg Oral Q12H    Followed by    [START ON 9/9/2019] dexAMETHasone  2 mg Oral Q12H    divalproex  500 mg Oral Q12H    enoxaparin  40 mg Subcutaneous Daily    ferrous sulfate  325 mg Oral TID    glucagon (human recombinant)        levothyroxine  75 mcg Oral Before breakfast    nicotine  1 patch Transdermal Daily    pantoprazole  40 mg Oral Daily    polyethylene glycol  17 g Oral Daily    senna-docusate 8.6-50 mg  1 tablet Oral BID     PRN Meds:acetaminophen, albuterol-ipratropium, dextrose 50%, dextrose 50%, glucagon (human recombinant), glucose, glucose, haloperidol lactate, ondansetron, oxyCODONE, QUEtiapine, sodium chloride 0.9%     Review of Systems  Objective:     Weight: 35.4 kg (78 lb)  Body mass index is 14.74 kg/m².  Vital Signs (Most Recent):  Temp: 96 °F (35.6 °C) (08/28/19 0516)  Pulse: 92 (08/28/19 1242)  Resp: 16 (08/28/19 1242)  BP: 112/74 (08/28/19 0802)  SpO2: (S) (unable to detect due to perfusion to fingers) (08/28/19 1242) Vital Signs (24h Range):  Temp:  [96 °F (35.6 °C)-97.4 °F (36.3 °C)] 96 °F (35.6 °C)  Pulse:  [55-92] 92  Resp:  [16-18] 16  SpO2:  [95 %-98 %] 96 %  BP: (104-112)/(62-74) 112/74       Neurosurgery Physical Exam   General: well developed, well nourished, no distress. Flat affect.   Head: normocephalic  Neurologic: Alert and oriented. Somewhat inattentive.   GCS: Motor: 6/Verbal: 4/Eyes: 4 GCS Total: 14  Mental Status: Awake, Alert, Oriented x 2  Language: Receptive aphasia  Speech: No dysarthria  Cranial nerves: face  symmetric, tongue midline, CN II-XII grossly intact.   Eyes: Pupils anisocoric, R>L, round, reactive to light with accomodation, EOMI.   Pulmonary: normal respirations, no signs of respiratory distress  Abdomen: soft, non-distended, not tender to palpation  Skin: Skin is warm, dry and intact.  Sensory: response to light touch throughout  Motor Strength: Moves all extremities spontaneously with good tone.  Full strength upper and lower extremities. No abnormal movements seen.   Pronator drift: absent bilaterally  Finger to nose: unable to assess 2/2 mental status       Incision:   Clean, dry, staples intact. Skin edges well approximated. No surrounding erythema or edema. No drainage or TTP. Incision appears to have healed well.       Significant Labs:  Recent Labs   Lab 08/28/19 0314   GLU 86   *   K 5.5*   CL 92*   CO2 24   BUN 28*   CREATININE 0.8   CALCIUM 8.9     Recent Labs   Lab 08/28/19 0314   WBC 14.60*   HGB 9.4*   HCT 28.6*   PLT 89*

## 2019-08-28 NOTE — PLAN OF CARE
Problem: Adult Inpatient Plan of Care  Goal: Plan of Care Review  Outcome: Ongoing (interventions implemented as appropriate)  POC reviewed with pt.  Pt A & disoriented x 4, adequate urine output via commode. Vital signs stable on 2L O2 on NC. Pt denies pain. Pt NSR on telemetry.  Blood sugar low this morning, (33), gave glucagon IM and gave her juice and boost. Came up to 99, has been WDL since that time.  Pt is confused but pleasant.

## 2019-08-28 NOTE — PLAN OF CARE
Problem: Physical Therapy Goal  Goal: Physical Therapy Goal  Goals to be met by: 2019    Patient will increase functional independence with mobility by performin. Supine <> sit with supervision - MET 2019  2. Sit to stand transfer with Supervision- MET 2019  3. Gait  x 150 feet with Supervision using appropriate AD.   4. Stand for 3 minutes with Supervision while performing dynamic balance activities to reduce fall risk - MET  5. Reach for object on floor outside of DALLIN with no LOB and Supervision to reduce fall risk  6. Lower extremity exercise program x20 reps per handout, with assistance as needed        Outcome: Ongoing (interventions implemented as appropriate)  LTGs remain appropriate. Pt will continue PT POC.    Keara Newton, PT  2019

## 2019-08-28 NOTE — PLAN OF CARE
Problem: Occupational Therapy Goal  Goal: Occupational Therapy Goal  Goals set 8/19 to be addressed for 14 days with expiration date, 9/2:  Patient will increase functional independence with ADLs by performing:    Patient will demonstrate rolling to the right with modified independence.  Not met   Patient will demonstrate rolling to the left with modified independence.   Not met  Patient will demonstrate supine -sit with modified independence.   Not met  Patient will demonstrate stand pivot transfers with modified independence.   Not met  Patient will demonstrate grooming while standing with modified independence.   Not met  Patient will demonstrate upper body dressing with modified independence while seated EOB.   Not met  Patient will demonstrate lower body dressing with modified independence while seated EOB.   Not met  Patient will demonstrate toileting with modified independence.   Not met  Patient will demonstrate bathing while seated EOB with modified independence.   Not met   Goals remain appropriate.  OSIEL Lynch  8/28/2019

## 2019-08-28 NOTE — PLAN OF CARE
BARBIE following for DC needs. BARBIE in communication with Rhonda GARCIA.    BARBIE called [facility] ([phone]) to follow up on referral. Admissions was in their morning meeting. SW left a message with the .     UPDATE 2:50 PM  SW attempted to call [facility] ([phone]) again and was told that Yodit with admissions is on the phone. SW left a message.     UPDATE 4:08 PM  SW received call from [facility] asking for updates on the patient. BARBIE sent updates via [employer].     Cindy Valladares, MARI  [facility]  [phone]

## 2019-08-28 NOTE — PT/OT/SLP PROGRESS
Occupational Therapy   Treatment    Name: Carmen Leyva  MRN: [id number]  Admitting Diagnosis:  Brain metastasis  16 Days Post-Op    Recommendations:     Discharge Recommendations: nursing facility, basic  Discharge Equipment Recommendations:  shower chair  Barriers to discharge:  Decreased caregiver support    Assessment:     Carmen Leyva is a 59 y.o. female with a medical diagnosis of Brain metastasis.  She presents with performance deficits affecting function are weakness, impaired self care skills, impaired balance, impaired functional mobilty, gait instability, impaired cognition.     Rehab Prognosis:  Fair; patient would benefit from acute skilled OT services to address these deficits and reach maximum level of function.       Plan:     Patient to be seen 3 x/week to address the above listed problems via self-care/home management, cognitive retraining, therapeutic activities, therapeutic exercises  · Plan of Care Expires: 09/11/19  · Plan of Care Reviewed with: patient    Subjective     Pain/Comfort:  · Pain Rating 1: 0/10  · Pain Rating Post-Intervention 1: 0/10    Objective:     Communicated with: Nurse prior to session.  Patient found supine with bed alarm, telemetry upon OT entry to room.    General Precautions: Standard, aspiration, fall, vision impaired, hearing impaired   Orthopedic Precautions:N/A   Braces: N/A     Occupational Performance:     Bed Mobility:    · Patient completed Rolling/Turning to Left with  stand by assistance  · Patient completed Rolling/Turning to Right with stand by assistance  · Patient completed Scooting/Bridging with stand by assistance  · Patient completed Supine to Sit with stand by assistance  · Patient completed Sit to Supine with stand by assistance     Functional Mobility/Transfers:  · Patient completed Sit <> Stand Transfer with stand by assistance  with  no assistive device   · Patient completed Bed <> Chair Transfer using Stand Pivot technique with contact guard  assistance with no assistive device    Activities of Daily Living:  · Grooming: contact guard assistance while standing      [facility] 6 Click ADL: 18    Patient left supine with all lines intact and call button in reachEducation:      GOALS:   Multidisciplinary Problems     Occupational Therapy Goals        Problem: Occupational Therapy Goal    Goal Priority Disciplines Outcome Interventions   Occupational Therapy Goal     OT, PT/OT Ongoing (interventions implemented as appropriate)    Description:  Goals set 8/19 to be addressed for 14 days with expiration date, 9/2:  Patient will increase functional independence with ADLs by performing:    Patient will demonstrate rolling to the right with modified independence.  Not met   Patient will demonstrate rolling to the left with modified independence.   Not met  Patient will demonstrate supine -sit with modified independence.   Not met  Patient will demonstrate stand pivot transfers with modified independence.   Not met  Patient will demonstrate grooming while standing with modified independence.   Not met  Patient will demonstrate upper body dressing with modified independence while seated EOB.   Not met  Patient will demonstrate lower body dressing with modified independence while seated EOB.   Not met  Patient will demonstrate toileting with modified independence.   Not met  Patient will demonstrate bathing while seated EOB with modified independence.   Not met                    Time Tracking:     OT Date of Treatment: 08/28/19  OT Start Time: 1035  OT Stop Time: 1052  OT Total Time (min): 17 min    Billable Minutes:Self Care/Home Management 17    OSIEL Lynch  8/28/2019

## 2019-08-28 NOTE — PLAN OF CARE
Problem: Adult Inpatient Plan of Care  Goal: Plan of Care Review  Outcome: Ongoing (interventions implemented as appropriate)  POC reviewed with patient. VSS throughout shift. Fall/safety precautions implemented and maintained. Blood glucose monitored. Bed locked in lowest position. Call bell within reach. Will continue to monitor.

## 2019-08-29 ENCOUNTER — TELEPHONE (OUTPATIENT)
Dept: HEMATOLOGY/ONCOLOGY | Facility: CLINIC | Age: 59
End: 2019-08-29

## 2019-08-29 LAB
ANION GAP SERPL CALC-SCNC: 9 MMOL/L (ref 8–16)
BUN SERPL-MCNC: 26 MG/DL (ref 6–20)
CALCIUM SERPL-MCNC: 8.6 MG/DL (ref 8.7–10.5)
CHLORIDE SERPL-SCNC: 93 MMOL/L (ref 95–110)
CO2 SERPL-SCNC: 30 MMOL/L (ref 23–29)
CREAT SERPL-MCNC: 0.9 MG/DL (ref 0.5–1.4)
EST. GFR  (AFRICAN AMERICAN): >60 ML/MIN/1.73 M^2
EST. GFR  (NON AFRICAN AMERICAN): >60 ML/MIN/1.73 M^2
GLUCOSE SERPL-MCNC: 98 MG/DL (ref 70–110)
POCT GLUCOSE: 135 MG/DL (ref 70–110)
POCT GLUCOSE: 58 MG/DL (ref 70–110)
POCT GLUCOSE: 70 MG/DL (ref 70–110)
POCT GLUCOSE: 77 MG/DL (ref 70–110)
POCT GLUCOSE: 93 MG/DL (ref 70–110)
POCT GLUCOSE: 96 MG/DL (ref 70–110)
POTASSIUM SERPL-SCNC: 4.7 MMOL/L (ref 3.5–5.1)
SODIUM SERPL-SCNC: 132 MMOL/L (ref 136–145)

## 2019-08-29 PROCEDURE — 99900035 HC TECH TIME PER 15 MIN (STAT)

## 2019-08-29 PROCEDURE — S4991 NICOTINE PATCH NONLEGEND: HCPCS | Performed by: PHYSICIAN ASSISTANT

## 2019-08-29 PROCEDURE — 94761 N-INVAS EAR/PLS OXIMETRY MLT: CPT

## 2019-08-29 PROCEDURE — 63600175 PHARM REV CODE 636 W HCPCS: Performed by: NEUROLOGICAL SURGERY

## 2019-08-29 PROCEDURE — 25000003 PHARM REV CODE 250: Performed by: PHYSICIAN ASSISTANT

## 2019-08-29 PROCEDURE — 20600001 HC STEP DOWN PRIVATE ROOM

## 2019-08-29 PROCEDURE — 80048 BASIC METABOLIC PNL TOTAL CA: CPT

## 2019-08-29 PROCEDURE — 27000221 HC OXYGEN, UP TO 24 HOURS

## 2019-08-29 PROCEDURE — 25000003 PHARM REV CODE 250: Performed by: STUDENT IN AN ORGANIZED HEALTH CARE EDUCATION/TRAINING PROGRAM

## 2019-08-29 PROCEDURE — 36415 COLL VENOUS BLD VENIPUNCTURE: CPT

## 2019-08-29 PROCEDURE — 63600175 PHARM REV CODE 636 W HCPCS: Performed by: PHYSICIAN ASSISTANT

## 2019-08-29 RX ADMIN — Medication 250 MG: at 08:08

## 2019-08-29 RX ADMIN — DIVALPROEX SODIUM 500 MG: 250 TABLET, DELAYED RELEASE ORAL at 08:08

## 2019-08-29 RX ADMIN — Medication 250 MG: at 09:08

## 2019-08-29 RX ADMIN — SODIUM CHLORIDE TAB 1 GM 2 G: 1 TAB at 04:08

## 2019-08-29 RX ADMIN — SODIUM CHLORIDE TAB 1 GM 2 G: 1 TAB at 09:08

## 2019-08-29 RX ADMIN — FERROUS SULFATE TAB EC 325 MG (65 MG FE EQUIVALENT) 325 MG: 325 (65 FE) TABLET DELAYED RESPONSE at 09:08

## 2019-08-29 RX ADMIN — Medication 250 MG: at 04:08

## 2019-08-29 RX ADMIN — FERROUS SULFATE TAB EC 325 MG (65 MG FE EQUIVALENT) 325 MG: 325 (65 FE) TABLET DELAYED RESPONSE at 03:08

## 2019-08-29 RX ADMIN — SENNOSIDES,DOCUSATE SODIUM 1 TABLET: 8.6; 5 TABLET, FILM COATED ORAL at 09:08

## 2019-08-29 RX ADMIN — ENOXAPARIN SODIUM 40 MG: 100 INJECTION SUBCUTANEOUS at 05:08

## 2019-08-29 RX ADMIN — PANTOPRAZOLE SODIUM 40 MG: 40 TABLET, DELAYED RELEASE ORAL at 09:08

## 2019-08-29 RX ADMIN — FERROUS SULFATE TAB EC 325 MG (65 MG FE EQUIVALENT) 325 MG: 325 (65 FE) TABLET DELAYED RESPONSE at 08:08

## 2019-08-29 RX ADMIN — DEXAMETHASONE 2 MG: 1 TABLET ORAL at 08:08

## 2019-08-29 RX ADMIN — LEVOTHYROXINE SODIUM 75 MCG: 75 TABLET ORAL at 05:08

## 2019-08-29 RX ADMIN — DIVALPROEX SODIUM 500 MG: 250 TABLET, DELAYED RELEASE ORAL at 09:08

## 2019-08-29 RX ADMIN — SODIUM CHLORIDE TAB 1 GM 2 G: 1 TAB at 01:08

## 2019-08-29 RX ADMIN — NICOTINE 1 PATCH: 7 PATCH, EXTENDED RELEASE TRANSDERMAL at 09:08

## 2019-08-29 RX ADMIN — DEXAMETHASONE 4 MG: 4 TABLET ORAL at 09:08

## 2019-08-29 RX ADMIN — SODIUM CHLORIDE TAB 1 GM 2 G: 1 TAB at 08:08

## 2019-08-29 NOTE — CODE/ RAPID DOCUMENTATION
"RAPID RESPONSE NURSE PROACTIVE ROUNDING NOTE     Time of Visit: 1220    Admit Date: 2019  LOS: 18  Code Status: Full Code   Date of Visit: 2019  : 1960  Age: 59 y.o.  Sex: female  Race: White  [facility]   MRN: [id number]  Was the patient discharged from an ICU this admission? yes   Was the patient discharged from a PACU within last 24 hours?  no  Did the patient receive conscious sedation/general anesthesia in last 24 hours?  no  Was the patient in the ED within the past 24 hours?  no  Was the patient started on NIPPV within the past 24 hours?  no  Attending Physician: Gaob Erickson MD  Primary Service: Networked reference to record PCT     ASSESSMENT     Diagnosis: Brain metastasis    Abnormal Vital Signs: BP (!) 111/52 (BP Location: Left arm)   Pulse 78   Temp 97.1 °F (36.2 °C)   Resp 18   Ht 5' 1" (1.549 m)   Wt 35.4 kg (78 lb)   SpO2 100%   Breastfeeding? No   BMI 14.74 kg/m²      Clinical Issues: Circulatory    Patient  has a past medical history of Acid reflux, Anemia, Anxiety, Arthritis, Blindness - both eyes, Chronic kidney disease, COPD (chronic obstructive pulmonary disease), GERD (gastroesophageal reflux disease), Gout, Hypertension, Lung cancer, Lung cancer, main bronchus, right, Osteopenia, Rheumatoid arteritis, SIADH (syndrome of inappropriate ADH production), Solitary kidney, Thyroid disease, and Vitamin D deficiency.    Upon assessment, pt resting comfortably in bed. RN and RT at bedside. HR 98, RR 18, /52, SpO2 100% on 1L NC. Bilateral fingers and toes cyanotic. Radial and dorsalis pedis pulses 2+. Dr. Araiza at bedside to examine pt. No new orders given. Hemodynamically stable at this time.      INTERVENTIONS/ RECOMMENDATIONS     Monitor pt hemodynamic status closely.     Discussed plan of care with Zeenat FOWLER    PHYSICIAN ESCALATION     Yes/No  yes    Orders received and case discussed with Dr. Araiza.    Disposition: Remain in room 706.    FOLLOW-UP "     Call back the Rapid Response Nurse, Natasha Banerjee RN at [phone] for additional questions or concerns.

## 2019-08-29 NOTE — NURSING
Pt 's fingers cyanotic. Unable to obtain o2sat on finger. Respiratory called. Neurosurgery paged.pt is with unlabored breathing. In room with patient and will continue to monitor.

## 2019-08-29 NOTE — TELEPHONE ENCOUNTER
----- Message from MARY ELLEN Sotomayor sent at 8/28/2019  1:00 PM CDT -----  Dr. Carr,    My name is Beth Loo and I am a PA with Dr. Erickson. Ms. Grande underwent a left temporal craniotomy for tumor resection on 8/12. Pathology has resulted as Metastatic small cell carcinoma. I see that she has an appt scheduled with you for the end of September. I didn't know if that date was fine or if the appointment needed to be moved up. I just wanted to update you. Please let me know if you have any questions.     Thanks!  Beth Loo  [phone]      Regi is getting her an appt. Here.

## 2019-08-29 NOTE — NURSING
Assisted pt with meal. Pt completed 75 % of meal and 100 % of both boost supplements for low bs of 58. Pt with no visual symptoms of low blood sugar. Will continue to monitor as per unit protocol.

## 2019-08-29 NOTE — SUBJECTIVE & OBJECTIVE
Interval History: NAEON. Received glucose this AM for low BG, asymptomatic. Patient very talkative this AM however unable to comprehend train of thought. BEAN spontaneously. Pending rehab.     Medications:  Continuous Infusions:  Scheduled Meds:   ascorbic acid (vitamin C)  250 mg Oral TID    dexAMETHasone  2 mg Oral Q8H    Followed by    [START ON 9/1/2019] dexAMETHasone  2 mg Oral Q12H    divalproex  500 mg Oral Q12H    enoxaparin  40 mg Subcutaneous Daily    ferrous sulfate  325 mg Oral TID    levothyroxine  75 mcg Oral Before breakfast    nicotine  1 patch Transdermal Daily    pantoprazole  40 mg Oral Daily    polyethylene glycol  17 g Oral Daily    senna-docusate 8.6-50 mg  1 tablet Oral BID    sodium chloride  2 g Oral QID     PRN Meds:acetaminophen, albuterol-ipratropium, Dextrose 10% Bolus, Dextrose 10% Bolus, glucagon (human recombinant), glucose, glucose, haloperidol lactate, ondansetron, oxyCODONE, QUEtiapine, sodium chloride 0.9%     Review of Systems  Objective:     Weight: 35.4 kg (78 lb)  Body mass index is 14.74 kg/m².  Vital Signs (Most Recent):  Temp: 97.1 °F (36.2 °C) (08/29/19 1119)  Pulse: 78 (08/29/19 1119)  Resp: 18 (08/29/19 1119)  BP: 98/60 (08/29/19 1119)  SpO2: 100 % (08/29/19 1208) Vital Signs (24h Range):  Temp:  [96.3 °F (35.7 °C)-97.5 °F (36.4 °C)] 97.1 °F (36.2 °C)  Pulse:  [71-92] 78  Resp:  [16-18] 18  SpO2:  [95 %-100 %] 100 %  BP: ()/(52-66) 98/60                          Neurosurgery Physical Exam   General: well developed, well nourished, no distress. Flat affect.   Head: normocephalic  Neurologic: Alert and oriented. Somewhat inattentive.   GCS: Motor: 6/Verbal: 4/Eyes: 4 GCS Total: 14  Mental Status: Awake, Alert, Oriented x 2  Language: Receptive aphasia  Speech: No dysarthria  Cranial nerves: face symmetric, tongue midline, CN II-XII grossly intact.   Eyes: Pupils anisocoric, R>L, round, reactive to light with accomodation, EOMI. R eye looks slightly more  lateral at baseline.   Pulmonary: normal respirations, no signs of respiratory distress  Abdomen: soft, non-distended, not tender to palpation  Skin: Skin is warm, dry and intact.  Sensory: response to light touch throughout  Motor Strength: Moves all extremities spontaneously with good tone.  Full strength upper and lower extremities. No abnormal movements seen.   Pronator drift: absent bilaterally  Finger to nose: unable to assess 2/2 mental status        Incision:   Clean, dry, staples intact. Skin edges well approximated. No surrounding erythema or edema. No drainage or TTP. Incision appears to have healed well.        Significant Labs:  Recent Labs   Lab 08/28/19  0314 08/28/19  1323 08/29/19  0952   GLU 86 163* 98   * 126* 132*   K 5.5* 4.9 4.7   CL 92* 89* 93*   CO2 24 27 30*   BUN 28* 27* 26*   CREATININE 0.8 0.9 0.9   CALCIUM 8.9 9.0 8.6*     Recent Labs   Lab 08/28/19  0314   WBC 14.60*   HGB 9.4*   HCT 28.6*   PLT 89*       Significant Diagnostics:  No results found in the last 24 hours.

## 2019-08-29 NOTE — NURSING
Elías Araiza MD and Tita FOWLER with rapid examining pt. Pt with 100 % 02 sat on 0.5L oxygen. Pt cyanotic in fingers and toes. Per MD call in 30 mins if still cyanotic. VSS see chart. Pt is in no distress. Video monitor in room . Will continue to monitor pt as per unit policy.

## 2019-08-29 NOTE — ASSESSMENT & PLAN NOTE
59F w/ PMH COPD, HTN, hypothyroidism, stage 4 R small cell lung cancer s/p 6 cycles of carbo/etoposide in remission since 06/2018 who presents to [facility] ED from OSH w/ L parietal brain mass. Now s/p crani for debulking on 8/12.    -Patient neurologically stable on exam, minimal PO intake.   -Pathology (8/19): Metastatic small cell carcinoma. Patient established with Dr. Winston Miller ([facility]). Routine FU scheduled for 9/25. Clinic messaged about moving up appt.  -Seizure prevention: Continue Depakote 500mg BID. Patient was on dual AED's. There was no reported seizure activity pre or post op. Keppra stopped on 8/27.  -Cerebral edema: Continue 3 week Dex taper to 2 mg BID - currently 2mg q8h. Continue PPI while on steroids.   -Thrombocytopenia: Plts 89. Stable. Will continue to monitor.     -Malnutrition:  Continue boost and jennifer supplements TID to promote wound healing.    -Hypoglycemia 2/2 malnutrition: hypoglycemia orders placed. Patient to have assistance with all meals to maximize intake.   -Hyponatremia: Na 130 this morning but dropped to 126 this afternoon. Begin Na tabs 2 g QID. Will continue Na tabs until PO intake improves.   -Hyperkalemia: K 5.5 this morning but repeat K now 4.9. Will continue to monitor.     -Psyc: No use of wrist restraints. Weaned to tele-sitter. Continue redirection and delirium precautions. Continue Seroquel TID PRN. Haldol prn qhs. Medical and physical precautions to minimize agitation in place.   -HTN:  Maintain SBP < 160. Home dose of Norvasc has been held since SBP at goal. Will continue to monitor and restart if SBP becomes elevated.   -Hypothyroidism: Continue home dose of Synthroid  -Tobacco abuse: Continue nicotine patch  -COPD: Continue duo nebs PRN, pulse ox q 4 hours    -DVT prophylaxis: ABI's, SCD's, lovenox  -Bowel regimen: senna BID and miralax daily  -Atelectasis prevention: IS hourly while awake, PT/OT, OOB > 6 hours per day    DISPO: Medically stable for discharge.  Pending nursing home acceptance.

## 2019-08-29 NOTE — PROGRESS NOTES
[facility]  Neurosurgery  Progress Note    Subjective:     History of Present Illness: 59F w/ PMH COPD, HTN, hypothyroidism, stage 4 R small cell lung cancer s/p 6 cycles of carbo/etoposide in remission since 06/2018 who presents to [facility] ED from OSH w/ L parietal brain mass. Per EMS records patient had a 1d history of AMS and gait difficulty and was brought into the hospital by her family for evaluation. CTH @ OSH showed large L parietal brain mass with possible hemorrhagic component and she was transferred to [facility] for evaluation. MRI @ [facility] redemonstrated L nonenhancing parietal mass with minimal blood. Patient is confused and so ROS is tenuous.    Post-Op Info:  Procedure(s) (LRB):  L craniotomy for tumor (Left)   17 Days Post-Op     Interval History: NAEON. Received glucose this AM for low BG, asymptomatic. Patient very talkative this AM however unable to comprehend train of thought. BEAN spontaneously. Pending rehab.     Medications:  Continuous Infusions:  Scheduled Meds:   ascorbic acid (vitamin C)  250 mg Oral TID    dexAMETHasone  2 mg Oral Q8H    Followed by    [START ON 9/1/2019] dexAMETHasone  2 mg Oral Q12H    divalproex  500 mg Oral Q12H    enoxaparin  40 mg Subcutaneous Daily    ferrous sulfate  325 mg Oral TID    levothyroxine  75 mcg Oral Before breakfast    nicotine  1 patch Transdermal Daily    pantoprazole  40 mg Oral Daily    polyethylene glycol  17 g Oral Daily    senna-docusate 8.6-50 mg  1 tablet Oral BID    sodium chloride  2 g Oral QID     PRN Meds:acetaminophen, albuterol-ipratropium, Dextrose 10% Bolus, Dextrose 10% Bolus, glucagon (human recombinant), glucose, glucose, haloperidol lactate, ondansetron, oxyCODONE, QUEtiapine, sodium chloride 0.9%     Review of Systems  Objective:     Weight: 35.4 kg (78 lb)  Body mass index is 14.74 kg/m².  Vital Signs (Most Recent):  Temp: 97.1 °F (36.2 °C) (08/29/19 1119)  Pulse: 78 (08/29/19 1119)  Resp: 18 (08/29/19  1119)  BP: 98/60 (08/29/19 1119)  SpO2: 100 % (08/29/19 1208) Vital Signs (24h Range):  Temp:  [96.3 °F (35.7 °C)-97.5 °F (36.4 °C)] 97.1 °F (36.2 °C)  Pulse:  [71-92] 78  Resp:  [16-18] 18  SpO2:  [95 %-100 %] 100 %  BP: ()/(52-66) 98/60                          Neurosurgery Physical Exam   General: well developed, well nourished, no distress. Flat affect.   Head: normocephalic  Neurologic: Alert and oriented. Somewhat inattentive.   GCS: Motor: 6/Verbal: 4/Eyes: 4 GCS Total: 14  Mental Status: Awake, Alert, Oriented x 2  Language: Receptive aphasia  Speech: No dysarthria  Cranial nerves: face symmetric, tongue midline, CN II-XII grossly intact.   Eyes: Pupils anisocoric, R>L, round, reactive to light with accomodation, EOMI. R eye looks slightly more lateral at baseline.   Pulmonary: normal respirations, no signs of respiratory distress  Abdomen: soft, non-distended, not tender to palpation  Skin: Skin is warm, dry and intact.  Sensory: response to light touch throughout  Motor Strength: Moves all extremities spontaneously with good tone.  Full strength upper and lower extremities. No abnormal movements seen.   Pronator drift: absent bilaterally  Finger to nose: unable to assess 2/2 mental status        Incision:   Clean, dry, staples intact. Skin edges well approximated. No surrounding erythema or edema. No drainage or TTP. Incision appears to have healed well.        Significant Labs:  Recent Labs   Lab 08/28/19  0314 08/28/19  1323 08/29/19  0952   GLU 86 163* 98   * 126* 132*   K 5.5* 4.9 4.7   CL 92* 89* 93*   CO2 24 27 30*   BUN 28* 27* 26*   CREATININE 0.8 0.9 0.9   CALCIUM 8.9 9.0 8.6*     Recent Labs   Lab 08/28/19 0314   WBC 14.60*   HGB 9.4*   HCT 28.6*   PLT 89*       Significant Diagnostics:  No results found in the last 24 hours.      Assessment/Plan:     * Brain metastasis  59F w/ PMH COPD, HTN, hypothyroidism, stage 4 R small cell lung cancer s/p 6 cycles of carbo/etoposide in  remission since 06/2018 who presents to [facility] ED from OSH w/ L parietal brain mass. Now s/p crani for debulking on 8/12.    -Patient neurologically stable on exam, minimal PO intake.   -Pathology (8/19): Metastatic small cell carcinoma. Patient established with Dr. Winston Miller ([facility]). Routine FU scheduled for 9/25. Clinic messaged about moving up appt.  -Seizure prevention: Continue Depakote 500mg BID. Patient was on dual AED's. There was no reported seizure activity pre or post op. Keppra stopped on 8/27.  -Cerebral edema: Continue 3 week Dex taper to 2 mg BID - currently 2mg q8h. Continue PPI while on steroids.   -Thrombocytopenia: Plts 89. Stable. Will continue to monitor.     -Malnutrition:  Continue boost and jennifer supplements TID to promote wound healing.    -Hypoglycemia 2/2 malnutrition: hypoglycemia orders placed. Patient to have assistance with all meals to maximize intake.   -Hyponatremia: Na 130 this morning but dropped to 126 this afternoon. Begin Na tabs 2 g QID. Will continue Na tabs until PO intake improves.   -Hyperkalemia: K 5.5 this morning but repeat K now 4.9. Will continue to monitor.     -Psyc: No use of wrist restraints. Weaned to tele-sitter. Continue redirection and delirium precautions. Continue Seroquel TID PRN. Haldol prn qhs. Medical and physical precautions to minimize agitation in place.   -HTN:  Maintain SBP < 160. Home dose of Norvasc has been held since SBP at goal. Will continue to monitor and restart if SBP becomes elevated.   -Hypothyroidism: Continue home dose of Synthroid  -Tobacco abuse: Continue nicotine patch  -COPD: Continue duo nebs PRN, pulse ox q 4 hours    -DVT prophylaxis: ABI's, SCD's, lovenox  -Bowel regimen: senna BID and miralax daily  -Atelectasis prevention: IS hourly while awake, PT/OT, OOB > 6 hours per day    DISPO: Medically stable for discharge. Pending nursing home acceptance.         Jessica Escamilla MD  Neurosurgery  [facility]  [place]

## 2019-08-29 NOTE — PLAN OF CARE
BARBIE following for DC needs. BARBIE in communication with Michelle GARCIA.    BARBIE received call from [facility] who stated that they can only accept the patient if she is on hospice services.    Cindy Valladares, LMSW [facility]  [phone]

## 2019-08-29 NOTE — PLAN OF CARE
Problem: Adult Inpatient Plan of Care  Goal: Plan of Care Review  Outcome: Ongoing (interventions implemented as appropriate)  Patient is disoriented x4. PT. Tried getting out of bed multiple times. Did not sleep much. No distress noted. Safety measures taken. Vital signs stable. Will continue to monitor.

## 2019-08-30 LAB
POCT GLUCOSE: 123 MG/DL (ref 70–110)
POCT GLUCOSE: 59 MG/DL (ref 70–110)
POCT GLUCOSE: 69 MG/DL (ref 70–110)
POCT GLUCOSE: 75 MG/DL (ref 70–110)

## 2019-08-30 PROCEDURE — 25000003 PHARM REV CODE 250: Performed by: PHYSICIAN ASSISTANT

## 2019-08-30 PROCEDURE — 63600175 PHARM REV CODE 636 W HCPCS: Performed by: PHYSICIAN ASSISTANT

## 2019-08-30 PROCEDURE — 25000003 PHARM REV CODE 250: Performed by: STUDENT IN AN ORGANIZED HEALTH CARE EDUCATION/TRAINING PROGRAM

## 2019-08-30 PROCEDURE — 99232 SBSQ HOSP IP/OBS MODERATE 35: CPT | Mod: ,,, | Performed by: INTERNAL MEDICINE

## 2019-08-30 PROCEDURE — 99232 PR SUBSEQUENT HOSPITAL CARE,LEVL II: ICD-10-PCS | Mod: ,,, | Performed by: INTERNAL MEDICINE

## 2019-08-30 PROCEDURE — 20600001 HC STEP DOWN PRIVATE ROOM

## 2019-08-30 PROCEDURE — S4991 NICOTINE PATCH NONLEGEND: HCPCS | Performed by: PHYSICIAN ASSISTANT

## 2019-08-30 PROCEDURE — 99233 PR SUBSEQUENT HOSPITAL CARE,LEVL III: ICD-10-PCS | Mod: ,,, | Performed by: RADIOLOGY

## 2019-08-30 PROCEDURE — 97535 SELF CARE MNGMENT TRAINING: CPT

## 2019-08-30 PROCEDURE — 99233 SBSQ HOSP IP/OBS HIGH 50: CPT | Mod: ,,, | Performed by: RADIOLOGY

## 2019-08-30 PROCEDURE — 63600175 PHARM REV CODE 636 W HCPCS: Performed by: NEUROLOGICAL SURGERY

## 2019-08-30 RX ADMIN — DEXAMETHASONE 2 MG: 1 TABLET ORAL at 02:08

## 2019-08-30 RX ADMIN — SODIUM CHLORIDE TAB 1 GM 2 G: 1 TAB at 09:08

## 2019-08-30 RX ADMIN — SODIUM CHLORIDE TAB 1 GM 2 G: 1 TAB at 01:08

## 2019-08-30 RX ADMIN — Medication 250 MG: at 08:08

## 2019-08-30 RX ADMIN — PANTOPRAZOLE SODIUM 40 MG: 40 TABLET, DELAYED RELEASE ORAL at 08:08

## 2019-08-30 RX ADMIN — NICOTINE 1 PATCH: 7 PATCH, EXTENDED RELEASE TRANSDERMAL at 08:08

## 2019-08-30 RX ADMIN — SENNOSIDES,DOCUSATE SODIUM 1 TABLET: 8.6; 5 TABLET, FILM COATED ORAL at 08:08

## 2019-08-30 RX ADMIN — LEVOTHYROXINE SODIUM 75 MCG: 75 TABLET ORAL at 05:08

## 2019-08-30 RX ADMIN — FERROUS SULFATE TAB EC 325 MG (65 MG FE EQUIVALENT) 325 MG: 325 (65 FE) TABLET DELAYED RESPONSE at 02:08

## 2019-08-30 RX ADMIN — ENOXAPARIN SODIUM 40 MG: 100 INJECTION SUBCUTANEOUS at 04:08

## 2019-08-30 RX ADMIN — SODIUM CHLORIDE 500 ML: 0.9 INJECTION, SOLUTION INTRAVENOUS at 05:08

## 2019-08-30 RX ADMIN — DEXAMETHASONE 2 MG: 1 TABLET ORAL at 05:08

## 2019-08-30 RX ADMIN — SODIUM CHLORIDE TAB 1 GM 2 G: 1 TAB at 08:08

## 2019-08-30 RX ADMIN — POLYETHYLENE GLYCOL 3350 17 G: 17 POWDER, FOR SOLUTION ORAL at 08:08

## 2019-08-30 RX ADMIN — SENNOSIDES,DOCUSATE SODIUM 1 TABLET: 8.6; 5 TABLET, FILM COATED ORAL at 09:08

## 2019-08-30 RX ADMIN — FERROUS SULFATE TAB EC 325 MG (65 MG FE EQUIVALENT) 325 MG: 325 (65 FE) TABLET DELAYED RESPONSE at 09:08

## 2019-08-30 RX ADMIN — DIVALPROEX SODIUM 500 MG: 250 TABLET, DELAYED RELEASE ORAL at 09:08

## 2019-08-30 RX ADMIN — DIVALPROEX SODIUM 500 MG: 250 TABLET, DELAYED RELEASE ORAL at 08:08

## 2019-08-30 RX ADMIN — SODIUM CHLORIDE TAB 1 GM 2 G: 1 TAB at 04:08

## 2019-08-30 RX ADMIN — FERROUS SULFATE TAB EC 325 MG (65 MG FE EQUIVALENT) 325 MG: 325 (65 FE) TABLET DELAYED RESPONSE at 08:08

## 2019-08-30 RX ADMIN — Medication 250 MG: at 02:08

## 2019-08-30 RX ADMIN — Medication 250 MG: at 09:08

## 2019-08-30 RX ADMIN — DEXAMETHASONE 2 MG: 1 TABLET ORAL at 09:08

## 2019-08-30 NOTE — CONSULTS
Consult Note                                         Consults  SUBJECTIVE:     History of Present Illness:    This is a 59-year-old female with a past medical history of right small cell lung cancer status post 6 cycles of carboplatin and etoposide completed in July 2018 by Dr. Miller, SVC syndrome status post palliative radiation treatment by Dr. Pickard at [facility] in December 2017 now presented to [facility] with altered mental status and gait problem    CT chest done in 2017 November revealed 3.5 cm right pleural based mass and massive lymphadenopathy of more than 8 cm and supraclavicular lymphadenopathy.  MRI of the brain and CT of the abdomen and pelvis with negative at that time.  She underwent 6 cycles of carboplatin and etoposide and completed July 2018.  She never received prophylactic whole-brain radiation.  Her recent CT chest without contrast done in June 2019 revealed post treatment changes in the right upper lobe and no new concerning pulmonary nodules, mass or lymphadenopathy.  MRI of the brain done on August 11, 2019 revealed 6 x 4.7 cm mass of the left temporoparietal region status post partial resection on August 12, 2019 by Neurosurgery.  Pathology revealed metastatic small cell carcinoma.     Hematology/oncology consulted for further evaluation and treatment.  Patient is a very poor historian, confused and repeats the same words multiple times.       Review of patient's allergies indicates:   Allergen Reactions    Celebrex [celecoxib]      Due to kidney removal she can not take anything for RA    Ibuprofen      Due to kidney removal    Neurontin [gabapentin]     Sulfa (sulfonamide antibiotics) Hives    Topamax [topiramate] Swelling     Past Medical History:   Diagnosis Date    Acid reflux     Anemia     Anxiety     Arthritis     Blindness - both eyes     Chronic kidney disease     COPD  (chronic obstructive pulmonary disease)     GERD (gastroesophageal reflux disease)     Gout     Hypertension     Lung cancer     Lung cancer, main bronchus, right 2018    Osteopenia     Rheumatoid arteritis     SIADH (syndrome of inappropriate ADH production) 2017    Solitary kidney     Thyroid disease     Vitamin D deficiency      Past Surgical History:   Procedure Laterality Date    ABDOMINAL ADHESION SURGERY      ADENOIDECTOMY      kidney removal      right     L craniotomy for tumor Left 2019    Performed by [facility] at [facility] OR [address] [place] [address]    TONSILLECTOMY       Family History   Problem Relation Age of Onset    Stroke Mother     Thyroid disease Mother     Sleep apnea Mother     Hypertension Mother     Clotting disorder Mother     COPD Father     Heart disease Father     Neuropathy Father     Neuropathy Sister     Thyroid disease Sister     Fibromyalgia Sister     Heart disease Brother     Heart disease Maternal Grandfather     Parkinsonism Maternal Grandfather     Diabetes Maternal Aunt     Hypertension Maternal Aunt     Heart disease Paternal Uncle     Diabetes Paternal Uncle      Social History     Tobacco Use    Smoking status: Former Smoker     Packs/day: 0.50     Types: Cigarettes     Last attempt to quit: 2017     Years since quittin.3    Smokeless tobacco: Never Used   Substance Use Topics    Alcohol use: No     Alcohol/week: 0.0 oz     Comment: seldom    Drug use: No     Review of Systems   Unable to perform ROS: Mental status change     OBJECTIVE:     Vital Signs:  Temp:  [97.9 °F (36.6 °C)-98.4 °F (36.9 °C)]   Pulse:  [63-90]   Resp:  [16-18]   BP: ()/(53-74)   SpO2:  [84 %-98 %]     Physical Exam   Constitutional:   Thin female lying in bed.  Confused  Not oriented to time place or person   HENT:   Head: Normocephalic and atraumatic.   Eyes: Pupils are equal, round, and reactive to light. EOM are normal.   Neck: Normal range of  motion. Neck supple.   Cardiovascular: Normal rate and regular rhythm.   No murmur heard.  Pulmonary/Chest: Effort normal and breath sounds normal.   Decreased breath sounds at the bases   Abdominal: Soft. Bowel sounds are normal. There is no tenderness.   Musculoskeletal: Normal range of motion. She exhibits no edema.   Neurological:   Not oriented to time, place or person   Skin: Skin is warm and dry.     Laboratory:  CBC:   Recent Labs   Lab 08/28/19 0314   WBC 14.60*   RBC 2.86*   HGB 9.4*   HCT 28.6*   PLT 89*   *   MCH 32.9*   MCHC 32.9     CMP:   Recent Labs   Lab 08/28/19 0314 08/29/19  0952   GLU 86   < > 98   CALCIUM 8.9   < > 8.6*   ALBUMIN 3.0*  --   --    PROT 5.5*  --   --    *   < > 132*   K 5.5*   < > 4.7   CO2 24   < > 30*   CL 92*   < > 93*   BUN 28*   < > 26*   CREATININE 0.8   < > 0.9   ALKPHOS 79  --   --    ALT 9*  --   --    AST 12  --   --    BILITOT 0.3  --   --     < > = values in this interval not displayed.     LFTs:   Recent Labs   Lab 08/28/19 0314   ALT 9*   AST 12   ALKPHOS 79   BILITOT 0.3   PROT 5.5*   ALBUMIN 3.0*         Diagnostic Results:      ASSESSMENT/PLAN:     1.  Metastatic small cell cancer to the brain:  MRI of the brain done on August 11 revealed large left temporoparietal mass status post partial resection by Dr. Erickson on August 11, 2019.     2.  Small cell carcinoma of the right lung status post 6 cycles of carboplatin and etoposide completed in July 2018 by Dr. Miller.  Patient never received prophylactic whole-brain radiation    3.  Superior vena cava syndrome status post palliative radiation treatment in December 2017 by Dr. Pickard    4.  Confusion/disorientation and aphasia    Plan:     1.  No acute oncologic intervention at this time.  Recommend to follow up with her primary oncologist Dr. Miller on discharge.    Plan of care discussed with Dr. Warren Camarena MD PGY 5  Hematology/Oncology

## 2019-08-30 NOTE — SUBJECTIVE & OBJECTIVE
Oncology Treatment Plan:   OP SCLC CARBOPLATIN (AUC) + ETOPOSIDE    Current Facility-Administered Medications   Medication    acetaminophen tablet 650 mg    albuterol-ipratropium 2.5 mg-0.5 mg/3 mL nebulizer solution 3 mL    ascorbic acid (vitamin C) tablet 250 mg    dexAMETHasone tablet 2 mg    Followed by    [START ON 9/1/2019] dexAMETHasone tablet 2 mg    dextrose 10% (D10W) Bolus    dextrose 10% (D10W) Bolus    divalproex EC tablet 500 mg    enoxaparin injection 40 mg    ferrous sulfate EC tablet 325 mg    glucagon (human recombinant) injection 1 mg    glucose chewable tablet 16 g    glucose chewable tablet 24 g    haloperidol lactate injection 5 mg    levothyroxine tablet 75 mcg    nicotine 7 mg/24 hr 1 patch    ondansetron injection 4 mg    oxyCODONE immediate release tablet 5 mg    pantoprazole EC tablet 40 mg    polyethylene glycol packet 17 g    QUEtiapine tablet 25 mg    senna-docusate 8.6-50 mg per tablet 1 tablet    sodium chloride 0.9% flush 10 mL    sodium chloride tablet 2 g       Review of patient's allergies indicates:   Allergen Reactions    Celebrex [celecoxib]      Due to kidney removal she can not take anything for RA    Ibuprofen      Due to kidney removal    Neurontin [gabapentin]     Sulfa (sulfonamide antibiotics) Hives    Topamax [topiramate] Swelling        Past Medical History:   Diagnosis Date    Acid reflux     Anemia     Anxiety     Arthritis     Blindness - both eyes     Chronic kidney disease     COPD (chronic obstructive pulmonary disease)     GERD (gastroesophageal reflux disease)     Gout     Hypertension     Lung cancer     Lung cancer, main bronchus, right 1/2/2018    Osteopenia     Rheumatoid arteritis     SIADH (syndrome of inappropriate ADH production) 11/12/2017    Solitary kidney     Thyroid disease     Vitamin D deficiency      Past Surgical History:   Procedure Laterality Date    ABDOMINAL ADHESION SURGERY       ADENOIDECTOMY      kidney removal      right     L craniotomy for tumor Left 2019    Performed by [facility] at [facility]    TONSILLECTOMY       Family History     Problem Relation (Age of Onset)    COPD Father    Clotting disorder Mother    Diabetes Maternal Aunt, Paternal Uncle    Fibromyalgia Sister    Heart disease Father, Brother, Maternal Grandfather, Paternal Uncle    Hypertension Mother, Maternal Aunt    Neuropathy Father, Sister    Parkinsonism Maternal Grandfather    Sleep apnea Mother    Stroke Mother    Thyroid disease Mother, Sister        Tobacco Use    Smoking status: Former Smoker     Packs/day: 0.50     Types: Cigarettes     Last attempt to quit: 2017     Years since quittin.3    Smokeless tobacco: Never Used   Substance and Sexual Activity    Alcohol use: No     Alcohol/week: 0.0 oz     Comment: seldom    Drug use: No    Sexual activity: Never     Partners: Male       Review of Systems   Unable to perform ROS: Mental status change     Objective:     Vital Signs (Most Recent):  Temp: 97.9 °F (36.6 °C) (19 1158)  Pulse: 87 (19 1500)  Resp: 18 (19 1158)  BP: (!) 97/57 (19 1158)  SpO2: (!) 84 % (19 1158) Vital Signs (24h Range):  Temp:  [97 °F (36.1 °C)-98.2 °F (36.8 °C)] 97.9 °F (36.6 °C)  Pulse:  [63-93] 87  Resp:  [18] 18  SpO2:  [84 %-100 %] 84 %  BP: ()/(53-74) 97/57     Weight: 35.4 kg (78 lb)  Body mass index is 14.74 kg/m².  Body surface area is 1.23 meters squared.    Physical Exam   Constitutional: She appears well-developed and well-nourished.   HENT:   Head: Normocephalic and atraumatic.   Mouth/Throat: Mucous membranes are normal.   Eyes: Pupils are equal, round, and reactive to light. EOM are normal. No scleral icterus.   Neck: Normal range of motion. Neck supple.   Pulmonary/Chest: Effort normal. No accessory muscle usage. No respiratory distress.   Abdominal: Soft. Normal appearance.   Musculoskeletal: Normal range  of motion. She exhibits no edema.   Lymphadenopathy:     She has no cervical adenopathy.        Right: No supraclavicular adenopathy present.        Left: No supraclavicular adenopathy present.   Neurological: She is alert. She has normal strength. No cranial nerve deficit.   Skin: Skin is warm. No rash noted. No cyanosis.   Psychiatric: Judgment normal. Her affect is blunt. Her speech is delayed and tangential. She exhibits abnormal recent memory and abnormal remote memory.   Vitals reviewed.      Diagnostic Results:  I have reviewed her available images and reports and summarized pertinent findings in HPI.

## 2019-08-30 NOTE — CONSULTS
"[facility]  Radiation Oncology  Consult Note    Patient Name: Carmen Leyva  MRN: [id number]  Admission Date: 8/11/2019  Hospital Length of Stay: 19 days  Code Status: Full Code   Attending Provider: Gabo Erickson MD  Consulting Provider: Heber Ulloa MD  Primary Care Physician: Kem Kiser MD  Principal Problem:Brain metastasis    Inpatient consult to Radiation Oncology  Consult performed by: Heber Ulloa MD  Consult ordered by: MARY ELLEN Sotomayor        Subjective:     HPI:  Ms. Leyva is a 60 y/o female with h/o SCLC s/p palliative RT to thorax for SVC syndrome by Dr. Pickard at [facility] in 12/2017. She went on to receive systemic therapy and was on observation since 6/2018. In August 2019 she was found to have a large Left temporal mass and was [place] [facility] [place]. She underwent debulking surgery by Dr. Erickson 8/12/19 with pathology revealing metastatic SCLC. She has had persistent receptive aphasia and some disorientation/delerium but is planned for rehab upon discharge. Radiation Oncology is consulted to discuss treatment options.     I met with the patient alone at bedside, sitter was in hallway. Patient very poor historian though does recall having chemotherapy and radiation in the past. Can answer after posing question several times such as "where do you live?" Denies weakness.     Oncology Treatment Plan:   OP SCLC CARBOPLATIN (AUC) + ETOPOSIDE    Current Facility-Administered Medications   Medication    acetaminophen tablet 650 mg    albuterol-ipratropium 2.5 mg-0.5 mg/3 mL nebulizer solution 3 mL    ascorbic acid (vitamin C) tablet 250 mg    dexAMETHasone tablet 2 mg    Followed by    [START ON 9/1/2019] dexAMETHasone tablet 2 mg    dextrose 10% (D10W) Bolus    dextrose 10% (D10W) Bolus    divalproex EC tablet 500 mg    enoxaparin injection 40 mg    ferrous sulfate EC tablet 325 mg    glucagon (human recombinant) injection 1 mg    " glucose chewable tablet 16 g    glucose chewable tablet 24 g    haloperidol lactate injection 5 mg    levothyroxine tablet 75 mcg    nicotine 7 mg/24 hr 1 patch    ondansetron injection 4 mg    oxyCODONE immediate release tablet 5 mg    pantoprazole EC tablet 40 mg    polyethylene glycol packet 17 g    QUEtiapine tablet 25 mg    senna-docusate 8.6-50 mg per tablet 1 tablet    sodium chloride 0.9% flush 10 mL    sodium chloride tablet 2 g       Review of patient's allergies indicates:   Allergen Reactions    Celebrex [celecoxib]      Due to kidney removal she can not take anything for RA    Ibuprofen      Due to kidney removal    Neurontin [gabapentin]     Sulfa (sulfonamide antibiotics) Hives    Topamax [topiramate] Swelling        Past Medical History:   Diagnosis Date    Acid reflux     Anemia     Anxiety     Arthritis     Blindness - both eyes     Chronic kidney disease     COPD (chronic obstructive pulmonary disease)     GERD (gastroesophageal reflux disease)     Gout     Hypertension     Lung cancer     Lung cancer, main bronchus, right 1/2/2018    Osteopenia     Rheumatoid arteritis     SIADH (syndrome of inappropriate ADH production) 11/12/2017    Solitary kidney     Thyroid disease     Vitamin D deficiency      Past Surgical History:   Procedure Laterality Date    ABDOMINAL ADHESION SURGERY      ADENOIDECTOMY      kidney removal      right     L craniotomy for tumor Left 8/12/2019    Performed by Gabo Erickson MD at [facility] OR [address] [place] [address]    TONSILLECTOMY       Family History     Problem Relation (Age of Onset)    COPD Father    Clotting disorder Mother    Diabetes Maternal Aunt, Paternal Uncle    Fibromyalgia Sister    Heart disease Father, Brother, Maternal Grandfather, Paternal Uncle    Hypertension Mother, Maternal Aunt    Neuropathy Father, Sister    Parkinsonism Maternal Grandfather    Sleep apnea Mother    Stroke Mother    Thyroid disease Mother, Sister         Tobacco Use    Smoking status: Former Smoker     Packs/day: 0.50     Types: Cigarettes     Last attempt to quit: 2017     Years since quittin.3    Smokeless tobacco: Never Used   Substance and Sexual Activity    Alcohol use: No     Alcohol/week: 0.0 oz     Comment: seldom    Drug use: No    Sexual activity: Never     Partners: Male       Review of Systems   Unable to perform ROS: Mental status change     Objective:     Vital Signs (Most Recent):  Temp: 97.9 °F (36.6 °C) (19 1158)  Pulse: 87 (19 1500)  Resp: 18 (19 1158)  BP: (!) 97/57 (19 1158)  SpO2: (!) 84 % (19 1158) Vital Signs (24h Range):  Temp:  [97 °F (36.1 °C)-98.2 °F (36.8 °C)] 97.9 °F (36.6 °C)  Pulse:  [63-93] 87  Resp:  [18] 18  SpO2:  [84 %-100 %] 84 %  BP: ()/(53-74) 97/57     Weight: 35.4 kg (78 lb)  Body mass index is 14.74 kg/m².  Body surface area is 1.23 meters squared.    Physical Exam   Constitutional: She appears well-developed and well-nourished.   HENT:   Head: Normocephalic and atraumatic.   Mouth/Throat: Mucous membranes are normal.   Eyes: Pupils are equal, round, and reactive to light. EOM are normal. No scleral icterus.   Neck: Normal range of motion. Neck supple.   Pulmonary/Chest: Effort normal. No accessory muscle usage. No respiratory distress.   Abdominal: Soft. Normal appearance.   Musculoskeletal: Normal range of motion. She exhibits no edema.   Lymphadenopathy:     She has no cervical adenopathy.        Right: No supraclavicular adenopathy present.        Left: No supraclavicular adenopathy present.   Neurological: She is alert. She has normal strength. No cranial nerve deficit.   Skin: Skin is warm. No rash noted. No cyanosis.   Psychiatric: Judgment normal. Her affect is blunt. Her speech is delayed and tangential. She exhibits abnormal recent memory and abnormal remote memory.   Vitals reviewed.      Diagnostic Results:  I have reviewed her available images and reports and  summarized pertinent findings in HPI.     Assessment/Plan:     * Brain metastasis  This is a 58 y/o female with h/o Stage IV SCLC s/p palliative RT for SVC syndrome in [place] (Dr. Pickard) completed 12/2017 followed by systemic therapy completed 6/2018. She has been on observation since that time. In August 2019 developed AMS and was transferred to [facility] 8/11/19; MRI Brain demonstrated large Left temporoparietal mass. She underwent debulking by Dr. Erickson 8/12/19 with pathology revealing SCLC. She remains with receptive aphasia and disorientation since surgery. Radiation Oncology is consulted for consideration of treatment options.     There is no indication for urgent or inpatient radiation therapy at this time. I recommend treatment with WBRT on an outpatient basis once she is able to receive it, but it is not clear that treatment will improve her current symptoms. The patient is not able to consent for treatment so would require conversation with her son regarding risks/benefits. Since she lives in [place], it is most appropriate to refer her back to her established Radiation Oncologist in [place] to discuss treatment options. Likely she will not be able to undergo therapy until discharge from rehab/SNF.         Thank you for your consult. I will sign off. Please contact us if you have any additional questions.    Heber Ulloa MD  Radiation Oncology  [facility]

## 2019-08-30 NOTE — PROGRESS NOTES
[facility]  Neurosurgery  Progress Note    Subjective:     History of Present Illness: 59F w/ PMH COPD, HTN, hypothyroidism, stage 4 R small cell lung cancer s/p 6 cycles of carbo/etoposide in remission since 06/2018 who presents to [facility] ED from OSH w/ L parietal brain mass. Per EMS records patient had a 1d history of AMS and gait difficulty and was brought into the hospital by her family for evaluation. CTH @ OSH showed large L parietal brain mass with possible hemorrhagic component and she was transferred to [facility] for evaluation. MRI @ [facility] redemonstrated L nonenhancing parietal mass with minimal blood. Patient is confused and so ROS is tenuous.    Post-Op Info:  Procedure(s) (LRB):  L craniotomy for tumor (Left)   18 Days Post-Op     Interval History: NAEON. Pt lying comfortably in bed. No new complaints. Improved appetite and tolerating diet. Voiding appropriately.     Medications:  Continuous Infusions:  Scheduled Meds:   ascorbic acid (vitamin C)  250 mg Oral TID    dexAMETHasone  2 mg Oral Q8H    Followed by    [START ON 9/1/2019] dexAMETHasone  2 mg Oral Q12H    divalproex  500 mg Oral Q12H    enoxaparin  40 mg Subcutaneous Daily    ferrous sulfate  325 mg Oral TID    levothyroxine  75 mcg Oral Before breakfast    nicotine  1 patch Transdermal Daily    pantoprazole  40 mg Oral Daily    polyethylene glycol  17 g Oral Daily    senna-docusate 8.6-50 mg  1 tablet Oral BID    sodium chloride  2 g Oral QID     PRN Meds:acetaminophen, albuterol-ipratropium, Dextrose 10% Bolus, Dextrose 10% Bolus, glucagon (human recombinant), glucose, glucose, haloperidol lactate, ondansetron, oxyCODONE, QUEtiapine, sodium chloride 0.9%     Review of Systems  Objective:     Weight: 35.4 kg (78 lb)  Body mass index is 14.74 kg/m².  Vital Signs (Most Recent):  Temp: 97.9 °F (36.6 °C) (08/30/19 1158)  Pulse: 87 (08/30/19 1500)  Resp: 18 (08/30/19 1158)  BP: (!) 97/57 (08/30/19 1158)  SpO2: (!) 84 %  (08/30/19 1158) Vital Signs (24h Range):  Temp:  [97 °F (36.1 °C)-98.2 °F (36.8 °C)] 97.9 °F (36.6 °C)  Pulse:  [63-93] 87  Resp:  [18] 18  SpO2:  [84 %-100 %] 84 %  BP: ()/(53-74) 97/57     Date 08/30/19 0700 - 08/31/19 0659   Shift 2867-2643 8023-2737 2508-0005 24 Hour Total   INTAKE   P.O. 550   550   Shift Total(mL/kg) 550(15.5)   550(15.5)   OUTPUT   Shift Total(mL/kg)       Weight (kg) 35.4 35.4 35.4 35.4                        Neurosurgery Physical Exam   General: well developed, well nourished, no distress. Flat affect.   Head: normocephalic  Neurologic: Alert and oriented. Somewhat inattentive.   GCS: Motor: 6/Verbal: 4/Eyes: 4 GCS Total: 14  Mental Status: Awake, Alert, Oriented x 1. To person.  Language: Receptive aphasia  Speech: No dysarthria. Tangential speech.  Cranial nerves: face symmetric, tongue midline, CN II-XII grossly intact.   Eyes: Pupils anisocoric, R>L, round, reactive to light with accomodation, EOMI.   Pulmonary: normal respirations, no signs of respiratory distress  Abdomen: soft, non-distended, not tender to palpation  Skin: Skin is warm, dry and intact.  Sensory: response to light touch throughout  Motor Strength: Moves all extremities spontaneously with good tone.  Full strength upper and lower extremities. No abnormal movements seen.   Pronator drift: absent bilaterally  Finger to nose: unable to assess 2/2 mental status    Incision: Skin edges well approximated. No surrounding erythema or edema. No drainage or TTP. Incision appears to have healed well.     Significant Labs:  Recent Labs   Lab 08/29/19  0952   GLU 98   *   K 4.7   CL 93*   CO2 30*   BUN 26*   CREATININE 0.9   CALCIUM 8.6*     No results for input(s): WBC, HGB, HCT, PLT in the last 48 hours.  No results for input(s): LABPT, INR, APTT in the last 48 hours.  Microbiology Results (last 7 days)     ** No results found for the last 168 hours. **            Assessment/Plan:     * Brain metastasis  59F w/ PMH  COPD, HTN, hypothyroidism, stage 4 R small cell lung cancer s/p 6 cycles of carbo/etoposide in remission since 06/2018 who presents to [facility] ED from OSH w/ L parietal brain mass. Now s/p crani for debulking on 8/12.    -Patient neurologically stable on exam, minimal PO intake.   -Pathology (8/19): Metastatic small cell carcinoma. Since pt not accepted to facility yet will consult heme/onc and rad/onc. Okay from neurosurgery standpoint to begin treatment. Spoke to pt's son, David, on the phone to update him on the plan. He voiced understanding.  -Seizure prevention: Continue Depakote 500mg BID. Patient was on dual AED's. There was no reported seizure activity pre or post op. Keppra stopped on 8/27.  -Cerebral edema: Continue 3 week Dex taper to 2 mg BID - currently 2mg q8h. Continue PPI while on steroids.   -Thrombocytopenia:  Stable. Will continue to monitor.     -Malnutrition:  Continue boost and jennifer supplements TID to promote wound healing.    -Hypoglycemia 2/2 malnutrition:  Less episodes with improving PO intake. Will continue to monitor.  -Hyponatremia: Improving. Na 132. Continue Na tabs 2 g QID. Will continue to monitor. Will continue Na tabs for now and consider stopping when PO intake continues to improve.   -Hyperkalemia: Resolved. 4.7 today. Will continue to monitor.    -Psyc: No use of wrist restraints. Weaned to tele-sitter. Continue redirection and delirium precautions. Continue Seroquel TID PRN. Haldol prn qhs. Medical and physical precautions to minimize agitation in place.   -HTN:  Maintain SBP < 160. Home dose of Norvasc has been held since SBP at goal. Will continue to monitor and restart if SBP becomes elevated.   -Hypothyroidism: Continue home dose of Synthroid  -Tobacco abuse: Continue nicotine patch  -COPD: Continue duo nebs PRN, pulse ox q 4 hours    -DVT prophylaxis: ABI's, SCD's, lovenox  -Bowel regimen: senna BID and miralax daily  -Atelectasis prevention: IS hourly while awake, PT/OT,  OOB > 6 hours per day    DISPO: Medically stable for discharge. Pending placement        Adali Collins PA-C  Neurosurgery  [facility]

## 2019-08-30 NOTE — ASSESSMENT & PLAN NOTE
59F w/ PMH COPD, HTN, hypothyroidism, stage 4 R small cell lung cancer s/p 6 cycles of carbo/etoposide in remission since 06/2018 who [place] [facility] [place] w/ L parietal brain mass. Now s/p crani for debulking on 8/12.    -Patient neurologically stable on exam, minimal PO intake.   -Pathology (8/19): Metastatic small cell carcinoma. Since pt not accepted to facility yet will consult heme/onc and rad/onc. Okay from neurosurgery standpoint to begin treatment.   -Seizure prevention: Continue Depakote 500mg BID. Patient was on dual AED's. There was no reported seizure activity pre or post op. Keppra stopped on 8/27.  -Cerebral edema: Continue 3 week Dex taper to 2 mg BID - currently 2mg q8h. Continue PPI while on steroids.   -Thrombocytopenia:  Stable. Will continue to monitor.     -Malnutrition:  Continue boost and jennifer supplements TID to promote wound healing.    -Hypoglycemia 2/2 malnutrition:  Hypoglycemia orders placed. Will continue to monitor pt for hypoglycemia. Improved PO intake today per nurse report. Patient to have assistance with all meals to maximize intake.   -Hyponatremia: Improving. Na 132. Continue Na tabs 2 g QID. Will continue to monitor. Will continue Na tabs for now and consider stopping when PO intake continues to improve.   -Hyperkalemia: Resolved. 4.7 today. Will continue to monitor.    -Psyc: No use of wrist restraints. Weaned to tele-sitter. Continue redirection and delirium precautions. Continue Seroquel TID PRN. Haldol prn qhs. Medical and physical precautions to minimize agitation in place.   -HTN:  Maintain SBP < 160. Home dose of Norvasc has been held since SBP at goal. Will continue to monitor and restart if SBP becomes elevated.   -Hypothyroidism: Continue home dose of Synthroid  -Tobacco abuse: Continue nicotine patch  -COPD: Continue duo nebs PRN, pulse ox q 4 hours    -DVT prophylaxis: ABI's, SCD's, lovenox  -Bowel regimen: senna BID and miralax daily  -Atelectasis  prevention: IS hourly while awake, PT/OT, OOB > 6 hours per day    DISPO: Medically stable for discharge. Pending placement

## 2019-08-30 NOTE — PLAN OF CARE
Hospital day # 20. Waiting for NH approval. Several referrals out and waiting for response from facilities. SW/CM will continue to follow.

## 2019-08-30 NOTE — ASSESSMENT & PLAN NOTE
This is a 60 y/o female with h/o Stage IV SCLC s/p palliative RT for SVC syndrome in [place] (Dr. Pickard) completed 12/2017 followed by systemic therapy completed 6/2018. She has been on observation since that time. In August 2019 developed AMS and [place] [facility] [place]; MRI Brain demonstrated large Left temporoparietal mass. She underwent debulking by Dr. Erickson 8/12/19 with pathology revealing SCLC. She remains with receptive aphasia and disorientation since surgery. Radiation Oncology is consulted for consideration of treatment options.     There is no indication for urgent or inpatient radiation therapy at this time. I recommend treatment with WBRT on an outpatient basis once she is able to receive it, but it is not clear that treatment will improve her current symptoms. The patient is not able to consent for treatment so would require conversation with her son regarding risks/benefits. Since she lives in [place], it is most appropriate to refer her back to her established Radiation Oncologist in [place] to discuss treatment options. Likely she will not be able to undergo therapy until discharge from rehab/SNF.

## 2019-08-30 NOTE — SUBJECTIVE & OBJECTIVE
Interval History: NAEON. Pt lying comfortably in bed. No new complaints. Improved appetite and tolerating diet. Voiding appropriately.     Medications:  Continuous Infusions:  Scheduled Meds:   ascorbic acid (vitamin C)  250 mg Oral TID    dexAMETHasone  2 mg Oral Q8H    Followed by    [START ON 9/1/2019] dexAMETHasone  2 mg Oral Q12H    divalproex  500 mg Oral Q12H    enoxaparin  40 mg Subcutaneous Daily    ferrous sulfate  325 mg Oral TID    levothyroxine  75 mcg Oral Before breakfast    nicotine  1 patch Transdermal Daily    pantoprazole  40 mg Oral Daily    polyethylene glycol  17 g Oral Daily    senna-docusate 8.6-50 mg  1 tablet Oral BID    sodium chloride  2 g Oral QID     PRN Meds:acetaminophen, albuterol-ipratropium, Dextrose 10% Bolus, Dextrose 10% Bolus, glucagon (human recombinant), glucose, glucose, haloperidol lactate, ondansetron, oxyCODONE, QUEtiapine, sodium chloride 0.9%     Review of Systems  Objective:     Weight: 35.4 kg (78 lb)  Body mass index is 14.74 kg/m².  Vital Signs (Most Recent):  Temp: 97.9 °F (36.6 °C) (08/30/19 1158)  Pulse: 87 (08/30/19 1500)  Resp: 18 (08/30/19 1158)  BP: (!) 97/57 (08/30/19 1158)  SpO2: (!) 84 % (08/30/19 1158) Vital Signs (24h Range):  Temp:  [97 °F (36.1 °C)-98.2 °F (36.8 °C)] 97.9 °F (36.6 °C)  Pulse:  [63-93] 87  Resp:  [18] 18  SpO2:  [84 %-100 %] 84 %  BP: ()/(53-74) 97/57     Date 08/30/19 0700 - 08/31/19 0659   Shift 3603-6963 3183-7786 0925-7568 24 Hour Total   INTAKE   P.O. 550   550   Shift Total(mL/kg) 550(15.5)   550(15.5)   OUTPUT   Shift Total(mL/kg)       Weight (kg) 35.4 35.4 35.4 35.4                        Neurosurgery Physical Exam   General: well developed, well nourished, no distress. Flat affect.   Head: normocephalic  Neurologic: Alert and oriented. Somewhat inattentive.   GCS: Motor: 6/Verbal: 4/Eyes: 4 GCS Total: 14  Mental Status: Awake, Alert, Oriented x 2  Language: Receptive aphasia  Speech: No dysarthria. Tangential  speech.  Cranial nerves: face symmetric, tongue midline, CN II-XII grossly intact.   Eyes: Pupils anisocoric, R>L, round, reactive to light with accomodation, EOMI.   Pulmonary: normal respirations, no signs of respiratory distress  Abdomen: soft, non-distended, not tender to palpation  Skin: Skin is warm, dry and intact.  Sensory: response to light touch throughout  Motor Strength: Moves all extremities spontaneously with good tone.  Full strength upper and lower extremities. No abnormal movements seen.   Pronator drift: absent bilaterally  Finger to nose: unable to assess 2/2 mental status    Incision: Skin edges well approximated. No surrounding erythema or edema. No drainage or TTP. Incision appears to have healed well.     Significant Labs:  Recent Labs   Lab 08/29/19  0952   GLU 98   *   K 4.7   CL 93*   CO2 30*   BUN 26*   CREATININE 0.9   CALCIUM 8.6*     No results for input(s): WBC, HGB, HCT, PLT in the last 48 hours.  No results for input(s): LABPT, INR, APTT in the last 48 hours.  Microbiology Results (last 7 days)     ** No results found for the last 168 hours. **

## 2019-08-30 NOTE — PT/OT/SLP PROGRESS
"Occupational Therapy   Treatment/Discharge Summary    Name: Carmen Leyva  MRN: [id number]  Admitting Diagnosis:  Brain metastasis  18 Days Post-Op    Recommendations:     Discharge Recommendations: nursing facility, basic  Discharge Equipment Recommendations:  shower chair  Barriers to discharge:  Decreased caregiver support    Assessment:     Carmen Leyva is a 59 y.o. female with a medical diagnosis of Brain metastasis.  She presents with performance deficits affecting function are impaired cognition, impaired endurance, impaired self care skills, impaired balance, gait instability.     Rehab Prognosis:  Good; patient would benefit from acute skilled OT services to address these deficits and reach maximum level of function.       Plan:     · Discharge from OT services on acute 2* not progressing with functional status.  · Plan of Care Expires: 09/11/19  · Plan of Care Reviewed with: patient    Subjective   Patient:  "Moma, do you want one?" "They told me I am doing well but I am not quite there yet."    Pain/Comfort:  · Pain Rating 1: 0/10  · Pain Rating Post-Intervention 1: 0/10    Objective:     Communicated with: Nurse prior to session.  Patient found supine with bed alarm, telemetry upon OT entry to room.  Family not present.    General Precautions: Standard, aspiration, fall, vision impaired, hearing impaired   Orthopedic Precautions:N/A   Braces:       Occupational Performance:     Bed Mobility:    · Patient completed Rolling/Turning to Left with  stand by assistance  · Patient completed Rolling/Turning to Right with stand by assistance  · Patient completed Scooting/Bridging with stand by assistance  · Patient completed Supine to Sit with stand by assistance  · Patient completed Sit to Supine with stand by assistance     Functional Mobility/Transfers:  · Patient completed Sit <> Stand Transfer with contact guard assistance  with  no assistive device   · Patient completed Bed <> Chair Transfer using Stand " Pivot technique with contact guard assistance with no assistive device    Activities of Daily Living:  · Grooming: minimum assistance while standing  · Upper Body Dressing: minimum assistance while seated EOB   · Lower Body Dressing: minimum assistance while seated      AMPAC 6 Click ADL: 18    Patient left supine with all lines intact, call button in reach and bed alarm onEducation:      GOALS:   Multidisciplinary Problems     Occupational Therapy Goals     Not on file          Multidisciplinary Problems (Resolved)        Problem: Occupational Therapy Goal    Goal Priority Disciplines Outcome Interventions   Occupational Therapy Goal   (Resolved)     OT, PT/OT Outcome(s) achieved    Description:  Goals set 8/19 to be addressed for 14 days with expiration date, 9/2:  Patient will increase functional independence with ADLs by performing:    Patient will demonstrate rolling to the right with modified independence.  Not met   Patient will demonstrate rolling to the left with modified independence.   Not met  Patient will demonstrate supine -sit with modified independence.   Not met  Patient will demonstrate stand pivot transfers with modified independence.   Not met  Patient will demonstrate grooming while standing with modified independence.   Not met  Patient will demonstrate upper body dressing with modified independence while seated EOB.   Not met  Patient will demonstrate lower body dressing with modified independence while seated EOB.   Not met  Patient will demonstrate toileting with modified independence.   Not met  Patient will demonstrate bathing while seated EOB with modified independence.   Not met                    Time Tracking:     OT Date of Treatment: 08/30/19  OT Start Time: 0816  OT Stop Time: 0842  OT Total Time (min): 26 min    Billable Minutes:Self Care/Home Management 26    OSIEL Lynch  8/30/2019

## 2019-08-30 NOTE — HPI
"Ms. Leyva is a 60 y/o female with h/o SCLC s/p palliative RT to thorax for SVC syndrome by Dr. Pickard at [facility] in 12/2017. She went on to receive systemic therapy and was on observation since 6/2018. In August 2019 she was found to have a large Left temporal mass and was transferred to [facility] 8/11/19. She underwent debulking surgery by Dr. Erickson 8/12/19 with pathology revealing metastatic SCLC. She has had persistent receptive aphasia and some disorientation/delerium but is planned for rehab upon discharge. Radiation Oncology is consulted to discuss treatment options.     I met with the patient alone at bedside, sitter was in hallway. Patient very poor historian though does recall having chemotherapy and radiation in the past. Can answer after posing question several times such as "where do you live?" Denies weakness.   "

## 2019-08-30 NOTE — PLAN OF CARE
SW following for DC needs. SW in communication with RADHA, MARY ELLEN Nazario spoke to the patient's son to discuss the patient's progress and plan.    Consult placed to Radiation Oncology and Hematology/Oncology.     SW will continue following up with [facility] Nursing Home Pl[facility].     Cindy Valladares LMSW  [facility]  [id number]

## 2019-08-31 LAB
ALBUMIN SERPL BCP-MCNC: 2.7 G/DL (ref 3.5–5.2)
ALP SERPL-CCNC: 63 U/L (ref 55–135)
ALT SERPL W/O P-5'-P-CCNC: 8 U/L (ref 10–44)
ANION GAP SERPL CALC-SCNC: 5 MMOL/L (ref 8–16)
AST SERPL-CCNC: 9 U/L (ref 10–40)
BASOPHILS # BLD AUTO: 0.01 K/UL (ref 0–0.2)
BASOPHILS NFR BLD: 0.1 % (ref 0–1.9)
BILIRUB SERPL-MCNC: 0.2 MG/DL (ref 0.1–1)
BUN SERPL-MCNC: 28 MG/DL (ref 6–20)
CALCIUM SERPL-MCNC: 8.5 MG/DL (ref 8.7–10.5)
CHLORIDE SERPL-SCNC: 108 MMOL/L (ref 95–110)
CO2 SERPL-SCNC: 29 MMOL/L (ref 23–29)
CREAT SERPL-MCNC: 0.8 MG/DL (ref 0.5–1.4)
DIFFERENTIAL METHOD: ABNORMAL
EOSINOPHIL # BLD AUTO: 0 K/UL (ref 0–0.5)
EOSINOPHIL NFR BLD: 0 % (ref 0–8)
ERYTHROCYTE [DISTWIDTH] IN BLOOD BY AUTOMATED COUNT: 15.9 % (ref 11.5–14.5)
EST. GFR  (AFRICAN AMERICAN): >60 ML/MIN/1.73 M^2
EST. GFR  (NON AFRICAN AMERICAN): >60 ML/MIN/1.73 M^2
GLUCOSE SERPL-MCNC: 86 MG/DL (ref 70–110)
HCT VFR BLD AUTO: 27.4 % (ref 37–48.5)
HGB BLD-MCNC: 8.3 G/DL (ref 12–16)
IMM GRANULOCYTES # BLD AUTO: 0.22 K/UL (ref 0–0.04)
IMM GRANULOCYTES NFR BLD AUTO: 2.9 % (ref 0–0.5)
LYMPHOCYTES # BLD AUTO: 0.4 K/UL (ref 1–4.8)
LYMPHOCYTES NFR BLD: 4.7 % (ref 18–48)
MCH RBC QN AUTO: 32.8 PG (ref 27–31)
MCHC RBC AUTO-ENTMCNC: 30.3 G/DL (ref 32–36)
MCV RBC AUTO: 108 FL (ref 82–98)
MONOCYTES # BLD AUTO: 1.1 K/UL (ref 0.3–1)
MONOCYTES NFR BLD: 14.1 % (ref 4–15)
NEUTROPHILS # BLD AUTO: 6 K/UL (ref 1.8–7.7)
NEUTROPHILS NFR BLD: 78.2 % (ref 38–73)
NRBC BLD-RTO: 0 /100 WBC
PLATELET # BLD AUTO: 64 K/UL (ref 150–350)
PMV BLD AUTO: 11.8 FL (ref 9.2–12.9)
POCT GLUCOSE: 106 MG/DL (ref 70–110)
POCT GLUCOSE: 111 MG/DL (ref 70–110)
POCT GLUCOSE: 112 MG/DL (ref 70–110)
POCT GLUCOSE: 121 MG/DL (ref 70–110)
POCT GLUCOSE: 84 MG/DL (ref 70–110)
POTASSIUM SERPL-SCNC: 5.3 MMOL/L (ref 3.5–5.1)
PROT SERPL-MCNC: 4.8 G/DL (ref 6–8.4)
RBC # BLD AUTO: 2.53 M/UL (ref 4–5.4)
SODIUM SERPL-SCNC: 142 MMOL/L (ref 136–145)
WBC # BLD AUTO: 7.67 K/UL (ref 3.9–12.7)

## 2019-08-31 PROCEDURE — 85025 COMPLETE CBC W/AUTO DIFF WBC: CPT

## 2019-08-31 PROCEDURE — 25000003 PHARM REV CODE 250: Performed by: STUDENT IN AN ORGANIZED HEALTH CARE EDUCATION/TRAINING PROGRAM

## 2019-08-31 PROCEDURE — 25000003 PHARM REV CODE 250: Performed by: PHYSICIAN ASSISTANT

## 2019-08-31 PROCEDURE — 63600175 PHARM REV CODE 636 W HCPCS: Performed by: STUDENT IN AN ORGANIZED HEALTH CARE EDUCATION/TRAINING PROGRAM

## 2019-08-31 PROCEDURE — 20600001 HC STEP DOWN PRIVATE ROOM

## 2019-08-31 PROCEDURE — 36415 COLL VENOUS BLD VENIPUNCTURE: CPT

## 2019-08-31 PROCEDURE — S4991 NICOTINE PATCH NONLEGEND: HCPCS | Performed by: PHYSICIAN ASSISTANT

## 2019-08-31 PROCEDURE — 63600175 PHARM REV CODE 636 W HCPCS: Performed by: NEUROLOGICAL SURGERY

## 2019-08-31 PROCEDURE — 80053 COMPREHEN METABOLIC PANEL: CPT

## 2019-08-31 PROCEDURE — 63600175 PHARM REV CODE 636 W HCPCS: Performed by: PHYSICIAN ASSISTANT

## 2019-08-31 RX ADMIN — Medication 250 MG: at 02:08

## 2019-08-31 RX ADMIN — LEVOTHYROXINE SODIUM 75 MCG: 75 TABLET ORAL at 05:08

## 2019-08-31 RX ADMIN — POLYETHYLENE GLYCOL 3350 17 G: 17 POWDER, FOR SOLUTION ORAL at 08:08

## 2019-08-31 RX ADMIN — PANTOPRAZOLE SODIUM 40 MG: 40 TABLET, DELAYED RELEASE ORAL at 08:08

## 2019-08-31 RX ADMIN — DEXAMETHASONE 2 MG: 1 TABLET ORAL at 09:08

## 2019-08-31 RX ADMIN — SENNOSIDES,DOCUSATE SODIUM 1 TABLET: 8.6; 5 TABLET, FILM COATED ORAL at 08:08

## 2019-08-31 RX ADMIN — FERROUS SULFATE TAB EC 325 MG (65 MG FE EQUIVALENT) 325 MG: 325 (65 FE) TABLET DELAYED RESPONSE at 09:08

## 2019-08-31 RX ADMIN — DIVALPROEX SODIUM 500 MG: 250 TABLET, DELAYED RELEASE ORAL at 09:08

## 2019-08-31 RX ADMIN — SODIUM CHLORIDE TAB 1 GM 2 G: 1 TAB at 09:08

## 2019-08-31 RX ADMIN — Medication 250 MG: at 08:08

## 2019-08-31 RX ADMIN — DEXAMETHASONE 2 MG: 1 TABLET ORAL at 05:08

## 2019-08-31 RX ADMIN — SODIUM CHLORIDE TAB 1 GM 2 G: 1 TAB at 12:08

## 2019-08-31 RX ADMIN — Medication 250 MG: at 09:08

## 2019-08-31 RX ADMIN — SODIUM CHLORIDE TAB 1 GM 2 G: 1 TAB at 04:08

## 2019-08-31 RX ADMIN — FERROUS SULFATE TAB EC 325 MG (65 MG FE EQUIVALENT) 325 MG: 325 (65 FE) TABLET DELAYED RESPONSE at 08:08

## 2019-08-31 RX ADMIN — SODIUM CHLORIDE TAB 1 GM 2 G: 1 TAB at 08:08

## 2019-08-31 RX ADMIN — SODIUM CHLORIDE 1000 ML: 0.9 INJECTION, SOLUTION INTRAVENOUS at 11:08

## 2019-08-31 RX ADMIN — ENOXAPARIN SODIUM 40 MG: 100 INJECTION SUBCUTANEOUS at 04:08

## 2019-08-31 RX ADMIN — DEXAMETHASONE 2 MG: 1 TABLET ORAL at 02:08

## 2019-08-31 RX ADMIN — FERROUS SULFATE TAB EC 325 MG (65 MG FE EQUIVALENT) 325 MG: 325 (65 FE) TABLET DELAYED RESPONSE at 02:08

## 2019-08-31 RX ADMIN — SENNOSIDES,DOCUSATE SODIUM 1 TABLET: 8.6; 5 TABLET, FILM COATED ORAL at 09:08

## 2019-08-31 RX ADMIN — DIVALPROEX SODIUM 500 MG: 250 TABLET, DELAYED RELEASE ORAL at 08:08

## 2019-08-31 RX ADMIN — NICOTINE 1 PATCH: 7 PATCH, EXTENDED RELEASE TRANSDERMAL at 08:08

## 2019-08-31 NOTE — ASSESSMENT & PLAN NOTE
59F w/ PMH COPD, HTN, hypothyroidism, stage 4 R small cell lung cancer s/p 6 cycles of carbo/etoposide in remission since 06/2018 who presents to [facility] ED from OSH w/ L parietal brain mass. Now s/p crani for debulking on 8/12.    -Patient neurologically stable on exam  -Pathology (8/19): Metastatic small cell carcinoma. Since pt not accepted to facility yet will consult heme/onc and rad/onc. Okay from neurosurgery standpoint to begin treatment.   -Seizure prevention: Continue Depakote 500mg BID. Patient was on dual AED's. There was no reported seizure activity pre or post op. Keppra stopped on 8/27.  -Cerebral edema: Continue 3 week Dex taper to 2 mg BID - currently 2mg q8h. Continue PPI while on steroids.   -Thrombocytopenia:  Stable. Will continue to monitor.     -Malnutrition:  Continue boost and jennifer supplements TID to promote wound healing.    -Hypoglycemia 2/2 malnutrition:  Hypoglycemia orders placed. Will continue to monitor pt for hypoglycemia. Improved PO intake today per nurse report. Patient to have assistance with all meals to maximize intake.   -Hyponatremia: Improving. Na 132. Continue Na tabs 2 g QID. Will continue to monitor. Will continue Na tabs for now and consider stopping when PO intake continues to improve.   -Hyperkalemia: Resolved. 4.7 today. Will continue to monitor.    -Psyc: No use of wrist restraints. Weaned to tele-sitter. Continue redirection and delirium precautions. Continue Seroquel TID PRN. Haldol prn qhs. Medical and physical precautions to minimize agitation in place.   -HTN:  Maintain SBP < 160. Home dose of Norvasc has been held since SBP at goal. Will continue to monitor and restart if SBP becomes elevated.   -Hypothyroidism: Continue home dose of Synthroid  -Tobacco abuse: Continue nicotine patch  -COPD: Continue duo nebs PRN, pulse ox q 4 hours    -DVT prophylaxis: ABI's, SCD's, lovenox  -Bowel regimen: senna BID and miralax daily  -Atelectasis prevention: IS hourly while  awake, PT/OT, OOB > 6 hours per day    DISPO: Medically stable for discharge. Pending placement

## 2019-08-31 NOTE — SUBJECTIVE & OBJECTIVE
Interval History: Pending dispo    Medications:  Continuous Infusions:  Scheduled Meds:   ascorbic acid (vitamin C)  250 mg Oral TID    dexAMETHasone  2 mg Oral Q8H    Followed by    [START ON 9/1/2019] dexAMETHasone  2 mg Oral Q12H    divalproex  500 mg Oral Q12H    enoxaparin  40 mg Subcutaneous Daily    ferrous sulfate  325 mg Oral TID    levothyroxine  75 mcg Oral Before breakfast    nicotine  1 patch Transdermal Daily    pantoprazole  40 mg Oral Daily    polyethylene glycol  17 g Oral Daily    senna-docusate 8.6-50 mg  1 tablet Oral BID    sodium chloride  2 g Oral QID     PRN Meds:acetaminophen, albuterol-ipratropium, Dextrose 10% Bolus, Dextrose 10% Bolus, glucagon (human recombinant), glucose, glucose, haloperidol lactate, ondansetron, oxyCODONE, QUEtiapine, sodium chloride 0.9%     Review of Systems  Objective:     Weight: 35.4 kg (78 lb)  Body mass index is 14.74 kg/m².  Vital Signs (Most Recent):  Temp: 97.7 °F (36.5 °C) (08/31/19 1141)  Pulse: 81 (08/31/19 1141)  Resp: 17 (08/31/19 1141)  BP: 103/62 (08/31/19 1141)  SpO2: 99 % (08/31/19 1141) Vital Signs (24h Range):  Temp:  [97.1 °F (36.2 °C)-98.4 °F (36.9 °C)] 97.7 °F (36.5 °C)  Pulse:  [] 81  Resp:  [14-18] 17  SpO2:  [96 %-100 %] 99 %  BP: ()/(53-68) 103/62     Date 08/31/19 0700 - 09/01/19 0659   Shift 1520-8883 2709-1872 6057-4571 24 Hour Total   INTAKE   P.O. 370   370   Shift Total(mL/kg) 370(10.5)   370(10.5)   OUTPUT   Shift Total(mL/kg)       Weight (kg) 35.4 35.4 35.4 35.4                        Neurosurgery Physical Exam  GCS E4V3M6 AOx1, Aphasia  PERRL, EOMI, Face Symmetric, Tongue Midline  RUE: FC LUE FC  RLE: FC LLE: FC  SILT  No pronator drift    Significant Labs:  Recent Labs   Lab 08/31/19 0354   GLU 86      K 5.3*      CO2 29   BUN 28*   CREATININE 0.8   CALCIUM 8.5*     Recent Labs   Lab 08/31/19 0354   WBC 7.67   HGB 8.3*   HCT 27.4*   PLT 64*     No results for input(s): LABPT, INR, APTT in the  last 48 hours.  Microbiology Results (last 7 days)     ** No results found for the last 168 hours. **        All pertinent labs from the last 24 hours have been reviewed.    Significant Diagnostics:  I have reviewed all pertinent imaging results/findings within the past 24 hours.

## 2019-08-31 NOTE — PROGRESS NOTES
[facility]  Neurosurgery  Progress Note    Subjective:     History of Present Illness: 59F w/ PMH COPD, HTN, hypothyroidism, stage 4 R small cell lung cancer s/p 6 cycles of carbo/etoposide in remission since 06/2018 who presents to [facility] ED from OSH w/ L parietal brain mass. Per EMS records patient had a 1d history of AMS and gait difficulty and was brought into the hospital by her family for evaluation. CTH @ OSH showed large L parietal brain mass with possible hemorrhagic component and she was transferred to [facility] for evaluation. MRI @ [facility] redemonstrated L nonenhancing parietal mass with minimal blood. Patient is confused and so ROS is tenuous.    Post-Op Info:  Procedure(s) (LRB):  L craniotomy for tumor (Left)   19 Days Post-Op     Interval History: Pending dispo    Medications:  Continuous Infusions:  Scheduled Meds:   ascorbic acid (vitamin C)  250 mg Oral TID    dexAMETHasone  2 mg Oral Q8H    Followed by    [START ON 9/1/2019] dexAMETHasone  2 mg Oral Q12H    divalproex  500 mg Oral Q12H    enoxaparin  40 mg Subcutaneous Daily    ferrous sulfate  325 mg Oral TID    levothyroxine  75 mcg Oral Before breakfast    nicotine  1 patch Transdermal Daily    pantoprazole  40 mg Oral Daily    polyethylene glycol  17 g Oral Daily    senna-docusate 8.6-50 mg  1 tablet Oral BID    sodium chloride  2 g Oral QID     PRN Meds:acetaminophen, albuterol-ipratropium, Dextrose 10% Bolus, Dextrose 10% Bolus, glucagon (human recombinant), glucose, glucose, haloperidol lactate, ondansetron, oxyCODONE, QUEtiapine, sodium chloride 0.9%     Review of Systems  Objective:     Weight: 35.4 kg (78 lb)  Body mass index is 14.74 kg/m².  Vital Signs (Most Recent):  Temp: 97.7 °F (36.5 °C) (08/31/19 1141)  Pulse: 81 (08/31/19 1141)  Resp: 17 (08/31/19 1141)  BP: 103/62 (08/31/19 1141)  SpO2: 99 % (08/31/19 1141) Vital Signs (24h Range):  Temp:  [97.1 °F (36.2 °C)-98.4 °F (36.9 °C)] 97.7 °F (36.5 °C)  Pulse:   [] 81  Resp:  [14-18] 17  SpO2:  [96 %-100 %] 99 %  BP: ()/(53-68) 103/62     Date 08/31/19 0700 - 09/01/19 0659   Shift 6637-8597 6082-5827 7085-8795 24 Hour Total   INTAKE   P.O. 370   370   Shift Total(mL/kg) 370(10.5)   370(10.5)   OUTPUT   Shift Total(mL/kg)       Weight (kg) 35.4 35.4 35.4 35.4                        Neurosurgery Physical Exam  GCS E4V3M6 AOx1, Aphasia  PERRL, EOMI, Face Symmetric, Tongue Midline  RUE: FC LUE FC  RLE: FC LLE: FC  SILT  No pronator drift    Significant Labs:  Recent Labs   Lab 08/31/19  0354   GLU 86      K 5.3*      CO2 29   BUN 28*   CREATININE 0.8   CALCIUM 8.5*     Recent Labs   Lab 08/31/19  0354   WBC 7.67   HGB 8.3*   HCT 27.4*   PLT 64*     No results for input(s): LABPT, INR, APTT in the last 48 hours.  Microbiology Results (last 7 days)     ** No results found for the last 168 hours. **        All pertinent labs from the last 24 hours have been reviewed.    Significant Diagnostics:  I have reviewed all pertinent imaging results/findings within the past 24 hours.    Assessment/Plan:     * Brain metastasis  59F w/ PMH COPD, HTN, hypothyroidism, stage 4 R small cell lung cancer s/p 6 cycles of carbo/etoposide in remission since 06/2018 who [facility] w/ L parietal brain mass. Now s/p crani for debulking on 8/12.    -Patient neurologically stable on exam  -Pathology (8/19): Metastatic small cell carcinoma. Since pt not accepted to facility yet will consult heme/onc and rad/onc. Okay from neurosurgery standpoint to begin treatment.   -Seizure prevention: Continue Depakote 500mg BID. Patient was on dual AED's. There was no reported seizure activity pre or post op. Keppra stopped on 8/27.  -Cerebral edema: Continue 3 week Dex taper to 2 mg BID - currently 2mg q8h. Continue PPI while on steroids.   -Thrombocytopenia:  Stable. Will continue to monitor.     -Malnutrition:  Continue boost and jennifer supplements TID to promote wound  healing.    -Hypoglycemia 2/2 malnutrition:  Hypoglycemia orders placed. Will continue to monitor pt for hypoglycemia. Improved PO intake today per nurse report. Patient to have assistance with all meals to maximize intake.   -Hyponatremia: Improving. Na 132. Continue Na tabs 2 g QID. Will continue to monitor. Will continue Na tabs for now and consider stopping when PO intake continues to improve.   -Hyperkalemia: Resolved. 4.7 today. Will continue to monitor.    -Psyc: No use of wrist restraints. Weaned to tele-sitter. Continue redirection and delirium precautions. Continue Seroquel TID PRN. Haldol prn qhs. Medical and physical precautions to minimize agitation in place.   -HTN:  Maintain SBP < 160. Home dose of Norvasc has been held since SBP at goal. Will continue to monitor and restart if SBP becomes elevated.   -Hypothyroidism: Continue home dose of Synthroid  -Tobacco abuse: Continue nicotine patch  -COPD: Continue duo nebs PRN, pulse ox q 4 hours    -DVT prophylaxis: ABI's, SCD's, lovenox  -Bowel regimen: senna BID and miralax daily  -Atelectasis prevention: IS hourly while awake, PT/OT, OOB > 6 hours per day    DISPO: Medically stable for discharge. Pending placement        Ken Araiza MD  Neurosurgery  [facility]

## 2019-08-31 NOTE — PLAN OF CARE
Problem: Confusion Acute  Goal: Optimal Cognitive Function  Outcome: Ongoing (interventions implemented as appropriate)  Reorient patient, maintain delirium precautions, and ensure safety.    Comments: POC reviewed with patient. All questions and concerns reviewed. Patient reoriented due to confusion. Blood pressure was low upon initial assessment, other vital signs stable throughout shift. For full assessment please refer to flowsheet. Fall/ safety precautions implemented & maintained. Bed locked in lowest position, bed alarm activated & audible, telesitter, & call light in reach. Will continue to monitor.

## 2019-09-01 LAB
POCT GLUCOSE: 105 MG/DL (ref 70–110)
POCT GLUCOSE: 108 MG/DL (ref 70–110)
POCT GLUCOSE: 108 MG/DL (ref 70–110)
POCT GLUCOSE: 143 MG/DL (ref 70–110)

## 2019-09-01 PROCEDURE — 25000003 PHARM REV CODE 250: Performed by: PHYSICIAN ASSISTANT

## 2019-09-01 PROCEDURE — 25000003 PHARM REV CODE 250: Performed by: STUDENT IN AN ORGANIZED HEALTH CARE EDUCATION/TRAINING PROGRAM

## 2019-09-01 PROCEDURE — S4991 NICOTINE PATCH NONLEGEND: HCPCS | Performed by: PHYSICIAN ASSISTANT

## 2019-09-01 PROCEDURE — 63600175 PHARM REV CODE 636 W HCPCS: Performed by: NEUROLOGICAL SURGERY

## 2019-09-01 PROCEDURE — 20600001 HC STEP DOWN PRIVATE ROOM

## 2019-09-01 PROCEDURE — 63600175 PHARM REV CODE 636 W HCPCS: Performed by: PHYSICIAN ASSISTANT

## 2019-09-01 RX ADMIN — FERROUS SULFATE TAB EC 325 MG (65 MG FE EQUIVALENT) 325 MG: 325 (65 FE) TABLET DELAYED RESPONSE at 10:09

## 2019-09-01 RX ADMIN — ENOXAPARIN SODIUM 40 MG: 100 INJECTION SUBCUTANEOUS at 04:09

## 2019-09-01 RX ADMIN — DEXAMETHASONE 2 MG: 1 TABLET ORAL at 06:09

## 2019-09-01 RX ADMIN — DEXAMETHASONE 2 MG: 1 TABLET ORAL at 10:09

## 2019-09-01 RX ADMIN — SODIUM CHLORIDE TAB 1 GM 2 G: 1 TAB at 04:09

## 2019-09-01 RX ADMIN — SENNOSIDES,DOCUSATE SODIUM 1 TABLET: 8.6; 5 TABLET, FILM COATED ORAL at 10:09

## 2019-09-01 RX ADMIN — DIVALPROEX SODIUM 500 MG: 250 TABLET, DELAYED RELEASE ORAL at 08:09

## 2019-09-01 RX ADMIN — POLYETHYLENE GLYCOL 3350 17 G: 17 POWDER, FOR SOLUTION ORAL at 08:09

## 2019-09-01 RX ADMIN — FERROUS SULFATE TAB EC 325 MG (65 MG FE EQUIVALENT) 325 MG: 325 (65 FE) TABLET DELAYED RESPONSE at 08:09

## 2019-09-01 RX ADMIN — SODIUM CHLORIDE TAB 1 GM 2 G: 1 TAB at 01:09

## 2019-09-01 RX ADMIN — FERROUS SULFATE TAB EC 325 MG (65 MG FE EQUIVALENT) 325 MG: 325 (65 FE) TABLET DELAYED RESPONSE at 02:09

## 2019-09-01 RX ADMIN — Medication 250 MG: at 02:09

## 2019-09-01 RX ADMIN — DEXAMETHASONE 2 MG: 1 TABLET ORAL at 02:09

## 2019-09-01 RX ADMIN — NICOTINE 1 PATCH: 7 PATCH, EXTENDED RELEASE TRANSDERMAL at 08:09

## 2019-09-01 RX ADMIN — LEVOTHYROXINE SODIUM 75 MCG: 75 TABLET ORAL at 06:09

## 2019-09-01 RX ADMIN — Medication 250 MG: at 10:09

## 2019-09-01 RX ADMIN — DIVALPROEX SODIUM 500 MG: 250 TABLET, DELAYED RELEASE ORAL at 10:09

## 2019-09-01 RX ADMIN — SENNOSIDES,DOCUSATE SODIUM 1 TABLET: 8.6; 5 TABLET, FILM COATED ORAL at 08:09

## 2019-09-01 RX ADMIN — SODIUM CHLORIDE TAB 1 GM 2 G: 1 TAB at 08:09

## 2019-09-01 RX ADMIN — PANTOPRAZOLE SODIUM 40 MG: 40 TABLET, DELAYED RELEASE ORAL at 08:09

## 2019-09-01 RX ADMIN — Medication 250 MG: at 08:09

## 2019-09-01 RX ADMIN — SODIUM CHLORIDE TAB 1 GM 2 G: 1 TAB at 10:09

## 2019-09-01 NOTE — PLAN OF CARE
Problem: Confusion Acute  Goal: Optimal Cognitive Function  Outcome: Ongoing (interventions implemented as appropriate)  Reorient as needed.    Comments: POC reviewed with patient. All questions and concerns reviewed. Hypotension was an issue, MD called and IV fluid bolus administered per order. Vital signs stable throughout shift. For full assessment please refer to flowsheet. Fall/ safety precautions implemented & maintained. Bed locked in lowest position, bed alarm activated & audible, & call light in reach. Will continue to monitor.

## 2019-09-02 LAB — POCT GLUCOSE: 97 MG/DL (ref 70–110)

## 2019-09-02 PROCEDURE — 20600001 HC STEP DOWN PRIVATE ROOM

## 2019-09-02 PROCEDURE — 25000003 PHARM REV CODE 250: Performed by: PHYSICIAN ASSISTANT

## 2019-09-02 PROCEDURE — 63600175 PHARM REV CODE 636 W HCPCS: Performed by: NEUROLOGICAL SURGERY

## 2019-09-02 PROCEDURE — 25000003 PHARM REV CODE 250: Performed by: STUDENT IN AN ORGANIZED HEALTH CARE EDUCATION/TRAINING PROGRAM

## 2019-09-02 PROCEDURE — 63600175 PHARM REV CODE 636 W HCPCS: Performed by: PHYSICIAN ASSISTANT

## 2019-09-02 PROCEDURE — 99024 PR POST-OP FOLLOW-UP VISIT: ICD-10-PCS | Mod: ,,, | Performed by: PHYSICIAN ASSISTANT

## 2019-09-02 PROCEDURE — 99024 POSTOP FOLLOW-UP VISIT: CPT | Mod: ,,, | Performed by: PHYSICIAN ASSISTANT

## 2019-09-02 PROCEDURE — S4991 NICOTINE PATCH NONLEGEND: HCPCS | Performed by: PHYSICIAN ASSISTANT

## 2019-09-02 RX ADMIN — Medication 250 MG: at 09:09

## 2019-09-02 RX ADMIN — SODIUM CHLORIDE TAB 1 GM 2 G: 1 TAB at 10:09

## 2019-09-02 RX ADMIN — Medication 250 MG: at 03:09

## 2019-09-02 RX ADMIN — DEXAMETHASONE 2 MG: 1 TABLET ORAL at 09:09

## 2019-09-02 RX ADMIN — NICOTINE 1 PATCH: 7 PATCH, EXTENDED RELEASE TRANSDERMAL at 10:09

## 2019-09-02 RX ADMIN — FERROUS SULFATE TAB EC 325 MG (65 MG FE EQUIVALENT) 325 MG: 325 (65 FE) TABLET DELAYED RESPONSE at 09:09

## 2019-09-02 RX ADMIN — QUETIAPINE FUMARATE 25 MG: 25 TABLET ORAL at 09:09

## 2019-09-02 RX ADMIN — SENNOSIDES,DOCUSATE SODIUM 1 TABLET: 8.6; 5 TABLET, FILM COATED ORAL at 09:09

## 2019-09-02 RX ADMIN — POLYETHYLENE GLYCOL 3350 17 G: 17 POWDER, FOR SOLUTION ORAL at 10:09

## 2019-09-02 RX ADMIN — ENOXAPARIN SODIUM 40 MG: 100 INJECTION SUBCUTANEOUS at 05:09

## 2019-09-02 RX ADMIN — FERROUS SULFATE TAB EC 325 MG (65 MG FE EQUIVALENT) 325 MG: 325 (65 FE) TABLET DELAYED RESPONSE at 10:09

## 2019-09-02 RX ADMIN — LEVOTHYROXINE SODIUM 75 MCG: 75 TABLET ORAL at 06:09

## 2019-09-02 RX ADMIN — DIVALPROEX SODIUM 500 MG: 250 TABLET, DELAYED RELEASE ORAL at 10:09

## 2019-09-02 RX ADMIN — FERROUS SULFATE TAB EC 325 MG (65 MG FE EQUIVALENT) 325 MG: 325 (65 FE) TABLET DELAYED RESPONSE at 02:09

## 2019-09-02 RX ADMIN — DIVALPROEX SODIUM 500 MG: 250 TABLET, DELAYED RELEASE ORAL at 09:09

## 2019-09-02 RX ADMIN — DEXAMETHASONE 2 MG: 1 TABLET ORAL at 10:09

## 2019-09-02 RX ADMIN — SODIUM CHLORIDE TAB 1 GM 2 G: 1 TAB at 09:09

## 2019-09-02 RX ADMIN — PANTOPRAZOLE SODIUM 40 MG: 40 TABLET, DELAYED RELEASE ORAL at 10:09

## 2019-09-02 RX ADMIN — SODIUM CHLORIDE TAB 1 GM 2 G: 1 TAB at 02:09

## 2019-09-02 NOTE — ASSESSMENT & PLAN NOTE
59F w/ PMH COPD, HTN, hypothyroidism, stage 4 R small cell lung cancer s/p 6 cycles of carbo/etoposide in remission since 06/2018 who presents to [facility] [place] ED from OSH w/ L parietal brain mass. Now s/p crani for debulking on 8/12.    Pt with confusion overnight prompting head CT. Intervally stable and pt at baseline.    -Patient neurologically stable on exam  -Pathology (8/19): Metastatic small cell carcinoma. Since pt not accepted to facility yet will consult heme/onc and rad/onc. Okay from neurosurgery standpoint to begin treatment.   -Seizure prevention: Continue Depakote 500mg BID. Patient was on dual AED's. There was no reported seizure activity pre or post op. Keppra stopped on 8/27.  -Cerebral edema: Continue 3 week Dex taper to 2 mg BIDContinue PPI while on steroids.   -Thrombocytopenia:  Stable. Will continue to monitor.     -Malnutrition:  Continue boost and jennifer supplements TID to promote wound healing.    -Hypoglycemia 2/2 malnutrition:  Hypoglycemia orders placed. Will continue to monitor pt for hypoglycemia. Improved PO intake today per nurse report. Patient to have assistance with all meals to maximize intake.   -Hyponatremia: Improving.  Continue Na tabs 2 g QID. Will continue to monitor. Will continue Na tabs for now and consider stopping when PO intake continues to improve.   -Hyperkalemia: Resolved. 4.7 today. Will continue to monitor.    -Psyc: No use of wrist restraints. Weaned to tele-sitter. Continue redirection and delirium precautions. Continue Seroquel TID PRN. Haldol prn qhs. Medical and physical precautions to minimize agitation in place.   -HTN:  Maintain SBP < 160. Home dose of Norvasc has been held since SBP at goal. Will continue to monitor and restart if SBP becomes elevated.   -Hypothyroidism: Continue home dose of Synthroid  -Tobacco abuse: Continue nicotine patch  -COPD: Continue duo nebs PRN, pulse ox q 4 hours    -DVT prophylaxis: ABI's, SCD's, lovenox  -Bowel regimen: senna BID and  miralax daily  -Atelectasis prevention: IS hourly while awake, PT/OT, OOB > 6 hours per day    DISPO: Medically stable for discharge. Pending placement

## 2019-09-02 NOTE — SUBJECTIVE & OBJECTIVE
Interval History:  Non-participatory on exam today. Did not answer questions. Nurse present during exam and states the patient waxes and wanes and this behavior is not abnormal for her. She was seen ambulating and talking this am. Patient responded by giggling when her feet were touched but otherwise did not participate.     Medications:  Continuous Infusions:  Scheduled Meds:   ascorbic acid (vitamin C)  250 mg Oral TID    dexAMETHasone  2 mg Oral Q12H    divalproex  500 mg Oral Q12H    enoxaparin  40 mg Subcutaneous Daily    ferrous sulfate  325 mg Oral TID    levothyroxine  75 mcg Oral Before breakfast    nicotine  1 patch Transdermal Daily    pantoprazole  40 mg Oral Daily    polyethylene glycol  17 g Oral Daily    senna-docusate 8.6-50 mg  1 tablet Oral BID    sodium chloride 0.9%  1,000 mL Intravenous Once    sodium chloride  2 g Oral QID     PRN Meds:acetaminophen, albuterol-ipratropium, Dextrose 10% Bolus, Dextrose 10% Bolus, glucagon (human recombinant), glucose, glucose, haloperidol lactate, ondansetron, oxyCODONE, QUEtiapine, sodium chloride 0.9%     Review of Systems  Objective:     Weight: 35.4 kg (78 lb)  Body mass index is 14.74 kg/m².  Vital Signs (Most Recent):  Temp: 98 °F (36.7 °C) (09/02/19 0804)  Pulse: 84 (09/02/19 0804)  Resp: 16 (09/02/19 0804)  BP: 116/78 (09/02/19 0804)  SpO2: (!) 89 % (09/02/19 0804) Vital Signs (24h Range):  Temp:  [96.9 °F (36.1 °C)-98.6 °F (37 °C)] 98 °F (36.7 °C)  Pulse:  [] 84  Resp:  [16-18] 16  SpO2:  [89 %-98 %] 89 %  BP: (102-126)/(60-78) 116/78       Neurosurgery Physical Exam  PERRL, EOMI, Face Symmetric  Non-labored breathing  No acute distress noted  Moving all extremities  Unable to assess strength, sensation, orientation due to lack of participation in exam.     Incision: c/d/i. Healing well       Significant Labs:  No results for input(s): GLU, NA, K, CL, CO2, BUN, CREATININE, CALCIUM, MG in the last 48 hours.  No results for input(s):  WBC, HGB, HCT, PLT in the last 48 hours.  No results for input(s): LABPT, INR, APTT in the last 48 hours.  Microbiology Results (last 7 days)     ** No results found for the last 168 hours. **            Significant Diagnostics:  I have personally reviewed imaging and agree with the findings.     CT head 9/1/19  Postop changes left temporal lobe similar to prior study.    No significant interval change from prior exam.

## 2019-09-02 NOTE — SUBJECTIVE & OBJECTIVE
Medications:  Continuous Infusions:  Scheduled Meds:   ascorbic acid (vitamin C)  250 mg Oral TID    dexAMETHasone  2 mg Oral Q12H    divalproex  500 mg Oral Q12H    enoxaparin  40 mg Subcutaneous Daily    ferrous sulfate  325 mg Oral TID    levothyroxine  75 mcg Oral Before breakfast    nicotine  1 patch Transdermal Daily    pantoprazole  40 mg Oral Daily    polyethylene glycol  17 g Oral Daily    senna-docusate 8.6-50 mg  1 tablet Oral BID    sodium chloride 0.9%  1,000 mL Intravenous Once    sodium chloride  2 g Oral QID     PRN Meds:acetaminophen, albuterol-ipratropium, Dextrose 10% Bolus, Dextrose 10% Bolus, glucagon (human recombinant), glucose, glucose, haloperidol lactate, ondansetron, oxyCODONE, QUEtiapine, sodium chloride 0.9%     Review of Systems    Objective:     Weight: 35.4 kg (78 lb)  Body mass index is 14.74 kg/m².  Vital Signs (Most Recent):  Temp: 96.9 °F (36.1 °C) (09/01/19 2006)  Pulse: 87 (09/01/19 2006)  Resp: 16 (09/01/19 2006)  BP: 110/69 (09/01/19 2006)  SpO2: 97 % (09/01/19 2006) Vital Signs (24h Range):  Temp:  [96.9 °F (36.1 °C)-98.7 °F (37.1 °C)] 96.9 °F (36.1 °C)  Pulse:  [] 87  Resp:  [14-18] 16  SpO2:  [94 %-100 %] 97 %  BP: ()/(54-78) 110/69                          Neurosurgery Physical Exam    GCS E4V3M6 AOx1, Aphasia  PERRL, EOMI, Face Symmetric, Tongue Midline  RUE: FC LUE FC  RLE: FC LLE: FC  SILT  No pronator drift    Significant Labs:  Recent Labs   Lab 08/31/19  0354   GLU 86      K 5.3*      CO2 29   BUN 28*   CREATININE 0.8   CALCIUM 8.5*     Recent Labs   Lab 08/31/19  0354   WBC 7.67   HGB 8.3*   HCT 27.4*   PLT 64*     No results for input(s): LABPT, INR, APTT in the last 48 hours.  Microbiology Results (last 7 days)     ** No results found for the last 168 hours. **        All pertinent labs from the last 24 hours have been reviewed.    Significant Diagnostics:  I have reviewed all pertinent imaging results/findings within the  past 24 hours.

## 2019-09-02 NOTE — PROGRESS NOTES
[facility]  Neurosurgery  Progress Note    Subjective:     History of Present Illness: 59F w/ PMH COPD, HTN, hypothyroidism, stage 4 R small cell lung cancer s/p 6 cycles of carbo/etoposide in remission since 06/2018 who [facility] from [facility]SH w/ L parietal brain mass. Per EMS records patient had a 1d history of AMS and gait difficulty and was brought into the hospital by her family for evaluation. CTH @ OSH showed large L parietal brain mass with possible hemorrhagic component and she was transferred to [facility] for [facility]. MRI @ [facility] redemonstrated L nonenhancing parietal mass with minimal blood. Patient is confused and so ROS is tenuous.    Post-Op Info:  Procedure(s) (LRB):  L craniotomy for tumor (Left)   20 Days Post-Op         Medications:  Continuous Infusions:  Scheduled Meds:   ascorbic acid (vitamin C)  250 mg Oral TID    dexAMETHasone  2 mg Oral Q12H    divalproex  500 mg Oral Q12H    enoxaparin  40 mg Subcutaneous Daily    ferrous sulfate  325 mg Oral TID    levothyroxine  75 mcg Oral Before breakfast    nicotine  1 patch Transdermal Daily    pantoprazole  40 mg Oral Daily    polyethylene glycol  17 g Oral Daily    senna-docusate 8.6-50 mg  1 tablet Oral BID    sodium chloride 0.9%  1,000 mL Intravenous Once    sodium chloride  2 g Oral QID     PRN Meds:acetaminophen, albuterol-ipratropium, Dextrose 10% Bolus, Dextrose 10% Bolus, glucagon (human recombinant), glucose, glucose, haloperidol lactate, ondansetron, oxyCODONE, QUEtiapine, sodium chloride 0.9%     Review of Systems    Objective:     Weight: 35.4 kg (78 lb)  Body mass index is 14.74 kg/m².  Vital Signs (Most Recent):  Temp: 96.9 °F (36.1 °C) (09/01/19 2006)  Pulse: 87 (09/01/19 2006)  Resp: 16 (09/01/19 2006)  BP: 110/69 (09/01/19 2006)  SpO2: 97 % (09/01/19 2006) Vital Signs (24h Range):  Temp:  [96.9 °F (36.1 °C)-98.7 °F (37.1 °C)] 96.9 °F (36.1 °C)  Pulse:  [] 87  Resp:  [14-18] 16  SpO2:  [94 %-100  %] 97 %  BP: ()/(54-78) 110/69                          Neurosurgery Physical Exam    GCS [id number] AOx1, Aphasia  PERRL, EOMI, Face Symmetric, Tongue Midline  RUE: FC LUE FC  RLE: FC LLE: FC  SILT  No pronator drift    Significant Labs:  Recent Labs   Lab 08/31/19  0354   GLU 86      K 5.3*      CO2 29   BUN 28*   CREATININE 0.8   CALCIUM 8.5*     Recent Labs   Lab 08/31/19  0354   WBC 7.67   HGB 8.3*   HCT 27.4*   PLT 64*     No results for input(s): LABPT, INR, APTT in the last 48 hours.  Microbiology Results (last 7 days)     ** No results found for the last 168 hours. **        All pertinent labs from the last 24 hours have been reviewed.    Significant Diagnostics:  I have reviewed all pertinent imaging results/findings within the past 24 hours.    Assessment/Plan:     * Brain metastasis  59F w/ PMH COPD, HTN, hypothyroidism, stage 4 R small cell lung cancer s/p 6 cycles of carbo/etoposide in remission since 06/2018 who [place] [facility] [place] w/ L parietal brain mass. Now s/p crani for debulking on 8/12.    Pt with confusion overnight prompting head CT. Intervally stable and pt at baseline.    -Patient neurologically stable on exam  -Pathology (8/19): Metastatic small cell carcinoma. Since pt not accepted to facility yet will consult heme/onc and rad/onc. Okay from neurosurgery standpoint to begin treatment.   -Seizure prevention: Continue Depakote 500mg BID. Patient was on dual AED's. There was no reported seizure activity pre or post op. Keppra stopped on 8/27.  -Cerebral edema: Continue 3 week Dex taper to 2 mg BIDContinue PPI while on steroids.   -Thrombocytopenia:  Stable. Will continue to monitor.     -Malnutrition:  Continue boost and jennifer supplements TID to promote wound healing.    -Hypoglycemia 2/2 malnutrition:  Hypoglycemia orders placed. Will continue to monitor pt for hypoglycemia. Improved PO intake today per nurse report. Patient to have assistance with all meals to  maximize intake.   -Hyponatremia: Improving.  Continue Na tabs 2 g QID. Will continue to monitor. Will continue Na tabs for now and consider stopping when PO intake continues to improve.   -Hyperkalemia: Resolved. 4.7 today. Will continue to monitor.    -Psyc: No use of wrist restraints. Weaned to tele-sitter. Continue redirection and delirium precautions. Continue Seroquel TID PRN. Haldol prn qhs. Medical and physical precautions to minimize agitation in place.   -HTN:  Maintain SBP < 160. Home dose of Norvasc has been held since SBP at goal. Will continue to monitor and restart if SBP becomes elevated.   -Hypothyroidism: Continue home dose of Synthroid  -Tobacco abuse: Continue nicotine patch  -COPD: Continue duo nebs PRN, pulse ox q 4 hours    -DVT prophylaxis: ABI's, SCD's, lovenox  -Bowel regimen: senna BID and miralax daily  -Atelectasis prevention: IS hourly while awake, PT/OT, OOB > 6 hours per day    DISPO: Medically stable for discharge. Pending placement        Ty Pedro MD  Neurosurgery  [facility]

## 2019-09-02 NOTE — ASSESSMENT & PLAN NOTE
59F w/ PMH COPD, HTN, hypothyroidism, stage 4 R small cell lung cancer s/p 6 cycles of carbo/etoposide in remission since 06/2018 who presents to [facility] ED from OSH w/ L parietal brain mass. Now s/p crani for debulking on 8/12.    9/1 HCT stable. Non-participatory on exam today but reported walking and talking this am with nurse.   H/H 8/27. Will repeat labs tomorrow.     -Pathology (8/19): Metastatic small cell carcinoma. Since pt not accepted to facility yet will consult heme/onc and rad/onc. Okay from neurosurgery standpoint to begin treatment.   -Seizure prevention: Continue Depakote 500mg BID. Patient was on dual AED's. There was no reported seizure activity pre or post op. Keppra stopped on 8/27.  -Cerebral edema: Continue 3 week Dex taper to 2 mg BIDContinue PPI while on steroids.   -Thrombocytopenia:  Stable. Will continue to monitor.     -Malnutrition:  Continue boost and jennifer supplements TID to promote wound healing.    -Hypoglycemia 2/2 malnutrition:  Hypoglycemia orders placed. Will continue to monitor pt for hypoglycemia.  Patient to have assistance with all meals to maximize intake.   -Hyponatremia: Improving. 142 today. Continue Na tabs 2 g QID. Will continue to monitor. Will continue Na tabs for now and consider stopping when PO intake continues to improve.   -Hyperkalemia: Resolved. 5.3 today. Will continue to monitor.    -Psyc: No use of wrist restraints. Weaned to tele-sitter. Continue redirection and delirium precautions. Continue Seroquel TID PRN. Haldol prn qhs. Medical and physical precautions to minimize agitation in place.   -HTN:  Maintain SBP < 160. Home dose of Norvasc has been held since SBP at goal. Will continue to monitor and restart if SBP becomes elevated.   -Hypothyroidism: Continue home dose of Synthroid  -Tobacco abuse: Continue nicotine patch  -COPD: Continue duo nebs PRN, pulse ox q 4 hours    -DVT prophylaxis: ABI's, SCD's, lovenox  -Bowel regimen: senna BID and miralax  daily  -Atelectasis prevention: IS hourly while awake, PT/OT, OOB > 6 hours per day    DISPO: Medically stable for discharge. Pending placement

## 2019-09-02 NOTE — PROGRESS NOTES
[facility]  Neurosurgery  Progress Note    Subjective:     History of Present Illness: 59F w/ PMH COPD, HTN, hypothyroidism, stage 4 R small cell lung cancer s/p 6 cycles of carbo/etoposide in remission since 06/2018 who [place] to [facility] [place] from OSH w/ L parietal brain mass. Per EMS records patient had a 1d history of AMS and gait difficulty and was brought into the hospital by her family for evaluation. CTH @ OSH showed large L parietal brain mass with possible hemorrhagic component and she was transferred to [facility] for evaluation. MRI @ [facility] redemonstrated L nonenhancing parietal mass with minimal blood. Patient is confused and so ROS is tenuous.    Post-Op Info:  Procedure(s) (LRB):  L craniotomy for tumor (Left)   21 Days Post-Op     Interval History:  Non-participatory on exam today. Did not answer questions. Nurse present during exam and states the patient waxes and wanes and this behavior is not abnormal for her. She was seen ambulating and talking this am. Patient responded by giggling when her feet were touched but otherwise did not participate.     Medications:  Continuous Infusions:  Scheduled Meds:   ascorbic acid (vitamin C)  250 mg Oral TID    dexAMETHasone  2 mg Oral Q12H    divalproex  500 mg Oral Q12H    enoxaparin  40 mg Subcutaneous Daily    ferrous sulfate  325 mg Oral TID    levothyroxine  75 mcg Oral Before breakfast    nicotine  1 patch Transdermal Daily    pantoprazole  40 mg Oral Daily    polyethylene glycol  17 g Oral Daily    senna-docusate 8.6-50 mg  1 tablet Oral BID    sodium chloride 0.9%  1,000 mL Intravenous Once    sodium chloride  2 g Oral QID     PRN Meds:acetaminophen, albuterol-ipratropium, Dextrose 10% Bolus, Dextrose 10% Bolus, glucagon (human recombinant), glucose, glucose, haloperidol lactate, ondansetron, oxyCODONE, QUEtiapine, sodium chloride 0.9%     Review of Systems  Objective:     Weight: 35.4 kg (78 lb)  Body mass index is 14.74  kg/m².  Vital Signs (Most Recent):  Temp: 98 °F (36.7 °C) (09/02/19 0804)  Pulse: 84 (09/02/19 0804)  Resp: 16 (09/02/19 0804)  BP: 116/78 (09/02/19 0804)  SpO2: (!) 89 % (09/02/19 0804) Vital Signs (24h Range):  Temp:  [96.9 °F (36.1 °C)-98.6 °F (37 °C)] 98 °F (36.7 °C)  Pulse:  [] 84  Resp:  [16-18] 16  SpO2:  [89 %-98 %] 89 %  BP: (102-126)/(60-78) 116/78       Neurosurgery Physical Exam  PERRL, EOMI, Face Symmetric  Non-labored breathing  No acute distress noted  Moving all extremities  Unable to assess strength, sensation, orientation due to lack of participation in exam.     Incision: c/d/i. Healing well       Significant Labs:  No results for input(s): GLU, NA, K, CL, CO2, BUN, CREATININE, CALCIUM, MG in the last 48 hours.  No results for input(s): WBC, HGB, HCT, PLT in the last 48 hours.  No results for input(s): LABPT, INR, APTT in the last 48 hours.  Microbiology Results (last 7 days)     ** No results found for the last 168 hours. **            Significant Diagnostics:  I have personally reviewed imaging and agree with the findings.     CT head 9/1/19  Postop changes left temporal lobe similar to prior study.    No significant interval change from prior exam.    Assessment/Plan:     * Brain metastasis  59F w/ PMH COPD, HTN, hypothyroidism, stage 4 R small cell lung cancer s/p 6 cycles of carbo/etoposide in remission since 06/2018 who presents to [facility] ED from OSH w/ L parietal brain mass. Now s/p crani for debulking on 8/12.    9/1 HCT stable. Non-participatory on exam today but reported walking and talking this am with nurse.   H/H 8/27. Will repeat labs tomorrow.     -Pathology (8/19): Metastatic small cell carcinoma. Since pt not accepted to facility yet will consult heme/onc and rad/onc. Okay from neurosurgery standpoint to begin treatment.   -Seizure prevention: Continue Depakote 500mg BID. Patient was on dual AED's. There was no reported seizure activity pre or post op. Keppra stopped on  8/27.  -Cerebral edema: Continue 3 week Dex taper to 2 mg BIDContinue PPI while on steroids.   -Thrombocytopenia:  Stable. Will continue to monitor.     -Malnutrition:  Continue boost and ejnnifer supplements TID to promote wound healing.    -Hypoglycemia 2/2 malnutrition:  Hypoglycemia orders placed. Will continue to monitor pt for hypoglycemia.  Patient to have assistance with all meals to maximize intake.   -Hyponatremia: Improving. 142 today. Continue Na tabs 2 g QID. Will continue to monitor. Will continue Na tabs for now and consider stopping when PO intake continues to improve.   -Hyperkalemia: Resolved. 5.3 today. Will continue to monitor.    -Psyc: No use of wrist restraints. Weaned to tele-sitter. Continue redirection and delirium precautions. Continue Seroquel TID PRN. Haldol prn qhs. Medical and physical precautions to minimize agitation in place.   -HTN:  Maintain SBP < 160. Home dose of Norvasc has been held since SBP at goal. Will continue to monitor and restart if SBP becomes elevated.   -Hypothyroidism: Continue home dose of Synthroid  -Tobacco abuse: Continue nicotine patch  -COPD: Continue duo nebs PRN, pulse ox q 4 hours    -DVT prophylaxis: ABI's, SCD's, lovenox  -Bowel regimen: senna BID and miralax daily  -Atelectasis prevention: IS hourly while awake, PT/OT, OOB > 6 hours per day    DISPO: Medically stable for discharge. Pending placement        Nelia Simpson PA-C  Neurosurgery  [facility]

## 2019-09-02 NOTE — PLAN OF CARE
Problem: Confusion Acute  Goal: Optimal Cognitive Function    Intervention: Minimize Factors Contributing to Confusion  Reorient as needed, promote sleep.      Comments: POC reviewed with patient. All questions and concerns reviewed. Vital signs stable throughout shift. For full assessment please refer to flowsheet. Fall/ safety precautions implemented & maintained. Bed locked in lowest position, bed alarm activated & audible, & call light in reach. Will continue to monitor.

## 2019-09-03 LAB
ALBUMIN SERPL BCP-MCNC: 3.3 G/DL (ref 3.5–5.2)
ALP SERPL-CCNC: 79 U/L (ref 55–135)
ALT SERPL W/O P-5'-P-CCNC: 11 U/L (ref 10–44)
ANION GAP SERPL CALC-SCNC: 8 MMOL/L (ref 8–16)
AST SERPL-CCNC: 13 U/L (ref 10–40)
BASOPHILS # BLD AUTO: 0.01 K/UL (ref 0–0.2)
BASOPHILS NFR BLD: 0.1 % (ref 0–1.9)
BILIRUB SERPL-MCNC: 0.3 MG/DL (ref 0.1–1)
BUN SERPL-MCNC: 30 MG/DL (ref 6–20)
CALCIUM SERPL-MCNC: 9.1 MG/DL (ref 8.7–10.5)
CHLORIDE SERPL-SCNC: 105 MMOL/L (ref 95–110)
CO2 SERPL-SCNC: 31 MMOL/L (ref 23–29)
CREAT SERPL-MCNC: 0.8 MG/DL (ref 0.5–1.4)
DIFFERENTIAL METHOD: ABNORMAL
EOSINOPHIL # BLD AUTO: 0 K/UL (ref 0–0.5)
EOSINOPHIL NFR BLD: 0.2 % (ref 0–8)
ERYTHROCYTE [DISTWIDTH] IN BLOOD BY AUTOMATED COUNT: 16.4 % (ref 11.5–14.5)
EST. GFR  (AFRICAN AMERICAN): >60 ML/MIN/1.73 M^2
EST. GFR  (NON AFRICAN AMERICAN): >60 ML/MIN/1.73 M^2
GLUCOSE SERPL-MCNC: 72 MG/DL (ref 70–110)
HCT VFR BLD AUTO: 30.2 % (ref 37–48.5)
HGB BLD-MCNC: 9.2 G/DL (ref 12–16)
IMM GRANULOCYTES # BLD AUTO: 0.15 K/UL (ref 0–0.04)
IMM GRANULOCYTES NFR BLD AUTO: 1.4 % (ref 0–0.5)
LYMPHOCYTES # BLD AUTO: 0.6 K/UL (ref 1–4.8)
LYMPHOCYTES NFR BLD: 5.8 % (ref 18–48)
MCH RBC QN AUTO: 32.9 PG (ref 27–31)
MCHC RBC AUTO-ENTMCNC: 30.5 G/DL (ref 32–36)
MCV RBC AUTO: 108 FL (ref 82–98)
MONOCYTES # BLD AUTO: 0.7 K/UL (ref 0.3–1)
MONOCYTES NFR BLD: 6.8 % (ref 4–15)
NEUTROPHILS # BLD AUTO: 9.3 K/UL (ref 1.8–7.7)
NEUTROPHILS NFR BLD: 85.7 % (ref 38–73)
NRBC BLD-RTO: 0 /100 WBC
PLATELET # BLD AUTO: 69 K/UL (ref 150–350)
PMV BLD AUTO: 12.1 FL (ref 9.2–12.9)
POCT GLUCOSE: 101 MG/DL (ref 70–110)
POCT GLUCOSE: 106 MG/DL (ref 70–110)
POCT GLUCOSE: 39 MG/DL (ref 70–110)
POCT GLUCOSE: 47 MG/DL (ref 70–110)
POCT GLUCOSE: 53 MG/DL (ref 70–110)
POCT GLUCOSE: 74 MG/DL (ref 70–110)
POCT GLUCOSE: 86 MG/DL (ref 70–110)
POTASSIUM SERPL-SCNC: 5.3 MMOL/L (ref 3.5–5.1)
PROT SERPL-MCNC: 6 G/DL (ref 6–8.4)
RBC # BLD AUTO: 2.8 M/UL (ref 4–5.4)
SODIUM SERPL-SCNC: 144 MMOL/L (ref 136–145)
WBC # BLD AUTO: 10.82 K/UL (ref 3.9–12.7)

## 2019-09-03 PROCEDURE — 63600175 PHARM REV CODE 636 W HCPCS: Performed by: NEUROLOGICAL SURGERY

## 2019-09-03 PROCEDURE — 85025 COMPLETE CBC W/AUTO DIFF WBC: CPT

## 2019-09-03 PROCEDURE — 97803 MED NUTRITION INDIV SUBSEQ: CPT

## 2019-09-03 PROCEDURE — 20600001 HC STEP DOWN PRIVATE ROOM

## 2019-09-03 PROCEDURE — 80053 COMPREHEN METABOLIC PANEL: CPT

## 2019-09-03 PROCEDURE — 97530 THERAPEUTIC ACTIVITIES: CPT

## 2019-09-03 PROCEDURE — 36415 COLL VENOUS BLD VENIPUNCTURE: CPT

## 2019-09-03 PROCEDURE — 25000003 PHARM REV CODE 250: Performed by: STUDENT IN AN ORGANIZED HEALTH CARE EDUCATION/TRAINING PROGRAM

## 2019-09-03 PROCEDURE — 63600175 PHARM REV CODE 636 W HCPCS: Mod: JG | Performed by: PHYSICIAN ASSISTANT

## 2019-09-03 PROCEDURE — 99024 PR POST-OP FOLLOW-UP VISIT: ICD-10-PCS | Mod: ,,, | Performed by: PHYSICIAN ASSISTANT

## 2019-09-03 PROCEDURE — 25000003 PHARM REV CODE 250: Performed by: PHYSICIAN ASSISTANT

## 2019-09-03 PROCEDURE — S4991 NICOTINE PATCH NONLEGEND: HCPCS | Performed by: PHYSICIAN ASSISTANT

## 2019-09-03 PROCEDURE — 99024 POSTOP FOLLOW-UP VISIT: CPT | Mod: ,,, | Performed by: PHYSICIAN ASSISTANT

## 2019-09-03 RX ADMIN — OXYCODONE HYDROCHLORIDE 5 MG: 5 TABLET ORAL at 07:09

## 2019-09-03 RX ADMIN — DIVALPROEX SODIUM 500 MG: 250 TABLET, DELAYED RELEASE ORAL at 09:09

## 2019-09-03 RX ADMIN — ENOXAPARIN SODIUM 40 MG: 100 INJECTION SUBCUTANEOUS at 06:09

## 2019-09-03 RX ADMIN — POLYETHYLENE GLYCOL 3350 17 G: 17 POWDER, FOR SOLUTION ORAL at 09:09

## 2019-09-03 RX ADMIN — DEXAMETHASONE 2 MG: 1 TABLET ORAL at 09:09

## 2019-09-03 RX ADMIN — Medication 250 MG: at 03:09

## 2019-09-03 RX ADMIN — SODIUM CHLORIDE TAB 1 GM 2 G: 1 TAB at 09:09

## 2019-09-03 RX ADMIN — FERROUS SULFATE TAB EC 325 MG (65 MG FE EQUIVALENT) 325 MG: 325 (65 FE) TABLET DELAYED RESPONSE at 09:09

## 2019-09-03 RX ADMIN — FERROUS SULFATE TAB EC 325 MG (65 MG FE EQUIVALENT) 325 MG: 325 (65 FE) TABLET DELAYED RESPONSE at 02:09

## 2019-09-03 RX ADMIN — NICOTINE 1 PATCH: 7 PATCH, EXTENDED RELEASE TRANSDERMAL at 09:09

## 2019-09-03 RX ADMIN — SODIUM CHLORIDE TAB 1 GM 2 G: 1 TAB at 02:09

## 2019-09-03 RX ADMIN — SODIUM CHLORIDE TAB 1 GM 2 G: 1 TAB at 06:09

## 2019-09-03 RX ADMIN — DEXAMETHASONE 2 MG: 1 TABLET ORAL at 08:09

## 2019-09-03 RX ADMIN — SENNOSIDES,DOCUSATE SODIUM 1 TABLET: 8.6; 5 TABLET, FILM COATED ORAL at 09:09

## 2019-09-03 RX ADMIN — Medication 250 MG: at 09:09

## 2019-09-03 RX ADMIN — FERROUS SULFATE TAB EC 325 MG (65 MG FE EQUIVALENT) 325 MG: 325 (65 FE) TABLET DELAYED RESPONSE at 08:09

## 2019-09-03 RX ADMIN — SODIUM CHLORIDE TAB 1 GM 2 G: 1 TAB at 08:09

## 2019-09-03 RX ADMIN — Medication 250 MG: at 08:09

## 2019-09-03 RX ADMIN — LEVOTHYROXINE SODIUM 75 MCG: 75 TABLET ORAL at 06:09

## 2019-09-03 RX ADMIN — PANTOPRAZOLE SODIUM 40 MG: 40 TABLET, DELAYED RELEASE ORAL at 09:09

## 2019-09-03 RX ADMIN — DIVALPROEX SODIUM 500 MG: 250 TABLET, DELAYED RELEASE ORAL at 08:09

## 2019-09-03 RX ADMIN — GLUCAGON 1 MG: KIT at 12:09

## 2019-09-03 NOTE — SUBJECTIVE & OBJECTIVE
Interval History: No acute events overnight.  Tangential speech continues.  No changes in mental status in comparison to weekend.  Imaging obtained over the weekend reviewed.  No detrimental change in comparison to prior studies.  Platelets remained stable at 69k, will hold off on transfusion.  Patient unable to complete ROS.  Lying in bed with head of bed elevated in no acute distress.  Patient is seen in the wheelchair on the unit earlier in the day.  Intermittently talkative with staff.  Heme-Onc and RadOnc do not recommend inpatient treatment, no systemic treatment per oncology. RadOnc stating WBR once more stable, however do not know how much this will improve her function. Patient accepted by hospice facility.  Patient's family deciding on post acute care needs, declining hospice at this time.  Goals of care discussion to be done this week with patient's family.    Medications:  Continuous Infusions:  Scheduled Meds:   ascorbic acid (vitamin C)  250 mg Oral TID    dexAMETHasone  2 mg Oral Q12H    divalproex  500 mg Oral Q12H    enoxaparin  40 mg Subcutaneous Daily    ferrous sulfate  325 mg Oral TID    levothyroxine  75 mcg Oral Before breakfast    nicotine  1 patch Transdermal Daily    pantoprazole  40 mg Oral Daily    polyethylene glycol  17 g Oral Daily    senna-docusate 8.6-50 mg  1 tablet Oral BID    sodium chloride 0.9%  1,000 mL Intravenous Once    sodium chloride  2 g Oral QID     PRN Meds:acetaminophen, albuterol-ipratropium, Dextrose 10% Bolus, Dextrose 10% Bolus, glucagon (human recombinant), glucose, glucose, haloperidol lactate, ondansetron, oxyCODONE, QUEtiapine, sodium chloride 0.9%       Objective:     Weight: 35.4 kg (78 lb)  Body mass index is 14.74 kg/m².  Vital Signs (Most Recent):  Temp: 98.2 °F (36.8 °C) (09/03/19 1437)  Pulse: 103 (09/03/19 1437)  Resp: 18 (09/03/19 1437)  BP: (!) 133/96 (09/03/19 1437)  SpO2: 95 % (09/03/19 1437) Vital Signs (24h Range):  Temp:  [97.4 °F  (36.3 °C)-98.5 °F (36.9 °C)] 98.2 °F (36.8 °C)  Pulse:  [] 103  Resp:  [18] 18  SpO2:  [95 %-98 %] 95 %  BP: (110-169)/(64-96) 133/96     Date 09/03/19 0700 - 09/04/19 0659   Shift 8180-9353 4594-5181 2919-2696 24 Hour Total   INTAKE   P.O. 0 237  237   Shift Total(mL/kg) 0(0) 237(6.7)  237(6.7)   OUTPUT   Shift Total(mL/kg)       Weight (kg) 35.4 35.4 35.4 35.4                 Neurosurgery Physical Exam    General: well developed, well nourished, no distress.   Head: normocephalic, atraumatic  GCS: Motor: 5/Verbal: 4/Eyes: 4 GCS Total: 13  Mental Status: Awake, Alert. Unable to answer questions appropriately during assessment  Tangential speech  Cranial nerves: face symmetric, CN II-XII grossly intact.   Eyes: pupils equal, round, reactive to light with accomodation, EOMI.    Pulmonary: normal respirations, no signs of respiratory distress  Abdomen: soft, non-distended, not tender to palpation  Sensory:  Appears intact to light touch throughout  Motor Strength: Moves all extremities spontaneously with good tone.  Does not follow commands. No abnormal movements seen.   Pronator Drift:  Unable to assess secondary to AMS  Finger-to-nose:  Unable to assess secondary to AMS  Puentes: absent  Clonus: absent  Babinski: absent  Skin: Skin is warm, dry and intact.    Incision well healed.  No surrounding erythema or edema.  No drainage from incision, nontender to palpation.    Significant Labs:  Recent Labs   Lab 09/03/19  0439   GLU 72      K 5.3*      CO2 31*   BUN 30*   CREATININE 0.8   CALCIUM 9.1     Recent Labs   Lab 09/03/19 0439   WBC 10.82   HGB 9.2*   HCT 30.2*   PLT 69*     No results for input(s): LABPT, INR, APTT in the last 48 hours.  Microbiology Results (last 7 days)     ** No results found for the last 168 hours. **        Recent Lab Results       09/03/19  1809   09/03/19  1807   09/03/19  1342   09/03/19  1221   09/03/19  1218        Albumin               Alkaline Phosphatase                ALT               Anion Gap               AST               Baso #               Basophil%               BILIRUBIN TOTAL               BUN, Bld               Calcium               Chloride               CO2               Creatinine               Differential Method               eGFR if                eGFR if non                Eos #               Eosinophil%               Glucose               Gran # (ANC)               Gran%               Hematocrit               Hemoglobin               Immature Grans (Abs)               Immature Granulocytes               Lymph #               Lymph%               MCH               MCHC               MCV               Mono #               Mono%               MPV               nRBC               Platelets               POCT Glucose 101 39 86 47 53     Potassium               PROTEIN TOTAL               RBC               RDW               Sodium               WBC                                09/03/19  0439   09/03/19  0217        Albumin 3.3       Alkaline Phosphatase 79       ALT 11       Anion Gap 8       AST 13       Baso # 0.01       Basophil% 0.1       BILIRUBIN TOTAL 0.3  Comment:  For infants and newborns, interpretation of results should be based  on gestational age, weight and in agreement with clinical  observations.  Premature Infant recommended reference ranges:  Up to 24 hours.............<8.0 mg/dL  Up to 48 hours............<12.0 mg/dL  3-5 days..................<15.0 mg/dL  6-29 days.................<15.0 mg/dL         BUN, Bld 30       Calcium 9.1       Chloride 105       CO2 31       Creatinine 0.8       Differential Method Automated       eGFR if  >60.0       eGFR if non  >60.0  Comment:  Calculation used to obtain the estimated glomerular filtration  rate (eGFR) is the CKD-EPI equation.          Eos # 0.0       Eosinophil% 0.2       Glucose 72       Gran # (ANC) 9.3       Gran% 85.7        Hematocrit 30.2       Hemoglobin 9.2       Immature Grans (Abs) 0.15  Comment:  Mild elevation in immature granulocytes is non specific and   can be seen in a variety of conditions including stress response,   acute inflammation, trauma and pregnancy. Correlation with other   laboratory and clinical findings is essential.         Immature Granulocytes 1.4       Lymph # 0.6       Lymph% 5.8       MCH 32.9       MCHC 30.5              Mono # 0.7       Mono% 6.8       MPV 12.1       nRBC 0       Platelets 69       POCT Glucose   74     Potassium 5.3  Comment:  *No Visible Hemolysis       PROTEIN TOTAL 6.0       RBC 2.80       RDW 16.4       Sodium 144       WBC 10.82           All pertinent labs from the last 24 hours have been reviewed.    Significant Diagnostics:  I have reviewed all pertinent imaging results/findings within the past 24 hours.

## 2019-09-03 NOTE — PROGRESS NOTES
[facility]  Neurosurgery  Progress Note    Subjective:     History of Present Illness: 59F w/ PMH COPD, HTN, hypothyroidism, stage 4 R small cell lung cancer s/p 6 cycles of carbo/etoposide in remission since 06/2018 who [place] [facility] [place] w/ L parietal brain mass. Per EMS records patient had a 1d history of AMS and gait difficulty and was brought into the hospital by her family for evaluation. CTH @ OSH showed large L parietal brain mass with possible hemorrhagic component and she was transferred to [facility] [place]. MRI @ [facility] redemonstrated L nonenhancing parietal mass with minimal blood. Patient is confused and so ROS is tenuous.    Post-Op Info:  Procedure(s) (LRB):  L craniotomy for tumor (Left)   22 Days Post-Op     Interval History: No acute events overnight.  Tangential speech continues.  No changes in mental status in comparison to weekend.  Imaging obtained over the weekend reviewed.  No detrimental change in comparison to prior studies.  Platelets remained stable at 69k, will hold off on transfusion.  Patient unable to complete ROS.  Lying in bed with head of bed elevated in no acute distress.  Patient is seen in the wheelchair on the unit earlier in the day.  Intermittently talkative with staff.  Heme-Onc and RadOnc do not recommend inpatient treatment, no systemic treatment per oncology. RadOnc stating WBR once more stable, however do not know how much this will improve her function. Patient accepted by hospice facility.  Patient's family deciding on post acute care needs, declining hospice at this time.  Goals of care discussion to be done this week with patient's family.    Medications:  Continuous Infusions:  Scheduled Meds:   ascorbic acid (vitamin C)  250 mg Oral TID    dexAMETHasone  2 mg Oral Q12H    divalproex  500 mg Oral Q12H    enoxaparin  40 mg Subcutaneous Daily    ferrous sulfate  325 mg Oral TID    levothyroxine  75 mcg Oral Before breakfast     nicotine  1 patch Transdermal Daily    pantoprazole  40 mg Oral Daily    polyethylene glycol  17 g Oral Daily    senna-docusate 8.6-50 mg  1 tablet Oral BID    sodium chloride 0.9%  1,000 mL Intravenous Once    sodium chloride  2 g Oral QID     PRN Meds:acetaminophen, albuterol-ipratropium, Dextrose 10% Bolus, Dextrose 10% Bolus, glucagon (human recombinant), glucose, glucose, haloperidol lactate, ondansetron, oxyCODONE, QUEtiapine, sodium chloride 0.9%       Objective:     Weight: 35.4 kg (78 lb)  Body mass index is 14.74 kg/m².  Vital Signs (Most Recent):  Temp: 98.2 °F (36.8 °C) (09/03/19 1437)  Pulse: 103 (09/03/19 1437)  Resp: 18 (09/03/19 1437)  BP: (!) 133/96 (09/03/19 1437)  SpO2: 95 % (09/03/19 1437) Vital Signs (24h Range):  Temp:  [97.4 °F (36.3 °C)-98.5 °F (36.9 °C)] 98.2 °F (36.8 °C)  Pulse:  [] 103  Resp:  [18] 18  SpO2:  [95 %-98 %] 95 %  BP: (110-169)/(64-96) 133/96     Date 09/03/19 0700 - 09/04/19 0659   Shift 1034-1173 3646-2093 5331-7690 24 Hour Total   INTAKE   P.O. 0 237  237   Shift Total(mL/kg) 0(0) 237(6.7)  237(6.7)   OUTPUT   Shift Total(mL/kg)       Weight (kg) 35.4 35.4 35.4 35.4                 Neurosurgery Physical Exam    General: well developed, well nourished, no distress.   Head: normocephalic, atraumatic  GCS: Motor: 5/Verbal: 4/Eyes: 4 GCS Total: 13  Mental Status: Awake, Alert. Unable to answer questions appropriately during assessment  Tangential speech  Cranial nerves: face symmetric, CN II-XII grossly intact.   Eyes: pupils equal, round, reactive to light with accomodation, EOMI.    Pulmonary: normal respirations, no signs of respiratory distress  Abdomen: soft, non-distended, not tender to palpation  Sensory:  Appears intact to light touch throughout  Motor Strength: Moves all extremities spontaneously with good tone.  Does not follow commands. No abnormal movements seen.   Pronator Drift:  Unable to assess secondary to AMS  Finger-to-nose:  Unable to assess  secondary to AMS  Puentes: absent  Clonus: absent  Babinski: absent  Skin: Skin is warm, dry and intact.    Incision well healed.  No surrounding erythema or edema.  No drainage from incision, nontender to palpation.    Significant Labs:  Recent Labs   Lab 09/03/19  0439   GLU 72      K 5.3*      CO2 31*   BUN 30*   CREATININE 0.8   CALCIUM 9.1     Recent Labs   Lab 09/03/19  0439   WBC 10.82   HGB 9.2*   HCT 30.2*   PLT 69*     No results for input(s): LABPT, INR, APTT in the last 48 hours.  Microbiology Results (last 7 days)     ** No results found for the last 168 hours. **        Recent Lab Results       09/03/19  1809   09/03/19  1807   09/03/19  1342   09/03/19  1221   09/03/19  1218        Albumin               Alkaline Phosphatase               ALT               Anion Gap               AST               Baso #               Basophil%               BILIRUBIN TOTAL               BUN, Bld               Calcium               Chloride               CO2               Creatinine               Differential Method               eGFR if                eGFR if non                Eos #               Eosinophil%               Glucose               Gran # (ANC)               Gran%               Hematocrit               Hemoglobin               Immature Grans (Abs)               Immature Granulocytes               Lymph #               Lymph%               MCH               MCHC               MCV               Mono #               Mono%               MPV               nRBC               Platelets               POCT Glucose 101 39 86 47 53     Potassium               PROTEIN TOTAL               RBC               RDW               Sodium               WBC                                09/03/19  0439   09/03/19  0217        Albumin 3.3       Alkaline Phosphatase 79       ALT 11       Anion Gap 8       AST 13       Baso # 0.01       Basophil% 0.1       BILIRUBIN TOTAL  0.3  Comment:  For infants and newborns, interpretation of results should be based  on gestational age, weight and in agreement with clinical  observations.  Premature Infant recommended reference ranges:  Up to 24 hours.............<8.0 mg/dL  Up to 48 hours............<12.0 mg/dL  3-5 days..................<15.0 mg/dL  6-29 days.................<15.0 mg/dL         BUN, Bld 30       Calcium 9.1       Chloride 105       CO2 31       Creatinine 0.8       Differential Method Automated       eGFR if  >60.0       eGFR if non  >60.0  Comment:  Calculation used to obtain the estimated glomerular filtration  rate (eGFR) is the CKD-EPI equation.          Eos # 0.0       Eosinophil% 0.2       Glucose 72       Gran # (ANC) 9.3       Gran% 85.7       Hematocrit 30.2       Hemoglobin 9.2       Immature Grans (Abs) 0.15  Comment:  Mild elevation in immature granulocytes is non specific and   can be seen in a variety of conditions including stress response,   acute inflammation, trauma and pregnancy. Correlation with other   laboratory and clinical findings is essential.         Immature Granulocytes 1.4       Lymph # 0.6       Lymph% 5.8       MCH 32.9       MCHC 30.5              Mono # 0.7       Mono% 6.8       MPV 12.1       nRBC 0       Platelets 69       POCT Glucose   74     Potassium 5.3  Comment:  *No Visible Hemolysis       PROTEIN TOTAL 6.0       RBC 2.80       RDW 16.4       Sodium 144       WBC 10.82           All pertinent labs from the last 24 hours have been reviewed.    Significant Diagnostics:  I have reviewed all pertinent imaging results/findings within the past 24 hours.    Assessment/Plan:     * Brain metastasis  59F w/ PMH COPD, HTN, hypothyroidism, stage 4 R small cell lung cancer s/p 6 cycles of carbo/etoposide in remission since 06/2018 who presents to [facility] ED from OSH w/ L parietal brain mass. Now s/p crani for debulking on 8/12.    9/1 HCT stable. No acute events  overnight.  Tangential speech continues.  No changes in mental status in comparison to weekend activity. Patient seen in the wheelchair on the unit earlier in the day.  Intermittently talkative with staff.     -Pathology (8/19): Metastatic small cell carcinoma. Heme-Onc and RadOnc do not recommend inpatient treatment, no systemic treatment per oncology. RadOnc stating WBR once more stable, however do not know how much this will improve her function. Goals of care discussion to be done this week with patient's family. Okay from neurosurgery standpoint to begin treatment if deemed necessary.   -Seizure prevention: Continue Depakote 500mg BID. Patient was on dual AED's. There was no reported seizure activity pre or post op. Keppra stopped on 8/27.  -Cerebral edema: Continue 3 week Dex taper to 2 mg BID. Continue PPI while on steroids.   -Thrombocytopenia:  Stable. Will continue to monitor.   -Malnutrition:  Continue boost and jennifer supplements TID to promote wound healing.    -Hypoglycemia 2/2 malnutrition:  Hypoglycemia orders placed. Will continue to monitor pt for hypoglycemia.  Patient to have assistance with all meals to maximize intake.   -Hyponatremia: Resolved. 144 today. Continue Na tabs 2 g QID. Will continue to monitor. Will continue Na tabs for now and consider stopping when PO intake continues to improve.   -Hyperkalemia: 5.3 today. Will continue to monitor.  -Psyc: No use of wrist restraints. Weaned to tele-sitter. Continue redirection and delirium precautions. Continue Seroquel TID PRN. Haldol prn qhs. Medical and physical precautions to minimize agitation in place.   -HTN:  Maintain SBP < 160. Home dose of Norvasc has been held since SBP at goal. Will continue to monitor and restart if SBP becomes elevated.   -Hypothyroidism: Continue home dose of Synthroid  -Tobacco abuse: Continue nicotine patch  -COPD: Continue duo nebs PRN, pulse ox q 4 hours    -DVT prophylaxis: ABI's, SCD's, lovenox  -Bowel  regimen: senna BID and miralax daily  -Atelectasis prevention: IS hourly while awake, PT/OT, OOB > 6 hours per day    DISPO: Medically stable for discharge. Pending placement. Goals of care discussion with family this week.         Ashley Reddy PA-C  Neurosurgery  [facility]

## 2019-09-03 NOTE — PLAN OF CARE
Problem: Adult Inpatient Plan of Care  Goal: Plan of Care Review  Outcome: Ongoing (interventions implemented as appropriate)  Pt oriented to self. Attempts to get OOB noted this shift. Monitored. Able to take meds but pockets, spit and plays with meds/food. Telesitter ongoing.    Assisted to get OOB and go to the bathroom. No falls.  VS and blood glucose monitored.  Safety in place.  Call light within easy reach.  Will continue to monitor.

## 2019-09-03 NOTE — PLAN OF CARE
Plan for discharge is new nursing home half-way placement.  BARBIE working on family meeting to determine final plan for discharge as family would like patient to receive treatment.  Patient requires 24/7 care due to cognitive status.     09/03/19 1303   Discharge Reassessment   Assessment Type Discharge Planning Reassessment   Provided patient/caregiver education on the expected discharge date and the discharge plan No   Do you have any problems affording any of your prescribed medications? No   Discharge Plan A New Nursing Home placement - half-way care facility   Discharge Plan B Home with family   DME Needed Upon Discharge  none   Patient choice form signed by patient/caregiver N/A   Anticipated Discharge Disposition [facility]   Can the patient answer the patient profile reliably? No, cognitively impaired   Describe the patient's ability to walk at the present time. Minor restrictions or changes

## 2019-09-03 NOTE — PLAN OF CARE
Problem: Physical Therapy Goal  Goal: Physical Therapy Goal  Goals to be met by: 2019    Patient will increase functional independence with mobility by performin. Supine <> sit with supervision - MET 2019  2. Sit to stand transfer with Supervision- MET 2019  3. Gait  x 150 feet with Supervision using appropriate AD.   4. Stand for 3 minutes with Supervision while performing dynamic balance activities to reduce fall risk - MET  5. Reach for object on floor outside of DALLIN with no LOB and Supervision to reduce fall risk  6. Lower extremity exercise program x20 reps per handout, with assistance as needed        Outcome: Outcome(s) achieved Date Met: 19  Patient has not made significant progress towards goals with therapy. She is safe to ambulate with RN assist. Recommend discharge to 24 hr [facility] care.   Jennifer Schwarz, PT  9/3/2019

## 2019-09-03 NOTE — PROGRESS NOTES
"[facility]  Adult Nutrition  Progress Note    SUMMARY       Recommendations    1. Continue regular diet as feasible. 2. Continue Boost Plus and Quentin TID.   Goals: 1.) Pt to consume/tolerate >75% EEN and EPN by follow up  Nutrition Goal Status: progressing towards goal  Communication of RD Recs: (POC)    Reason for Assessment    Reason For Assessment: RD follow-up  Diagnosis: surgery/postoperative complications(left craniotomy)  Relevant Medical History: CKD, COPD, GERD, HTN, RA, lung cancer, osteopenia, gout, anemia, anxiety, blindness  Interdisciplinary Rounds: did not attend  General Information Comments: Pt s/p 6 cycles chemo for lung cancer with brain mets and s/p craniotomy  for debulking.Pt has AMS and is agitated. Per chart, pt has fair intake meals (50-75%) but pt's RN today reports she has not been eating much at all and drinks very little of the Boost Plus. Pt with severe wt loss (19.5% x 10 months), BMI < 18.5 and has moderate protein-calorie malnutrition per NFPE .   Nutrition Discharge Planning: Pt to follow high protein/high kcal diet to avoid further wt loss.    Nutrition Risk Screen    Nutrition Risk Screen: dysphagia or difficulty swallowing    Nutrition/Diet History    Spiritual, Cultural Beliefs, Sikh Practices, Values that Affect Care: no    Anthropometrics    Temp: 97.6 °F (36.4 °C)  Height: 5' 1" (154.9 cm)  Height (inches): 61 in  Weight Method: Bed Scale  Weight: 35.4 kg (78 lb)  Weight (lb): 78 lb  Ideal Body Weight (IBW), Female: 105 lb  % Ideal Body Weight, Female (lb): 74.29 lb  BMI (Calculated): 14.8  Usual Body Weight (UBW), k kg(10/2018)  % Usual Body Weight: 80.58       Lab/Procedures/Meds    Pertinent Labs Reviewed: reviewed  Pertinent Labs Comments: K 5.3, CO2 31, BUN 30  Pertinent Medications Reviewed: reviewed  Pertinent Medications Comments: ascorbic acid, pantoprazole, ferrous sulfate      Estimated/Assessed Needs    Weight Used For Calorie " Calculations: 35.4 kg (78 lb 0.7 oz)  Energy Calorie Requirements (kcal): 6794-7573  Energy Need Method: Kcal/kg(35-40 kcal/kg)  Protein Requirements: 53-71(g/day)  Weight Used For Protein Calculations: 35.4 kg (78 lb 0.7 oz)(1.5-2.0 g/kg)     Estimated Fluid Requirement Method: RDA Method(or per MD)  RDA Method (mL): 1239         Nutrition Prescription Ordered    Current Diet Order: Regular  Oral Nutrition Supplement: Boost Plus TID, Quentin TID    Evaluation of Received Nutrient/Fluid Intake    I/O: +7.1L since admission  Comments: LBM 8/30  % Intake of Estimated Energy Needs: 25 - 50 %  % Meal Intake: 25 - 50 %    Nutrition Risk    Level of Risk/Frequency of Follow-up: low     Assessment and Plan  Goal: Plan of Care Review  Nutrition Problem:  Moderate Protein-Calorie Malnutrition  Malnutrition in the context of Chronic Illness/Injury     Related to (etiology):  Poor po intake in setting of lung cancer with brain metastasis     Signs and Symptoms (as evidenced by):     Body Fat Depletion: moderate depletion of triceps, thoracic region  Muscle Mass Depletion: moderate depletion of temples, scapula, interosseous muscles, lower extremities   Weight Loss: 19.5% x 10 months     Interventions (treatment strategy):  Collaboration of care to providers  Commercial beverage: Boost Plus, Quentin TID.     Nutrition Diagnosis Status:  continues    Monitor and Evaluation    Food and Nutrient Intake: energy intake, food and beverage intake  Food and Nutrient Adminstration: diet order  Anthropometric Measurements: weight, weight change, body mass index  Biochemical Data, Medical Tests and Procedures: electrolyte and renal panel, gastrointestinal profile, glucose/endocrine profile  Nutrition-Focused Physical Findings: overall appearance     Malnutrition Assessment             Weight Loss (Malnutrition): greater than 10% in 6 months(19.5% x 10 months)   Orbital Region (Subcutaneous Fat Loss): mild depletion  Upper Arm Region  (Subcutaneous Fat Loss): moderate depletion  Thoracic and Lumbar Region: moderate depletion   [place] Region (Muscle Loss): moderate depletion  Clavicle Bone Region (Muscle Loss): mild depletion  Clavicle and Acromion Bone Region (Muscle Loss): moderate depletion  Scapular Bone Region (Muscle Loss): moderate depletion  Dorsal Hand (Muscle Loss): moderate depletion  Patellar Region (Muscle Loss): moderate depletion  Anterior Thigh Region (Muscle Loss): moderate depletion  Posterior Calf Region (Muscle Loss): moderate depletion                 Nutrition Follow-Up    RD Follow-up?: Yes

## 2019-09-03 NOTE — PLAN OF CARE
Problem: Confusion Acute  Goal: Optimal Cognitive Function    Intervention: Minimize Factors Contributing to Confusion     09/03/19 1811   Manage Environment and Stimulation   Environment Familiarity/Consistency daily routine followed   Prevent or Manage Pain   Sensory Stimulation Regulation care clustered;lighting decreased;music/television provided for relaxation;music/television provided for stimulation

## 2019-09-03 NOTE — PT/OT/SLP PROGRESS
"Physical Therapy Treatment and Discharge    Patient Name:  Carmen Leyva   MRN:  [id number]    Recommendations:     Discharge Recommendations:  nursing facility, basic(24 hr jail care)   Discharge Equipment Recommendations: shower chair, wheelchair, manual   Barriers to discharge: Inaccessible home, Decreased caregiver support and poor safety awareness and insight into deficits    Assessment:     Carmen Leyva is a 59 y.o. female admitted with a medical diagnosis of Brain metastasis.  She presents with the following impairments/functional limitations:  impaired cognition, impaired functional mobilty, impaired balance, gait instability, impaired self care skills, impaired endurance. The patient has made minimal progress towards therapy goals, her participation with therapy is limited due to her cognitive impairments. She has impaired motor planning, orientation, insight into her deficits; she is unable to follow any motor cues or redirection. She required manual cues for safety with mobility but is able to transfer without physical assist. She will require 24 hr jail care on discharge, she is not appropriate for continued therapy services at this time.   She is safe to ambulate with RN assist of 1 person and would benefit from RN mobility protocol.     Rehab Prognosis: Poor and limited by impaired cognition; patient would benefit from acute skilled PT services to address these deficits and reach maximum level of function.    Recent Surgery: Procedure(s) (LRB):  L craniotomy for tumor (Left) 22 Days Post-Op    Plan:     During this hospitalization, patient to be seen 2 x/week to address the identified rehab impairments via gait training, therapeutic activities, therapeutic exercises and progress toward the following goals:    · Plan of Care Expires:  09/13/19    Subjective     Chief Complaint: "I wanna see this", unable to answer orientation questions  Patient/Family Comments/goals: unable to " state  Pain/Comfort:  · Pain Rating 1: 0/10      Objective:     Communicated with RN prior to session.  Patient found HOB elevated with bed alarm, telemetry([facility]) upon PT entry to room.     General Precautions: Standard, aspiration, fall, vision impaired, hearing impaired   Orthopedic Precautions:N/A   Braces: N/A     Functional Mobility:    Bed Mobility  Rolling to L: moderate assistance and modified independent;  unable to follow cues to actively participate in mobility  Supine to Sit:  moderate assistance 1st attempt, modified independent on second attempt; patient unable to follow cues, stood spontaneously   Transfers Sit to Stand:  moderate assistance first attempt; supervision assistance for safety on second attempt   Gait  Gait Distance: 10x2 ft with no AD  Assistance Level: contact guard assist to moderate assistance- unable to redirect, patient attempting to climb into wardrobe in room, patient needing physical assistance to maintain path in room  Description: short shuffling strides, anterior trunk lean, decreased speed, poor safety awareness        AM-PAC 6 CLICK MOBILITY  Turning over in bed (including adjusting bedclothes, sheets and blankets)?: 4  Sitting down on and standing up from a chair with arms (e.g., wheelchair, bedside commode, etc.): 3  Moving from lying on back to sitting on the side of the bed?: 4  Moving to and from a bed to a chair (including a wheelchair)?: 3  Need to walk in hospital room?: 3  Climbing 3-5 steps with a railing?: 3  Basic Mobility Total Score: 20       Therapeutic Activities and Exercises   Patient  educated on role of therapy, goals of session, benefits of out of bed mobility. Patient agreeable to mobilize with therapy.  Discussed PT plan of care during hospitalization. Patient educated that they need to call for assistance to mobilize out of bed. Whiteboard updated as appropriate. Patient educated on how their diagnosis impacts their mobility within PT scope of  practice.     Patient left HOB elevated with all lines intact, call button in reach and bed alarm on..    GOALS:   Multidisciplinary Problems     Physical Therapy Goals     Not on file          Multidisciplinary Problems (Resolved)        Problem: Physical Therapy Goal    Goal Priority Disciplines Outcome Goal Variances Interventions   Physical Therapy Goal   (Resolved)     PT, PT/OT Outcome(s) achieved     Description:  Goals to be met by: 2019    Patient will increase functional independence with mobility by performin. Supine <> sit with supervision - MET 2019  2. Sit to stand transfer with Supervision- MET 2019  3. Gait  x 150 feet with Supervision using appropriate AD.   4. Stand for 3 minutes with Supervision while performing dynamic balance activities to reduce fall risk - MET  5. Reach for object on floor outside of DALLIN with no LOB and Supervision to reduce fall risk  6. Lower extremity exercise program x20 reps per handout, with assistance as needed                         Time Tracking:     PT Received On: 19  PT Start Time: 1515     PT Stop Time: 1531  PT Total Time (min): 16 min     Billable Minutes: Therapeutic Activity 16    Treatment Type: Treatment  PT/PTA: PT     PTA Visit Number: 0     Jennifer Schwarz, PT  2019

## 2019-09-03 NOTE — PHYSICIAN QUERY
"PT Name: Carmen Leyva  MR #: [id number]     Physician Query Form - Documentation Clarification      CDS: Rita Madsen RN, CCDS       Contact information: [email or web address]    This form is a permanent document in the medical record.     Query Date: September 3, 2019    By submitting this query, we are merely seeking further clarification of documentation. Please utilize your independent clinical judgment when addressing the question(s) below.    The Medical record reflects the following:    Supporting Clinical Findings Location in Medical Record     Past Medical History:  Chronic kidney disease   Solitary kidney     Past Surgical History:  Kidney Removal--Right     S/p nephrectomy for DAPHNIE     8/11-H&P              8/12-Anesthesia Note     GFR=>60.0, CR=1.0, BUN=10  GFR=>60.0, CR=0.9, BUN=35  GFR=>60.0, CR=0.8, BUN=30     8/11-Labs  8/19-Labs  9/3-Labs                                                                         Doctor, Please specify diagnosis or diagnoses associated with above clinical findings.    Please clarify CKD "Stage" or if CKD is not a current diagnosis for this hospital admit.     Provider Use Only       Chronic Kidney Disease (CKD) (please specify stage below):      [employer] Definitions     Stage Description eGFR (mL/min)   [  x ]   II Mildly reduced kidney function 60-89           [   ] CKD--Not a current diagnosis for this hospital admit          [   ] Other Clarification (please specify):________________________________                                                               [  ] Clinically Undetermined               "

## 2019-09-03 NOTE — ASSESSMENT & PLAN NOTE
59F w/ PMH COPD, HTN, hypothyroidism, stage 4 R small cell lung cancer s/p 6 cycles of carbo/etoposide in remission since 06/2018 who [place] [facility] [place]SH w/ L parietal brain mass. Now s/p crani for debulking on 8/12.    9/1 HCT stable. No acute events overnight.  Tangential speech continues.  No changes in mental status in comparison to weekend activity. Patient seen in the wheelchair on the unit earlier in the day.  Intermittently talkative with staff.     -Pathology (8/19): Metastatic small cell carcinoma. Heme-Onc and RadOnc do not recommend inpatient treatment, no systemic treatment per oncology. RadOnc stating WBR once more stable, however do not know how much this will improve her function. Goals of care discussion to be done this week with patient's family. Okay from neurosurgery standpoint to begin treatment if deemed necessary.   -Seizure prevention: Continue Depakote 500mg BID. Patient was on dual AED's. There was no reported seizure activity pre or post op. Keppra stopped on 8/27.  -Cerebral edema: Continue 3 week Dex taper to 2 mg BID. Continue PPI while on steroids.   -Thrombocytopenia:  Stable. Will continue to monitor.   -Malnutrition:  Continue boost and jennifer supplements TID to promote wound healing.    -Hypoglycemia 2/2 malnutrition:  Hypoglycemia orders placed. Will continue to monitor pt for hypoglycemia.  Patient to have assistance with all meals to maximize intake.   -Hyponatremia: Resolved. 144 today. Continue Na tabs 2 g QID. Will continue to monitor. Will continue Na tabs for now and consider stopping when PO intake continues to improve.   -Hyperkalemia: 5.3 today. Will continue to monitor.  -Psyc: No use of wrist restraints. Weaned to tele-sitter. Continue redirection and delirium precautions. Continue Seroquel TID PRN. Haldol prn qhs. Medical and physical precautions to minimize agitation in place.   -HTN:  Maintain SBP < 160. Home dose of Norvasc has been held since SBP at goal.  Will continue to monitor and restart if SBP becomes elevated.   -Hypothyroidism: Continue home dose of Synthroid  -Tobacco abuse: Continue nicotine patch  -COPD: Continue duo nebs PRN, pulse ox q 4 hours    -DVT prophylaxis: ABI's, SCD's, lovenox  -Bowel regimen: senna BID and miralax daily  -Atelectasis prevention: IS hourly while awake, PT/OT, OOB > 6 hours per day    DISPO: Medically stable for discharge. Pending placement. Goals of care discussion with family this week.

## 2019-09-04 ENCOUNTER — TELEPHONE (OUTPATIENT)
Dept: HEMATOLOGY/ONCOLOGY | Facility: CLINIC | Age: 59
End: 2019-09-04

## 2019-09-04 LAB
POCT GLUCOSE: 116 MG/DL (ref 70–110)
POCT GLUCOSE: 150 MG/DL (ref 70–110)
POCT GLUCOSE: 52 MG/DL (ref 70–110)
POCT GLUCOSE: 57 MG/DL (ref 70–110)
POCT GLUCOSE: 63 MG/DL (ref 70–110)
POCT GLUCOSE: 66 MG/DL (ref 70–110)
POCT GLUCOSE: 75 MG/DL (ref 70–110)
POCT GLUCOSE: 81 MG/DL (ref 70–110)
POCT GLUCOSE: 82 MG/DL (ref 70–110)
POCT GLUCOSE: 86 MG/DL (ref 70–110)
POCT GLUCOSE: 91 MG/DL (ref 70–110)

## 2019-09-04 PROCEDURE — S4991 NICOTINE PATCH NONLEGEND: HCPCS | Performed by: PHYSICIAN ASSISTANT

## 2019-09-04 PROCEDURE — 99024 POSTOP FOLLOW-UP VISIT: CPT | Mod: ,,, | Performed by: PHYSICIAN ASSISTANT

## 2019-09-04 PROCEDURE — 20600001 HC STEP DOWN PRIVATE ROOM

## 2019-09-04 PROCEDURE — 25000003 PHARM REV CODE 250: Performed by: PHYSICIAN ASSISTANT

## 2019-09-04 PROCEDURE — 99497 PR ADVNCD CARE PLAN 30 MIN: ICD-10-PCS | Mod: ,,, | Performed by: CLINICAL NURSE SPECIALIST

## 2019-09-04 PROCEDURE — 99024 PR POST-OP FOLLOW-UP VISIT: ICD-10-PCS | Mod: ,,, | Performed by: PHYSICIAN ASSISTANT

## 2019-09-04 PROCEDURE — 99497 ADVNCD CARE PLAN 30 MIN: CPT | Mod: ,,, | Performed by: CLINICAL NURSE SPECIALIST

## 2019-09-04 PROCEDURE — 99233 SBSQ HOSP IP/OBS HIGH 50: CPT | Mod: 25,,, | Performed by: CLINICAL NURSE SPECIALIST

## 2019-09-04 PROCEDURE — 63600175 PHARM REV CODE 636 W HCPCS: Performed by: STUDENT IN AN ORGANIZED HEALTH CARE EDUCATION/TRAINING PROGRAM

## 2019-09-04 PROCEDURE — 25000003 PHARM REV CODE 250: Performed by: STUDENT IN AN ORGANIZED HEALTH CARE EDUCATION/TRAINING PROGRAM

## 2019-09-04 PROCEDURE — 63600175 PHARM REV CODE 636 W HCPCS: Performed by: PHYSICIAN ASSISTANT

## 2019-09-04 PROCEDURE — 99233 PR SUBSEQUENT HOSPITAL CARE,LEVL III: ICD-10-PCS | Mod: 25,,, | Performed by: CLINICAL NURSE SPECIALIST

## 2019-09-04 RX ORDER — DEXTROSE MONOHYDRATE AND SODIUM CHLORIDE 5; .9 G/100ML; G/100ML
INJECTION, SOLUTION INTRAVENOUS CONTINUOUS
Status: DISCONTINUED | OUTPATIENT
Start: 2019-09-04 | End: 2019-09-10 | Stop reason: HOSPADM

## 2019-09-04 RX ORDER — GLUCAGON 1 MG
1 KIT INJECTION
Status: DISCONTINUED | OUTPATIENT
Start: 2019-09-04 | End: 2019-09-10 | Stop reason: HOSPADM

## 2019-09-04 RX ORDER — SODIUM CHLORIDE 9 MG/ML
INJECTION, SOLUTION INTRAVENOUS CONTINUOUS
Status: DISCONTINUED | OUTPATIENT
Start: 2019-09-04 | End: 2019-09-04

## 2019-09-04 RX ADMIN — ACETAMINOPHEN 650 MG: 325 TABLET ORAL at 09:09

## 2019-09-04 RX ADMIN — NICOTINE 1 PATCH: 7 PATCH, EXTENDED RELEASE TRANSDERMAL at 09:09

## 2019-09-04 RX ADMIN — SODIUM CHLORIDE TAB 1 GM 2 G: 1 TAB at 09:09

## 2019-09-04 RX ADMIN — Medication 250 MG: at 09:09

## 2019-09-04 RX ADMIN — ONDANSETRON 4 MG: 2 INJECTION INTRAMUSCULAR; INTRAVENOUS at 01:09

## 2019-09-04 RX ADMIN — Medication 24 G: at 10:09

## 2019-09-04 RX ADMIN — PANTOPRAZOLE SODIUM 40 MG: 40 TABLET, DELAYED RELEASE ORAL at 09:09

## 2019-09-04 RX ADMIN — SENNOSIDES,DOCUSATE SODIUM 1 TABLET: 8.6; 5 TABLET, FILM COATED ORAL at 09:09

## 2019-09-04 RX ADMIN — DEXTROSE AND SODIUM CHLORIDE: 5; .9 INJECTION, SOLUTION INTRAVENOUS at 03:09

## 2019-09-04 RX ADMIN — SODIUM CHLORIDE: 0.9 INJECTION, SOLUTION INTRAVENOUS at 01:09

## 2019-09-04 RX ADMIN — FERROUS SULFATE TAB EC 325 MG (65 MG FE EQUIVALENT) 325 MG: 325 (65 FE) TABLET DELAYED RESPONSE at 09:09

## 2019-09-04 RX ADMIN — DIVALPROEX SODIUM 500 MG: 250 TABLET, DELAYED RELEASE ORAL at 09:09

## 2019-09-04 RX ADMIN — POLYETHYLENE GLYCOL 3350 17 G: 17 POWDER, FOR SOLUTION ORAL at 09:09

## 2019-09-04 RX ADMIN — GLUCAGON 1 MG: KIT at 10:09

## 2019-09-04 RX ADMIN — LEVOTHYROXINE SODIUM 75 MCG: 75 TABLET ORAL at 05:09

## 2019-09-04 RX ADMIN — OXYCODONE HYDROCHLORIDE 5 MG: 5 TABLET ORAL at 01:09

## 2019-09-04 RX ADMIN — DEXAMETHASONE 2 MG: 1 TABLET ORAL at 09:09

## 2019-09-04 NOTE — ASSESSMENT & PLAN NOTE
59F w/ PMH COPD, HTN, hypothyroidism, stage 4 R small cell lung cancer s/p 6 cycles of carbo/etoposide in remission since 06/2018 who presents to [facility] ED from OSH w/ L parietal brain mass. Now s/p crani for debulking on 8/12.      -Patient's mental status continues to wax and wane. Mentation also greatly affected by hypoglycemia.   -Most recent HCT on 9/1 was stable. No new or detrimental findings.     -Pathology (8/19): Metastatic small cell carcinoma. Heme-Onc and RadOnc do not recommend inpatient treatment at this time due to functional status. Palliative Care consulted to have a goals of care discussion with patient's family.   -Seizure prevention: Continue Depakote 500mg BID. Patient was on dual AED's. There was no reported seizure activity pre or post op. Keppra stopped on 8/27.  -Cerebral edema: Completed 3 week Dex taper. Continue 2 mg BID. Continue PPI while on steroids.   -Thrombocytopenia:  Plts 69 today. Stable.     -Malnutrition: Patient with poor PO intake, despite feeding assistance with all meals. Discussed options with son, David, via phone who was ok with proceeding with PEG placement. IR consulted. CT abdomen ordered. Patient will need an NG tube prior to procedure. RD consulted for tube feed recs. Continue boost and jennifer supplements TID to promote wound healing.    -Hypoglycemia 2/2 malnutrition:  Continued hypoglycemic episodes that respond to PO/IV interventions. Pending PEG placement.   -Hyponatremia: Decrease Na tabs to 2 g TID. Will consider stopping when PO intake continues to improve.   -Hyperkalemia: 5.3 yesterday. Will continue to monitor.    -Psyc: Continue redirection and delirium precautions. Continue Seroquel TID PRN and Haldol prn qhs.   -HTN:  Maintain SBP < 160. Home dose of Norvasc has been held since SBP at goal. Will continue to monitor and restart if SBP becomes elevated.   -Hypothyroidism: Continue home dose of Synthroid  -Tobacco abuse: Continue nicotine patch  -COPD:  Continue duo nebs PRN, pulse ox q 4 hours    -DVT prophylaxis: ABI's, SCD's. DC lovenox in preparation for PEG placement tomorrow.   -Bowel regimen: senna BID and miralax daily  -Atelectasis prevention: OOB > 6 hours per day      DISPO: Patient discharged from inpatient PT on 9/3 and OT on 8/30. Both services recommending NH placement. Placement pending goals of care discussion with family.

## 2019-09-04 NOTE — SUBJECTIVE & OBJECTIVE
Interval History:   Hypoglycemic this morning due to minimal PO intake. BG in 50's. Patient awake but confused and less responsive. Patient having difficulty understanding and following simple commands. OJ and glucagon given to patient. BG with minimal response. Glucose tabs also given. No family at bedside. Events discussed with son via phone.     Medications:  Continuous Infusions:  Scheduled Meds:   ascorbic acid (vitamin C)  250 mg Oral TID    dexAMETHasone  2 mg Oral Q12H    divalproex  500 mg Oral Q12H    enoxaparin  40 mg Subcutaneous Daily    ferrous sulfate  325 mg Oral TID    levothyroxine  75 mcg Oral Before breakfast    nicotine  1 patch Transdermal Daily    pantoprazole  40 mg Oral Daily    polyethylene glycol  17 g Oral Daily    senna-docusate 8.6-50 mg  1 tablet Oral BID    sodium chloride 0.9%  1,000 mL Intravenous Once    sodium chloride  2 g Oral QID     PRN Meds:acetaminophen, albuterol-ipratropium, Dextrose 10% Bolus, Dextrose 10% Bolus, glucagon (human recombinant), glucose, glucose, haloperidol lactate, ondansetron, oxyCODONE, QUEtiapine, sodium chloride 0.9%     Review of Systems  Objective:     Weight: 35.4 kg (78 lb)  Body mass index is 14.74 kg/m².  Vital Signs (Most Recent):  Temp: 97.4 °F (36.3 °C) (09/04/19 1106)  Pulse: 89 (09/04/19 1106)  Resp: 16 (09/04/19 1106)  BP: (!) 109/58 (09/04/19 1106)  SpO2: (!) 92 % (09/04/19 1106) Vital Signs (24h Range):  Temp:  [97.4 °F (36.3 °C)-99 °F (37.2 °C)] 97.4 °F (36.3 °C)  Pulse:  [] 89  Resp:  [16-18] 16  SpO2:  [92 %-97 %] 92 %  BP: (106-133)/(52-96) 109/58         Neurosurgery Physical Exam   General: cachectic, no distress, very flat affect, confused, will not interact with nursing staff or providers  Head: normocephalic  GCS: Motor: 5/Verbal: 4/Eyes: 4 GCS Total: 13  Mental Status: Awake, Alert. Minimal verbal responses to questions. Will state name.   Cranial nerves: face symmetric, CN II-XII grossly intact.   Eyes: Pupils  anisocoric, round, reactive to light with accomodation, EOMI. Disconjugate gaze.  Pulmonary: normal respirations, no signs of respiratory distress  Abdomen: soft, non-distended, not tender to palpation  Sensory: Response to light touch throughout  Motor Strength: Moves all extremities spontaneously with good tone.  Does not follow commands. No abnormal movements seen.   Pronator Drift:  Unable to assess secondary to AMS  Finger-to-nose:  Unable to assess secondary to AMS  Gait: shuffles feet, unsteady     Incision well healed.  No surrounding erythema or edema.  No drainage from incision, nontender to palpation.        Significant Labs:  Recent Labs   Lab 09/03/19 0439   GLU 72      K 5.3*      CO2 31*   BUN 30*   CREATININE 0.8   CALCIUM 9.1     Recent Labs   Lab 09/03/19 0439   WBC 10.82   HGB 9.2*   HCT 30.2*   PLT 69*

## 2019-09-04 NOTE — CONSULTS
Radiology Consult    Carmen Leyva is a 59 y.o. female with a history of small cell lung cancer with brain metastasis.  IR consulted for gastrostomy tube placement due to poor oral intake.    Past Medical History:   Diagnosis Date    Acid reflux     Anemia     Anxiety     Arthritis     Blindness - both eyes     Chronic kidney disease     COPD (chronic obstructive pulmonary disease)     GERD (gastroesophageal reflux disease)     Gout     Hypertension     Lung cancer     Lung cancer, main bronchus, right 1/2/2018    Osteopenia     Rheumatoid arteritis     SIADH (syndrome of inappropriate ADH production) 11/12/2017    Solitary kidney     Thyroid disease     Vitamin D deficiency      Past Surgical History:   Procedure Laterality Date    ABDOMINAL ADHESION SURGERY      ADENOIDECTOMY      kidney removal      right     L craniotomy for tumor Left 8/12/2019    Performed by [facility] at [facility] OR 2ND FLR    TONSILLECTOMY         Discussed with primary team, Beth HYDE Neurosurgery.    Procedure: Gastrostomy tube placement    Scheduled Meds:    ascorbic acid (vitamin C)  250 mg Oral TID    dexAMETHasone  2 mg Oral Q12H    divalproex  500 mg Oral Q12H    enoxaparin  40 mg Subcutaneous Daily    ferrous sulfate  325 mg Oral TID    levothyroxine  75 mcg Oral Before breakfast    nicotine  1 patch Transdermal Daily    pantoprazole  40 mg Oral Daily    polyethylene glycol  17 g Oral Daily    senna-docusate 8.6-50 mg  1 tablet Oral BID    sodium chloride 0.9%  1,000 mL Intravenous Once    sodium chloride  2 g Oral QID     Continuous Infusions:   PRN Meds:acetaminophen, albuterol-ipratropium, Dextrose 10% Bolus, Dextrose 10% Bolus, glucagon (human recombinant), glucose, glucose, haloperidol lactate, ondansetron, oxyCODONE, QUEtiapine, sodium chloride 0.9%    Allergies:   Review of patient's allergies indicates:   Allergen Reactions    Celebrex [celecoxib]      Due to kidney removal  she can not take anything for RA    Ibuprofen      Due to kidney removal    Neurontin [gabapentin]     Sulfa (sulfonamide antibiotics) Hives    Topamax [topiramate] Swelling       Labs:    INR 1.0 8/11/19  No results for input(s): INR in the last 168 hours.    Invalid input(s):  PT,  PTT    Recent Labs   Lab 09/03/19  0439   WBC 10.82   HGB 9.2*   HCT 30.2*   *   PLT 69*      Recent Labs   Lab 09/03/19  0439   GLU 72      K 5.3*      CO2 31*   BUN 30*   CREATININE 0.8   CALCIUM 9.1   ALT 11   AST 13   ALBUMIN 3.3*   BILITOT 0.3         Vitals (Most Recent):  Temp: 97.4 °F (36.3 °C) (09/04/19 1106)  Pulse: 89 (09/04/19 1106)  Resp: 16 (09/04/19 1106)  BP: (!) 109/58 (09/04/19 1106)  SpO2: (!) 92 % (09/04/19 1106)    Plan:   1. NPO after midnight.  2. Hold anticoagulants.  3. Obtain noncontrast CT abdomen.  4. Place NG tube and maintain for procedure.  5. Administer barium overnight (ordered).  6. G tube placement scheduled for tomorrow 9/5    Of note, primary team considering hospice care.  G tube placement dependent on the result of that discussion.    Mami Bustamante MD  Resident  Department of Radiology  Pager: [phone]

## 2019-09-04 NOTE — TELEPHONE ENCOUNTER
----- Message from Carmelita Jack sent at 9/3/2019  3:59 PM CDT -----  Contact: Salma patient's sister  The patient's sister called and said the patient had brain surgery 3 weeks ago at [facility]. She said they are trying to find a place to send her for rehab but are unable to find a facility that will take her. She said they are saying she needs too much care. Salma would like to speak to Dr Carr or Daphne about her treatment if any that she may need now that the surgery is done. She said the patient talks non stop but does not make any sense. They are looking for advice. Please call Salma back at [phone].      Spoke to Carmen's sister today. I explained that she needs to talk to the  for Carmen's placement in a facility. I advised [place] for the family to visit. She will keep out office informed of the situation for Carmen. I told her to let us know when [place] is available for a visit here with Dr. Carr. She voiced understanding.

## 2019-09-04 NOTE — PLAN OF CARE
Problem: Adult Inpatient Plan of Care  Goal: Plan of Care Review  Pt is alert and disoriented x4.  V/S stable throughout shift; please see flowsheet for details and full assessments. Pt pulled iv out overnight and another peripheral iv was placed.  NAEO.  Siderails up x 3, bed locked and in low position, pt turned q2 hours  Call bell in reach. Will CTM.

## 2019-09-04 NOTE — PROGRESS NOTES
[facility]  Neurosurgery  Progress Note    Subjective:     History of Present Illness: 59F w/ PMH COPD, HTN, hypothyroidism, stage 4 R small cell lung cancer s/p 6 cycles of carbo/etoposide in remission since 06/2018 who [place] [facility] [place] w/ L parietal brain mass. Per EMS records patient had a 1d history of AMS and gait difficulty and was brought into the hospital by her family for evaluation. CTH @ OSH showed large L parietal brain mass with possible hemorrhagic component and she was transferred [place] [facility] [place]. [place] @ [facility] redemonstrated L nonenhancing parietal mass with minimal blood. Patient is confused and so ROS is tenuous.    Post-Op Info:  Procedure(s) (LRB):  L craniotomy for tumor (Left)   23 Days Post-Op     Interval History:   Hypoglycemic this morning due to minimal PO intake. BG in 50's. Patient awake but confused and less responsive. Patient having difficulty understanding and following simple commands. OJ and glucagon given to patient. BG with minimal response. Glucose tabs also given. No family at bedside. Events discussed with son via phone.     Medications:  Continuous Infusions:  Scheduled Meds:   ascorbic acid (vitamin C)  250 mg Oral TID    dexAMETHasone  2 mg Oral Q12H    divalproex  500 mg Oral Q12H    enoxaparin  40 mg Subcutaneous Daily    ferrous sulfate  325 mg Oral TID    levothyroxine  75 mcg Oral Before breakfast    nicotine  1 patch Transdermal Daily    pantoprazole  40 mg Oral Daily    polyethylene glycol  17 g Oral Daily    senna-docusate 8.6-50 mg  1 tablet Oral BID    sodium chloride 0.9%  1,000 mL Intravenous Once    sodium chloride  2 g Oral QID     PRN Meds:acetaminophen, albuterol-ipratropium, Dextrose 10% Bolus, Dextrose 10% Bolus, glucagon (human recombinant), glucose, glucose, haloperidol lactate, ondansetron, oxyCODONE, QUEtiapine, sodium chloride 0.9%     Review of Systems  Objective:     Weight: 35.4 kg (78 lb)  Body mass  index is 14.74 kg/m².  Vital Signs (Most Recent):  Temp: 97.4 °F (36.3 °C) (09/04/19 1106)  Pulse: 89 (09/04/19 1106)  Resp: 16 (09/04/19 1106)  BP: (!) 109/58 (09/04/19 1106)  SpO2: (!) 92 % (09/04/19 1106) Vital Signs (24h Range):  Temp:  [97.4 °F (36.3 °C)-99 °F (37.2 °C)] 97.4 °F (36.3 °C)  Pulse:  [] 89  Resp:  [16-18] 16  SpO2:  [92 %-97 %] 92 %  BP: (106-133)/(52-96) 109/58         Neurosurgery Physical Exam   General: cachectic, no distress, very flat affect, confused, will not interact with nursing staff or providers  Head: normocephalic  GCS: Motor: 5/Verbal: 4/Eyes: 4 GCS Total: 13  Mental Status: Awake, Alert. Minimal verbal responses to questions. Will state name.   Cranial nerves: face symmetric, CN II-XII grossly intact.   Eyes: Pupils anisocoric, round, reactive to light with accomodation, EOMI. Disconjugate gaze.  Pulmonary: normal respirations, no signs of respiratory distress  Abdomen: soft, non-distended, not tender to palpation  Sensory: Response to light touch throughout  Motor Strength: Moves all extremities spontaneously with good tone.  Does not follow commands. No abnormal movements seen.   Pronator Drift:  Unable to assess secondary to AMS  Finger-to-nose:  Unable to assess secondary to AMS  Gait: shuffles feet, unsteady     Incision well healed.  No surrounding erythema or edema.  No drainage from incision, nontender to palpation.        Significant Labs:  Recent Labs   Lab 09/03/19 0439   GLU 72      K 5.3*      CO2 31*   BUN 30*   CREATININE 0.8   CALCIUM 9.1     Recent Labs   Lab 09/03/19 0439   WBC 10.82   HGB 9.2*   HCT 30.2*   PLT 69*         Assessment/Plan:     * Brain metastasis  59F w/ PMH COPD, HTN, hypothyroidism, stage 4 R small cell lung cancer s/p 6 cycles of carbo/etoposide in remission since 06/2018 who [place] [facility] [place] w/ L parietal brain mass. Now s/p crani for debulking on 8/12.      -Patient's mental status continues to wax and wane.  Mentation also greatly affected by hypoglycemia.   -Most recent HCT on 9/1 was stable. No new or detrimental findings.     -Pathology (8/19): Metastatic small cell carcinoma. Heme-Onc and RadOnc do not recommend inpatient treatment at this time due to functional status. Palliative Care consulted to have a goals of care discussion with patient's family.   -Seizure prevention: Continue Depakote 500mg BID. Patient was on dual AED's. There was no reported seizure activity pre or post op. Keppra stopped on 8/27.  -Cerebral edema: Completed 3 week Dex taper. Continue 2 mg BID. Continue PPI while on steroids.   -Thrombocytopenia:  Plts 69 today. Stable.     -Malnutrition: Patient with poor PO intake, despite feeding assistance with all meals. Discussed options with son, David, via phone who was ok with proceeding with PEG placement. IR consulted. CT abdomen ordered. Patient will need an NG tube prior to procedure. RD consulted for tube feed recs. Continue boost and jennifer supplements TID to promote wound healing.    -Hypoglycemia 2/2 malnutrition:  Continued hypoglycemic episodes that respond to PO/IV interventions. Pending PEG placement.   -Hyponatremia: Decrease Na tabs to 2 g TID. Will consider stopping when PO intake continues to improve.   -Hyperkalemia: 5.3 yesterday. Will continue to monitor.    -Psyc: Continue redirection and delirium precautions. Continue Seroquel TID PRN and Haldol prn qhs.   -HTN:  Maintain SBP < 160. Home dose of Norvasc has been held since SBP at goal. Will continue to monitor and restart if SBP becomes elevated.   -Hypothyroidism: Continue home dose of Synthroid  -Tobacco abuse: Continue nicotine patch  -COPD: Continue duo nebs PRN, pulse ox q 4 hours    -DVT prophylaxis: ABI's, SCD's. DC lovenox in preparation for PEG placement tomorrow.   -Bowel regimen: senna BID and miralax daily  -Atelectasis prevention: OOB > 6 hours per day      DISPO: Patient discharged from inpatient PT on 9/3 and OT  on 8/30. Both services recommending NH placement. Placement pending goals of care discussion with family.         Please call with any questions      Beth Loo PA-C   Neurosurgery   Pager: [phone]

## 2019-09-04 NOTE — NURSING
Pt drowzy, low bg. MD at bedside. Glucagon given im orange juice po. Glucose tablets.Rechecked bg 86.  Will continue to monitor.

## 2019-09-04 NOTE — PROGRESS NOTES
Palliative care had phone conversation with son regarding goals of care. Son coming to the hospital tomorrow to discuss in person. Will cancel PEG placement for now until goals of care can be determined.       Please call with any questions      Beth Loo PA-C   Neurosurgery   Pager: [phone]

## 2019-09-04 NOTE — CONSULTS
[facility]  Palliative Medicine  Consult Note    Patient Name: Carmen Leyva  MRN: [id number]  Admission Date: 8/11/2019  Hospital Length of Stay: 24 days  Code Status: Full Code   Attending Provider: Gabo Erickson MD  Consulting Provider: LAMBERTO Esquivel  Primary Care Physician: Kem Kiser MD  Principal Problem:Brain metastasis         Inpatient consult to Palliative Care  Consult performed by: Jennyfer Odonnell, LAMBERTO  Consult ordered by: MARY ELLEN Sotomayor  Reason for consult: goals of care, advanced care planning   Assessment/Recommendations: Palliative medicine consult received. Chart reviewed and patient discussed with RICHARD HYDE with primary team.   No family is present and Ms. Leyva is unable to participate in conversation.   Will contact family by telephone.   Full consult to follow.   Thank you for consult and opportunity to participate in Ms. CAMELIA Rodriguez's care.   Jennyfer Odonnell, APRN, ACNS-BC, OCN  Palliative Medicine  Ext [phone]

## 2019-09-05 PROBLEM — Z51.5 PALLIATIVE CARE ENCOUNTER: Status: ACTIVE | Noted: 2019-09-05

## 2019-09-05 LAB
POCT GLUCOSE: 105 MG/DL (ref 70–110)
POCT GLUCOSE: 107 MG/DL (ref 70–110)
POCT GLUCOSE: 160 MG/DL (ref 70–110)
POCT GLUCOSE: 61 MG/DL (ref 70–110)
POCT GLUCOSE: 99 MG/DL (ref 70–110)

## 2019-09-05 PROCEDURE — 25000003 PHARM REV CODE 250: Performed by: STUDENT IN AN ORGANIZED HEALTH CARE EDUCATION/TRAINING PROGRAM

## 2019-09-05 PROCEDURE — S4991 NICOTINE PATCH NONLEGEND: HCPCS | Performed by: PHYSICIAN ASSISTANT

## 2019-09-05 PROCEDURE — 63600175 PHARM REV CODE 636 W HCPCS: Performed by: PHYSICIAN ASSISTANT

## 2019-09-05 PROCEDURE — 20600001 HC STEP DOWN PRIVATE ROOM

## 2019-09-05 PROCEDURE — 25000003 PHARM REV CODE 250: Performed by: PHYSICIAN ASSISTANT

## 2019-09-05 RX ADMIN — SODIUM CHLORIDE TAB 1 GM 2 G: 1 TAB at 08:09

## 2019-09-05 RX ADMIN — DEXAMETHASONE 2 MG: 1 TABLET ORAL at 08:09

## 2019-09-05 RX ADMIN — NICOTINE 1 PATCH: 7 PATCH, EXTENDED RELEASE TRANSDERMAL at 09:09

## 2019-09-05 RX ADMIN — FERROUS SULFATE TAB EC 325 MG (65 MG FE EQUIVALENT) 325 MG: 325 (65 FE) TABLET DELAYED RESPONSE at 09:09

## 2019-09-05 RX ADMIN — DIVALPROEX SODIUM 500 MG: 250 TABLET, DELAYED RELEASE ORAL at 09:09

## 2019-09-05 RX ADMIN — Medication 250 MG: at 08:09

## 2019-09-05 RX ADMIN — FERROUS SULFATE TAB EC 325 MG (65 MG FE EQUIVALENT) 325 MG: 325 (65 FE) TABLET DELAYED RESPONSE at 08:09

## 2019-09-05 RX ADMIN — SENNOSIDES,DOCUSATE SODIUM 1 TABLET: 8.6; 5 TABLET, FILM COATED ORAL at 08:09

## 2019-09-05 RX ADMIN — DEXAMETHASONE 2 MG: 1 TABLET ORAL at 09:09

## 2019-09-05 RX ADMIN — Medication 250 MG: at 09:09

## 2019-09-05 RX ADMIN — SENNOSIDES,DOCUSATE SODIUM 1 TABLET: 8.6; 5 TABLET, FILM COATED ORAL at 09:09

## 2019-09-05 RX ADMIN — GLUCAGON HYDROCHLORIDE 1 MG: KIT at 06:09

## 2019-09-05 RX ADMIN — SODIUM CHLORIDE TAB 1 GM 2 G: 1 TAB at 09:09

## 2019-09-05 RX ADMIN — PANTOPRAZOLE SODIUM 40 MG: 40 TABLET, DELAYED RELEASE ORAL at 09:09

## 2019-09-05 RX ADMIN — DIVALPROEX SODIUM 500 MG: 250 TABLET, DELAYED RELEASE ORAL at 08:09

## 2019-09-05 RX ADMIN — DEXTROSE AND SODIUM CHLORIDE: 5; .9 INJECTION, SOLUTION INTRAVENOUS at 09:09

## 2019-09-05 RX ADMIN — LEVOTHYROXINE SODIUM 75 MCG: 75 TABLET ORAL at 06:09

## 2019-09-05 NOTE — PROGRESS NOTES
[facility]  Neurosurgery  Progress Note    Subjective:     History of Present Illness: 59F w/ PMH COPD, HTN, hypothyroidism, stage 4 R small cell lung cancer s/p 6 cycles of carbo/etoposide in remission since 06/2018 who presents to [facility] ED from OSH w/ L parietal brain mass. Per EMS records patient had a 1d history of AMS and gait difficulty and was brought into the hospital by her family for evaluation. CTH @ OSH showed large L parietal brain mass with possible hemorrhagic component and she was transferred to [facility] for evaluation. MRI @ [facility] redemonstrated L nonenhancing parietal mass with minimal blood. Patient is confused and so ROS is tenuous.    Post-Op Info:  Procedure(s) (LRB):  L craniotomy for tumor (Left)   24 Days Post-Op     Interval History: Palliative medicine consulted yesterday, family wishes to pursue inpatient hospice, PEG cancelled.     Medications:  Continuous Infusions:   dextrose 5 % and 0.9 % NaCl 50 mL/hr at 09/05/19 0938     Scheduled Meds:   ascorbic acid (vitamin C)  250 mg Oral TID    dexAMETHasone  2 mg Oral Q12H    divalproex  500 mg Oral Q12H    ferrous sulfate  325 mg Oral TID    levothyroxine  75 mcg Oral Before breakfast    nicotine  1 patch Transdermal Daily    pantoprazole  40 mg Oral Daily    polyethylene glycol  17 g Oral Daily    senna-docusate 8.6-50 mg  1 tablet Oral BID    sodium chloride  2 g Oral TID     PRN Meds:acetaminophen, albuterol-ipratropium, Dextrose 10% Bolus, Dextrose 10% Bolus, glucagon (human recombinant), glucose, glucose, ondansetron, QUEtiapine, sodium chloride 0.9%     Review of Systems  Objective:     Weight: 35.4 kg (78 lb)  Body mass index is 14.74 kg/m².  Vital Signs (Most Recent):  Temp: 98.6 °F (37 °C) (09/05/19 1216)  Pulse: (!) 113 (09/05/19 1216)  Resp: 17 (09/05/19 1216)  BP: 135/73 (09/05/19 1216)  SpO2: (!) 92 % (09/05/19 1216) Vital Signs (24h Range):  Temp:  [96.8 °F (36 °C)-99.5 °F (37.5 °C)] 98.6 °F (37  °C)  Pulse:  [] 113  Resp:  [15-20] 17  SpO2:  [92 %-95 %] 92 %  BP: (112-150)/(57-94) 135/73                   Neurosurgery Physical Exam   General: cachectic, no distress, very flat affect, confused  Head: normocephalic  GCS: Motor: 5/Verbal: 4/Eyes: 4 GCS Total: 13  Mental Status: Awake, Alert. Minimal verbal responses to questions. Will state name.   Cranial nerves: face symmetric, CN II-XII grossly intact.   Eyes: Pupils anisocoric, round, reactive to light with accomodation, EOMI. Disconjugate gaze.  Pulmonary: normal respirations, no signs of respiratory distress  Abdomen: soft, non-distended, not tender to palpation  Sensory: Response to light touch throughout  Motor Strength: Moves all extremities spontaneously with good tone.  Does not follow commands. No abnormal movements seen.   Pronator Drift:  Unable to assess secondary to AMS  Finger-to-nose:  Unable to assess secondary to AMS  Gait: shuffles feet, unsteady     Incision well healed.  No surrounding erythema or edema.  No drainage from incision, nontender to palpation.    Significant Labs:  No results for input(s): GLU, NA, K, CL, CO2, BUN, CREATININE, CALCIUM, MG in the last 48 hours.  No results for input(s): WBC, HGB, HCT, PLT in the last 48 hours.  No results for input(s): LABPT, INR, APTT in the last 48 hours.  Microbiology Results (last 7 days)     ** No results found for the last 168 hours. **            Significant Diagnostics:  Ct Abdomen Without Contrast    Result Date: 9/4/2019  No acute intra-abdominal abnormality seen. Aortic atherosclerosis. Electronically signed by: Ty De Los Santos Date:    09/04/2019 Time:    15:57      Assessment/Plan:     * Brain metastasis  59F w/ PMH COPD, HTN, hypothyroidism, stage 4 R small cell lung cancer s/p 6 cycles of carbo/etoposide in remission since 06/2018 who presents to [facility] ED from OSH w/ L parietal brain mass. Now s/p crani for debulking on 8/12.      -Patient's mental status continues to wax and  wane. Mentation also greatly affected by hypoglycemia.   -Most recent HCT on 9/1 was stable. No new or detrimental findings.     -Pathology (8/19): Metastatic small cell carcinoma. Heme-Onc and RadOnc do not recommend inpatient treatment at this time due to functional status. Palliative Care consulted to have a goals of care discussion with patient's family.   -Seizure prevention: Continue Depakote 500mg BID. Patient was on dual AED's. There was no reported seizure activity pre or post op. Keppra stopped on 8/27.  -Cerebral edema: Completed 3 week Dex taper. Continue 2 mg BID. Continue PPI while on steroids.   -Thrombocytopenia:  Plts 69 today. Stable.     -Malnutrition: Patient with poor PO intake, despite feeding assistance with all meals. Discussed options with son, David, via phone who was ok with proceeding with PEG placement. IR consulted. CT abdomen ordered. Patient will need an NG tube prior to procedure. RD consulted for tube feed recs. Continue boost and jennifer supplements TID to promote wound healing.    -Hypoglycemia 2/2 malnutrition:  Continued hypoglycemic episodes that respond to PO/IV interventions. Pending PEG placement.   -Hyponatremia: Decrease Na tabs to 2 g TID. Will consider stopping when PO intake continues to improve.   -Hyperkalemia: 5.3 yesterday. Will continue to monitor.    -Psyc: Continue redirection and delirium precautions. Continue Seroquel TID PRN and Haldol prn qhs.   -HTN:  Maintain SBP < 160. Home dose of Norvasc has been held since SBP at goal. Will continue to monitor and restart if SBP becomes elevated.   -Hypothyroidism: Continue home dose of Synthroid  -Tobacco abuse: Continue nicotine patch  -COPD: Continue duo nebs PRN, pulse ox q 4 hours    -DVT prophylaxis: ABI's, SCD's. DC lovenox in preparation for PEG placement tomorrow.   -Bowel regimen: senna BID and miralax daily  -Atelectasis prevention: OOB > 6 hours per day      DISPO: Patient discharged from inpatient PT on 9/3  and OT on 8/30. Both services recommending NH placement. Placement pending goals of care discussion with family.         Ken Araiza MD  Neurosurgery  [facility]

## 2019-09-05 NOTE — PLAN OF CARE
BARBIE spoke to patient's son who stated that he and the family want the patient to go to [facility] in [place] on hospice to be closer to family. BARBIE spoke to Susie at [facility] who requested updated notes on the patient.     BARBIE sent referral to [facility] via [facility].     UPDATE 4:14 PM  BARBIE received call from Latesha ([phone]) with [facility] who stated that she spoke to [facility] and they are agreeable to working together to care for the patient. BARBIE will updated Latesha with a DC date once established.     Cindy Valladares, LMSW [facility]  [id number]

## 2019-09-05 NOTE — HPI
Ms. Robertson is a 60 yo lady with PMHx of  COPD, HTN, hypothyroidism,  stage 4 R small cell lung cancer s/p 6 cycles of carbo/etoposide in remission since 06/2018.  She presented to [facility] as transfer via EMS  from [facility] with c/o altered mental status and abnormal gait and higher level of care.      CT of head at OSH revealed a left  parietotemporal mass concerning for metastatic disease from lung cancer.  Per EMS documentation the family reports she was last seen at baseline on 8/10/19.  On day of admit the family reports she had been confused and having difficulty walking.    8/11/19 she was admitted to [facility] with L temporoparietal brain mass with vasogenic edema.    8/11 Neuro surgery consulted.   MRI at [facility] 8/11 revealed non enhancing  heterogeneous intra-axial mass of left parietal temporal lobe consistent with dominant metastasis.  Primary brain tumor could have similar appearance.  No other enhancing lesion.  8/12 - S/P craniotomy and tumor resection   8/19 Pathology positive for metastatic small cell carcinoma   Heme/Onc and radiation oncology consulted No recommendations for inpatient treatment at this time due to poor performance     Post operative period - now 23 days post op complicated by waxing and waning mental  -, malnutrition, hyperglycemia, hyponatremia, hypokalemia,  persistent receptive aphasia.  Followed by OT and PT with recommendations for nursing home placement for continued rehab. Placement pending goals of care discussion with family.     PEG placement scheduled for 9/5/19 9/4/19 Palliative care consulted for goals of care

## 2019-09-05 NOTE — SUBJECTIVE & OBJECTIVE
Interval History:     Past Medical History:   Diagnosis Date    Acid reflux     Anemia     Anxiety     Arthritis     Blindness - both eyes     Chronic kidney disease     COPD (chronic obstructive pulmonary disease)     GERD (gastroesophageal reflux disease)     Gout     Hypertension     Lung cancer     Lung cancer, main bronchus, right 1/2/2018    Osteopenia     Rheumatoid arteritis     SIADH (syndrome of inappropriate ADH production) 11/12/2017    Solitary kidney     Thyroid disease     Vitamin D deficiency        Past Surgical History:   Procedure Laterality Date    ABDOMINAL ADHESION SURGERY      ADENOIDECTOMY      kidney removal      right     L craniotomy for tumor Left 8/12/2019    Performed by Gabo Erickson MD at [facility] OR [address] [place] [address]    TONSILLECTOMY         Review of patient's allergies indicates:   Allergen Reactions    Celebrex [celecoxib]      Due to kidney removal she can not take anything for RA    Ibuprofen      Due to kidney removal    Neurontin [gabapentin]     Sulfa (sulfonamide antibiotics) Hives    Topamax [topiramate] Swelling       Medications:  Continuous Infusions:   dextrose 5 % and 0.9 % NaCl 50 mL/hr at 09/04/19 1544     Scheduled Meds:   ascorbic acid (vitamin C)  250 mg Oral TID    dexAMETHasone  2 mg Oral Q12H    divalproex  500 mg Oral Q12H    ferrous sulfate  325 mg Oral TID    levothyroxine  75 mcg Oral Before breakfast    nicotine  1 patch Transdermal Daily    pantoprazole  40 mg Oral Daily    polyethylene glycol  17 g Oral Daily    senna-docusate 8.6-50 mg  1 tablet Oral BID    sodium chloride  2 g Oral TID     PRN Meds:acetaminophen, albuterol-ipratropium, Dextrose 10% Bolus, Dextrose 10% Bolus, glucagon (human recombinant), glucose, glucose, ondansetron, QUEtiapine, sodium chloride 0.9%     Review of Systems: unable to complete secondary to mental status change     Family History     Problem Relation (Age of Onset)    COPD Father    Clotting  disorder Mother    Diabetes Maternal Aunt, Paternal Uncle    Fibromyalgia Sister    Heart disease Father, Brother, Maternal Grandfather, Paternal Uncle    Hypertension Mother, Maternal Aunt    Neuropathy Father, Sister    Parkinsonism Maternal Grandfather    Sleep apnea Mother    Stroke Mother    Thyroid disease Mother, Sister        Tobacco Use    Smoking status: Former Smoker     Packs/day: 0.50     Types: Cigarettes     Last attempt to quit: 2017     Years since quittin.3    Smokeless tobacco: Never Used   Substance and Sexual Activity    Alcohol use: No     Alcohol/week: 0.0 oz     Comment: seldom    Drug use: No    Sexual activity: Never     Partners: Male         Objective:     Vital Signs (Most Recent):  Temp: 99.5 °F (37.5 °C) (19)  Pulse: 99 (19)  Resp: 20 (19)  BP: 116/75 (19)  SpO2: 95 % (19) Vital Signs (24h Range):  Temp:  [97.4 °F (36.3 °C)-99.5 °F (37.5 °C)] 99.5 °F (37.5 °C)  Pulse:  [] 99  Resp:  [16-20] 20  SpO2:  [92 %-95 %] 95 %  BP: (106-117)/(52-75) 116/75     Weight: 35.4 kg (78 lb)  Body mass index is 14.74 kg/m².    Review of Symptoms  Symptom Assessment (ESAS 0-10 scale)   ESAS 0 1 2 3 4 5 6 7 8 9 10   Pain              Dyspnea              Anxiety              Nausea              Depression               Anorexia              Fatigue              Insomnia              Restlessness               Agitation              CAM / Delirium __ --  ___+   Constipation     __ --  ___+   Diarrhea           __ --  ___+  Bowel Management Plan (BMP): No    Comments: having some confusion, unable to complete ESAS    Pain Assessment:   OME in 24 hours: 0    Performance Status: 30    ECOG Performance Status Grade: 3 - Confined to bed or chair 50% of waking hours    Physical Exam   Constitutional:   Malnourished, appears chronically ill    HENT:   S/p craniotomy    Eyes: Pupils are equal, round, and reactive to light. Conjunctivae are  normal.   Cardiovascular: Normal rate, regular rhythm and normal heart sounds.   Pulmonary/Chest: Effort normal and breath sounds normal. No respiratory distress.   Abdominal: Soft. Bowel sounds are normal. She exhibits no distension.   Genitourinary:   Genitourinary Comments: Pure wick urinary device- clear yellow urine    Neurological:   Arouses with verbal and tactile stimuli,  Spontaneous movement, does not follow simple commands    Skin: Skin is warm and dry.   Craniotomy incision healed    Psychiatric: Her behavior is normal.   Altered mental status, unable to assess.     Nursing note and vitals reviewed.      Significant Labs: All pertinent labs within the past 24 hours have been reviewed.  CBC:   Recent Labs   Lab 09/03/19  0439   WBC 10.82   HGB 9.2*   HCT 30.2*   *   PLT 69*     BMP:  No results for input(s): GLU, NA, K, CL, CO2, BUN, CREATININE, CALCIUM, MG in the last 24 hours.  LFT:  Lab Results   Component Value Date    AST 13 09/03/2019    ALKPHOS 79 09/03/2019    BILITOT 0.3 09/03/2019     Albumin:   Albumin   Date Value Ref Range Status   09/03/2019 3.3 (L) 3.5 - 5.2 g/dL Final   06/24/2019 3.9 3.1 - 4.7 g/dL      Protein:   Total Protein   Date Value Ref Range Status   09/03/2019 6.0 6.0 - 8.4 g/dL Final     Lactic acid:   No results found for: LACTATE    Significant Imaging: I have reviewed all pertinent imaging results/findings within the past 24 hours.    Advance Care Planning   Advanced Directives::  Living Will: No  LaPOST: No  Do Not Resuscitate Status: No  Medical Power of : No    Decision-Making Capacity: waxing and waning mental status unable to make decisions        Living Arrangements: Lives alone, has family in close proximity, has only one son,       Psychosocial/Cultural: , one son, two sisters.  No longer working since cancer diagnosis      Spiritual:     F- Mercedes and Belief: non [employer]    I - Importance:   .  C - Community:     A -  Address in Care: amenable to  visits

## 2019-09-05 NOTE — ASSESSMENT & PLAN NOTE
"Palliative medicine consulted for goals of care.  Palliative medicine APRN at bedside. Ms Leyva unable to participate in conversation.     Impression:  Ms Leyva is a 60 yo lady with PMX of small cell lung ca s/p chemo 6/18.  Admitted to [facility] as transfer from [facility] with altered mental status and gait disturbance.  Imaging revealed left  parietotemporal mass concerning for metastatic disease from lung cancer.  S/P craniotomy and resection 8/12.  Complicated recovery - now with malnourishment.  Oncology and radiation oncology consulted, not a candidate for systemic and radiation therapies secondary to poor performance status.  Ms. Leyva is awake, alert  responds to name.  Able to move all extremities, not following simple commands.  Appears to be comfortable no facial grimacing or moaning. No acute distress.  Plans for PEG placement.  Palliative medicine consulted for goals of care.     Advanced Care Planning Advance Care Planning       - No advanced directives received.   - Next of kin for medical decision making is gurwinder Pedroza: [phone].   - Resuscitation status: full code per primary team. Son is not amenable to changing code status at this time.   -Ms. Leyva unable to make medical decisions at this time secondary to waxing and waning mental status.     Goals of Care:   - Ms CAMELIA Rodriguez unable to participate in goals of care conversations.   - Spoke to son David by telephone.  During this conversation he states understanding she could have additional treatment.  Explained in her current clinical condition she is not a candidate for systemic therapies at this time.   - Son states when she was first diagnosed Ms. Leyva did not want chemotherapy but the family talked her into it.   - Son states no conversations about care she would choose if the cancer came back.  He states this happened so suddenly and there was not enough time "before she was out of her mind"  - Son states understanding having the PEG " and tube feedings is going to help her gain weight and get strong enough for treatment.   - Risks and benefits of PEG and artifical treatment introduced.  Explained there is a good possibility she will not receive benefit of tube feeding and she will not be able to receive chemotherapy.  Son with questions about goals of care.   - Palliative care requested to have a face to face meeting to further discuss.  David states he will take off of work and be at hospital 9/5/19  - Emotional support provided     Plan/Recommendation   - Based on patient's clinical condition and poor evidence supporting benefit of artifical nutrition, Palliative care does not recommend PEG placement.   - Patient and family would benefit from meeting with primary team and palliative care to further discuss goals of care. Son David states he will be available to hospital on 9/5  - Palliative care will continue to follow for further development of goals of care and advanced care planning.   - Emotional support     Primary team aware of the above goals of care conversation and recommendations.

## 2019-09-05 NOTE — ASSESSMENT & PLAN NOTE
59F w/ PMH COPD, HTN, hypothyroidism, stage 4 R small cell lung cancer s/p 6 cycles of carbo/etoposide in remission since 06/2018 who presents to [facility] ED from OSH w/ L parietal brain mass. Now s/p crani for debulking on 8/12.      -Patient's mental status continues to wax and wane. Mentation also greatly affected by hypoglycemia.   -Most recent HCT on 9/1 was stable. No new or detrimental findings.     -Pathology (8/19): Metastatic small cell carcinoma. Heme-Onc and RadOnc do not recommend inpatient treatment at this time due to functional status. Palliative Care consulted to have a goals of care discussion with patient's family.   -Seizure prevention: Continue Depakote 500mg BID. Patient was on dual AED's. There was no reported seizure activity pre or post op. Keppra stopped on 8/27.  -Cerebral edema: Completed 3 week Dex taper. Continue 2 mg BID. Continue PPI while on steroids.   -Thrombocytopenia:  Plts 69 today. Stable.     -Malnutrition: Patient with poor PO intake, despite feeding assistance with all meals. Discussed options with son, David, via phone who was ok with proceeding with PEG placement. IR consulted. CT abdomen ordered. Patient will need an NG tube prior to procedure. RD consulted for tube feed recs. Continue boost and jennifer supplements TID to promote wound healing.    -Hypoglycemia 2/2 malnutrition:  Continued hypoglycemic episodes that respond to PO/IV interventions. Pending PEG placement.   -Hyponatremia: Decrease Na tabs to 2 g TID. Will consider stopping when PO intake continues to improve.   -Hyperkalemia: 5.3 yesterday. Will continue to monitor.    -Psyc: Continue redirection and delirium precautions. Continue Seroquel TID PRN and Haldol prn qhs.   -HTN:  Maintain SBP < 160. Home dose of Norvasc has been held since SBP at goal. Will continue to monitor and restart if SBP becomes elevated.   -Hypothyroidism: Continue home dose of Synthroid  -Tobacco abuse: Continue nicotine patch  -COPD:  Continue duo nebs PRN, pulse ox q 4 hours    -DVT prophylaxis: ABI's, SCD's. DC lovenox in preparation for PEG placement tomorrow.   -Bowel regimen: senna BID and miralax daily  -Atelectasis prevention: OOB > 6 hours per day      DISPO: Patient discharged from inpatient PT on 9/3 and OT on 8/30. Both services recommending NH placement. Placement pending goals of care discussion with family.

## 2019-09-05 NOTE — CARE UPDATE
Rapid Response Nurse Chart Check     Chart check completed, abnormal VS noted. Bedside RN Ari contacted, no concerns verbalized at this time, instructed to call [phone] for further concerns or assistance.

## 2019-09-05 NOTE — ASSESSMENT & PLAN NOTE
Palliative medicine consulted for goals of care.  Palliative medicine APRN at bedside. Ms Leyva unable to participate in conversation.     Impression:  Ms Leyva is a 60 yo lady with PMX of small cell lung ca s/p chemo 6/18.  Admitted to [facility] as transfer from [facility] with altered mental status and gait disturbance.  Imaging revealed left  parietotemporal mass concerning for metastatic disease from lung cancer.  S/P craniotomy and resection 8/12.  Complicated recovery - now with malnourishment.  Oncology and radiation oncology consulted, not a candidate for systemic and radiation therapies secondary to poor performance status.  At time of this assessment Ms. Leyva is sleeping and does not respond to tactile or verbal stimuli. Spontaneous movement, not following simple commands.  Appears to be comfortable no facial grimacing or moaning. No acute distress.  Plans for PEG placement.  Palliative medicine consulted for goals of care.     Advanced Care Planning Advance Care Planning       - No advanced directives received.   - Next of kin for medical decision making is son David Pedroza: [phone].   - Resuscitation status: full code per primary team. Son is not amenable to changing code status at this time.   -Ms. Leyva unable to make medical decisions at this time secondary to waxing and waning mental status.     Goals of Care:   - Ms CAMELIA Rodriguez unable to participate in goals of care conversations.   - Family meeting with son David and daughter-in-law Anastasia at bedside today.  Clinical updates provided.  David states his mother' condition has rapidly changed and understand the cancer will continue to progress in the time she is using to get stronger.  Son states when she was first diagnosed Ms. Leyva did not want chemotherapy but the family talked her into it. At this time David, son, states it appears chemotherapy is not an option and her being comfortable is best.   -  As per nursing staff Ms CAMELIA Rodriguez has been  resisting taking anything by mouth - clinches her teeth and will not open her mouth for medications or food.   - Son states understanding having the PEG and tube feedings is going to help her gain weight and get strong enough for treatment. - Risks and benefits of PEG and artifical treatment introduced.  Explained there is a good possibility she will not receive benefit of tube feeding and she will not be able to receive chemotherapy.    At this time David, son, states it appears chemotherapy is not an option and her being comfortable is best.   - David not amenable to having PEG placed.   - Family has familiarity with hospice - David states his father received home hospice.    - Hospice education (philosophy, criteria, settings and costs)  provided. David explained family is unable to provide level of support for home hospice and nursing home with hospice is not a financial option.   - Discussed inpatient hospice  critieria - malnourished, no PEG, not eating  aggressive cancer with no treatment options,  Altered mental status appears to meet inpatient hospice criteria.    - Emotional support provided   - Family amenable to inpatient hospice. Requesting informational consult to [facility].       Plan/Recommendation   - consult  for inpatient hospice informational consult for [facility]  - Palliative care will continue to follow for further development of goals of care and advanced care planning.   - Emotional support     Primary team aware of the above goals of care conversation and recommendations.

## 2019-09-05 NOTE — PLAN OF CARE
SW following for DC needs. BARBIE in communication with Rhonda GARCIA.    BARBIE notified by Rhonda GARCIA, that palliative care notified her that the patient's son wants to pursue inpatient hospice.     BARBIE sent referrals to [facility] and [facility] to contact son and discuss inpatient hospice.      09/05/19 2742   Post-Acute Status   Post-Acute Authorization Home Health/Hospice   Home Health/Hospice Status Referrals Sent     Cindy Valladares LMSW  [facility]  [id number]

## 2019-09-05 NOTE — PROGRESS NOTES
[facility]  Palliative Medicine  Progress Note    Patient Name: Carmen Leyva  MRN: [id number]  Admission Date: 8/11/2019  Hospital Length of Stay: 25 days  Code Status: Full Code   Attending Provider: Gabo Erickson MD  Consulting Provider: LAMBERTO Esquivel  Primary Care Physician: Kem Kiser MD  Principal Problem:Brain metastasis    Patient information was obtained from relative(s).      Assessment/Plan:     Palliative care encounter  Palliative medicine consulted for goals of care.  Palliative medicine APRN at bedside. Ms Leyva unable to participate in conversation.     Impression:  Ms Leyva is a 60 yo lady with PMX of small cell lung ca s/p chemo 6/18.  Admitted to [facility] as transfer from OSH with altered mental status and gait disturbance.  Imaging revealed left  parietotemporal mass concerning for metastatic disease from lung cancer.  S/P craniotomy and resection 8/12.  Complicated recovery - now with malnourishment.  Oncology and radiation oncology consulted, not a candidate for systemic and radiation therapies secondary to poor performance status.  At time of this assessment Ms. Leyva is sleeping and does not respond to tactile or verbal stimuli. Spontaneous movement, not following simple commands.  Appears to be comfortable no facial grimacing or moaning. No acute distress.  Plans for PEG placement.  Palliative medicine consulted for goals of care.     Advanced Care Planning Advance Care Planning       - No advanced directives received.   - Next of kin for medical decision making is gurwinder Hernandez Yovany: [phone].   - Resuscitation status: full code per primary team. Son is not amenable to changing code status at this time.   -Ms. Leyva unable to make medical decisions at this time secondary to waxing and waning mental status.    Goals of Care:   - Ms CAMELIA Rodriguez unable to participate in goals of care conversations.   - Family meeting with gurwinder Hernandez and daughter-in-law  Anastasia at bedside today.  Clinical updates provided.  David states his mother' condition has rapidly changed and understand the cancer will continue to progress in the time she is using to get stronger.  Son states when she was first diagnosed Ms. Leyva did not want chemotherapy but the family talked her into it. At this time David, son, states it appears chemotherapy is not an option and her being comfortable is best.   -  As per nursing staff Ms CAMELIA Rodriguez has been resisting taking anything by mouth - clinches her teeth and will not open her mouth for medications or food.   - Son states understanding having the PEG and tube feedings is going to help her gain weight and get strong enough for treatment. - Risks and benefits of PEG and artifical treatment introduced.  Explained there is a good possibility she will not receive benefit of tube feeding and she will not be able to receive chemotherapy.    At this time David, son, states it appears chemotherapy is not an option and her being comfortable is best.   - David not amenable to having PEG placed.   - Family has familiarity with hospice - David states his father received home hospice.    - Hospice education (philosophy, criteria, settings and costs)  provided. David explained family is unable to provide level of support for home hospice and nursing home with hospice is not a financial option.   - Discussed inpatient hospice  critieria - malnourished, no PEG, not eating  aggressive cancer with no treatment options,  Altered mental status appears to meet inpatient hospice criteria.    - Emotional support provided   - Family amenable to inpatient hospice. Requesting informational consult to [facility].       Plan/Recommendation   - consult  for inpatient hospice informational consult for [facility]  - Palliative care will continue to follow for further development of goals of care and advanced care planning.    - Emotional support     Primary team aware of the above goals of care conversation and recommendations.         I will follow-up with patient. Please contact us if you have any additional questions.    Subjective:     Chief Complaint:   Chief Complaint   Patient presents with    Transfer from [facility] for [facility] with Dx of brain tumor. Went to [place] ED for confusion, HA, and unsteady gait. Hx of Lung CA       HPI:   Ms. Robertson is a 58 yo lady with PMHx of  COPD, HTN, hypothyroidism,  stage 4 R small cell lung cancer s/p 6 cycles of carbo/etoposide in remission since 06/2018.  She presented to [facility] as transfer via EMS  from [facility] with c/o altered mental status and abnormal gait and higher level of care.      CT of head at OSH revealed a left  parietotemporal mass concerning for metastatic disease from lung cancer.  Per EMS documentation the family reports she was last seen at baseline on 8/10/19.  On day of admit the family reports she had been confused and having difficulty walking.    8/11/19 she was admitted to [facility] with L temporoparietal brain mass with vasogenic edema.    8/11 Neuro surgery consulted.   MRI at [facility] 8/11 revealed non enhancing  heterogeneous intra-axial mass of left parietal temporal lobe consistent with dominant metastasis.  Primary brain tumor could have similar appearance.  No other enhancing lesion.  8/12 - S/P craniotomy and tumor resection   8/19 Pathology positive for metastatic small cell carcinoma   Heme/Onc and radiation oncology consulted No recommendations for inpatient treatment at this time due to poor performance     Post operative period - now 23 days post op complicated by waxing and waning mental  -, malnutrition, hyperglycemia, hyponatremia, hypokalemia,  persistent receptive aphasia.  Followed by OT and PT with recommendations for nursing home placement for continued rehab. Placement pending goals of care discussion with family.     PEG  placement scheduled for 9/5/19 9/4/19 Palliative care consulted for goals of care          Hospital Course:  No notes on file    Interval History:     Past Medical History:   Diagnosis Date    Acid reflux     Anemia     Anxiety     Arthritis     Blindness - both eyes     Chronic kidney disease     COPD (chronic obstructive pulmonary disease)     GERD (gastroesophageal reflux disease)     Gout     Hypertension     Lung cancer     Lung cancer, main bronchus, right 1/2/2018    Osteopenia     Rheumatoid arteritis     SIADH (syndrome of inappropriate ADH production) 11/12/2017    Solitary kidney     Thyroid disease     Vitamin D deficiency        Past Surgical History:   Procedure Laterality Date    ABDOMINAL ADHESION SURGERY      ADENOIDECTOMY      kidney removal      right     L craniotomy for tumor Left 8/12/2019    Performed by [facility]o Erickson MD at [facility] OR [address] [place] [address]    TONSILLECTOMY         Review of patient's allergies indicates:   Allergen Reactions    Celebrex [celecoxib]      Due to kidney removal she can not take anything for RA    Ibuprofen      Due to kidney removal    Neurontin [gabapentin]     Sulfa (sulfonamide antibiotics) Hives    Topamax [topiramate] Swelling       Medications:  Continuous Infusions:   dextrose 5 % and 0.9 % NaCl 50 mL/hr at 09/05/19 0938     Scheduled Meds:   ascorbic acid (vitamin C)  250 mg Oral TID    dexAMETHasone  2 mg Oral Q12H    divalproex  500 mg Oral Q12H    ferrous sulfate  325 mg Oral TID    levothyroxine  75 mcg Oral Before breakfast    nicotine  1 patch Transdermal Daily    pantoprazole  40 mg Oral Daily    polyethylene glycol  17 g Oral Daily    senna-docusate 8.6-50 mg  1 tablet Oral BID    sodium chloride  2 g Oral TID     PRN Meds:acetaminophen, albuterol-ipratropium, Dextrose 10% Bolus, Dextrose 10% Bolus, glucagon (human recombinant), glucose, glucose, ondansetron, QUEtiapine, sodium chloride 0.9%     Review of Systems:  unable to complete secondary to mental status change     Family History     Problem Relation (Age of Onset)    COPD Father    Clotting disorder Mother    Diabetes Maternal Aunt, Paternal Uncle    Fibromyalgia Sister    Heart disease Father, Brother, Maternal Grandfather, Paternal Uncle    Hypertension Mother, Maternal Aunt    Neuropathy Father, Sister    Parkinsonism Maternal Grandfather    Sleep apnea Mother    Stroke Mother    Thyroid disease Mother, Sister        Tobacco Use    Smoking status: Former Smoker     Packs/day: 0.50     Types: Cigarettes     Last attempt to quit: 2017     Years since quittin.3    Smokeless tobacco: Never Used   Substance and Sexual Activity    Alcohol use: No     Alcohol/week: 0.0 oz     Comment: seldom    Drug use: No    Sexual activity: Never     Partners: Male         Objective:     Vital Signs (Most Recent):  Temp: 98.6 °F (37 °C) (19 121)  Pulse: (!) 113 (19)  Resp: 17 (19)  BP: 135/73 (19)  SpO2: (!) 92 % (19) Vital Signs (24h Range):  Temp:  [96.8 °F (36 °C)-99.5 °F (37.5 °C)] 98.6 °F (37 °C)  Pulse:  [] 113  Resp:  [15-20] 17  SpO2:  [92 %-95 %] 92 %  BP: (112-150)/(57-94) 135/73     Weight: 35.4 kg (78 lb)  Body mass index is 14.74 kg/m².    Review of Symptoms  Symptom Assessment (ESAS 0-10 scale)   ESAS 0 1 2 3 4 5 6 7 8 9 10   Pain              Dyspnea              Anxiety              Nausea              Depression               Anorexia              Fatigue              Insomnia              Restlessness               Agitation              CAM / Delirium __ --  ___+   Constipation     __ --  ___+   Diarrhea           __ --  ___+  Bowel Management Plan (BMP): No    Comments: having some confusion, unable to complete ESAS    Pain Assessment:   OME in 24 hours: 0    Performance Status: 30    ECOG Performance Status Grade: 3 - Confined to bed or chair 50% of waking hours    Physical Exam   Constitutional:    Malnourished, appears chronically ill    HENT:   S/p craniotomy    Eyes: Pupils are equal, round, and reactive to light. Conjunctivae are normal.   Cardiovascular: Normal rate, regular rhythm and normal heart sounds.   Pulmonary/Chest: Effort normal and breath sounds normal. No respiratory distress.   Abdominal: Soft. Bowel sounds are normal. She exhibits no distension.   Genitourinary:   Genitourinary Comments: Pure wick urinary device- clear yellow urine    Neurological:   Arouses with verbal and tactile stimuli,  Spontaneous movement, does not follow simple commands    Skin: Skin is warm and dry.   Craniotomy incision healed    Psychiatric: Her behavior is normal.   Altered mental status, unable to assess.     Nursing note and vitals reviewed.      Significant Labs: All pertinent labs within the past 24 hours have been reviewed.  CBC:   Recent Labs   Lab 09/03/19  0439   WBC 10.82   HGB 9.2*   HCT 30.2*   *   PLT 69*     BMP:  No results for input(s): GLU, NA, K, CL, CO2, BUN, CREATININE, CALCIUM, MG in the last 24 hours.  LFT:  Lab Results   Component Value Date    AST 13 09/03/2019    ALKPHOS 79 09/03/2019    BILITOT 0.3 09/03/2019     Albumin:   Albumin   Date Value Ref Range Status   09/03/2019 3.3 (L) 3.5 - 5.2 g/dL Final   06/24/2019 3.9 3.1 - 4.7 g/dL      Protein:   Total Protein   Date Value Ref Range Status   09/03/2019 6.0 6.0 - 8.4 g/dL Final     Lactic acid:   No results found for: LACTATE    Significant Imaging: I have reviewed all pertinent imaging results/findings within the past 24 hours.    Advance Care Planning   Advanced Directives::  Living Will: No  LaPOST: No  Do Not Resuscitate Status: No  Medical Power of : No    Decision-Making Capacity: waxing and waning mental status unable to make decisions        Living Arrangements: Lives alone, has family in close proximity, has only one son,       Psychosocial/Cultural: , one son, two sisters.  No longer working  since cancer diagnosis      Spiritual:     F- Mercedes and Belief: non [employer]oration Tenriism    I - Importance:   .  C - Community:     A - Address in Care: amenable to  visits       > 50% of 35  min visit spent in chart review, face to face discussion of goals of care,  symptom assessment, coordination of care and emotional support.    Jennyfer Odonnell, CNS  Palliative Medicine  [facility]

## 2019-09-05 NOTE — SUBJECTIVE & OBJECTIVE
Interval History: Palliative medicine consulted yesterday, family wishes to pursue inpatient hospice, PEG cancelled.     Medications:  Continuous Infusions:   dextrose 5 % and 0.9 % NaCl 50 mL/hr at 09/05/19 0938     Scheduled Meds:   ascorbic acid (vitamin C)  250 mg Oral TID    dexAMETHasone  2 mg Oral Q12H    divalproex  500 mg Oral Q12H    ferrous sulfate  325 mg Oral TID    levothyroxine  75 mcg Oral Before breakfast    nicotine  1 patch Transdermal Daily    pantoprazole  40 mg Oral Daily    polyethylene glycol  17 g Oral Daily    senna-docusate 8.6-50 mg  1 tablet Oral BID    sodium chloride  2 g Oral TID     PRN Meds:acetaminophen, albuterol-ipratropium, Dextrose 10% Bolus, Dextrose 10% Bolus, glucagon (human recombinant), glucose, glucose, ondansetron, QUEtiapine, sodium chloride 0.9%     Review of Systems  Objective:     Weight: 35.4 kg (78 lb)  Body mass index is 14.74 kg/m².  Vital Signs (Most Recent):  Temp: 98.6 °F (37 °C) (09/05/19 1216)  Pulse: (!) 113 (09/05/19 1216)  Resp: 17 (09/05/19 1216)  BP: 135/73 (09/05/19 1216)  SpO2: (!) 92 % (09/05/19 1216) Vital Signs (24h Range):  Temp:  [96.8 °F (36 °C)-99.5 °F (37.5 °C)] 98.6 °F (37 °C)  Pulse:  [] 113  Resp:  [15-20] 17  SpO2:  [92 %-95 %] 92 %  BP: (112-150)/(57-94) 135/73                   Neurosurgery Physical Exam   General: cachectic, no distress, very flat affect, confused  Head: normocephalic  GCS: Motor: 5/Verbal: 4/Eyes: 4 GCS Total: 13  Mental Status: Awake, Alert. Minimal verbal responses to questions. Will state name.   Cranial nerves: face symmetric, CN II-XII grossly intact.   Eyes: Pupils anisocoric, round, reactive to light with accomodation, EOMI. Disconjugate gaze.  Pulmonary: normal respirations, no signs of respiratory distress  Abdomen: soft, non-distended, not tender to palpation  Sensory: Response to light touch throughout  Motor Strength: Moves all extremities spontaneously with good tone.  Does not follow  commands. No abnormal movements seen.   Pronator Drift:  Unable to assess secondary to AMS  Finger-to-nose:  Unable to assess secondary to AMS  Gait: shuffles feet, unsteady     Incision well healed.  No surrounding erythema or edema.  No drainage from incision, nontender to palpation.    Significant Labs:  No results for input(s): GLU, NA, K, CL, CO2, BUN, CREATININE, CALCIUM, MG in the last 48 hours.  No results for input(s): WBC, HGB, HCT, PLT in the last 48 hours.  No results for input(s): LABPT, INR, APTT in the last 48 hours.  Microbiology Results (last 7 days)     ** No results found for the last 168 hours. **            Significant Diagnostics:  Ct Abdomen Without Contrast    Result Date: 9/4/2019  No acute intra-abdominal abnormality seen. Aortic atherosclerosis. Electronically signed by: Ty De Los Santos Date:    09/04/2019 Time:    15:57

## 2019-09-05 NOTE — NURSING
bg in 60s after night shift gave oral glucose and oj. Glucagon given with a recheck of 105 bg. Neuro surg aware.

## 2019-09-05 NOTE — CONSULTS
[facility]  Palliative Medicine  Consult Note    Patient Name: Carmen Leyva  MRN: [id number]  Admission Date: 8/11/2019  Hospital Length of Stay: 25 days  Code Status: Full Code   Attending Provider: Gabo Erickson MD  Consulting Provider: LAMBERTO Esquivel  Primary Care Physician: Kem Kiser MD  Principal Problem:Brain metastasis    Patient information was obtained from relative(s), past medical records and ER records.      Consults  Assessment/Plan:     Palliative care encounter  Palliative medicine consulted for goals of care.  Palliative medicine APRN at bedside. Ms Leyva unable to participate in conversation.     Impression:  Ms Leyva is a 58 yo lady with PMX of small cell lung ca s/p chemo 6/18.  [place] [facility] [place] with altered mental status and gait disturbance.  Imaging revealed left  parietotemporal mass concerning for metastatic disease from lung cancer.  S/P craniotomy and resection 8/12.  Complicated recovery - now with malnourishment.  Oncology and radiation oncology consulted, not a candidate for systemic and radiation therapies secondary to poor performance status.  Ms. Leyva is awake, alert  responds to name.  Able to move all extremities, not following simple commands.  Appears to be comfortable no facial grimacing or moaning. No acute distress.  Plans for PEG placement.  Palliative medicine consulted for goals of care.     Advanced Care Planning Advance Care Planning       - No advanced directives received.   - Next of kin for medical decision making is gurwinder Pedroza: [phone].   - Resuscitation status: full code per primary team. Son is not amenable to changing code status at this time.   -Ms. Leyva unable to make medical decisions at this time secondary to waxing and waning mental status.    Goals of Care:   - Ms CAMELIA Rodriguez unable to participate in goals of care conversations.   - Spoke to gurwinder Hernandez by telephone.  During this  "conversation he states understanding she could have additional treatment.  Explained in her current clinical condition she is not a candidate for systemic therapies at this time.   - Son states when she was first diagnosed Ms. Leyva did not want chemotherapy but the family talked her into it.   - Son states no conversations about care she would choose if the cancer came back.  He states this happened so suddenly and there was not enough time "before she was out of her mind"  - Son states understanding having the PEG and tube feedings is going to help her gain weight and get strong enough for treatment.   - Risks and benefits of PEG and artifical treatment introduced.  Explained there is a good possibility she will not receive benefit of tube feeding and she will not be able to receive chemotherapy.  Son with questions about goals of care.   - Palliative care requested to have a face to face meeting to further discuss.  David states he will take off of work and be at hospital 9/5/19  - Emotional support provided     Plan/Recommendation   - Based on patient's clinical condition and poor evidence supporting benefit of artifical nutrition, Palliative care does not recommend PEG placement.   - Patient and family would benefit from meeting with primary team and palliative care to further discuss goals of care. Son David states he will be available to hospital on 9/5  - Palliative care will continue to follow for further development of goals of care and advanced care planning.   - Emotional support     Primary team aware of the above goals of care conversation and recommendations.         Thank you for your consult. I will follow-up with patient. Please contact us if you have any additional questions.    Subjective:     HPI:   Ms. Robertson is a 60 yo lady with PMHx of  COPD, HTN, hypothyroidism,  stage 4 R small cell lung cancer s/p 6 cycles of carbo/etoposide in remission since 06/2018.  She presented to [facility] as transfer " via EMS  from [facility] with c/o altered mental status and abnormal gait and higher level of care.      CT of head at OSH revealed a left  parietotemporal mass concerning for metastatic disease from lung cancer.  Per EMS documentation the family reports she was last seen at baseline on 8/10/19.  On day of admit the family reports she had been confused and having difficulty walking.    8/11/19 she was admitted to [facility] with L temporoparietal brain mass with vasogenic edema.    8/11 Neuro surgery consulted.   MRI at [facility] 8/11 revealed non enhancing  heterogeneous intra-axial mass of left parietal temporal lobe consistent with dominant metastasis.  Primary brain tumor could have similar appearance.  No other enhancing lesion.  8/12 - S/P craniotomy and tumor resection   8/19 Pathology positive for metastatic small cell carcinoma   Heme/Onc and radiation oncology consulted No recommendations for inpatient treatment at this time due to poor performance     Post operative period - now 23 days post op complicated by waxing and waning mental  -, malnutrition, hyperglycemia, hyponatremia, hypokalemia,  persistent receptive aphasia.  Followed by OT and PT with recommendations for nursing home placement for continued rehab. Placement pending goals of care discussion with family.     PEG placement scheduled for 9/5/19 9/4/19 Palliative care consulted for goals of care          Hospital Course:  No notes on file    Interval History:     Past Medical History:   Diagnosis Date    Acid reflux     Anemia     Anxiety     Arthritis     Blindness - both eyes     Chronic kidney disease     COPD (chronic obstructive pulmonary disease)     GERD (gastroesophageal reflux disease)     Gout     Hypertension     Lung cancer     Lung cancer, main bronchus, right 1/2/2018    Osteopenia     Rheumatoid arteritis     SIADH (syndrome of inappropriate ADH production) 11/12/2017    Solitary kidney     Thyroid disease      Vitamin D deficiency        Past Surgical History:   Procedure Laterality Date    ABDOMINAL ADHESION SURGERY      ADENOIDECTOMY      kidney removal      right     L craniotomy for tumor Left 2019    Performed by Gabo Erickson MD at [facility] OR [address] [place] [address]    TONSILLECTOMY         Review of patient's allergies indicates:   Allergen Reactions    Celebrex [celecoxib]      Due to kidney removal she can not take anything for RA    Ibuprofen      Due to kidney removal    Neurontin [gabapentin]     Sulfa (sulfonamide antibiotics) Hives    Topamax [topiramate] Swelling       Medications:  Continuous Infusions:   dextrose 5 % and 0.9 % NaCl 50 mL/hr at 19 1544     Scheduled Meds:   ascorbic acid (vitamin C)  250 mg Oral TID    dexAMETHasone  2 mg Oral Q12H    divalproex  500 mg Oral Q12H    ferrous sulfate  325 mg Oral TID    levothyroxine  75 mcg Oral Before breakfast    nicotine  1 patch Transdermal Daily    pantoprazole  40 mg Oral Daily    polyethylene glycol  17 g Oral Daily    senna-docusate 8.6-50 mg  1 tablet Oral BID    sodium chloride  2 g Oral TID     PRN Meds:acetaminophen, albuterol-ipratropium, Dextrose 10% Bolus, Dextrose 10% Bolus, glucagon (human recombinant), glucose, glucose, ondansetron, QUEtiapine, sodium chloride 0.9%     Review of Systems: unable to complete secondary to mental status change     Family History     Problem Relation (Age of Onset)    COPD Father    Clotting disorder Mother    Diabetes Maternal Aunt, Paternal Uncle    Fibromyalgia Sister    Heart disease Father, Brother, Maternal Grandfather, Paternal Uncle    Hypertension Mother, Maternal Aunt    Neuropathy Father, Sister    Parkinsonism Maternal Grandfather    Sleep apnea Mother    Stroke Mother    Thyroid disease Mother, Sister        Tobacco Use    Smoking status: Former Smoker     Packs/day: 0.50     Types: Cigarettes     Last attempt to quit: 2017     Years since quittin.3    Smokeless  tobacco: Never Used   Substance and Sexual Activity    Alcohol use: No     Alcohol/week: 0.0 oz     Comment: seldom    Drug use: No    Sexual activity: Never     Partners: Male         Objective:     Vital Signs (Most Recent):  Temp: 99.5 °F (37.5 °C) (09/04/19 2011)  Pulse: 99 (09/04/19 2011)  Resp: 20 (09/04/19 2011)  BP: 116/75 (09/04/19 2011)  SpO2: 95 % (09/04/19 2011) Vital Signs (24h Range):  Temp:  [97.4 °F (36.3 °C)-99.5 °F (37.5 °C)] 99.5 °F (37.5 °C)  Pulse:  [] 99  Resp:  [16-20] 20  SpO2:  [92 %-95 %] 95 %  BP: (106-117)/(52-75) 116/75     Weight: 35.4 kg (78 lb)  Body mass index is 14.74 kg/m².    Review of Symptoms  Symptom Assessment (ESAS 0-10 scale)   ESAS 0 1 2 3 4 5 6 7 8 9 10   Pain              Dyspnea              Anxiety              Nausea              Depression               Anorexia              Fatigue              Insomnia              Restlessness               Agitation              CAM / Delirium __ --  ___+   Constipation     __ --  ___+   Diarrhea           __ --  ___+  Bowel Management Plan (BMP): No    Comments: having some confusion, unable to complete ESAS    Pain Assessment:   OME in 24 hours: 0    Performance Status: 30    ECOG Performance Status Grade: 3 - Confined to bed or chair 50% of waking hours    Physical Exam   Constitutional:   Malnourished, appears chronically ill    HENT:   S/p craniotomy    Eyes: Pupils are equal, round, and reactive to light. Conjunctivae are normal.   Cardiovascular: Normal rate, regular rhythm and normal heart sounds.   Pulmonary/Chest: Effort normal and breath sounds normal. No respiratory distress.   Abdominal: Soft. Bowel sounds are normal. She exhibits no distension.   Genitourinary:   Genitourinary Comments: Pure wick urinary device- clear yellow urine    Neurological:   Arouses with verbal and tactile stimuli,  Spontaneous movement, does not follow simple commands    Skin: Skin is warm and dry.   Craniotomy incision healed     Psychiatric: Her behavior is normal.   Altered mental status, unable to assess.     Nursing note and vitals reviewed.      Significant Labs: All pertinent labs within the past 24 hours have been reviewed.  CBC:   Recent Labs   Lab 09/03/19  0439   WBC 10.82   HGB 9.2*   HCT 30.2*   *   PLT 69*     BMP:  No results for input(s): GLU, NA, K, CL, CO2, BUN, CREATININE, CALCIUM, MG in the last 24 hours.  LFT:  Lab Results   Component Value Date    AST 13 09/03/2019    ALKPHOS 79 09/03/2019    BILITOT 0.3 09/03/2019     Albumin:   Albumin   Date Value Ref Range Status   09/03/2019 3.3 (L) 3.5 - 5.2 g/dL Final   06/24/2019 3.9 3.1 - 4.7 g/dL      Protein:   Total Protein   Date Value Ref Range Status   09/03/2019 6.0 6.0 - 8.4 g/dL Final     Lactic acid:   No results found for: LACTATE    Significant Imaging: I have reviewed all pertinent imaging results/findings within the past 24 hours.    Advance Care Planning   Advanced Directives::  Living Will: No  LaPOST: No  Do Not Resuscitate Status: No  Medical Power of : No    Decision-Making Capacity: waxing and waning mental status unable to make decisions        Living Arrangements: Lives alone, has family in close proximity, has only one son,       Psychosocial/Cultural: , one son, two sisters.  No longer working since cancer diagnosis      Spiritual:     F- Mercedes and Belief: non Adventist Mandaen    I - Importance:   .  C - Community:     A - Address in Care: amenable to  visits       > 50% of   70 min visit spent in chart review, face to face discussion of goals of care,  symptom assessment, coordination of care and emotional support.  > 16 minutes spent advanced care planning      Jennyfer Odonnell, CNS  Palliative Medicine  [facility]

## 2019-09-06 LAB
ALBUMIN SERPL BCP-MCNC: 2.8 G/DL (ref 3.5–5.2)
ALP SERPL-CCNC: 74 U/L (ref 55–135)
ALT SERPL W/O P-5'-P-CCNC: 9 U/L (ref 10–44)
ANION GAP SERPL CALC-SCNC: 6 MMOL/L (ref 8–16)
AST SERPL-CCNC: 14 U/L (ref 10–40)
BASOPHILS # BLD AUTO: 0 K/UL (ref 0–0.2)
BASOPHILS NFR BLD: 0 % (ref 0–1.9)
BILIRUB SERPL-MCNC: 0.4 MG/DL (ref 0.1–1)
BUN SERPL-MCNC: 13 MG/DL (ref 6–20)
CALCIUM SERPL-MCNC: 8.6 MG/DL (ref 8.7–10.5)
CHLORIDE SERPL-SCNC: 101 MMOL/L (ref 95–110)
CO2 SERPL-SCNC: 30 MMOL/L (ref 23–29)
CREAT SERPL-MCNC: 0.7 MG/DL (ref 0.5–1.4)
DIFFERENTIAL METHOD: ABNORMAL
EOSINOPHIL # BLD AUTO: 0 K/UL (ref 0–0.5)
EOSINOPHIL NFR BLD: 0.3 % (ref 0–8)
ERYTHROCYTE [DISTWIDTH] IN BLOOD BY AUTOMATED COUNT: 16.2 % (ref 11.5–14.5)
EST. GFR  (AFRICAN AMERICAN): >60 ML/MIN/1.73 M^2
EST. GFR  (NON AFRICAN AMERICAN): >60 ML/MIN/1.73 M^2
GLUCOSE SERPL-MCNC: 88 MG/DL (ref 70–110)
HCT VFR BLD AUTO: 23.1 % (ref 37–48.5)
HGB BLD-MCNC: 7 G/DL (ref 12–16)
IMM GRANULOCYTES # BLD AUTO: 0.05 K/UL (ref 0–0.04)
IMM GRANULOCYTES NFR BLD AUTO: 0.5 % (ref 0–0.5)
LYMPHOCYTES # BLD AUTO: 0.4 K/UL (ref 1–4.8)
LYMPHOCYTES NFR BLD: 3.6 % (ref 18–48)
MCH RBC QN AUTO: 32.6 PG (ref 27–31)
MCHC RBC AUTO-ENTMCNC: 30.3 G/DL (ref 32–36)
MCV RBC AUTO: 107 FL (ref 82–98)
MONOCYTES # BLD AUTO: 1.3 K/UL (ref 0.3–1)
MONOCYTES NFR BLD: 12 % (ref 4–15)
NEUTROPHILS # BLD AUTO: 9.2 K/UL (ref 1.8–7.7)
NEUTROPHILS NFR BLD: 83.6 % (ref 38–73)
NRBC BLD-RTO: 0 /100 WBC
PLATELET # BLD AUTO: 54 K/UL (ref 150–350)
PMV BLD AUTO: 11.8 FL (ref 9.2–12.9)
POCT GLUCOSE: 126 MG/DL (ref 70–110)
POCT GLUCOSE: 130 MG/DL (ref 70–110)
POCT GLUCOSE: 92 MG/DL (ref 70–110)
POCT GLUCOSE: 95 MG/DL (ref 70–110)
POCT GLUCOSE: 98 MG/DL (ref 70–110)
POTASSIUM SERPL-SCNC: 4.3 MMOL/L (ref 3.5–5.1)
PROT SERPL-MCNC: 5.7 G/DL (ref 6–8.4)
RBC # BLD AUTO: 2.15 M/UL (ref 4–5.4)
SODIUM SERPL-SCNC: 137 MMOL/L (ref 136–145)
WBC # BLD AUTO: 10.95 K/UL (ref 3.9–12.7)

## 2019-09-06 PROCEDURE — 63600175 PHARM REV CODE 636 W HCPCS: Performed by: PHYSICIAN ASSISTANT

## 2019-09-06 PROCEDURE — 99024 PR POST-OP FOLLOW-UP VISIT: ICD-10-PCS | Mod: ,,, | Performed by: PHYSICIAN ASSISTANT

## 2019-09-06 PROCEDURE — 25000003 PHARM REV CODE 250: Performed by: PHYSICIAN ASSISTANT

## 2019-09-06 PROCEDURE — 36415 COLL VENOUS BLD VENIPUNCTURE: CPT

## 2019-09-06 PROCEDURE — 25000003 PHARM REV CODE 250: Performed by: STUDENT IN AN ORGANIZED HEALTH CARE EDUCATION/TRAINING PROGRAM

## 2019-09-06 PROCEDURE — 80053 COMPREHEN METABOLIC PANEL: CPT

## 2019-09-06 PROCEDURE — 85025 COMPLETE CBC W/AUTO DIFF WBC: CPT

## 2019-09-06 PROCEDURE — 99024 POSTOP FOLLOW-UP VISIT: CPT | Mod: ,,, | Performed by: PHYSICIAN ASSISTANT

## 2019-09-06 PROCEDURE — 20600001 HC STEP DOWN PRIVATE ROOM

## 2019-09-06 PROCEDURE — S4991 NICOTINE PATCH NONLEGEND: HCPCS | Performed by: PHYSICIAN ASSISTANT

## 2019-09-06 RX ORDER — POLYETHYLENE GLYCOL 3350 17 G/17G
17 POWDER, FOR SOLUTION ORAL 2 TIMES DAILY PRN
Status: DISCONTINUED | OUTPATIENT
Start: 2019-09-06 | End: 2019-09-10 | Stop reason: HOSPADM

## 2019-09-06 RX ORDER — AMOXICILLIN 250 MG
1 CAPSULE ORAL 2 TIMES DAILY PRN
Status: DISCONTINUED | OUTPATIENT
Start: 2019-09-06 | End: 2019-09-10 | Stop reason: HOSPADM

## 2019-09-06 RX ORDER — DEXAMETHASONE 2 MG/1
2 TABLET ORAL EVERY 12 HOURS
Refills: 0
Start: 2019-09-06

## 2019-09-06 RX ORDER — NICOTINE 7MG/24HR
1 PATCH, TRANSDERMAL 24 HOURS TRANSDERMAL DAILY
Refills: 0 | COMMUNITY
Start: 2019-09-06

## 2019-09-06 RX ORDER — ACETAMINOPHEN 325 MG/1
650 TABLET ORAL EVERY 6 HOURS PRN
Refills: 0 | COMMUNITY
Start: 2019-09-06

## 2019-09-06 RX ORDER — QUETIAPINE FUMARATE 25 MG/1
25 TABLET, FILM COATED ORAL 3 TIMES DAILY PRN
Start: 2019-09-06 | End: 2020-09-05

## 2019-09-06 RX ORDER — DIVALPROEX SODIUM 500 MG/1
500 TABLET, DELAYED RELEASE ORAL EVERY 12 HOURS
Start: 2019-09-06 | End: 2020-09-05

## 2019-09-06 RX ADMIN — SODIUM CHLORIDE TAB 1 GM 2 G: 1 TAB at 08:09

## 2019-09-06 RX ADMIN — DIVALPROEX SODIUM 500 MG: 250 TABLET, DELAYED RELEASE ORAL at 08:09

## 2019-09-06 RX ADMIN — DIVALPROEX SODIUM 500 MG: 250 TABLET, DELAYED RELEASE ORAL at 09:09

## 2019-09-06 RX ADMIN — NICOTINE 1 PATCH: 7 PATCH, EXTENDED RELEASE TRANSDERMAL at 08:09

## 2019-09-06 RX ADMIN — LEVOTHYROXINE SODIUM 75 MCG: 75 TABLET ORAL at 05:09

## 2019-09-06 RX ADMIN — DEXAMETHASONE 2 MG: 1 TABLET ORAL at 09:09

## 2019-09-06 RX ADMIN — Medication 250 MG: at 01:09

## 2019-09-06 RX ADMIN — Medication 250 MG: at 09:09

## 2019-09-06 RX ADMIN — DEXAMETHASONE 2 MG: 1 TABLET ORAL at 08:09

## 2019-09-06 RX ADMIN — SODIUM CHLORIDE TAB 1 GM 2 G: 1 TAB at 09:09

## 2019-09-06 RX ADMIN — FERROUS SULFATE TAB EC 325 MG (65 MG FE EQUIVALENT) 325 MG: 325 (65 FE) TABLET DELAYED RESPONSE at 01:09

## 2019-09-06 RX ADMIN — FERROUS SULFATE TAB EC 325 MG (65 MG FE EQUIVALENT) 325 MG: 325 (65 FE) TABLET DELAYED RESPONSE at 08:09

## 2019-09-06 RX ADMIN — Medication 250 MG: at 08:09

## 2019-09-06 RX ADMIN — FERROUS SULFATE TAB EC 325 MG (65 MG FE EQUIVALENT) 325 MG: 325 (65 FE) TABLET DELAYED RESPONSE at 09:09

## 2019-09-06 RX ADMIN — PANTOPRAZOLE SODIUM 40 MG: 40 TABLET, DELAYED RELEASE ORAL at 08:09

## 2019-09-06 NOTE — SUBJECTIVE & OBJECTIVE
Interval History:   NAEON. Patient somnolent, resting in bed. No family at bedside. Patient will not answer questions this morning. Minimal PO intake. IVF's in place.     Medications:  Continuous Infusions:   dextrose 5 % and 0.9 % NaCl 50 mL/hr at 09/05/19 0938     Scheduled Meds:   ascorbic acid (vitamin C)  250 mg Oral TID    dexAMETHasone  2 mg Oral Q12H    divalproex  500 mg Oral Q12H    ferrous sulfate  325 mg Oral TID    levothyroxine  75 mcg Oral Before breakfast    nicotine  1 patch Transdermal Daily    pantoprazole  40 mg Oral Daily    sodium chloride  2 g Oral TID     PRN Meds:acetaminophen, albuterol-ipratropium, Dextrose 10% Bolus, Dextrose 10% Bolus, glucagon (human recombinant), glucose, glucose, ondansetron, polyethylene glycol, QUEtiapine, senna-docusate 8.6-50 mg, sodium chloride 0.9%     Review of Systems  Objective:     Weight: 34.6 kg (76 lb 4.5 oz)  Body mass index is 14.41 kg/m².  Vital Signs (Most Recent):  Temp: 98.5 °F (36.9 °C) (09/06/19 0800)  Pulse: 102 (09/06/19 0800)  Resp: 16 (09/06/19 0800)  BP: 122/78 (09/06/19 0800)  SpO2: 96 % (09/06/19 0800) Vital Signs (24h Range):  Temp:  [97.4 °F (36.3 °C)-99.5 °F (37.5 °C)] 98.5 °F (36.9 °C)  Pulse:  [] 102  Resp:  [16-18] 16  SpO2:  [92 %-96 %] 96 %  BP: (122-136)/(63-80) 122/78       Neurosurgery Physical Exam  General: cachectic, no distress, somnolent  Head: normocephalic  GCS: Motor: 5/Verbal: 1/Eyes: 3 GCS Total: 9  Mental Status: Somnolent, arouses to verbal stimulus but quickly falls back asleep. Will not answer any questions this morning.   Cranial nerves: face symmetric, CN II-XII grossly intact.   Eyes: Pupils anisocoric, round, reactive to light with accomodation, EOMI. Disconjugate gaze.  Pulmonary: normal respirations, no signs of respiratory distress  Abdomen: soft, non-distended, not tender to palpation  Sensory: Response to light touch throughout  Motor Strength: Moves all extremities spontaneously with good  tone. Will not follow commands today. No abnormal movements seen.   Pronator Drift:  Unable to assess   Finger-to-nose:  Unable to assess        Incision well healed.  No surrounding erythema or edema.  No drainage from incision, nontender to palpation.        Significant Labs:  Recent Labs   Lab 09/06/19  0324   GLU 88      K 4.3      CO2 30*   BUN 13   CREATININE 0.7   CALCIUM 8.6*     Recent Labs   Lab 09/06/19 0324   WBC 10.95   HGB 7.0*   HCT 23.1*   PLT 54*

## 2019-09-06 NOTE — ASSESSMENT & PLAN NOTE
59F w/ PMH COPD, HTN, hypothyroidism, stage 4 R small cell lung cancer s/p 6 cycles of carbo/etoposide in remission since 06/2018 who presents to [facility] ED from OSH w/ L parietal brain mass. Now s/p crani for debulking on 8/12.    -Patient somnolent today but will arouse to verbal stim.   -DC daily labs, SQH.     -Pathology (8/19): Metastatic small cell carcinoma. Heme-Onc and RadOnc do not recommend inpatient treatment at this time due to functional status. Palliative Care consulted. Patient pending acceptance to Hospice.   -Seizure prevention: Continue Depakote 500mg BID.   -Cerebral edema: Completed 3 week Dex taper. Continue 2 mg BID. Continue PPI while on steroids.   -Thrombocytopenia:  Plts 54 today.      -Malnutrition: Poor PO intake. Placement of PEG cancelled due to discussion to pursue hospice. PO intake for pleasure. Aspiration precautions.   -Hypoglycemia 2/2 malnutrition:  Improved with IVF's. Continue D5.   -Hyponatremia: Na 137 today. Decrease Na tabs to 2 g BID.    -Hyperkalemia: K 4.3 today.     -Psyc: Continue redirection and delirium precautions. Continue Seroquel TID PRN and Haldol prn qhs.   -HTN:  Maintain SBP < 160. Home dose of Norvasc has been held since SBP at goal. Will continue to monitor and restart if SBP becomes elevated.   -Hypothyroidism: Continue home dose of Synthroid  -Tobacco abuse: Continue nicotine patch  -COPD/Atelectasis prevention: Continue duo nebs PRN, pulse ox q 4 hours  -DVT prophylaxis: ABI's, SCD's.   -Bowel regimen: senna and miralax BID PRN      DISPO: Pending acceptance to inpatient hospice facility.

## 2019-09-06 NOTE — PLAN OF CARE
Problem: Adult Inpatient Plan of Care  Goal: Plan of Care Review  Pt Awake.    V/S stable throughout shift; please see flowsheet for details and full assessments.  NAEO.  Siderails up x 2, bed locked and in low position.  Call bell in reach. Pt has telesitter in process  .  Will CTM.

## 2019-09-06 NOTE — PLAN OF CARE
BARBIE following for DC needs. BARBIE in communication with Jorge L GARCIA    Anticipated DC to [facility] for hospice care on Monday with [facility].     BARBIE spoke to Susie at [facility]. Susie stated that she will need [facility] to send a contract to them to review and sign.     BARBIE spoke to Latesha at [facility] and requested that she send the contract to [facility] at [phone]. Latesha stated that she will send the contract now. BARBIE informed Latesha of anticipated DC on Monday.      09/06/19 1131   Post-Acute Status   Post-Acute Authorization Placement   Post-Acute Placement Status Authorization Obtained     UPDATE 12:50PM  BARBIE received call from Jw with [facility] who stated that she is on her way to the hospital to meet with the family.     Cindy Valladares, MARI  [facility]  [phone]

## 2019-09-06 NOTE — ASSESSMENT & PLAN NOTE
59F w/ PMH COPD, HTN, hypothyroidism, stage 4 R small cell lung cancer s/p 6 cycles of carbo/etoposide in remission since 06/2018 who presents to [facility] ED from OSH w/ L parietal brain mass. Now s/p crani for debulking on 8/12.    -Patient somnolent today but will arouse to verbal stim. Does not answer questions. Moves all extremities spontaneously however will not follow commands.   -DC daily labs, SQH, progressive mobility orders. Vitals/neuro checks q 6h.     -Pathology (8/19): Metastatic small cell carcinoma. Patient pending acceptance to Hospice, likely to discharge Monday to inpatient hospice.   -Seizure prevention: Continue Depakote 500mg BID.   -Cerebral edema: Completed 3 week Dex taper. Continue 2 mg BID. Continue PPI while on steroids.   -Thrombocytopenia:  Plts 54 on labs yesterday. Will f/u at next lab draw.     -Malnutrition: Poor PO intake. Placement of PEG cancelled due to discussion to pursue hospice. PO intake for pleasure. Aspiration precautions.   -Hypoglycemia 2/2 malnutrition:  Improved with IVF's. Continue D5.   -Hyponatremia: Na 137 on last labs, will f/u at next draw. Na tabs to 2 g BID.    -Hyperkalemia: improved. K 4.3 on previous labs.  -Anemia: H&H 7/23.1. Continue iron for replacement.     -Psyc: Continue redirection and delirium precautions. Continue Seroquel TID PRN and Haldol prn qhs.   -HTN:  Maintain SBP < 160. Home dose of Norvasc has been held since SBP at goal. Will continue to monitor and restart if SBP becomes elevated.   -Hypothyroidism: Continue home dose of Synthroid  -Tobacco abuse: Continue nicotine patch  -COPD/Atelectasis prevention: Continue duo nebs PRN, pulse ox q 4 hours  -DVT prophylaxis: ABI's, SCD's.   -Bowel regimen: senna and miralax BID PRN      DISPO: Pending acceptance to inpatient hospice facility.

## 2019-09-06 NOTE — PROGRESS NOTES
[facility]  Neurosurgery  Progress Note    Subjective:     History of Present Illness: 59F w/ PMH COPD, HTN, hypothyroidism, stage 4 R small cell lung cancer s/p 6 cycles of carbo/etoposide in remission since 06/2018 who presents to [facility] ED from OSH w/ L parietal brain mass. Per EMS records patient had a 1d history of AMS and gait difficulty and was brought into the hospital by her family for evaluation. CTH @ OSH showed large L parietal brain mass with possible hemorrhagic component and she was transferred to [facility] for evaluation. MRI @ [facility] redemonstrated L nonenhancing parietal mass with minimal blood. Patient is confused and so ROS is tenuous.    Post-Op Info:  Procedure(s) (LRB):  L craniotomy for tumor (Left)   25 Days Post-Op     Interval History:   NAEON. Patient somnolent, resting in bed. No family at bedside. Patient will not answer questions this morning. Minimal PO intake. IVF's in place.     Medications:  Continuous Infusions:   dextrose 5 % and 0.9 % NaCl 50 mL/hr at 09/05/19 0938     Scheduled Meds:   ascorbic acid (vitamin C)  250 mg Oral TID    dexAMETHasone  2 mg Oral Q12H    divalproex  500 mg Oral Q12H    ferrous sulfate  325 mg Oral TID    levothyroxine  75 mcg Oral Before breakfast    nicotine  1 patch Transdermal Daily    pantoprazole  40 mg Oral Daily    sodium chloride  2 g Oral TID     PRN Meds:acetaminophen, albuterol-ipratropium, Dextrose 10% Bolus, Dextrose 10% Bolus, glucagon (human recombinant), glucose, glucose, ondansetron, polyethylene glycol, QUEtiapine, senna-docusate 8.6-50 mg, sodium chloride 0.9%     Review of Systems  Objective:     Weight: 34.6 kg (76 lb 4.5 oz)  Body mass index is 14.41 kg/m².  Vital Signs (Most Recent):  Temp: 98.5 °F (36.9 °C) (09/06/19 0800)  Pulse: 102 (09/06/19 0800)  Resp: 16 (09/06/19 0800)  BP: 122/78 (09/06/19 0800)  SpO2: 96 % (09/06/19 0800) Vital Signs (24h Range):  Temp:  [97.4 °F (36.3 °C)-99.5 °F (37.5 °C)] 98.5 °F  (36.9 °C)  Pulse:  [] 102  Resp:  [16-18] 16  SpO2:  [92 %-96 %] 96 %  BP: (122-136)/(63-80) 122/78       Neurosurgery Physical Exam  General: cachectic, no distress, somnolent  Head: normocephalic  GCS: Motor: 5/Verbal: 1/Eyes: 3 GCS Total: 9  Mental Status: Somnolent, arouses to verbal stimulus but quickly falls back asleep. Will not answer any questions this morning.   Cranial nerves: face symmetric, CN II-XII grossly intact.   Eyes: Pupils anisocoric, round, reactive to light with accomodation, EOMI. Disconjugate gaze.  Pulmonary: normal respirations, no signs of respiratory distress  Abdomen: soft, non-distended, not tender to palpation  Sensory: Response to light touch throughout  Motor Strength: Moves all extremities spontaneously with good tone. Will not follow commands today. No abnormal movements seen.   Pronator Drift:  Unable to assess   Finger-to-nose:  Unable to assess        Incision well healed.  No surrounding erythema or edema.  No drainage from incision, nontender to palpation.        Significant Labs:  Recent Labs   Lab 09/06/19  0324   GLU 88      K 4.3      CO2 30*   BUN 13   CREATININE 0.7   CALCIUM 8.6*     Recent Labs   Lab 09/06/19  0324   WBC 10.95   HGB 7.0*   HCT 23.1*   PLT 54*         Assessment/Plan:     * Brain metastasis  59F w/ PMH COPD, HTN, hypothyroidism, stage 4 R small cell lung cancer s/p 6 cycles of carbo/etoposide in remission since 06/2018 who presents to [facility] ED from OSH w/ L parietal brain mass. Now s/p crani for debulking on 8/12.    -Patient somnolent today but will arouse to verbal stim.   -DC daily labs, SQH, progressive mobility orders. Decrease vitals/neuro checks to q 6.    -Pathology (8/19): Metastatic small cell carcinoma. Heme-Onc and RadOnc do not recommend inpatient treatment at this time due to functional status. Palliative Care consulted. Patient pending acceptance to Hospice.   -Seizure prevention: Continue Depakote 500mg BID.   -Cerebral  edema: Completed 3 week Dex taper. Continue 2 mg BID. Continue PPI while on steroids.   -Thrombocytopenia:  Plts 54 today.      -Malnutrition: Poor PO intake. Placement of PEG cancelled due to discussion to pursue hospice. PO intake for pleasure. Aspiration precautions.   -Hypoglycemia 2/2 malnutrition:  Improved with IVF's. Continue D5.   -Hyponatremia: Na 137 today. Decrease Na tabs to 2 g BID.    -Hyperkalemia: K 4.3 today.   -Anemia: H&H 7/23.1. Continue iron for replacement.     -Psyc: Continue redirection and delirium precautions. Continue Seroquel TID PRN and Haldol prn qhs.   -HTN:  Maintain SBP < 160. Home dose of Norvasc has been held since SBP at goal. Will continue to monitor and restart if SBP becomes elevated.   -Hypothyroidism: Continue home dose of Synthroid  -Tobacco abuse: Continue nicotine patch  -COPD/Atelectasis prevention: Continue duo nebs PRN, pulse ox q 4 hours  -DVT prophylaxis: ABI's, SCD's.   -Bowel regimen: senna and miralax BID PRN      DISPO: Pending acceptance to inpatient hospice facility.       Please call with any questions      Beth Loo PA-C   Neurosurgery   Pager: [phone]

## 2019-09-07 LAB
POCT GLUCOSE: 105 MG/DL (ref 70–110)
POCT GLUCOSE: 105 MG/DL (ref 70–110)
POCT GLUCOSE: 107 MG/DL (ref 70–110)
POCT GLUCOSE: 113 MG/DL (ref 70–110)
POCT GLUCOSE: 93 MG/DL (ref 70–110)

## 2019-09-07 PROCEDURE — S4991 NICOTINE PATCH NONLEGEND: HCPCS | Performed by: PHYSICIAN ASSISTANT

## 2019-09-07 PROCEDURE — 99024 PR POST-OP FOLLOW-UP VISIT: ICD-10-PCS | Mod: ,,, | Performed by: PHYSICIAN ASSISTANT

## 2019-09-07 PROCEDURE — 63600175 PHARM REV CODE 636 W HCPCS: Performed by: PHYSICIAN ASSISTANT

## 2019-09-07 PROCEDURE — 99024 POSTOP FOLLOW-UP VISIT: CPT | Mod: ,,, | Performed by: PHYSICIAN ASSISTANT

## 2019-09-07 PROCEDURE — 20600001 HC STEP DOWN PRIVATE ROOM

## 2019-09-07 PROCEDURE — 25000003 PHARM REV CODE 250: Performed by: PHYSICIAN ASSISTANT

## 2019-09-07 PROCEDURE — 25000003 PHARM REV CODE 250: Performed by: STUDENT IN AN ORGANIZED HEALTH CARE EDUCATION/TRAINING PROGRAM

## 2019-09-07 RX ADMIN — DEXAMETHASONE 2 MG: 1 TABLET ORAL at 09:09

## 2019-09-07 RX ADMIN — DIVALPROEX SODIUM 500 MG: 250 TABLET, DELAYED RELEASE ORAL at 09:09

## 2019-09-07 RX ADMIN — DEXTROSE AND SODIUM CHLORIDE: 5; .9 INJECTION, SOLUTION INTRAVENOUS at 05:09

## 2019-09-07 RX ADMIN — Medication 250 MG: at 09:09

## 2019-09-07 RX ADMIN — SODIUM CHLORIDE TAB 1 GM 2 G: 1 TAB at 09:09

## 2019-09-07 RX ADMIN — LEVOTHYROXINE SODIUM 75 MCG: 75 TABLET ORAL at 04:09

## 2019-09-07 RX ADMIN — Medication 250 MG: at 03:09

## 2019-09-07 RX ADMIN — NICOTINE 1 PATCH: 7 PATCH, EXTENDED RELEASE TRANSDERMAL at 09:09

## 2019-09-07 NOTE — PROGRESS NOTES
[facility]  Neurosurgery  Progress Note    Subjective:     History of Present Illness: 59F w/ PMH COPD, HTN, hypothyroidism, stage 4 R small cell lung cancer s/p 6 cycles of carbo/etoposide in remission since 06/2018 who presents to [facility] ED from OSH w/ L parietal brain mass. Per EMS records patient had a 1d history of AMS and gait difficulty and was brought into the hospital by her family for evaluation. CTH @ OSH showed large L parietal brain mass with possible hemorrhagic component and she was transferred to [facility] for evaluation. MRI @ [facility] redemonstrated L nonenhancing parietal mass with minimal blood. Patient is confused and so ROS is tenuous.    Post-Op Info:  Procedure(s) (LRB):  L craniotomy for tumor (Left)   26 Days Post-Op     Interval History: NAEON. Plan for discharge to inpatient hospice Monday. Patient resting in bed. Exam stable from yesterday, patient remains somnolent however arousable to voice. Does not follow commands however moves all extremities spontaneously.     Medications:  Continuous Infusions:   dextrose 5 % and 0.9 % NaCl 50 mL/hr at 09/05/19 0938     Scheduled Meds:   ascorbic acid (vitamin C)  250 mg Oral TID    dexAMETHasone  2 mg Oral Q12H    divalproex  500 mg Oral Q12H    ferrous sulfate  325 mg Oral TID    levothyroxine  75 mcg Oral Before breakfast    nicotine  1 patch Transdermal Daily    pantoprazole  40 mg Oral Daily    sodium chloride  2 g Oral BID     PRN Meds:acetaminophen, albuterol-ipratropium, Dextrose 10% Bolus, Dextrose 10% Bolus, glucagon (human recombinant), glucose, glucose, ondansetron, polyethylene glycol, QUEtiapine, senna-docusate 8.6-50 mg, sodium chloride 0.9%       Objective:     Weight: 34.6 kg (76 lb 4.5 oz)  Body mass index is 14.41 kg/m².  Vital Signs (Most Recent):  Temp: 99.3 °F (37.4 °C) (09/06/19 1200)  Pulse: 102 (09/06/19 1207)  Resp: 18 (09/06/19 1200)  BP: 129/69 (09/06/19 1200)  SpO2: 95 % (09/06/19 1200) Vital Signs (24h  Range):  Temp:  [97.4 °F (36.3 °C)-99.5 °F (37.5 °C)] 99.3 °F (37.4 °C)  Pulse:  [] 102  Resp:  [16-18] 18  SpO2:  [95 %-96 %] 95 %  BP: (122-136)/(63-80) 129/69     Date 09/06/19 0700 - 09/07/19 0659   Shift 3980-9570 3837-5844 9634-5087 24 Hour Total   INTAKE   Shift Total(mL/kg)       OUTPUT   Urine(mL/kg/hr) 300   300   Shift Total(mL/kg) 300(8.7)   300(8.7)   Weight (kg) 34.6 34.6 34.6 34.6                   Neurosurgery Physical Exam  General: cachectic, no distress, somnolent  Head: normocephalic  GCS: Motor: 5/Verbal: 1/Eyes: 3 GCS Total: 9  Mental Status: Somnolent, arouses to verbal stimulus but quickly falls back asleep. Will not answer any questions.   Cranial nerves: face symmetric, CN II-XII grossly intact.   Eyes: Pupils anisocoric, round, reactive to light with accomodation, EOMI. Disconjugate gaze.  Pulmonary: normal respirations, no signs of respiratory distress  Abdomen: soft, non-distended, not tender to palpation  Sensory: Response to light touch throughout  Motor Strength: Moves all extremities spontaneously with good tone. Will not follow commands. No abnormal movements seen.   Pronator Drift:  Unable to assess   Finger-to-nose:  Unable to assess         Incision well healed.  No surrounding erythema or edema.  No drainage from incision, nontender to palpation.    Significant Labs:  Recent Labs   Lab 09/06/19  0324   GLU 88      K 4.3      CO2 30*   BUN 13   CREATININE 0.7   CALCIUM 8.6*     Recent Labs   Lab 09/06/19  0324   WBC 10.95   HGB 7.0*   HCT 23.1*   PLT 54*     No results for input(s): LABPT, INR, APTT in the last 48 hours.  Microbiology Results (last 7 days)     ** No results found for the last 168 hours. **        All pertinent labs from the last 24 hours have been reviewed.    Significant Diagnostics:  I have reviewed all pertinent imaging results/findings within the past 24 hours.    Assessment/Plan:     * Brain metastasis  59F w/ PMH COPD, HTN, hypothyroidism,  stage 4 R small cell lung cancer s/p 6 cycles of carbo/etoposide in remission since 06/2018 who presents to [facility] ED from OSH w/ L parietal brain mass. Now s/p crani for debulking on 8/12.    -Patient somnolent today but will arouse to verbal stim. Does not answer questions. Moves all extremities spontaneously however will not follow commands.   -DC daily labs, SQH, progressive mobility orders. Vitals/neuro checks q 6h.     -Pathology (8/19): Metastatic small cell carcinoma. Patient pending acceptance to Hospice, likely to discharge Monday to inpatient hospice.   -Seizure prevention: Continue Depakote 500mg BID.   -Cerebral edema: Completed 3 week Dex taper. Continue 2 mg BID. Continue PPI while on steroids.   -Thrombocytopenia:  Plts 54 on labs yesterday. Will f/u at next lab draw.     -Malnutrition: Poor PO intake. Placement of PEG cancelled due to discussion to pursue hospice. PO intake for pleasure. Aspiration precautions.   -Hypoglycemia 2/2 malnutrition:  Improved with IVF's. Continue D5.   -Hyponatremia: Na 137 on last labs, will f/u at next draw. Na tabs to 2 g BID.    -Hyperkalemia: improved. K 4.3 on previous labs.  -Anemia: H&H 7/23.1. Continue iron for replacement.     -Psyc: Continue redirection and delirium precautions. Continue Seroquel TID PRN and Haldol prn qhs.   -HTN:  Maintain SBP < 160. Home dose of Norvasc has been held since SBP at goal. Will continue to monitor and restart if SBP becomes elevated.   -Hypothyroidism: Continue home dose of Synthroid  -Tobacco abuse: Continue nicotine patch  -COPD/Atelectasis prevention: Continue duo nebs PRN, pulse ox q 4 hours  -DVT prophylaxis: ABI's, SCD's.   -Bowel regimen: senna and miralax BID PRN      DISPO: Pending acceptance to inpatient hospice facility.         Ashley Reddy PA-C  Neurosurgery  [facility]

## 2019-09-07 NOTE — CARE UPDATE
Palliative medicine APRN returned to bedside.  Mrs. CAMELIA Rodriguez in bed unable to participate in conversation. No family is present.  From chart review  coordinating discharge plan - anticipated that Ms. RUFUS Rodriguez will transition to hospice at [facility] with [facility].      Thank you for consult and opportunity to participate in Ms. CAMELIA Rodriguez's care.  Palliative medicine will sign off.  Please re-consult as needed.

## 2019-09-07 NOTE — PLAN OF CARE
Problem: Adult Inpatient Plan of Care  Goal: Plan of Care Review  Outcome: Ongoing (interventions implemented as appropriate)  Pt is awake and alert. VSS with the exception of tachycardia at times, continuous cardiac monitor on. No falls/injury as freq rounds are being made. Telesitter remains on at bedside. Bed alarm on. No s/s of skin breakdown noted. Sacral protective dressing changed today with no skin breakdown noted. Pt being turned on wedge. Incontinence care being done. No s/s pain. IV intact and infusing. Bed in lowest position. Call light within reach. Will continue to monitor.

## 2019-09-07 NOTE — PLAN OF CARE
Problem: Adult Inpatient Plan of Care  Goal: Plan of Care Review  Pt AAOx0.  POC and meds reviewed with patient. V/S stable throughout shift; please see flowsheet for details and full assessments.  NAEO.  Siderails up x 2, bed locked and in low position. Turned q2, video monitoring and cardiac monitoring in process  Call bell in reach.  02 and suction at bedside.  Will CTM.

## 2019-09-08 LAB — POCT GLUCOSE: 141 MG/DL (ref 70–110)

## 2019-09-08 PROCEDURE — 63600175 PHARM REV CODE 636 W HCPCS: Performed by: PHYSICIAN ASSISTANT

## 2019-09-08 PROCEDURE — 25000003 PHARM REV CODE 250: Performed by: STUDENT IN AN ORGANIZED HEALTH CARE EDUCATION/TRAINING PROGRAM

## 2019-09-08 PROCEDURE — 25000003 PHARM REV CODE 250: Performed by: PHYSICIAN ASSISTANT

## 2019-09-08 PROCEDURE — S4991 NICOTINE PATCH NONLEGEND: HCPCS | Performed by: PHYSICIAN ASSISTANT

## 2019-09-08 PROCEDURE — 63600175 PHARM REV CODE 636 W HCPCS: Performed by: STUDENT IN AN ORGANIZED HEALTH CARE EDUCATION/TRAINING PROGRAM

## 2019-09-08 PROCEDURE — 20600001 HC STEP DOWN PRIVATE ROOM

## 2019-09-08 RX ADMIN — SODIUM CHLORIDE 1000 ML: 0.9 INJECTION, SOLUTION INTRAVENOUS at 11:09

## 2019-09-08 RX ADMIN — DEXAMETHASONE 2 MG: 1 TABLET ORAL at 09:09

## 2019-09-08 RX ADMIN — DIVALPROEX SODIUM 500 MG: 250 TABLET, DELAYED RELEASE ORAL at 09:09

## 2019-09-08 RX ADMIN — SODIUM CHLORIDE TAB 1 GM 2 G: 1 TAB at 09:09

## 2019-09-08 RX ADMIN — NICOTINE 1 PATCH: 7 PATCH, EXTENDED RELEASE TRANSDERMAL at 09:09

## 2019-09-08 RX ADMIN — LEVOTHYROXINE SODIUM 75 MCG: 75 TABLET ORAL at 05:09

## 2019-09-08 RX ADMIN — Medication 250 MG: at 09:09

## 2019-09-08 RX ADMIN — FERROUS SULFATE TAB EC 325 MG (65 MG FE EQUIVALENT) 325 MG: 325 (65 FE) TABLET DELAYED RESPONSE at 09:09

## 2019-09-08 RX ADMIN — PANTOPRAZOLE SODIUM 40 MG: 40 TABLET, DELAYED RELEASE ORAL at 09:09

## 2019-09-08 NOTE — PLAN OF CARE
Problem: Adult Inpatient Plan of Care  Goal: Plan of Care Review  Outcome: Ongoing (interventions implemented as appropriate)  Patient remains free from injury or fall. No neuro changes noted from initial assessment. Sleeping, aphasic and very poor oral intake noted. Plan = Transferred to hospice tomorrow. Cardiac rhythm  And blood glucose monitored.  IV fluids continued. Will continue to monitor.

## 2019-09-08 NOTE — SUBJECTIVE & OBJECTIVE
Interval History: Pending hospice, informed patient can no longer get iron as she cannot tolerate PO and the pills cannot be crushed    Medications:  Continuous Infusions:   dextrose 5 % and 0.9 % NaCl 50 mL/hr at 09/07/19 1733     Scheduled Meds:   ascorbic acid (vitamin C)  250 mg Oral TID    dexAMETHasone  2 mg Oral Q12H    divalproex  500 mg Oral Q12H    ferrous sulfate  325 mg Oral TID    levothyroxine  75 mcg Oral Before breakfast    nicotine  1 patch Transdermal Daily    pantoprazole  40 mg Oral Daily    sodium chloride  2 g Oral BID     PRN Meds:acetaminophen, albuterol-ipratropium, Dextrose 10% Bolus, Dextrose 10% Bolus, glucagon (human recombinant), glucose, glucose, ondansetron, polyethylene glycol, QUEtiapine, senna-docusate 8.6-50 mg, sodium chloride 0.9%     Review of Systems  Objective:     Weight: 33.8 kg (74 lb 8.3 oz)  Body mass index is 14.08 kg/m².  Vital Signs (Most Recent):  Temp: 97.7 °F (36.5 °C) (09/08/19 1245)  Pulse: (!) 123 (09/08/19 1245)  Resp: 18 (09/08/19 1245)  BP: 111/75 (09/08/19 1245)  SpO2: (!) 94 % (09/08/19 1245) Vital Signs (24h Range):  Temp:  [97.2 °F (36.2 °C)-98.8 °F (37.1 °C)] 97.7 °F (36.5 °C)  Pulse:  [112-123] 123  Resp:  [16-18] 18  SpO2:  [92 %-96 %] 94 %  BP: (111-146)/(75-93) 111/75                          Neurosurgery Physical Exam  General: cachectic, no distress, somnolent  Head: normocephalic  GCS: Motor: 5/Verbal: 1/Eyes: 3 GCS Total: 9  Mental Status: Somnolent, arouses to verbal stimulus but quickly falls back asleep. Will not answer any questions.   Cranial nerves: face symmetric, CN II-XII grossly intact.   Eyes: Pupils anisocoric, round, reactive to light with accomodation, EOMI. Disconjugate gaze.  Pulmonary: normal respirations, no signs of respiratory distress  Abdomen: soft, non-distended, not tender to palpation  Sensory: Response to light touch throughout  Motor Strength: Moves all extremities spontaneously with good tone. Will not follow  commands. No abnormal movements seen.   Pronator Drift:  Unable to assess   Finger-to-nose:  Unable to assess         Incision well healed.  No surrounding erythema or edema.  No drainage from incision, nontender to palpation.    Significant Labs:  No results for input(s): GLU, NA, K, CL, CO2, BUN, CREATININE, CALCIUM, MG in the last 48 hours.  No results for input(s): WBC, HGB, HCT, PLT in the last 48 hours.  No results for input(s): LABPT, INR, APTT in the last 48 hours.  Microbiology Results (last 7 days)     ** No results found for the last 168 hours. **        All pertinent labs from the last 24 hours have been reviewed.    Significant Diagnostics:  I have reviewed all pertinent imaging results/findings within the past 24 hours.

## 2019-09-08 NOTE — PROGRESS NOTES
[facility]  Neurosurgery  Progress Note    Subjective:     History of Present Illness: 59F w/ PMH COPD, HTN, hypothyroidism, stage 4 R small cell lung cancer s/p 6 cycles of carbo/etoposide in remission since 06/2018 who [place] [facility] [place] w/ L parietal brain mass. Per EMS records patient had a 1d history of AMS and gait difficulty and was brought into the hospital by her family for evaluation. CTH @ OSH showed large L parietal brain mass with possible hemorrhagic component and she was [place] [facility] [place]. MRI @ [facility] redemonstrated L nonenhancing parietal mass with minimal blood. Patient is confused and so ROS is tenuous.    Post-Op Info:  Procedure(s) (LRB):  L craniotomy for tumor (Left)   27 Days Post-Op     Interval History: Pending hospice, informed patient can no longer get iron as she cannot tolerate PO and the pills cannot be crushed    Medications:  Continuous Infusions:   dextrose 5 % and 0.9 % NaCl 50 mL/hr at 09/07/19 1293     Scheduled Meds:   ascorbic acid (vitamin C)  250 mg Oral TID    dexAMETHasone  2 mg Oral Q12H    divalproex  500 mg Oral Q12H    ferrous sulfate  325 mg Oral TID    levothyroxine  75 mcg Oral Before breakfast    nicotine  1 patch Transdermal Daily    pantoprazole  40 mg Oral Daily    sodium chloride  2 g Oral BID     PRN Meds:acetaminophen, albuterol-ipratropium, Dextrose 10% Bolus, Dextrose 10% Bolus, glucagon (human recombinant), glucose, glucose, ondansetron, polyethylene glycol, QUEtiapine, senna-docusate 8.6-50 mg, sodium chloride 0.9%     Review of Systems  Objective:     Weight: 33.8 kg (74 lb 8.3 oz)  Body mass index is 14.08 kg/m².  Vital Signs (Most Recent):  Temp: 97.7 °F (36.5 °C) (09/08/19 1245)  Pulse: (!) 123 (09/08/19 1245)  Resp: 18 (09/08/19 1245)  BP: 111/75 (09/08/19 1245)  SpO2: (!) 94 % (09/08/19 1245) Vital Signs (24h Range):  Temp:  [97.2 °F (36.2 °C)-98.8 °F (37.1 °C)] 97.7 °F (36.5 °C)  Pulse:  [112-123]  123  Resp:  [16-18] 18  SpO2:  [92 %-96 %] 94 %  BP: (111-146)/(75-93) 111/75                          Neurosurgery Physical Exam  General: cachectic, no distress, somnolent  Head: normocephalic  GCS: Motor: 5/Verbal: 1/Eyes: 3 GCS Total: 9  Mental Status: Somnolent, arouses to verbal stimulus but quickly falls back asleep. Will not answer any questions.   Cranial nerves: face symmetric, CN II-XII grossly intact.   Eyes: Pupils anisocoric, round, reactive to light with accomodation, EOMI. Disconjugate gaze.  Pulmonary: normal respirations, no signs of respiratory distress  Abdomen: soft, non-distended, not tender to palpation  Sensory: Response to light touch throughout  Motor Strength: Moves all extremities spontaneously with good tone. Will not follow commands. No abnormal movements seen.   Pronator Drift:  Unable to assess   Finger-to-nose:  Unable to assess         Incision well healed.  No surrounding erythema or edema.  No drainage from incision, nontender to palpation.    Significant Labs:  No results for input(s): GLU, NA, K, CL, CO2, BUN, CREATININE, CALCIUM, MG in the last 48 hours.  No results for input(s): WBC, HGB, HCT, PLT in the last 48 hours.  No results for input(s): LABPT, INR, APTT in the last 48 hours.  Microbiology Results (last 7 days)     ** No results found for the last 168 hours. **        All pertinent labs from the last 24 hours have been reviewed.    Significant Diagnostics:  I have reviewed all pertinent imaging results/findings within the past 24 hours.    Assessment/Plan:     * Brain metastasis  59F w/ PMH COPD, HTN, hypothyroidism, stage 4 R small cell lung cancer s/p 6 cycles of carbo/etoposide in remission since 06/2018 who presents to [facility] ED from OSH w/ L parietal brain mass. Now s/p crani for debulking on 8/12.    -Patient somnolent today but will arouse to verbal stim. Does not answer questions. Moves all extremities spontaneously however will not follow commands.   -DC daily  labs, SQH, progressive mobility orders. Vitals/neuro checks q 6h.     -Pathology (8/19): Metastatic small cell carcinoma. Patient pending acceptance to Hospice, likely to discharge Monday to inpatient hospice.   -Seizure prevention: Continue Depakote 500mg BID.   -Cerebral edema: Completed 3 week Dex taper. Continue 2 mg BID. Continue PPI while on steroids.   -Thrombocytopenia:  Plts 54 on labs yesterday. Will f/u at next lab draw.     -Malnutrition: Poor PO intake. Placement of PEG cancelled due to discussion to pursue hospice. PO intake for pleasure. Aspiration precautions.   -Hypoglycemia 2/2 malnutrition:  Improved with IVF's. Continue D5.   -Hyponatremia: Na 137 on last labs, will f/u at next draw. Na tabs to 2 g BID.    -Hyperkalemia: improved. K 4.3 on previous labs.  -Anemia: H&H 7/23.1. Cannot receive iron replacement PO.    -Psyc: Continue redirection and delirium precautions. Continue Seroquel TID PRN and Haldol prn qhs.   -HTN:  Maintain SBP < 160. Home dose of Norvasc has been held since SBP at goal. Will continue to monitor and restart if SBP becomes elevated.   -Hypothyroidism: Continue home dose of Synthroid  -Tobacco abuse: Continue nicotine patch  -COPD/Atelectasis prevention: Continue duo nebs PRN, pulse ox q 4 hours  -DVT prophylaxis: ABI's, SCD's.   -Bowel regimen: senna and miralax BID PRN      DISPO: Pending acceptance to inpatient hospice facility.         Kne Araiza MD  Neurosurgery  [facility]

## 2019-09-08 NOTE — PLAN OF CARE
Problem: Adult Inpatient Plan of Care  Goal: Plan of Care Review  Outcome: Ongoing (interventions implemented as appropriate)  Plan of care reviewed with pt. Unable to assess pt orientation. No apparent distress noted. Pt has been running normal sinus to sinus tach during the shift. Pt on continuous fluids @ 50ml/hr. VSS. Fall precautions maintained. Bed in lowest position, and locked. Call light within reach and advised to call for assistance. Side rails x 2 and slip resistant socks on at this time. WCTM.

## 2019-09-08 NOTE — ASSESSMENT & PLAN NOTE
59F w/ PMH COPD, HTN, hypothyroidism, stage 4 R small cell lung cancer s/p 6 cycles of carbo/etoposide in remission since 06/2018 who presents to [facility] ED from OSH w/ L parietal brain mass. Now s/p crani for debulking on 8/12.    -Patient somnolent today but will arouse to verbal stim. Does not answer questions. Moves all extremities spontaneously however will not follow commands.   -DC daily labs, SQH, progressive mobility orders. Vitals/neuro checks q 6h.     -Pathology (8/19): Metastatic small cell carcinoma. Patient pending acceptance to Hospice, likely to discharge Monday to inpatient hospice.   -Seizure prevention: Continue Depakote 500mg BID.   -Cerebral edema: Completed 3 week Dex taper. Continue 2 mg BID. Continue PPI while on steroids.   -Thrombocytopenia:  Plts 54 on labs yesterday. Will f/u at next lab draw.     -Malnutrition: Poor PO intake. Placement of PEG cancelled due to discussion to pursue hospice. PO intake for pleasure. Aspiration precautions.   -Hypoglycemia 2/2 malnutrition:  Improved with IVF's. Continue D5.   -Hyponatremia: Na 137 on last labs, will f/u at next draw. Na tabs to 2 g BID.    -Hyperkalemia: improved. K 4.3 on previous labs.  -Anemia: H&H 7/23.1. Cannot receive iron replacement PO.    -Psyc: Continue redirection and delirium precautions. Continue Seroquel TID PRN and Haldol prn qhs.   -HTN:  Maintain SBP < 160. Home dose of Norvasc has been held since SBP at goal. Will continue to monitor and restart if SBP becomes elevated.   -Hypothyroidism: Continue home dose of Synthroid  -Tobacco abuse: Continue nicotine patch  -COPD/Atelectasis prevention: Continue duo nebs PRN, pulse ox q 4 hours  -DVT prophylaxis: ABI's, SCD's.   -Bowel regimen: senna and miralax BID PRN      DISPO: Pending acceptance to inpatient hospice facility.

## 2019-09-08 NOTE — CODE/ RAPID DOCUMENTATION
Rapid Response Nurse Chart Check     Chart check completed, abnormal VS noted. Bedside RN Mallory contacted, no concerns   verbalized at this time. HR variable 110s-130s, pending hospice placement.   Would recommend code status change given plan for pending hospice.   RN instructed to call [phone] for further concerns or assistance.

## 2019-09-09 VITALS
SYSTOLIC BLOOD PRESSURE: 98 MMHG | OXYGEN SATURATION: 94 % | HEART RATE: 113 BPM | DIASTOLIC BLOOD PRESSURE: 43 MMHG | TEMPERATURE: 100 F | BODY MASS INDEX: 14.06 KG/M2 | HEIGHT: 61 IN | RESPIRATION RATE: 19 BRPM | WEIGHT: 74.5 LBS

## 2019-09-09 LAB — POCT GLUCOSE: 131 MG/DL (ref 70–110)

## 2019-09-09 PROCEDURE — 27000221 HC OXYGEN, UP TO 24 HOURS

## 2019-09-09 PROCEDURE — 99024 POSTOP FOLLOW-UP VISIT: CPT | Mod: ,,, | Performed by: PHYSICIAN ASSISTANT

## 2019-09-09 PROCEDURE — S4991 NICOTINE PATCH NONLEGEND: HCPCS | Performed by: PHYSICIAN ASSISTANT

## 2019-09-09 PROCEDURE — 99233 SBSQ HOSP IP/OBS HIGH 50: CPT | Mod: ,,, | Performed by: CLINICAL NURSE SPECIALIST

## 2019-09-09 PROCEDURE — 86580 TB INTRADERMAL TEST: CPT | Performed by: NEUROLOGICAL SURGERY

## 2019-09-09 PROCEDURE — 25000003 PHARM REV CODE 250: Performed by: PHYSICIAN ASSISTANT

## 2019-09-09 PROCEDURE — 99233 PR SUBSEQUENT HOSPITAL CARE,LEVL III: ICD-10-PCS | Mod: ,,, | Performed by: CLINICAL NURSE SPECIALIST

## 2019-09-09 PROCEDURE — 94761 N-INVAS EAR/PLS OXIMETRY MLT: CPT

## 2019-09-09 PROCEDURE — 99024 PR POST-OP FOLLOW-UP VISIT: ICD-10-PCS | Mod: ,,, | Performed by: PHYSICIAN ASSISTANT

## 2019-09-09 PROCEDURE — 25000242 PHARM REV CODE 250 ALT 637 W/ HCPCS: Performed by: STUDENT IN AN ORGANIZED HEALTH CARE EDUCATION/TRAINING PROGRAM

## 2019-09-09 PROCEDURE — 63600175 PHARM REV CODE 636 W HCPCS: Performed by: STUDENT IN AN ORGANIZED HEALTH CARE EDUCATION/TRAINING PROGRAM

## 2019-09-09 PROCEDURE — 94640 AIRWAY INHALATION TREATMENT: CPT

## 2019-09-09 PROCEDURE — 30200315 PPD INTRADERMAL TEST REV CODE 302: Performed by: NEUROLOGICAL SURGERY

## 2019-09-09 RX ORDER — ACETAMINOPHEN 650 MG/1
650 SUPPOSITORY RECTAL EVERY 6 HOURS PRN
Status: DISCONTINUED | OUTPATIENT
Start: 2019-09-09 | End: 2019-09-10 | Stop reason: HOSPADM

## 2019-09-09 RX ORDER — ACETAMINOPHEN 650 MG/1
650 SUPPOSITORY RECTAL EVERY 6 HOURS PRN
Refills: 0 | COMMUNITY
Start: 2019-09-09

## 2019-09-09 RX ADMIN — ACETAMINOPHEN 650 MG: 650 SUPPOSITORY RECTAL at 11:09

## 2019-09-09 RX ADMIN — Medication 5 UNITS: at 04:09

## 2019-09-09 RX ADMIN — IPRATROPIUM BROMIDE AND ALBUTEROL SULFATE 3 ML: .5; 3 SOLUTION RESPIRATORY (INHALATION) at 11:09

## 2019-09-09 RX ADMIN — IPRATROPIUM BROMIDE AND ALBUTEROL SULFATE 3 ML: .5; 3 SOLUTION RESPIRATORY (INHALATION) at 09:09

## 2019-09-09 RX ADMIN — SODIUM CHLORIDE 1000 ML: 0.9 INJECTION, SOLUTION INTRAVENOUS at 02:09

## 2019-09-09 RX ADMIN — SODIUM CHLORIDE 500 ML: 0.9 INJECTION, SOLUTION INTRAVENOUS at 09:09

## 2019-09-09 RX ADMIN — NICOTINE 1 PATCH: 7 PATCH, EXTENDED RELEASE TRANSDERMAL at 09:09

## 2019-09-09 NOTE — CARE UPDATE
"RAPID RESPONSE NURSE PROACTIVE ROUNDING NOTE     Time of Visit: 1300    Admit Date: 2019  LOS: 29  Code Status: DNR   Date of Visit: 2019  : 1960  Age: 59 y.o.  Sex: female  Race: White  [facility]: [facility] A:   MRN: [id number]  Was the patient discharged from an ICU this admission? yes   Was the patient discharged from a PACU within last 24 hours?  no  Did the patient receive conscious sedation/general anesthesia in last 24 hours?  no  Was the patient in the ED within the past 24 hours?  no  Was the patient started on NIPPV within the past 24 hours?  no  Attending Physician: Gabo Erickson MD  Primary Service: Networked reference to record PCT     ASSESSMENT     Diagnosis: Brain metastasis    Abnormal Vital Signs: BP (!) 83/50   Pulse (!) 131   Temp 99.6 °F (37.6 °C)   Resp (!) 40   Ht 5' 1" (1.549 m)   Wt 33.8 kg (74 lb 8.3 oz)   SpO2 (!) 90%   Breastfeeding? No   BMI 14.08 kg/m²      Clinical Issues: Neuro    Patient  has a past medical history of Acid reflux, Anemia, Anxiety, Arthritis, Blindness - both eyes, Chronic kidney disease, COPD (chronic obstructive pulmonary disease), GERD (gastroesophageal reflux disease), Gout, Hypertension, Lung cancer, Lung cancer, main bronchus, right, Osteopenia, Rheumatoid arteritis, SIADH (syndrome of inappropriate ADH production), Solitary kidney, Thyroid disease, and Vitamin D deficiency.    Pt assessed for general deterioration need for code status clarification. Pt laying in bed responding to voice. Temp of 102, RR 20-30, -130s, and systolic blood pressure <100. Plan for pt to be transferred to hospice. Pt family wished to keep code status full till hospice.       INTERVENTIONS/ RECOMMENDATIONS     Advanced care planning and code status discussion need with family.     Discussed plan of care with Shelby FOWLER.    PHYSICIAN ESCALATION     Yes/No  yes    Orders received and case discussed with MARY ELLEN Reddy.    Disposition: Remain in room 706A.    FOLLOW-UP "     Call back the Rapid Response Nurse, Ari Doan RN at [phone] for additional questions or concerns.

## 2019-09-09 NOTE — CARE UPDATE
On Friday 9/6/19, patient's anticipated discharge planning with [facility] on Monday 9/9/19. Patient seen by Palliative Care consult team on Friday 9/6 and spoke with patient's son David over the phone. Son was amenable to pursuing inpatient hospice however not amenable to changing patient's code status while remaining inpatient. Upon assessment this morning, patient noted to have sustaining HR in the 140s since yesterday. On continuous IVF at 50cc/hr, received 1L bolus yesterday and 500cc bolus of fluids today. Nursing staff has not been able to give patient her oral medications or assist with feedings as she is clenching her jaw with attempts. Awaiting transfer to inpatient hospice. Per Social work note, patient's son unable to take off work to sign paperwork for inpatient hospice until Wednesday. Paperwork will be emailed. Consult placed to palliative care regarding advanced directives, stated they would speak to the patient's son today. I discussed the patient's current medical status and change in code status with the patient's son. The son stated he would speak with the patient's sister before the code can be changed. Received a call from Dr. Odonnell with palliative care who stated she spoke with the patient's son, David, who stated he would like the patient's code status updated to DNR and for the patient to be taken off telemetry. Will continue supportive care measures.    Ashley Reddy PA-C  Neurosurgery  [facility]

## 2019-09-09 NOTE — PROGRESS NOTES
[facility]  Palliative Medicine  Progress Note    Patient Name: Carmen Leyva  MRN: [id number]  Admission Date: 8/11/2019  Hospital Length of Stay: 29 days  Code Status: DNR   Attending Provider: Gabo Erickson MD  Consulting Provider: LAMBERTO Esquivel  Primary Care Physician: Kem Kiser MD  Principal Problem:Brain metastasis    Patient information was obtained from relative(s).      Assessment/Plan:     Palliative care encounter  Palliative medicine follow up to goals of care.      Impression:  Ms Leyva is a 58 yo lady with PMX of small cell lung ca s/p chemo 6/18.  Admitted to [facility] as transfer from OSH with altered mental status and gait disturbance.  Imaging revealed left  parietotemporal mass concerning for metastatic disease from lung cancer.  S/P craniotomy and resection 8/12.  Complicated recovery - now with malnourishment.  Oncology and radiation oncology consulted, not a candidate for systemic and radiation therapies secondary to poor performance status.  At time of this assessment Ms. Leyva withdraws to painful stimuli. Febrile, tachycardia, Spontaneous movement, not following simple commands.  No non-verbal inidcators of pain or discomfort. No acute distress.    Advanced Care Planning Advance Care Planning     - No advanced directives received.   - Next of kin for medical decision making is son David Pedroza: [phone].   - Ms. Leyva unable to make medical decisions at this time secondary to waxing and waning mental status  - Resuscitation status: after discussion with gregorio Hernandez by telephone [phone], family has very good understanding that CPR would be non-beneficial and would prolong the natural dying process.    Gregorio Hernandez is amenable to DNR orders.    - Son has very good understanding of current clinical condition and states knowing her time is limited .    Goals of Care:   - Ms CAMELIA Rodriguez unable to participate in goals of care conversations.   - Spoke with  son David by telephone.  David is unable to be at bedside today.  He is planning to be here on Wed.   - Discussed change in clinical status - fever, tachycardia in relationship to code status.  David states he does not want his mother to suffer.  He is amenable to DNR orders.    - Palliative care recommended discontinuing telemetry.  Family is in agreement.   - David with questions about what is causing the fever.  David with questions concerning use of antibiotics.  Explained use of antibiotics will not change overall condition or outcome.   - David states he wishes his mother to be comfortable. He has agreed to no escalation of care.   - Explained if Ms. Leyva becomes unstable for transport to nursing home with hospice  she may remain inpatient at [facility]. Family is agreeable.   - Transition to hospice pending completion of admit paperwork.  To be filed electronically per son    Plan/Recommendation   - Family amenable to DNR orders - primary team to write.  -D/C telemetry   - If patient becomes unstable for transportation to hospice,  Consider full comfort measures and or General Inpatient hospice bed here at [facility]   - Emotional support     Primary team aware of the above goals of care conversation and recommendations.         I will follow-up with patient. Please contact us if you have any additional questions.    Subjective:     Chief Complaint:   Chief Complaint   Patient presents with    Transfer from [facility] for Neuro with Dx of brain tumor. Went to [place] ED for confusion, HA, and unsteady gait. Hx of Lung CA       HPI:   Ms. Robertson is a 60 yo lady with PMHx of  COPD, HTN, hypothyroidism,  stage 4 R small cell lung cancer s/p 6 cycles of carbo/etoposide in remission since 06/2018.  She presented to [facility] as transfer via EMS  from [facility] with c/o altered mental status and abnormal gait and higher level of care.      CT of head at OSH revealed a left  parietotemporal mass  concerning for metastatic disease from lung cancer.  Per EMS documentation the family reports she was last seen at baseline on 8/10/19.  On day of admit the family reports she had been confused and having difficulty walking.    8/11/19 she was admitted to [facility] with L temporoparietal brain mass with vasogenic edema.    8/11 Neuro surgery consulted.   MRI at [facility] 8/11 revealed non enhancing  heterogeneous intra-axial mass of left parietal temporal lobe consistent with dominant metastasis.  Primary brain tumor could have similar appearance.  No other enhancing lesion.  8/12 - S/P craniotomy and tumor resection   8/19 Pathology positive for metastatic small cell carcinoma   Heme/Onc and radiation oncology consulted No recommendations for inpatient treatment at this time due to poor performance     Post operative period - now 23 days post op complicated by waxing and waning mental  -, malnutrition, hyperglycemia, hyponatremia, hypokalemia,  persistent receptive aphasia.  Followed by OT and PT with recommendations for nursing home placement for continued rehab. Placement pending goals of care discussion with family.     PEG placement scheduled for 9/5/19 9/4/19 Palliative care consulted for goals of care          Hospital Course:  No notes on file    Interval History:     Past Medical History:   Diagnosis Date    Acid reflux     Anemia     Anxiety     Arthritis     Blindness - both eyes     Chronic kidney disease     COPD (chronic obstructive pulmonary disease)     GERD (gastroesophageal reflux disease)     Gout     Hypertension     Lung cancer     Lung cancer, main bronchus, right 1/2/2018    Osteopenia     Rheumatoid arteritis     SIADH (syndrome of inappropriate ADH production) 11/12/2017    Solitary kidney     Thyroid disease     Vitamin D deficiency        Past Surgical History:   Procedure Laterality Date    ABDOMINAL ADHESION SURGERY      ADENOIDECTOMY      kidney removal      right     L  craniotomy for tumor Left 2019    Performed by Gabo Erickson MD at [facility] OR [address] [place] [address]    TONSILLECTOMY         Review of patient's allergies indicates:   Allergen Reactions    Celebrex [celecoxib]      Due to kidney removal she can not take anything for RA    Ibuprofen      Due to kidney removal    Neurontin [gabapentin]     Sulfa (sulfonamide antibiotics) Hives    Topamax [topiramate] Swelling       Medications:  Continuous Infusions:   dextrose 5 % and 0.9 % NaCl 50 mL/hr at 19 1733     Scheduled Meds:   ascorbic acid (vitamin C)  250 mg Oral TID    dexAMETHasone  2 mg Oral Q12H    divalproex  500 mg Oral Q12H    ferrous sulfate  325 mg Oral TID    levothyroxine  75 mcg Oral Before breakfast    nicotine  1 patch Transdermal Daily    pantoprazole  40 mg Oral Daily    sodium chloride 0.9%  1,000 mL Intravenous Once    sodium chloride  2 g Oral BID     PRN Meds:acetaminophen, acetaminophen, albuterol-ipratropium, Dextrose 10% Bolus, Dextrose 10% Bolus, glucagon (human recombinant), glucose, glucose, ondansetron, polyethylene glycol, QUEtiapine, senna-docusate 8.6-50 mg, sodium chloride 0.9%     Review of Systems: unable to complete secondary to mental status change     Family History     Problem Relation (Age of Onset)    COPD Father    Clotting disorder Mother    Diabetes Maternal Aunt, Paternal Uncle    Fibromyalgia Sister    Heart disease Father, Brother, Maternal Grandfather, Paternal Uncle    Hypertension Mother, Maternal Aunt    Neuropathy Father, Sister    Parkinsonism Maternal Grandfather    Sleep apnea Mother    Stroke Mother    Thyroid disease Mother, Sister        Tobacco Use    Smoking status: Former Smoker     Packs/day: 0.50     Types: Cigarettes     Last attempt to quit: 2017     Years since quittin.4    Smokeless tobacco: Never Used   Substance and Sexual Activity    Alcohol use: No     Alcohol/week: 0.0 oz     Comment: seldom    Drug use: No    Sexual activity:  Never     Partners: Male         Objective:     Vital Signs (Most Recent):  Temp: (!) 102 °F (38.9 °C) (09/09/19 1157)  Pulse: (!) 148 (09/09/19 1137)  Resp: (!) 40 (09/09/19 1137)  BP: 119/72 (09/09/19 1116)  SpO2: (!) 88 % (09/09/19 1137) Vital Signs (24h Range):  Temp:  [97 °F (36.1 °C)-102 °F (38.9 °C)] 102 °F (38.9 °C)  Pulse:  [121-162] 148  Resp:  [16-40] 40  SpO2:  [88 %-96 %] 88 %  BP: (100-143)/(70-87) 119/72     Weight: 33.8 kg (74 lb 8.3 oz)  Body mass index is 14.08 kg/m².    Review of Symptoms  Symptom Assessment (ESAS 0-10 scale)   ESAS 0 1 2 3 4 5 6 7 8 9 10   Pain              Dyspnea              Anxiety              Nausea              Depression               Anorexia              Fatigue              Insomnia              Restlessness               Agitation              CAM / Delirium __ --  ___+   Constipation     __ --  ___+   Diarrhea           __ --  ___+  Bowel Management Plan (BMP): No    Comments: having some confusion, unable to complete ESAS    Pain Assessment:   OME in 24 hours: 0    Performance Status: 30    ECOG Performance Status Grade: 3 - Confined to bed or chair 50% of waking hours    Physical Exam   Constitutional:   Malnourished, appears chronically ill    HENT:   S/p craniotomy    Eyes: Pupils are equal, round, and reactive to light. Conjunctivae are normal.   Cardiovascular: Regular rhythm and normal heart sounds.   Tachycardia    Pulmonary/Chest: Effort normal and breath sounds normal. No respiratory distress.   Abdominal: Soft. Bowel sounds are normal. She exhibits no distension.   Genitourinary:   Genitourinary Comments: Pure wick urinary device- clear yellow urine    Neurological:   Withdraws to painful  stimuli,  Spontaneous movement, does not follow simple commands    Skin: Skin is warm and dry.   Craniotomy incision healed    Psychiatric: Her behavior is normal.   Altered mental status, unable to assess.     Nursing note and vitals reviewed.      Significant Labs: All  pertinent labs within the past 24 hours have been reviewed.  CBC:   Recent Labs   Lab 09/06/19  0324   WBC 10.95   HGB 7.0*   HCT 23.1*   *   PLT 54*     BMP:  No results for input(s): GLU, NA, K, CL, CO2, BUN, CREATININE, CALCIUM, MG in the last 24 hours.  LFT:  Lab Results   Component Value Date    AST 14 09/06/2019    ALKPHOS 74 09/06/2019    BILITOT 0.4 09/06/2019     Albumin:   Albumin   Date Value Ref Range Status   09/06/2019 2.8 (L) 3.5 - 5.2 g/dL Final   06/24/2019 3.9 3.1 - 4.7 g/dL      Protein:   Total Protein   Date Value Ref Range Status   09/06/2019 5.7 (L) 6.0 - 8.4 g/dL Final     Lactic acid:   No results found for: LACTATE    Significant Imaging: I have reviewed all pertinent imaging results/findings within the past 24 hours.    Advance Care Planning   Advanced Directives::  Living Will: No  LaPOST: No  Do Not Resuscitate Status: No  Medical Power of : No    Decision-Making Capacity: waxing and waning mental status unable to make decisions        Living Arrangements: Lives alone, has family in close proximity, has only one son,       Psychosocial/Cultural: , one son, two sisters.  No longer working since cancer diagnosis      Spiritual:     F- Mercedes and Belief: non Jew Restorationist    I - Importance:   .  C - Community:     A - Address in Care: amenable to  visits       > 50% of 35  min visit spent in chart review, face to face discussion of goals of care,  symptom assessment, coordination of care and emotional support.    Jennyfer Odonnell, CNS  Palliative Medicine  [facility]

## 2019-09-09 NOTE — CODE/ RAPID DOCUMENTATION
Rapid Response Nurse Follow-up Note     Followed up with patient for proactive rounding. Plan for Hospice tomorrow. Bolus given for decreased intake and . Reviewed plan of care with primary RNAnamaria. Still recommending code status change and discussed w/ Jessie Taylor.     Please call Rapid Response RN, Alba Silva RN with any questions or concerns at [phone]

## 2019-09-09 NOTE — PLAN OF CARE
[facility]  Department of Hospital Medicine  [address] [place] [address]  [phone] after hours  [phone] fax    HOSPICE  ORDERS    09/09/2019    Admit to Hospice:  Inpatient Service: [facility]      Diagnoses:   Active Hospital Problems    Diagnosis  POA    *Brain metastasis [C79.31]  Yes    Palliative care encounter [Z51.5]  Not Applicable    Goals of care, counseling/discussion [Z71.89]  Not Applicable    Advanced care planning/counseling discussion [Z71.89]  Not Applicable    Moderate malnutrition [E44.0]  Unknown     Problem: Adult Inpatient Plan of Care  Goal: Plan of Care Review  Nutrition Problem:  Moderate Protein-Calorie Malnutrition  Malnutrition in the context of Chronic Illness/Injury     Related to (etiology):  Poor po intake in setting of lung cancer with brain metastasis     Signs and Symptoms (as evidenced by):     Body Fat Depletion: moderate depletion of triceps, thoracic region  Muscle Mass Depletion: moderate depletion of temples, scapula, interosseous muscles, lower extremities   Weight Loss: 19.5% x 10 months     Interventions (treatment strategy):  Collaboration of care to providers     Nutrition Diagnosis Status:  New     Recommendations     1. Continue regular diet as feasible. 2. Continue Boost Plus and Quentin TID.   Goals: 1.) Pt to consume/tolerate >75% EEN and EPN by follow up  Nutrition Goal Status: progressing towards goal  Communication of RD Recs: (POC)      Encephalopathies [G93.40]  Yes    Episodic cigarette smoking dependence [F17.210]  Yes    Seizure prophylaxis [Z29.8]  Not Applicable    Vasogenic brain edema [G93.6]  Unknown    Hypothyroid [E03.9]  Yes    HTN (hypertension) [I10]  Yes    COPD (chronic obstructive pulmonary disease) [J44.9]  Yes      Resolved Hospital Problems   No resolved problems to display.       Hospice Qualifying Diagnoses:        Patient has a life  expectancy < 6 months due to:  1) Primary Hospice Diagnosis: Brain metastasis secondary to metastatic lung cancer  2) Comorbid Conditions Contributing to Decline: malnutrition, dehydration, unable to tolerate PO, hyperglycemia, hyponatremia, hypokalemia     Vital Signs: Routine per Hospice Protocol.    Code Status: DNR    Allergies:   Review of patient's allergies indicates:   Allergen Reactions    Celebrex [celecoxib]      Due to kidney removal she can not take anything for RA    Ibuprofen      Due to kidney removal    Neurontin [gabapentin]     Sulfa (sulfonamide antibiotics) Hives    Topamax [topiramate] Swelling       Diet: Pleasure feedings, regular diet    Activities: As tolerated    Nursing: Per Hospice Routine.      Routine Skin for Bedridden Patients: Apply moisture barrier cream to all skin folds and   wet areas in perineal area daily and after baths and all bowel movements.    Peripheral IV Maintenance:   - Sterile dressing changes are done weekly and as needed.   - Use chlor-hexadine scrub to cleanse site, apply Biopatch to insertion site,       apply securement device dressing   - Posi-flow caps are changed weekly and after EVERY lab draw.   - If sterile gauze is under dressing to control oozing,                 dressing change must be performed every 24 hours until gauze is not needed.      Oxygen: prn    Other Miscellaneous Care: NA      Medications:      Carmen Leyva   Home Medication Instructions MARY:[id number]    Printed on:09/09/19 1451   Medication Information                      acetaminophen (TYLENOL) 325 MG tablet  Take 2 tablets (650 mg total) by mouth every 6 (six) hours as needed.             acetaminophen (TYLENOL) 650 MG Supp  Place 1 suppository (650 mg total) rectally every 6 (six) hours as needed (101).             albuterol (PROVENTIL HFA/VENTOLIN HFA) 200 puff inhaler  Inhale 2 puffs into the lungs every 6 (six) hours as needed.               dexAMETHasone (DECADRON) 2 MG  tablet  Take 1 tablet (2 mg total) by mouth every 12 (twelve) hours.             divalproex (DEPAKOTE) 500 MG TbEC  Take 1 tablet (500 mg total) by mouth every 12 (twelve) hours.             levothyroxine (SYNTHROID) 137 MCG Tab tablet  Take by mouth before breakfast.             nicotine (NICODERM CQ) 7 mg/24 hr  Place 1 patch onto the skin once daily.             pantoprazole (PROTONIX) 40 MG tablet  Take 40 mg by mouth once daily.              promethazine (PHENERGAN) 25 MG tablet  TAKE ONE TABLET BY MOUTH EVERY 6 HOURS AS NEEDED FOR NAUSEA             QUEtiapine (SEROQUEL) 25 MG Tab  Take 1 tablet (25 mg total) by mouth 3 (three) times daily as needed (agitation).             sodium chloride 1 gram tablet  Take 2 tablets (2 g total) by mouth 3 (three) times daily.                   DIABETES CARE:   Nurse to perform and educate diabetic management with blood glucose monitoring:      Fingerstick blood sugar AC and HS     Fingerstick blood sugar every 6 hours if patient is unable to eat    Report CBG < 60 or > 350 to physician.         Insulin Sliding Scale         Glucose  Novolog Insulin Subcutaneous        0 - 60   Orange juice or glucose tablet      No insulin   201-250  2 units   251-300  4 units   301-350  6 units   351-400  8 units   >400   10 units then call physician      Future Orders:  Hospice Medical Director may dictate new orders for comfortable care measures & sign death certificate.        _________________________________  Ashley Reddy PA-C  09/09/2019

## 2019-09-09 NOTE — PLAN OF CARE
Patient to be discharged to [facility] with [facility].  Care deferred to [facility].  Patient to be transported via ambulance provided per [facility].  Neurosurgery clinic to schedule follow up appointment if needed.     09/09/19 1618   Final Note   Assessment Type Final Discharge Note   Anticipated Discharge Disposition [facility] Follow Up  Appt(s) scheduled?   (Neurosurgery clinic to schedule follow up appointment if needed.)   Discharge plans and expectations educations in teach back method with documentation complete? No

## 2019-09-09 NOTE — ASSESSMENT & PLAN NOTE
Palliative medicine follow up to goals of care.      Impression:  Ms Leyva is a 58 yo lady with PMX of small cell lung ca s/p chemo 6/18.  Admitted to [facility] as transfer from OSH with altered mental status and gait disturbance.  Imaging revealed left  parietotemporal mass concerning for metastatic disease from lung cancer.  S/P craniotomy and resection 8/12.  Complicated recovery - now with malnourishment.  Oncology and radiation oncology consulted, not a candidate for systemic and radiation therapies secondary to poor performance status.  At time of this assessment Ms. Leyva withdraws to painful stimuli. Febrile, tachycardia, Spontaneous movement, not following simple commands.  No non-verbal inidcators of pain or discomfort. No acute distress.    Advanced Care Planning Advance Care Planning     - No advanced directives received.   - Next of kin for medical decision making is gurwinder Pedroza: [phone].   - Ms. Leyva unable to make medical decisions at this time secondary to waxing and waning mental status  - Resuscitation status: after discussion with gurwinder Hernandez by telephone [phone], family has very good understanding that CPR would be non-beneficial and would prolong the natural dying process.    Gurwinder Hernandez is amenable to DNR orders.    - Son has very good understanding of current clinical condition and states knowing her time is limited .     Goals of Care:   - Ms CAMELIA Rodriguez unable to participate in goals of care conversations.   - Spoke with gurwinder Hernandez by telephone.  David is unable to be at bedside today.  He is planning to be here on Wed.   - Discussed change in clinical status - fever, tachycardia in relationship to code status.  David states he does not want his mother to suffer.  He is amenable to DNR orders.    - Palliative care recommended discontinuing telemetry.  Family is in agreement.   - David with questions about what is causing the fever.  David with questions concerning use of  antibiotics.  Explained use of antibiotics will not change overall condition or outcome.   - David states he wishes his mother to be comfortable. He has agreed to no escalation of care.   - Explained if Ms. Leyva becomes unstable for transport to nursing home with hospice  she may remain inpatient at [facility]. Family is agreeable.   - Transition to hospice pending completion of admit paperwork.  To be filed electronically per son    Plan/Recommendation   - Family amenable to DNR orders - primary team to write.  -D/C telemetry   - If patient becomes unstable for transportation to hospice,  Consider full comfort measures and or General Inpatient hospice bed here at [facility]   - Emotional support     Primary team aware of the above goals of care conversation and recommendations.

## 2019-09-09 NOTE — PLAN OF CARE
BARBIE following for DC needs. BARBIE in communication with Rhonda GARCIA.    BARBIE spoke to Susie at [facility] ([phone]) who stated that she spoke to the patient's son, David, who stated that he cannot get off of work to sign paperwork until Wednesday at 12PM. Susie stated that she is going to call David back and attempt to e-mail him the paperwork to sign.   Susie will update BARBIE once she speaks to David.     Cindy Valladares, LMSW [facility]  [id number]

## 2019-09-09 NOTE — SUBJECTIVE & OBJECTIVE
Interval History:     Past Medical History:   Diagnosis Date    Acid reflux     Anemia     Anxiety     Arthritis     Blindness - both eyes     Chronic kidney disease     COPD (chronic obstructive pulmonary disease)     GERD (gastroesophageal reflux disease)     Gout     Hypertension     Lung cancer     Lung cancer, main bronchus, right 1/2/2018    Osteopenia     Rheumatoid arteritis     SIADH (syndrome of inappropriate ADH production) 11/12/2017    Solitary kidney     Thyroid disease     Vitamin D deficiency        Past Surgical History:   Procedure Laterality Date    ABDOMINAL ADHESION SURGERY      ADENOIDECTOMY      kidney removal      right     L craniotomy for tumor Left 8/12/2019    Performed by Gabo Erickson MD at [facility] OR [address] [place] [address]    TONSILLECTOMY         Review of patient's allergies indicates:   Allergen Reactions    Celebrex [celecoxib]      Due to kidney removal she can not take anything for RA    Ibuprofen      Due to kidney removal    Neurontin [gabapentin]     Sulfa (sulfonamide antibiotics) Hives    Topamax [topiramate] Swelling       Medications:  Continuous Infusions:   dextrose 5 % and 0.9 % NaCl 50 mL/hr at 09/07/19 1733     Scheduled Meds:   ascorbic acid (vitamin C)  250 mg Oral TID    dexAMETHasone  2 mg Oral Q12H    divalproex  500 mg Oral Q12H    ferrous sulfate  325 mg Oral TID    levothyroxine  75 mcg Oral Before breakfast    nicotine  1 patch Transdermal Daily    pantoprazole  40 mg Oral Daily    sodium chloride 0.9%  1,000 mL Intravenous Once    sodium chloride  2 g Oral BID     PRN Meds:acetaminophen, acetaminophen, albuterol-ipratropium, Dextrose 10% Bolus, Dextrose 10% Bolus, glucagon (human recombinant), glucose, glucose, ondansetron, polyethylene glycol, QUEtiapine, senna-docusate 8.6-50 mg, sodium chloride 0.9%     Review of Systems: unable to complete secondary to mental status change     Family History     Problem Relation (Age of Onset)     COPD Father    Clotting disorder Mother    Diabetes Maternal Aunt, Paternal Uncle    Fibromyalgia Sister    Heart disease Father, Brother, Maternal Grandfather, Paternal Uncle    Hypertension Mother, Maternal Aunt    Neuropathy Father, Sister    Parkinsonism Maternal Grandfather    Sleep apnea Mother    Stroke Mother    Thyroid disease Mother, Sister        Tobacco Use    Smoking status: Former Smoker     Packs/day: 0.50     Types: Cigarettes     Last attempt to quit: 2017     Years since quittin.4    Smokeless tobacco: Never Used   Substance and Sexual Activity    Alcohol use: No     Alcohol/week: 0.0 oz     Comment: seldom    Drug use: No    Sexual activity: Never     Partners: Male         Objective:     Vital Signs (Most Recent):  Temp: (!) 102 °F (38.9 °C) (19 1157)  Pulse: (!) 148 (19 1137)  Resp: (!) 40 (19 1137)  BP: 119/72 (19 1116)  SpO2: (!) 88 % (19 113) Vital Signs (24h Range):  Temp:  [97 °F (36.1 °C)-102 °F (38.9 °C)] 102 °F (38.9 °C)  Pulse:  [121-162] 148  Resp:  [16-40] 40  SpO2:  [88 %-96 %] 88 %  BP: (100-143)/(70-87) 119/72     Weight: 33.8 kg (74 lb 8.3 oz)  Body mass index is 14.08 kg/m².    Review of Symptoms  Symptom Assessment (ESAS 0-10 scale)   ESAS 0 1 2 3 4 5 6 7 8 9 10   Pain              Dyspnea              Anxiety              Nausea              Depression               Anorexia              Fatigue              Insomnia              Restlessness               Agitation              CAM / Delirium __ --  ___+   Constipation     __ --  ___+   Diarrhea           __ --  ___+  Bowel Management Plan (BMP): No    Comments: having some confusion, unable to complete ESAS    Pain Assessment:   OME in 24 hours: 0    Performance Status: 30    ECOG Performance Status Grade: 3 - Confined to bed or chair 50% of waking hours    Physical Exam   Constitutional:   Malnourished, appears chronically ill    HENT:   S/p craniotomy    Eyes: Pupils are equal,  round, and reactive to light. Conjunctivae are normal.   Cardiovascular: Regular rhythm and normal heart sounds.   Tachycardia    Pulmonary/Chest: Effort normal and breath sounds normal. No respiratory distress.   Abdominal: Soft. Bowel sounds are normal. She exhibits no distension.   Genitourinary:   Genitourinary Comments: Pure wick urinary device- clear yellow urine    Neurological:   Withdraws to painful  stimuli,  Spontaneous movement, does not follow simple commands    Skin: Skin is warm and dry.   Craniotomy incision healed    Psychiatric: Her behavior is normal.   Altered mental status, unable to assess.     Nursing note and vitals reviewed.      Significant Labs: All pertinent labs within the past 24 hours have been reviewed.  CBC:   Recent Labs   Lab 09/06/19  0324   WBC 10.95   HGB 7.0*   HCT 23.1*   *   PLT 54*     BMP:  No results for input(s): GLU, NA, K, CL, CO2, BUN, CREATININE, CALCIUM, MG in the last 24 hours.  LFT:  Lab Results   Component Value Date    AST 14 09/06/2019    ALKPHOS 74 09/06/2019    BILITOT 0.4 09/06/2019     Albumin:   Albumin   Date Value Ref Range Status   09/06/2019 2.8 (L) 3.5 - 5.2 g/dL Final   06/24/2019 3.9 3.1 - 4.7 g/dL      Protein:   Total Protein   Date Value Ref Range Status   09/06/2019 5.7 (L) 6.0 - 8.4 g/dL Final     Lactic acid:   No results found for: LACTATE    Significant Imaging: I have reviewed all pertinent imaging results/findings within the past 24 hours.    Advance Care Planning   Advanced Directives::  Living Will: No  LaPOST: No  Do Not Resuscitate Status: No  Medical Power of : No    Decision-Making Capacity: waxing and waning mental status unable to make decisions        Living Arrangements: Lives alone, has family in close proximity, has only one son,       Psychosocial/Cultural: , one son, two sisters.  No longer working since cancer diagnosis      Spiritual:     F- Mercedes and Belief: non [employer]    I -  Importance:   .  C - Community:     A - Address in Care: amenable to  visits

## 2019-09-09 NOTE — ASSESSMENT & PLAN NOTE
59F w/ PMH COPD, HTN, hypothyroidism, stage 4 R small cell lung cancer s/p 6 cycles of carbo/etoposide in remission since 06/2018 who presents to [facility] ED from OSH w/ L parietal brain mass. Now s/p crani for debulking on 8/12.    -Patient remains somnolent today. Verbalizes to tactile stimuli. Does not answer questions. Moves all extremities spontaneously however will not follow commands. Patient has not taken any of her oral meds, nursing documentation reports patient clenches jaw tight with attempts. HR sustaining 140s. Febrile at 102 today, Tylenol suppository given. 1x bolus at 500cc given this morning, 1x bolus 1L given this afternoon. Will continue IVF until transportation arrives.   -Palliative care discussed code status with patient's son. Now DNR. See progress note 9/9 for detail.   -DC daily labs, SQH, progressive mobility orders. Vitals/neuro checks q 6h.     -Pathology (8/19): Metastatic small cell carcinoma. Patient pending acceptance to Hospice, discharge today to inpatient hospice.   -Seizure prevention: Continue Depakote 500mg BID.   -Cerebral edema: Completed 3 week Dex taper. Continue 2 mg BID. Continue PPI while on steroids.   -Thrombocytopenia:  Plts 54 on labs yesterday. Will f/u at next lab draw.     -Malnutrition: Poor PO intake. Placement of PEG cancelled due to discussion to pursue hospice. PO intake for pleasure. Aspiration precautions.   -Hypoglycemia 2/2 malnutrition:  Improved with IVF's. Continue D5.   -Hyponatremia: Na 137 on last labs, will f/u at next draw. Na tabs to 2 g BID.    -Hyperkalemia: improved. K 4.3 on previous labs.  -Anemia: H&H 7/23.1. Cannot receive iron replacement PO.     -Psyc: Continue redirection and delirium precautions. Continue Seroquel TID PRN and Haldol prn qhs.   -HTN:  Maintain SBP < 160. Home dose of Norvasc has been held since SBP at goal. Will continue to monitor and restart if SBP becomes elevated.   -Hypothyroidism: Continue home dose of Synthroid.    -Tobacco abuse: Continue nicotine patch  -COPD/Atelectasis prevention: Continue duo nebs PRN, pulse ox q 4 hours  -DVT prophylaxis: ABI's, SCD's.   -Bowel regimen: senna and miralax BID PRN      DISPO: Accepted to inpatient hospice. Family has made patient DNR. Pending transport.

## 2019-09-09 NOTE — PLAN OF CARE
SW following for DC needs. SW in communication with Rhonda GARCIA.    Patient accepted at [facility] with [facility].      09/09/19 1663   Post-Acute Status   Post-Acute Authorization Placement;Home Health/Hospice   Post-Acute Placement Status Authorization Obtained   Home Health/Hospice Status Set-up Complete     Cindy Valladares LMSW  [facility]  [id number]

## 2019-09-09 NOTE — PROGRESS NOTES
"[facility]  Neurosurgery  Progress Note    Subjective:     History of Present Illness: 59F w/ PMH COPD, HTN, hypothyroidism, stage 4 R small cell lung cancer s/p 6 cycles of carbo/etoposide in remission since 06/2018 who presents to [facility] ED from OSH w/ L parietal brain mass. Per EMS records patient had a 1d history of AMS and gait difficulty and was brought into the hospital by her family for evaluation. CTH @ OSH showed large L parietal brain mass with possible hemorrhagic component and she was transferred to [facility] for evaluation. MRI @ [facility] redemonstrated L nonenhancing parietal mass with minimal blood. Patient is confused and so ROS is tenuous.    Post-Op Info:  Procedure(s) (LRB):  L craniotomy for tumor (Left)   28 Days Post-Op     Interval History: Patient remains tachycardic. Somnolent. Patient will not open eyes to tactile stimuli however does say "ouch" when painful stimuli performed to BLE to assess movement. Dudley however will not follow commands.  Unable to assess remainder of neuro exam. Fluid boluses given to tx tachycardia and maintain BP. Febrile at 102 today, patient given rectal tylenol as she does not cooperate for PO meds. Long discussion between myself and patient's son and then palliative care and patient's son resulted and patient changing code status to DNR. See care update note 9/9 from myself and progress note from palliative care 9/9 for further detail. Plan to dc to inpatient hospice today.     Medications:  Continuous Infusions:   dextrose 5 % and 0.9 % NaCl 50 mL/hr at 09/07/19 8043     Scheduled Meds:   ascorbic acid (vitamin C)  250 mg Oral TID    dexAMETHasone  2 mg Oral Q12H    divalproex  500 mg Oral Q12H    ferrous sulfate  325 mg Oral TID    levothyroxine  75 mcg Oral Before breakfast    nicotine  1 patch Transdermal Daily    pantoprazole  40 mg Oral Daily    sodium chloride  2 g Oral BID     PRN Meds:acetaminophen, acetaminophen, albuterol-ipratropium, " Dextrose 10% Bolus, Dextrose 10% Bolus, glucagon (human recombinant), glucose, glucose, ondansetron, polyethylene glycol, QUEtiapine, senna-docusate 8.6-50 mg, sodium chloride 0.9%       Objective:     Weight: 33.8 kg (74 lb 8.3 oz)  Body mass index is 14.08 kg/m².  Vital Signs (Most Recent):  Temp: 99.6 °F (37.6 °C) (09/09/19 1544)  Pulse: (!) 131 (09/09/19 1544)  Resp: (!) 40 (09/09/19 1137)  BP: (!) 83/50 (09/09/19 1544)  SpO2: (!) 90 % (09/09/19 1544) Vital Signs (24h Range):  Temp:  [97 °F (36.1 °C)-102 °F (38.9 °C)] 99.6 °F (37.6 °C)  Pulse:  [121-162] 131  Resp:  [16-40] 40  SpO2:  [88 %-96 %] 90 %  BP: ()/(50-87) 83/50                   Neurosurgery Physical Exam    General: cachectic, no distress, somnolent  Head: normocephalic  GCS: Motor: 5/Verbal: 1/Eyes: 3 GCS Total: 9  Mental Status: Somnolent, will not open eyes to tactile stimuli however will verbalize to painful stimuli. Will not answer any questions.   Cranial nerves: face symmetric, CN II-XII grossly intact.   Eyes: Pupils anisocoric, round, reactive to light with accomodation, EOMI. Disconjugate gaze.  Pulmonary: normal respirations, no signs of respiratory distress  Abdomen: soft, non-distended, not tender to palpation  Sensory: Response to light touch throughout  Motor Strength: Moves all extremities spontaneously with good tone. Will not follow commands. No abnormal movements seen.   Pronator Drift:  Unable to assess   Finger-to-nose:  Unable to assess         Incision well healed.  No surrounding erythema or edema.  No drainage from incision, nontender to palpation.    Significant Labs:  No results for input(s): GLU, NA, K, CL, CO2, BUN, CREATININE, CALCIUM, MG in the last 48 hours.  No results for input(s): WBC, HGB, HCT, PLT in the last 48 hours.  No results for input(s): LABPT, INR, APTT in the last 48 hours.  Microbiology Results (last 7 days)     ** No results found for the last 168 hours. **        Recent Lab Results        09/09/19  0610        POCT Glucose 131         All pertinent labs from the last 24 hours have been reviewed.    Significant Diagnostics:  No new imaging to review.     Assessment/Plan:     * Brain metastasis  59F w/ PMH COPD, HTN, hypothyroidism, stage 4 R small cell lung cancer s/p 6 cycles of carbo/etoposide in remission since 06/2018 who presents to [facility] ED from OSH w/ L parietal brain mass. Now s/p crani for debulking on 8/12.    -Patient remains somnolent today. Verbalizes to tactile stimuli. Does not answer questions. Moves all extremities spontaneously however will not follow commands. Patient has not taken any of her oral meds, nursing documentation reports patient clenches jaw tight with attempts. HR sustaining 140s. Febrile at 102 today, Tylenol suppository given. 1x bolus at 500cc given this morning, 1x bolus 1L given this afternoon. Will continue IVF until transportation arrives.   -Palliative care discussed code status with patient's son. Now DNR. See progress note 9/9 for detail.   -DC daily labs, SQH, progressive mobility orders. Vitals/neuro checks q 6h.     -Pathology (8/19): Metastatic small cell carcinoma. Patient pending acceptance to Hospice, discharge today to inpatient hospice.   -Seizure prevention: Continue Depakote 500mg BID.   -Cerebral edema: Completed 3 week Dex taper. Continue 2 mg BID. Continue PPI while on steroids.   -Thrombocytopenia:  Plts 54 on labs yesterday. Will f/u at next lab draw.     -Malnutrition: Poor PO intake. Placement of PEG cancelled due to discussion to pursue hospice. PO intake for pleasure. Aspiration precautions.   -Hypoglycemia 2/2 malnutrition:  Improved with IVF's. Continue D5.   -Hyponatremia: Na 137 on last labs, will f/u at next draw. Na tabs to 2 g BID.    -Hyperkalemia: improved. K 4.3 on previous labs.  -Anemia: H&H 7/23.1. Cannot receive iron replacement PO.     -Psyc: Continue redirection and delirium precautions. Continue Seroquel TID PRN and Haldol  prn qhs.   -HTN:  Maintain SBP < 160. Home dose of Norvasc has been held since SBP at goal. Will continue to monitor and restart if SBP becomes elevated.   -Hypothyroidism: Continue home dose of Synthroid.   -Tobacco abuse: Continue nicotine patch  -COPD/Atelectasis prevention: Continue duo nebs PRN, pulse ox q 4 hours  -DVT prophylaxis: ABI's, SCD's.   -Bowel regimen: senna and miralax BID PRN      DISPO: Accepted to inpatient hospice. Family has made patient DNR. Pending transport.         Ashley Reddy PA-C  Neurosurgery  [facility]

## 2019-09-09 NOTE — SUBJECTIVE & OBJECTIVE
"Interval History: Patient remains tachycardic. Somnolent. Patient will not open eyes to tactile stimuli however does say "ouch" when painful stimuli performed to BLE to assess movement. Dudley however will not follow commands.  Unable to assess remainder of neuro exam. Fluid boluses given to tx tachycardia and maintain BP. Febrile at 102 today, patient given rectal tylenol as she does not cooperate for PO meds. Long discussion between myself and patient's son and then palliative care and patient's son resulted and patient changing code status to DNR. See care update note 9/9 from myself and progress note from palliative care 9/9 for further detail. Plan to dc to inpatient hospice today.     Medications:  Continuous Infusions:   dextrose 5 % and 0.9 % NaCl 50 mL/hr at 09/07/19 1733     Scheduled Meds:   ascorbic acid (vitamin C)  250 mg Oral TID    dexAMETHasone  2 mg Oral Q12H    divalproex  500 mg Oral Q12H    ferrous sulfate  325 mg Oral TID    levothyroxine  75 mcg Oral Before breakfast    nicotine  1 patch Transdermal Daily    pantoprazole  40 mg Oral Daily    sodium chloride  2 g Oral BID     PRN Meds:acetaminophen, acetaminophen, albuterol-ipratropium, Dextrose 10% Bolus, Dextrose 10% Bolus, glucagon (human recombinant), glucose, glucose, ondansetron, polyethylene glycol, QUEtiapine, senna-docusate 8.6-50 mg, sodium chloride 0.9%       Objective:     Weight: 33.8 kg (74 lb 8.3 oz)  Body mass index is 14.08 kg/m².  Vital Signs (Most Recent):  Temp: 99.6 °F (37.6 °C) (09/09/19 1544)  Pulse: (!) 131 (09/09/19 1544)  Resp: (!) 40 (09/09/19 1137)  BP: (!) 83/50 (09/09/19 1544)  SpO2: (!) 90 % (09/09/19 1544) Vital Signs (24h Range):  Temp:  [97 °F (36.1 °C)-102 °F (38.9 °C)] 99.6 °F (37.6 °C)  Pulse:  [121-162] 131  Resp:  [16-40] 40  SpO2:  [88 %-96 %] 90 %  BP: ()/(50-87) 83/50                   Neurosurgery Physical Exam    General: cachectic, no distress, somnolent  Head: normocephalic  GCS: " Motor: 5/Verbal: 1/Eyes: 3 GCS Total: 9  Mental Status: Somnolent, will not open eyes to tactile stimuli however will verbalize to painful stimuli. Will not answer any questions.   Cranial nerves: face symmetric, CN II-XII grossly intact.   Eyes: Pupils anisocoric, round, reactive to light with accomodation, EOMI. Disconjugate gaze.  Pulmonary: normal respirations, no signs of respiratory distress  Abdomen: soft, non-distended, not tender to palpation  Sensory: Response to light touch throughout  Motor Strength: Moves all extremities spontaneously with good tone. Will not follow commands. No abnormal movements seen.   Pronator Drift:  Unable to assess   Finger-to-nose:  Unable to assess         Incision well healed.  No surrounding erythema or edema.  No drainage from incision, nontender to palpation.    Significant Labs:  No results for input(s): GLU, NA, K, CL, CO2, BUN, CREATININE, CALCIUM, MG in the last 48 hours.  No results for input(s): WBC, HGB, HCT, PLT in the last 48 hours.  No results for input(s): LABPT, INR, APTT in the last 48 hours.  Microbiology Results (last 7 days)     ** No results found for the last 168 hours. **        Recent Lab Results       09/09/19  0610        POCT Glucose 131         All pertinent labs from the last 24 hours have been reviewed.    Significant Diagnostics:  No new imaging to review.

## 2019-09-09 NOTE — PLAN OF CARE
CM attempted to call son to advise that pateint would transfer to [facility] this evening with hospice care, however, there was no answer.

## 2019-09-09 NOTE — PLAN OF CARE
Patient will DC to [facility] in [place] this afternoon.     Patient will have Hospice with [facility].     SW arranged stretcher transport via [facility]. Requested  time is 6PM.  Requested  time does not guarantee arrival time.      Nurse call report to Johan at [phone]. Patient will go to # [phone].    SW notified nurse of the above.      09/09/19 9760   Post-Acute Status   Post-Acute Authorization Placement   Post-Acute Placement Status Set-up Complete     Cindy Valladares LMSW  [facility]  [id number]

## 2019-09-09 NOTE — CONSULTS
[facility]  Palliative Medicine  Consult Note    Patient Name: Carmen Leyva  MRN: [id number]  Admission Date: 8/11/2019  Hospital Length of Stay: 29 days  Code Status: Full Code   Attending Provider: Gabo Erickson MD  Consulting Provider: LAMBERTO Esquivel  Primary Care Physician: Kem Kiser MD  Principal Problem:Brain metastasis         Inpatient consult to Palliative Care  Consult performed by: LAMBERTO Hairston  Consult ordered by: Ashley Reddy PA-C  Reason for consult: goals of care   Assessment/Recommendations: Patient known to palliative care.  See initial consult note. 9/4/19.  Family is not able to be at bedside.  Will contact sonDavid by telephone.

## 2019-09-09 NOTE — PLAN OF CARE
Problem: Adult Inpatient Plan of Care  Goal: Plan of Care Review  Outcome: Ongoing (interventions implemented as appropriate)  Plan of care reviewed with pt. Unable to assess pt orientation. No apparent distress noted. Pt remained sinus tach during the shift. Called neuro sx due to pt heart rate remaining in the 130's. One time telephone order dose of Nacl 1000ml bolus given. Pt on continuous fluids @ 50ml/hr. VSS. Fall precautions maintained. Bed in lowest position, and locked. Call light within reach and advised to call for assistance. Side rails x 2 and slip resistant socks on at this time. WCTM.

## 2019-09-09 NOTE — NURSING
Printed discharge paperwork to give to ambulance service to hand off to Hospice patient being transferred to in [place], [place].  Peripheral IV to be discontinued right before transport arrives, per physician.  Telemetry discontinued earlier today, per physician. Awaiting ambulance service.

## 2019-09-11 NOTE — DISCHARGE SUMMARY
[facility]  Neurosurgery  Discharge Summary      Patient Name: Carmen Leyva  MRN: [id number]  Admission Date: 8/11/2019  Hospital Length of Stay: 29 days  Discharge Date and Time: 9/9/2019  Attending Physician: Gabo Erickson MD   Discharging Provider: Ashley Reddy PA-C  Primary Care Provider: Kem Kiser MD    HPI:   59F w/ PMH COPD, HTN, hypothyroidism, stage 4 R small cell lung cancer s/p 6 cycles of carbo/etoposide in remission since 06/2018 who presents to [facility] ED from OSH w/ L parietal brain mass. Per EMS records patient had a 1d history of AMS and gait difficulty and was brought into the hospital by her family for evaluation. CTH @ OSH showed large L parietal brain mass with possible hemorrhagic component and she was transferred to [facility] for evaluation. MRI @ [facility] redemonstrated L nonenhancing parietal mass with minimal blood. Patient is confused and so ROS is tenuous.    Procedure(s) (LRB):  L craniotomy for tumor (Left)     Hospital Course: 8/11: Admit NCC: MRI w/wo, Dex, NSGY following, SBP<160, keppra  8/12: add synthroid, send urine sodium add PPI, follow na q 8 hr. OR for Left temporal craniotomy for tumor. No intra op complications. Patient tolerated procedure well. Recovered in neuro ICU.   8/13: Post op MRI without detrimental findings. Add nicotine patch, incentive spirometry, d/c IVF, switch synthroid to PO, stepdown to NGSY team   8/14: Intermittent agitation overnight. Started on seroquel.  8/15: Remains in ICU, pending floor bed availability. Pain controlled. PT/OT recommending Rehab placement.   8/16: Patient agitated overnight, given Seroquel and placed in wrist restraints. Patient calm this morning. Started on iron for replacement. Decreased lab draws. Pending Rehab.   8/17: remains in hospital with measures in place to prevent agitation. Medically stable pending placement to rehab.   8/18: Had a code gray called over night for agitation, however was calm this morning  after receiving one time haldol. Added as PRN in addition to seroquel at night for active delerium. Otherwise neuro stable, not in restraints and preparing for hopeful rehab placement early next week.   8/19: NAEON. Stable neuro exam. No issues with agitation last night or this AM. No use of restraints.   8/20: NAEON, AFVSS, Exam stable, minimal agitation overnight, not requiring restraints. Now marks 72h without restraints. Medically stable for rehab.  8/21: NAEON. Neurologically stable. No agitation overnight. No restraints. Sitter still at bedside. Medically stable for discharge.  8/22: NAEON. Neurologically stable. Plts 55 today. 2 units of plts given. Sitter at bedside. Dispo pending SNF placement.   8/23: NAEON. Exam stable. No restraints, sitter at bedside. Pending SNF placement.  8/24: NAEON. Exam stable. Now with tele-sitter. SNF placement pending, multiple denials.  8/25: NAEON, patient stable on exam. Tele-sitter at bedside. Medically stable. PLT count improved to 115.  8/26 - 8/27: NAEON. Neuro exam stable, patient remains disoriented but calm. Working on placement.  8/28: Hypoglycemia due to poor PO intake. Improved with food and glucagon injection. Plts 89, continuing to monitor. Staples removed without complication. Patient tolerated removal well. Pending facility acceptance. Patient has been denied by [facility], [facility], [facility], [facility], [facility], and [facility]. Medically stable for discharge.   8/29: Neuro exam stable. Hypoglycemia this AM. Received glucose tablet. Pending rehab facility acceptance.   8/30: NAEON. Increased tangential speech today. Last MRI brain 8/13/19. MRI brain ordered for today.  8/31: Exam remains stable in comparison to yesterdays exam. Tangential speech present. Pending discharge to nursing home, patient has been denied by every nursing home referrals  sent to. Family declining option for hospice.   9/1: Pt with confusion overnight prompting head CT. Intervally stable and pt at baseline.  9/2: head CT from yesterday reviewed and showed no acute changes to explain increased confusion. Awaiting placement. Non-participatory on exam today, but nurse states this is normal for her to wax and wane. Nurse reports she was walking and talking prior to PA visit. H/H 8/27. Will repeat CBC tomorrow.   9/3:  No acute events overnight.  Tangential speech continues.  No changes in mental status in comparison to weekend.  Imaging obtained over the weekend reviewed.  No detrimental change in comparison to prior studies.  Platelets remained stable at 69k, will hold off on transfusion.  Patient unable to complete ROS.  Lying in bed with head of bed elevated in no acute distress.  Patient is seen in the wheelchair on the unit earlier in the day.  Intermittently talkative with staff.  Heme-Onc and RadOnc do not recommend inpatient treatment, no systemic treatment per oncology. RadOnc stating WBR once more stable, however do not know how much this will improve her function. Patient accepted by hospice facility.  Patient's family deciding on post acute care needs, declining hospice at this time.  Goals of care discussion to be done this week with patient's family.  9/4: Continue hypoglycemia due to poor PO intake. Spoke with son, David, who agreed to PEG placement. IR consulted. CT abdomen ordered. Palliative care consulted to discuss goals of care with son and sister. Pending placement.   9/5: Palliative medicine consulted yesterday, family wishes to pursue inpatient hospice, PEG cancelled.   9/6: Hypoglycemia stable with continuous IVF's. Daily labs and SQH stopped. Pending acceptance to inpatient hospice facility.   9/7: NAEON. Plan for discharge to inpatient hospice Monday. Exam stable from yesterday, patient remains somnolent however arousable to voice. Does not follow commands  "however moves all extremities spontaneously. PERRL.   9/8: Tachycardic. Pending discharge to inpatient hospice.   9/9: Patient remains tachycardic. Somnolent. Patient will not open eyes to tactile stimuli however does say "ouch" when painful stimuli performed to BLE to assess movement. Dudley however will not follow commands. PERRL. Unable to assess remainder of neuro exam. Fluid boluses given to tx tachycardia and maintain BP. Long discussion between myself and patient's son and then palliative care and patient's son resulted and patient changing code status to DNR. See care update note 9/9 from myself and progress note from palliative care 9/9 for further detail. Plan to dc to inpatient hospice today.     Consults:   Consults (From admission, onward)        Status Ordering Provider     Inpatient consult to Hematology/Oncology  Once     Provider:  (Not yet assigned)    Completed TYLER MURPHY     Inpatient consult to Interventional Radiology  Once     Provider:  (Not yet assigned)    Completed TYLER MURPHY     Inpatient consult to Midline team  Once     Provider:  (Not yet assigned)    Completed DARIANA TOLLIVER     Inpatient consult to Palliative Care  Once     Provider:  (Not yet assigned)    Completed TYLER MURPHY     Inpatient consult to Palliative Care  Once     Provider:  (Not yet assigned)    Completed MARIO ALBERTO LLANES     Inpatient consult to Radiation Oncology  Once     Provider:  (Not yet assigned)    Completed TYLER MURPHY     Inpatient consult to Social Work  Once     Provider:  (Not yet assigned)    Completed ESSENCE MONTES          Significant Diagnostic Studies: Labs: BMP: No results for input(s): GLU, NA, K, CL, CO2, BUN, CREATININE, CALCIUM, MG in the last 48 hours., CBC No results for input(s): WBC, HGB, HCT, PLT in the last 48 hours. and All labs within the past 24 hours have been reviewed  Radiology: MRI brain w wo contrast, CT head without contrast    Pending Diagnostic Studies:     " None        Final Active Diagnoses:    Diagnosis Date Noted POA    PRINCIPAL PROBLEM:  Brain metastasis [C79.31] 08/11/2019 Yes    Palliative care encounter [Z51.5] 09/05/2019 Not Applicable    Goals of care, counseling/discussion [Z71.89]  Not Applicable    Advanced care planning/counseling discussion [Z71.89]  Not Applicable    Moderate malnutrition [E44.0] 08/27/2019 Unknown    Encephalopathies [G93.40] 08/14/2019 Yes    Episodic cigarette smoking dependence [F17.210] 08/13/2019 Yes    Seizure prophylaxis [Z29.8] 08/11/2019 Not Applicable    Vasogenic brain edema [G93.6] 08/11/2019 Unknown    Hypothyroid [E03.9] 07/05/2012 Yes    HTN (hypertension) [I10] 07/05/2012 Yes    COPD (chronic obstructive pulmonary disease) [J44.9] 07/05/2012 Yes      Problems Resolved During this Admission:      Discharged Condition: stable, discharging to hospice    Disposition: Hospice/Medical Facility    Follow Up: 3 month postop craniotomy visit in NSGY clinic    Patient Instructions:   Written instructions provided in Discharge Tab on EPIC  Medications:  Reconciled Home Medications:      Medication List      START taking these medications    * acetaminophen 325 MG tablet  Commonly known as:  TYLENOL  Take 2 tablets (650 mg total) by mouth every 6 (six) hours as needed.     * acetaminophen 650 MG Supp  Commonly known as:  TYLENOL  Place 1 suppository (650 mg total) rectally every 6 (six) hours as needed (101).     dexAMETHasone 2 MG tablet  Commonly known as:  DECADRON  Take 1 tablet (2 mg total) by mouth every 12 (twelve) hours.     divalproex 500 MG Tbec  Commonly known as:  DEPAKOTE  Take 1 tablet (500 mg total) by mouth every 12 (twelve) hours.     nicotine 7 mg/24 hr  Commonly known as:  NICODERM CQ  Place 1 patch onto the skin once daily.     QUEtiapine 25 MG Tab  Commonly known as:  SEROQUEL  Take 1 tablet (25 mg total) by mouth 3 (three) times daily as needed (agitation).     sodium chloride 1 gram tablet  Take 2  tablets (2 g total) by mouth 3 (three) times daily.         * This list has 2 medication(s) that are the same as other medications prescribed for you. Read the directions carefully, and ask your doctor or other care provider to review them with you.            CONTINUE taking these medications    levothyroxine 137 MCG Tab tablet  Commonly known as:  SYNTHROID  Take by mouth before breakfast.     pantoprazole 40 MG tablet  Commonly known as:  PROTONIX  Take 40 mg by mouth once daily.     promethazine 25 MG tablet  Commonly known as:  PHENERGAN  TAKE ONE TABLET BY MOUTH EVERY 6 HOURS AS NEEDED FOR NAUSEA     PROVENTIL HFA 90 mcg/actuation inhaler  Generic drug:  albuterol  Inhale 2 puffs into the lungs every 6 (six) hours as needed.        STOP taking these medications    alendronate 70 MG tablet  Commonly known as:  FOSAMAX     allopurinol 300 MG tablet  Commonly known as:  ZYLOPRIM     amLODIPine 5 MG tablet  Commonly known as:  NORVASC     atorvastatin 10 MG tablet  Commonly known as:  LIPITOR     calcium citrate 200 mg (950 mg) tablet  Commonly known as:  CALCITRATE     LORazepam 1 MG tablet  Commonly known as:  ATIVAN     oxyCODONE 15 MG Tab  Commonly known as:  ROXICODONE     oxyCODONE 15 mg Tr12 12 hr tablet  Commonly known as:  OXYCONTIN            Ashley Reddy PA-C  Neurosurgery  [facility]

## 2019-09-12 ENCOUNTER — TELEPHONE (OUTPATIENT)
Dept: HOME HEALTH SERVICES | Facility: HOSPITAL | Age: 59
End: 2019-09-12

## 2020-07-09 NOTE — SUBJECTIVE & OBJECTIVE
Endoscopy  [facility]  [phone] Interval History: NAEON. Plan for discharge to inpatient hospice Monday. Patient resting in bed. Exam stable from yesterday, patient remains somnolent however arousable to voice. Does not follow commands however moves all extremities spontaneously.     Medications:  Continuous Infusions:   dextrose 5 % and 0.9 % NaCl 50 mL/hr at 09/05/19 0938     Scheduled Meds:   ascorbic acid (vitamin C)  250 mg Oral TID    dexAMETHasone  2 mg Oral Q12H    divalproex  500 mg Oral Q12H    ferrous sulfate  325 mg Oral TID    levothyroxine  75 mcg Oral Before breakfast    nicotine  1 patch Transdermal Daily    pantoprazole  40 mg Oral Daily    sodium chloride  2 g Oral BID     PRN Meds:acetaminophen, albuterol-ipratropium, Dextrose 10% Bolus, Dextrose 10% Bolus, glucagon (human recombinant), glucose, glucose, ondansetron, polyethylene glycol, QUEtiapine, senna-docusate 8.6-50 mg, sodium chloride 0.9%       Objective:     Weight: 34.6 kg (76 lb 4.5 oz)  Body mass index is 14.41 kg/m².  Vital Signs (Most Recent):  Temp: 99.3 °F (37.4 °C) (09/06/19 1200)  Pulse: 102 (09/06/19 1207)  Resp: 18 (09/06/19 1200)  BP: 129/69 (09/06/19 1200)  SpO2: 95 % (09/06/19 1200) Vital Signs (24h Range):  Temp:  [97.4 °F (36.3 °C)-99.5 °F (37.5 °C)] 99.3 °F (37.4 °C)  Pulse:  [] 102  Resp:  [16-18] 18  SpO2:  [95 %-96 %] 95 %  BP: (122-136)/(63-80) 129/69     Date 09/06/19 0700 - 09/07/19 0659   Shift 9771-7120 6358-7350 6160-0187 24 Hour Total   INTAKE   Shift Total(mL/kg)       OUTPUT   Urine(mL/kg/hr) 300   300   Shift Total(mL/kg) 300(8.7)   300(8.7)   Weight (kg) 34.6 34.6 34.6 34.6                   Neurosurgery Physical Exam  General: cachectic, no distress, somnolent  Head: normocephalic  GCS: Motor: 5/Verbal: 1/Eyes: 3 GCS Total: 9  Mental Status: Somnolent, arouses to verbal stimulus but quickly falls back asleep. Will not answer any questions.   Cranial nerves: face symmetric, CN II-XII grossly intact.   Eyes: Pupils  anisocoric, round, reactive to light with accomodation, EOMI. Disconjugate gaze.  Pulmonary: normal respirations, no signs of respiratory distress  Abdomen: soft, non-distended, not tender to palpation  Sensory: Response to light touch throughout  Motor Strength: Moves all extremities spontaneously with good tone. Will not follow commands. No abnormal movements seen.   Pronator Drift:  Unable to assess   Finger-to-nose:  Unable to assess         Incision well healed.  No surrounding erythema or edema.  No drainage from incision, nontender to palpation.    Significant Labs:  Recent Labs   Lab 09/06/19  0324   GLU 88      K 4.3      CO2 30*   BUN 13   CREATININE 0.7   CALCIUM 8.6*     Recent Labs   Lab 09/06/19  0324   WBC 10.95   HGB 7.0*   HCT 23.1*   PLT 54*     No results for input(s): LABPT, INR, APTT in the last 48 hours.  Microbiology Results (last 7 days)     ** No results found for the last 168 hours. **        All pertinent labs from the last 24 hours have been reviewed.    Significant Diagnostics:  I have reviewed all pertinent imaging results/findings within the past 24 hours.

## 2021-10-18 NOTE — TELEPHONE ENCOUNTER
Pt will be NPO r/t to a urology consult. Pt able to sign her consents. Pt continues to have pain to the right hip, IV pain medication helps with symptoms, slight repositioned helps with discomfort. Pt in bed bed in low call bell in reach will continue to monitor. Rapid COVID 19 test obtained per protocol, pt tolerated procedure well, bed in low call bell in reach will continue to monitor. Spoke to her sister, Preston. She will contact the [facility] and have her come to [facility] out patient for a platelet transfusion today.

## 2022-01-10 NOTE — PLAN OF CARE
Problem: SLP Goal  Goal: SLP Goal  Speech Language Pathology Goals  Goals expected to be met by 8/20:  1. Patient will tolerate a regular diet and thin liquids with no overt signs of airway compromise.   2. Patient will participate in a full speech, language, and cognitive assessment when appropriate.  MET  3. Pt will answer simple yes/no questions with 80% accy   4. Pt will complete simple problem solving tasks with 70% accy   5. Pt will orient x4 with mod cues  6. Pt will participate in ongoing assessment of cognitive linguistic skills.        Speech language cognitive eval completed. Please see note for details.   Mami Mo, LORETTA-SLP  8/14/2019         Attending Only

## 2022-01-21 NOTE — ASSESSMENT & PLAN NOTE
Had a long discussion with the patient about the life threatening terminal nature of this disease.  She has completed palliative XRT and is here for recommendations.  Will get staging CT scans to see where the cancer is now located and plan would be to begin Carbo/etop in a palliative fashion for six cycles.  Reviewed the risks and benefits in great detail with the patient.  She will need a PORT and chemo education and discussed this as well.  Will need weekly labs during treatment.      Will begin this process and hope to treat within the next 2 to 3 weeks.      Difficult diease with high mortality and low one year survival.     Mendel Bernard (:  1987) is a 29 y.o. female,Established patient, here for evaluation of the following chief complaint(s): Weight Management (ADIPEX #1), Discuss Labs, and Fatigue      ASSESSMENT/PLAN:    1. Chronic fatigue  Failing to change as expected. Will do basic labs to rule out certain common medical conditions: hematologic, renal, hepatic, electrolyte imbalances, thyroid disorders, vitamin deficiencies. -     CBC; Future  -     Comprehensive Metabolic Panel; Future  -     TSH without Reflex; Future  -     Vitamin D 25 Hydroxy; Future  -     Vitamin B12 & Folate; Future  2. Unintended weight gain  Worsening despite lifestyle changes  -     CBC; Future  -     Comprehensive Metabolic Panel; Future  -     TSH without Reflex; Future  3. Morbid obesity with BMI of 40.0-44.9, adult ([facility] [address]  Worsening  More intensive lifestyle changes discussed  We will do blood work, will start cinnamon and phentermine to help her lose weight  -     CBC; Future  -     Comprehensive Metabolic Panel; Future  -     Lipid Panel; Future  -     TSH without Reflex; Future  -     phentermine (ADIPEX-P) 37.5 MG tablet; Take 1 tablet by mouth every morning (before breakfast) for 30 days. , Disp-30 tablet, R-0Normal  -     Cinnamon 500 MG CAPS; Take 1 capsule by mouth daily, Disp-90 capsule, R-0Normal  Patient was asked about her current diet and exercise habits, and personalized advice was provided regarding recommended lifestyle changes. Patient's comorbid health conditions associated with elevated BMI were discussed, including mood disorder, as well as the likely benefits of weight loss. Based upon patient's motivation to change her behavior, the following plan was agreed upon to work toward a weight loss goal of 1-2 pounds per week: low carbohydrate diet, exercise for at least 30 minutes 4-5 days per week and medication prescribed: Adipex and cinnamon.      Advised to stop diet pop altogether and switch to caffeine  Educational materials for  weight loss were provided. Patient will follow-up in 1 month(s) with PCP. Provider spent 10 minutes counseling patient. 4. Recurrent major depressive disorder, in full remission ([address]  Stable  We will continue to monitor  Self discontinued Prozac  -     TSH without Reflex; Future  Lifestyle changes, would benefit from losing weight  5. Vitamin D deficiency  Unsure if improving or not. Will recheck level      -     Vitamin D 25 Hydroxy; Future  6. Vitamin B 12 deficiency  Unsure if improving or not. Will recheck level    -     Vitamin B12 & Folate; Future  7. Encounter for screening for other viral diseases  -     Hepatitis C Antibody; Future    She never completed prior blood work      01/05/2021 12/30/2020 1 Phentermine 37.5 Mg Tablet 30.00 30 Fl [facility] [id number] Cos ([id number])      Controlled Substance Monitoring:    Acute and Chronic Pain Monitoring:   RX Monitoring 1/21/2022   Attestation -   Periodic Controlled Substance Monitoring Possible medication side effects, risk of tolerance/dependence & alternative treatments discussed. ;No signs of potential drug abuse or diversion identified. ;Assessed functional status. Chronic Pain > 50 MEDD -         Radha received counseling on the following healthy behaviors: nutrition, exercise, medication adherence and weight loss  Reviewed prior labs and health maintenance  Discussed use, benefit, and side effects of prescribed medications. Barriers to medication compliance addressed. Patient given educational materials - see patient instructions  All patient questions answered. Patient voiced understanding. The patient's past medical,surgical, social, and family history as well as her current medications and allergies were reviewed as documented in today's encounter. Medications, labs, diagnostic studies, consultations and follow-up as documented in this encounter. Return for KEEP APPT.     Data Unavailable    Future Appointments   Date Time Provider Rex Rivera   2/18/2022  9:45 AM Ivette Hernandez MD [facility]   3/18/2022  9:30 AM [facility]         SUBJECTIVE/OBJECTIVE:    HPI     Patient reports she feels tired all the time, not improving, reports unintentional weight gain despite lifestyle changes. Denies fever, chills, night sweats. Denies increased appetite, thirst or polyuria. Denies cold or heat intolerance, dry skin, constipation. Patient reports the whole family is now fully vaccinated and boosted, one of the kids restarted going directly to school, and she feels less anxious regarding COVID-19 pandemic. Wants to lose weight. Larnell Angelucci has made a substantial good courtney effort to lose weight in a regimen for weight reduction based on caloric restriction, nutritional changes, and exercise  Larnell Angelucci reports she did: Changed her diet, eating more fruits and vegetables, being more active, drinking only diet pop and water. She has a new job which is more sedentary, has been hard to exercise but has been more active and she is planning to join [employer] at the beginning of February. Has been unable to  lose weight despite lifestyle changes. Contraindications to controlled substance anorexiant: NONE   Aurora Albertmabel reports no  use of illegal drug substances  Larnell Angelucci reports alcohol use of : None      Urine drug test done 11/10/2020    Contraceptive method: Patient just got tubal ligation on 12/15/2021    Patient informed that when prescribed a controlled substance for weight loss, the provider is required by law to see the patient for an appointment every thirty days. This is neccessary to record the weight and blood pressure and to assess patient's efforts to lose weight, and to ensure there are no contraindications or adverse effects.    Patient advised contraception during the time of taking this medication, advised no alcohol use during this time    OARRS done today and okay  Plan: diet, exercise and start Adipex and Cinnamon      blood pressure is Normal.    BP Readings from Last 3 Encounters:   01/21/22 124/72   09/17/21 130/72   07/01/21 134/72        Pulse is Normal.  Pulse Readings from Last 3 Encounters:   01/21/22 92   09/17/21 94   07/01/21 74       There is unintentional weight gain, significant, of 24 pounds in 4 months  Wt Readings from Last 3 Encounters:   01/21/22 249 lb 12.8 oz (113.3 kg)   09/17/21 225 lb 8 oz (102.3 kg)   07/01/21 225 lb (102.1 kg)       Morbid obesity per BMI  Body mass index is 44.25 kg/m². Depression is improved. She feels her marriage is not working well, her  works second shift, goes to sleep late morning, then he wakes up just in time to go back to work and not talking too much to each other, and not helping with the family life, and their 3 children  She just had tubal ligation. Her teenage daughter is doing better  Patient has self discontinued Prozac, does not feel she needs it. PHQ-2 Over the past 2 weeks, how often have you been bothered by any of the following problems? Little interest or pleasure in doing things: Not at all  Feeling down, depressed, or hopeless: Not at all  PHQ-2 Score: 0  PHQ-9 Over the past 2 weeks, how often have you been bothered by any of the following problems? PHQ-9 Total Score: 0  PHQ-9 Total Score: 0        PHQ Scores 1/21/2022 9/17/2021 4/21/2021 11/10/2020 4/3/2020 3/3/2020 2/4/2020   PHQ2 Score 0 0 6 0 0 0 6   PHQ9 Score 0 0 14 0 0 4 15     Radha has Vitamin D deficiency. Alisa Constantino  is not taking Vitamin D supplementation   she feels tired. Lab Results   Component Value Date    VITD25 26.5 (L) 11/10/2020     Vitamin B12 deficiency   Radha  is not taking Vitamin B12 supplementation. Radha reports fatigue. Lab Results   Component Value Date    [id number] 340 11/10/2020     Patient is due for hepatitis C screening.   Alisa Constantino 's indication is CDC recommendation. Prior to Visit Medications    Medication Sig Taking? Authorizing Provider   FLUoxetine (PROZAC) 10 MG capsule Take 1 capsule by mouth daily  Patient not taking: Reported on 1/21/2022  Ilia Angel MD   naratriptan (AMERGE) 2.5 MG tablet Take 1 tablet by mouth once as needed for Migraine 2.5 mg at onset of headache, may repeat in 4 hours if needed  Governor Viridiana MD       Social History     Tobacco Use    Smoking status: Former Smoker     Packs/day: 0.25     Years: 10.00     Pack years: 2.50     Types: Cigarettes     Quit date: 9/21/2011     Years since quitting: 10.3    Smokeless tobacco: Never Used   Vaping Use    Vaping Use: Never used   Substance Use Topics    Alcohol use: Yes     Comment: occassionally    Drug use: No         Review of Systems   Constitutional: Positive for fatigue and unexpected weight change. Negative for activity change, appetite change, chills, diaphoresis and fever. Respiratory: Negative for cough, chest tightness, shortness of breath and wheezing. Cardiovascular: Negative for chest pain, palpitations and leg swelling. Gastrointestinal: Negative for abdominal distention, abdominal pain, constipation, diarrhea, nausea and vomiting. Endocrine: Negative for cold intolerance, heat intolerance, polydipsia, polyphagia and polyuria. Psychiatric/Behavioral: Negative for dysphoric mood, self-injury, sleep disturbance and suicidal ideas. The patient is nervous/anxious.            -vital signs stable and within normal limits except morbid obesity per BMI  /72   Pulse 92   Temp 98.5 °F (36.9 °C)   Ht 5' 3\" (1.6 m)   Wt 249 lb 12.8 oz (113.3 kg)   SpO2 98%   BMI 44.25 kg/m²      Physical Exam  Vitals and nursing note reviewed. Constitutional:       General: She is not in acute distress. Appearance: Normal appearance. She is well-developed. She is obese. She is not diaphoretic. HENT:      Head: Normocephalic and atraumatic.       Right Ear: External ear normal.      Left Ear: External ear normal.      Mouth/Throat:      Comments: I did not examine the mouth due to coronavirus pandemic and wearing masks    Eyes:      General: Lids are normal. No scleral icterus. Right eye: No discharge. Left eye: No discharge. Extraocular Movements: Extraocular movements intact. Conjunctiva/sclera: Conjunctivae normal.   Neck:      Thyroid: No thyromegaly. Cardiovascular:      Rate and Rhythm: Normal rate and regular rhythm. Heart sounds: Normal heart sounds. No murmur heard. Pulmonary:      Effort: Pulmonary effort is normal. No respiratory distress. Breath sounds: Normal breath sounds. No wheezing or rales. Chest:      Chest wall: No tenderness. Abdominal:      General: Bowel sounds are normal. There is no distension. Palpations: Abdomen is soft. There is no hepatomegaly or splenomegaly. Tenderness: There is no abdominal tenderness. Comments: Obese abdomen. Musculoskeletal:         General: No tenderness. Normal range of motion. Cervical back: Normal range of motion and neck supple. Right lower leg: No edema. Left lower leg: No edema. Skin:     General: Skin is warm and dry. Capillary Refill: Capillary refill takes less than 2 seconds. Findings: No rash. Neurological:      Mental Status: She is alert and oriented to person, place, and time. Cranial Nerves: No cranial nerve deficit. Motor: No abnormal muscle tone. Psychiatric:         Mood and Affect: Mood is anxious. Behavior: Behavior normal.         Thought Content: Thought content normal.         Judgment: Judgment normal.             I personally reviewed testing . Discussed testing with the patient and all questions fully answered.   Vitamin B12 deficiency and vitamin D deficiency  Otherwise labs within normal limits        Lab Results   Component Value Date    WBC 4.7 11/10/2020    HGB 12.8 11/10/2020 HCT 36.9 11/10/2020    MCV 84.8 11/10/2020     11/10/2020       Lab Results   Component Value Date     11/10/2020    K 4.0 11/10/2020     11/10/2020    CO2 24 11/10/2020    BUN 11 11/10/2020    CREATININE 0.56 11/10/2020    GLUCOSE 96 11/10/2020    CALCIUM 9.2 11/10/2020        Lab Results   Component Value Date    ALT 14 11/10/2020    AST 17 11/10/2020    ALKPHOS 55 11/10/2020    BILITOT 0.49 11/10/2020       Lab Results   Component Value Date    TSH 1.51 11/10/2020       Lab Results   Component Value Date    CHOL 183 11/10/2020    CHOL 182 01/15/2019    CHOL 158 09/27/2016     Lab Results   Component Value Date    TRIG 70 11/10/2020    TRIG 105 01/15/2019    TRIG 66 09/27/2016     Lab Results   Component Value Date    HDL 84 11/10/2020    HDL 65 01/15/2019    HDL 62 09/27/2016     Lab Results   Component Value Date    LDLCALC 83 09/27/2016    LDLCHOLESTEROL 85 11/10/2020    LDLCHOLESTEROL 96 01/15/2019    LDLCHOLESTEROL 77 02/20/2014     Lab Results   Component Value Date    CHOLHDLRATIO 2.2 11/10/2020    CHOLHDLRATIO 2.8 01/15/2019    CHOLHDLRATIO 2.5 09/27/2016       Lab Results   Component Value Date    LABA1C 4.8 02/04/2020    LABA1C 4.9 01/18/2019    LABA1C 4.8 09/27/2016       Lab Results   Component Value Date    [id number] 340 11/10/2020       Lab Results   Component Value Date    FOLATE 12.2 11/10/2020       Lab Results   Component Value Date    VITD25 26.5 (L) 11/10/2020       Orders Placed This Encounter   Procedures    Hepatitis C Antibody     Standing Status:   Future     Standing Expiration Date:   1/21/2023    CBC     Standing Status:   Future     Standing Expiration Date:   1/21/2023    Comprehensive Metabolic Panel     Standing Status:   Future     Standing Expiration Date:   3/20/2022    Lipid Panel     Standing Status:   Future     Standing Expiration Date:   1/21/2023     Order Specific Question:   Is Patient Fasting?/# of Hours     Answer:   8-10 Hours, water ok to

## 2022-03-16 NOTE — TELEPHONE ENCOUNTER
----- Message from June Yost sent at 3/29/2018  8:44 AM CDT -----  Contact: GUILLE  PATIENT NEED RX REFILL   oxyCODONE (OXYCONTIN) 15 mg TR12 12 hr tablet 60 tablet   Please advise before holiday.   
Called pt tell her scrip was ready for    
None

## 2022-11-23 NOTE — ASSESSMENT & PLAN NOTE
- S/P resection  - Continue dexamethasone  - f/U path report    No - the patient is unable to be screened due to medical condition

## 2023-07-13 NOTE — SUBJECTIVE & OBJECTIVE
From: Rosemary Conklin  To: Eunice Cedillo  Sent: 7/12/2023 10:23 AM CDT  Subject: Tingling in legs, foot    Hi Dr. Cedillo,    I have been experiencing some tingling sensations in my legs recently. My right upper thigh has been numb sometimes at night which I think I mentioned to you before. It too, has some nerve \"aches\" along the inner leg at times. I believe that is a pinched nerve due to weight gain. I had it before and when I lost weight it went away.     Now I am experiencing some tingling on my left side lower calf and ankle area. It comes and goes. I'm nervous about this because years ago I had an MRI done and there was a small dot showing - and at the time they had me tested for MS. My doctor did not think that that is what it was, she thought it was due to my migraines. It was determined I did not have MS. But this tingling sensation makes me nervous now. Do you think I need to come in or can we do a virtual visit?    Thanks!   Interval History:     Past Medical History:   Diagnosis Date    Acid reflux     Anemia     Anxiety     Arthritis     Blindness - both eyes     Chronic kidney disease     COPD (chronic obstructive pulmonary disease)     GERD (gastroesophageal reflux disease)     Gout     Hypertension     Lung cancer     Lung cancer, main bronchus, right 1/2/2018    Osteopenia     Rheumatoid arteritis     SIADH (syndrome of inappropriate ADH production) 11/12/2017    Solitary kidney     Thyroid disease     Vitamin D deficiency        Past Surgical History:   Procedure Laterality Date    ABDOMINAL ADHESION SURGERY      ADENOIDECTOMY      kidney removal      right     L craniotomy for tumor Left 8/12/2019    Performed by [facility]o [facility]kson [facility] at [facility] OR [address] [place] [address]    TONSILLECTOMY         Review of patient's allergies indicates:   Allergen Reactions    Celebrex [celecoxib]      Due to kidney removal she can not take anything for RA    Ibuprofen      Due to kidney removal    Neurontin [gabapentin]     Sulfa (sulfonamide antibiotics) Hives    Topamax [topiramate] Swelling       Medications:  Continuous Infusions:   dextrose 5 % and 0.9 % NaCl 50 mL/hr at 09/05/19 0938     Scheduled Meds:   ascorbic acid (vitamin C)  250 mg Oral TID    dexAMETHasone  2 mg Oral Q12H    divalproex  500 mg Oral Q12H    ferrous sulfate  325 mg Oral TID    levothyroxine  75 mcg Oral Before breakfast    nicotine  1 patch Transdermal Daily    pantoprazole  40 mg Oral Daily    polyethylene glycol  17 g Oral Daily    senna-docusate 8.6-50 mg  1 tablet Oral BID    sodium chloride  2 g Oral TID     PRN Meds:acetaminophen, albuterol-ipratropium, Dextrose 10% Bolus, Dextrose 10% Bolus, glucagon (human recombinant), glucose, glucose, ondansetron, QUEtiapine, sodium chloride 0.9%     Review of Systems: unable to complete secondary to mental status change     Family History     Problem Relation (Age of Onset)    COPD Father    Clotting  disorder Mother    Diabetes Maternal Aunt, Paternal Uncle    Fibromyalgia Sister    Heart disease Father, Brother, Maternal Grandfather, Paternal Uncle    Hypertension Mother, Maternal Aunt    Neuropathy Father, Sister    Parkinsonism Maternal Grandfather    Sleep apnea Mother    Stroke Mother    Thyroid disease Mother, Sister        Tobacco Use    Smoking status: Former Smoker     Packs/day: 0.50     Types: Cigarettes     Last attempt to quit: 2017     Years since quittin.3    Smokeless tobacco: Never Used   Substance and Sexual Activity    Alcohol use: No     Alcohol/week: 0.0 oz     Comment: seldom    Drug use: No    Sexual activity: Never     Partners: Male         Objective:     Vital Signs (Most Recent):  Temp: 98.6 °F (37 °C) (19)  Pulse: (!) 113 (19 121)  Resp: 17 (19)  BP: 135/73 (19)  SpO2: (!) 92 % (19) Vital Signs (24h Range):  Temp:  [96.8 °F (36 °C)-99.5 °F (37.5 °C)] 98.6 °F (37 °C)  Pulse:  [] 113  Resp:  [15-20] 17  SpO2:  [92 %-95 %] 92 %  BP: (112-150)/(57-94) 135/73     Weight: 35.4 kg (78 lb)  Body mass index is 14.74 kg/m².    Review of Symptoms  Symptom Assessment (ESAS 0-10 scale)   ESAS 0 1 2 3 4 5 6 7 8 9 10   Pain              Dyspnea              Anxiety              Nausea              Depression               Anorexia              Fatigue              Insomnia              Restlessness               Agitation              CAM / Delirium __ --  ___+   Constipation     __ --  ___+   Diarrhea           __ --  ___+  Bowel Management Plan (BMP): No    Comments: having some confusion, unable to complete ESAS    Pain Assessment:   OME in 24 hours: 0    Performance Status: 30    ECOG Performance Status Grade: 3 - Confined to bed or chair 50% of waking hours    Physical Exam   Constitutional:   Malnourished, appears chronically ill    HENT:   S/p craniotomy    Eyes: Pupils are equal, round, and reactive to light. Conjunctivae  are normal.   Cardiovascular: Normal rate, regular rhythm and normal heart sounds.   Pulmonary/Chest: Effort normal and breath sounds normal. No respiratory distress.   Abdominal: Soft. Bowel sounds are normal. She exhibits no distension.   Genitourinary:   Genitourinary Comments: Pure wick urinary device- clear yellow urine    Neurological:   Arouses with verbal and tactile stimuli,  Spontaneous movement, does not follow simple commands    Skin: Skin is warm and dry.   Craniotomy incision healed    Psychiatric: Her behavior is normal.   Altered mental status, unable to assess.     Nursing note and vitals reviewed.      Significant Labs: All pertinent labs within the past 24 hours have been reviewed.  CBC:   Recent Labs   Lab 09/03/19  0439   WBC 10.82   HGB 9.2*   HCT 30.2*   *   PLT 69*     BMP:  No results for input(s): GLU, NA, K, CL, CO2, BUN, CREATININE, CALCIUM, MG in the last 24 hours.  LFT:  Lab Results   Component Value Date    AST 13 09/03/2019    ALKPHOS 79 09/03/2019    BILITOT 0.3 09/03/2019     Albumin:   Albumin   Date Value Ref Range Status   09/03/2019 3.3 (L) 3.5 - 5.2 g/dL Final   06/24/2019 3.9 3.1 - 4.7 g/dL      Protein:   Total Protein   Date Value Ref Range Status   09/03/2019 6.0 6.0 - 8.4 g/dL Final     Lactic acid:   No results found for: LACTATE    Significant Imaging: I have reviewed all pertinent imaging results/findings within the past 24 hours.    Advance Care Planning   Advanced Directives::  Living Will: No  LaPOST: No  Do Not Resuscitate Status: No  Medical Power of : No    Decision-Making Capacity: waxing and waning mental status unable to make decisions        Living Arrangements: Lives alone, has family in close proximity, has only one son,       Psychosocial/Cultural: , one son, two sisters.  No longer working since cancer diagnosis      Spiritual:     F- Mercedes and Belief: non Taoism Voodoo    I - Importance:   .  C - Community:     A  - Address in Care: amenable to  visits

## (undated) DEVICE — DURAPREP SURG SCRUB 26ML

## (undated) DEVICE — CONTAINER SPECIMEN STRL 4OZ

## (undated) DEVICE — BURR ROUTER TAPERED

## (undated) DEVICE — HOOK LONE STAR BLUNT 12MM

## (undated) DEVICE — CARTRIDGE OIL

## (undated) DEVICE — DRAPE SURGICAL STERI IRRG PCH

## (undated) DEVICE — COTTON BALLS 1/2IN

## (undated) DEVICE — DRESSING SURGICAL 1X1

## (undated) DEVICE — MARKERS SPHERZ PASSIVE

## (undated) DEVICE — SEE MEDLINE ITEM 154981

## (undated) DEVICE — HEMOSTAT SURGICEL 4X8IN

## (undated) DEVICE — SPONGE NEURO 1/4X1/4

## (undated) DEVICE — RUBBERBAND STERILE 3X1/8IN

## (undated) DEVICE — SEE MEDLINE ITEM 152622

## (undated) DEVICE — KIT FIBRIN SEALANT EVICEL 5 ML

## (undated) DEVICE — SUT 4/0 18IN NUROLON BLK B

## (undated) DEVICE — SPRAY MASTISOL

## (undated) DEVICE — TUBE FRAZIER 5MM 2FT SOFT TIP

## (undated) DEVICE — DIFFUSER

## (undated) DEVICE — DRESSING SURGICAL 3/4X3/4

## (undated) DEVICE — SUT CTD VICRYL BR CR/SH VIL

## (undated) DEVICE — SEE MEDLINE ITEM 146292

## (undated) DEVICE — BLADE SURG CARBON STEEL SZ11

## (undated) DEVICE — ELECTRODE REM PLYHSV RETURN 9

## (undated) DEVICE — MARKER SKIN STND TIP BLUE BARR

## (undated) DEVICE — DRESSING SURGICAL 1/2X1/2

## (undated) DEVICE — BUR BONE CUT MICRO TPS 3X3.8MM

## (undated) DEVICE — PINS SKULL ADULT MAYFIELD
Type: IMPLANTABLE DEVICE | Site: CRANIAL | Status: NON-FUNCTIONAL
Removed: 2019-08-12

## (undated) DEVICE — DRAPE OPMI STERILE

## (undated) DEVICE — BURR ACORN 7.5MM

## (undated) DEVICE — WARMER DRAPE STERILE LF

## (undated) DEVICE — TAPE SURG MEDIPORE 6X72IN

## (undated) DEVICE — ROUTER TAPERED 2.3MM

## (undated) DEVICE — DRAPE THYROID WITH ARMBOARD

## (undated) DEVICE — HOOK STAY ELAS 5MM 8EA/PK

## (undated) DEVICE — SEE MEDLINE ITEM 156905

## (undated) DEVICE — CORD BIPOLAR 12 FOOT

## (undated) DEVICE — DRESSING SURGICAL 1X3

## (undated) DEVICE — FORCEP SPETZLER MALIS 8IN 1MM

## (undated) DEVICE — BIT DRILL WIRE PASS 1.0MM